# Patient Record
Sex: FEMALE | Race: BLACK OR AFRICAN AMERICAN | NOT HISPANIC OR LATINO | Employment: OTHER | URBAN - METROPOLITAN AREA
[De-identification: names, ages, dates, MRNs, and addresses within clinical notes are randomized per-mention and may not be internally consistent; named-entity substitution may affect disease eponyms.]

---

## 2023-10-02 ENCOUNTER — TELEPHONE (OUTPATIENT)
Dept: NEUROLOGY | Facility: CLINIC | Age: 78
End: 2023-10-02
Payer: COMMERCIAL

## 2023-10-02 NOTE — TELEPHONE ENCOUNTER
Called Back- No Answer, Left detailed message to return call the refill line with medication names, local pharmacy- her refill message was not clear.

## 2023-12-28 ENCOUNTER — APPOINTMENT (OUTPATIENT)
Dept: CARDIOLOGY | Facility: HOSPITAL | Age: 78
DRG: 300 | End: 2023-12-28
Payer: COMMERCIAL

## 2023-12-28 ENCOUNTER — HOSPITAL ENCOUNTER (INPATIENT)
Facility: HOSPITAL | Age: 78
LOS: 1 days | Discharge: HOME | DRG: 300 | End: 2023-12-29
Attending: EMERGENCY MEDICINE | Admitting: INTERNAL MEDICINE
Payer: COMMERCIAL

## 2023-12-28 ENCOUNTER — ANCILLARY PROCEDURE (OUTPATIENT)
Dept: EMERGENCY MEDICINE | Facility: HOSPITAL | Age: 78
DRG: 300 | End: 2023-12-28
Payer: COMMERCIAL

## 2023-12-28 ENCOUNTER — APPOINTMENT (OUTPATIENT)
Dept: RADIOLOGY | Facility: HOSPITAL | Age: 78
DRG: 300 | End: 2023-12-28
Payer: COMMERCIAL

## 2023-12-28 DIAGNOSIS — R94.31 ACUTE ELECTROCARDIOGRAM CHANGES: ICD-10-CM

## 2023-12-28 DIAGNOSIS — I82.519 CHRONIC DEEP VEIN THROMBOSIS (DVT) OF FEMORAL VEIN, UNSPECIFIED LATERALITY (MULTI): Primary | ICD-10-CM

## 2023-12-28 DIAGNOSIS — R06.02 SOB (SHORTNESS OF BREATH): ICD-10-CM

## 2023-12-28 LAB
ALBUMIN SERPL BCP-MCNC: 3.8 G/DL (ref 3.4–5)
ALP SERPL-CCNC: 85 U/L (ref 33–136)
ALT SERPL W P-5'-P-CCNC: 4 U/L (ref 7–45)
ANION GAP BLDV CALCULATED.4IONS-SCNC: 8 MMOL/L (ref 10–25)
ANION GAP SERPL CALC-SCNC: 10 MMOL/L (ref 10–20)
APTT PPP: 45 SECONDS (ref 27–38)
AST SERPL W P-5'-P-CCNC: 10 U/L (ref 9–39)
ATRIAL RATE: 85 BPM
BASE EXCESS BLDV CALC-SCNC: -0.5 MMOL/L (ref -2–3)
BASOPHILS # BLD AUTO: 0.03 X10*3/UL (ref 0–0.1)
BASOPHILS NFR BLD AUTO: 0.6 %
BILIRUB SERPL-MCNC: 0.3 MG/DL (ref 0–1.2)
BNP SERPL-MCNC: 61 PG/ML (ref 0–99)
BODY TEMPERATURE: 37 DEGREES CELSIUS
BUN SERPL-MCNC: 21 MG/DL (ref 6–23)
CA-I BLDV-SCNC: 1.34 MMOL/L (ref 1.1–1.33)
CALCIUM SERPL-MCNC: 9.7 MG/DL (ref 8.6–10.6)
CARDIAC TROPONIN I PNL SERPL HS: 14 NG/L (ref 0–34)
CARDIAC TROPONIN I PNL SERPL HS: 19 NG/L (ref 0–34)
CHLORIDE BLDV-SCNC: 111 MMOL/L (ref 98–107)
CHLORIDE SERPL-SCNC: 112 MMOL/L (ref 98–107)
CO2 SERPL-SCNC: 23 MMOL/L (ref 21–32)
CREAT SERPL-MCNC: 1.79 MG/DL (ref 0.5–1.05)
EOSINOPHIL # BLD AUTO: 0.13 X10*3/UL (ref 0–0.4)
EOSINOPHIL NFR BLD AUTO: 2.6 %
ERYTHROCYTE [DISTWIDTH] IN BLOOD BY AUTOMATED COUNT: 14.3 % (ref 11.5–14.5)
FLUAV RNA RESP QL NAA+PROBE: NOT DETECTED
FLUBV RNA RESP QL NAA+PROBE: NOT DETECTED
GFR SERPL CREATININE-BSD FRML MDRD: 29 ML/MIN/1.73M*2
GLUCOSE BLDV-MCNC: 97 MG/DL (ref 74–99)
GLUCOSE SERPL-MCNC: 102 MG/DL (ref 74–99)
HCO3 BLDV-SCNC: 25.4 MMOL/L (ref 22–26)
HCT VFR BLD AUTO: 31.9 % (ref 36–46)
HCT VFR BLD EST: 33 % (ref 36–46)
HGB BLD-MCNC: 11.1 G/DL (ref 12–16)
HGB BLDV-MCNC: 11.1 G/DL (ref 12–16)
IMM GRANULOCYTES # BLD AUTO: 0.01 X10*3/UL (ref 0–0.5)
IMM GRANULOCYTES NFR BLD AUTO: 0.2 % (ref 0–0.9)
INR PPP: 2.7 (ref 0.9–1.1)
LACTATE BLDV-SCNC: 1 MMOL/L (ref 0.4–2)
LYMPHOCYTES # BLD AUTO: 1.29 X10*3/UL (ref 0.8–3)
LYMPHOCYTES NFR BLD AUTO: 26.2 %
MCH RBC QN AUTO: 31.5 PG (ref 26–34)
MCHC RBC AUTO-ENTMCNC: 34.8 G/DL (ref 32–36)
MCV RBC AUTO: 91 FL (ref 80–100)
MONOCYTES # BLD AUTO: 0.37 X10*3/UL (ref 0.05–0.8)
MONOCYTES NFR BLD AUTO: 7.5 %
NEUTROPHILS # BLD AUTO: 3.1 X10*3/UL (ref 1.6–5.5)
NEUTROPHILS NFR BLD AUTO: 62.9 %
NRBC BLD-RTO: 0 /100 WBCS (ref 0–0)
OXYHGB MFR BLDV: 75.7 % (ref 45–75)
P AXIS: 22 DEGREES
P OFFSET: 182 MS
P ONSET: 135 MS
PCO2 BLDV: 46 MM HG (ref 41–51)
PH BLDV: 7.35 PH (ref 7.33–7.43)
PLATELET # BLD AUTO: 190 X10*3/UL (ref 150–450)
PO2 BLDV: 49 MM HG (ref 35–45)
POTASSIUM BLDV-SCNC: 4.1 MMOL/L (ref 3.5–5.3)
POTASSIUM SERPL-SCNC: 4 MMOL/L (ref 3.5–5.3)
PR INTERVAL: 176 MS
PROT SERPL-MCNC: 7 G/DL (ref 6.4–8.2)
PROTHROMBIN TIME: 31.2 SECONDS (ref 9.8–12.8)
Q ONSET: 223 MS
QRS COUNT: 14 BEATS
QRS DURATION: 136 MS
QT INTERVAL: 414 MS
QTC CALCULATION(BAZETT): 492 MS
QTC FREDERICIA: 465 MS
R AXIS: -57 DEGREES
RBC # BLD AUTO: 3.52 X10*6/UL (ref 4–5.2)
SAO2 % BLDV: 81 % (ref 45–75)
SARS-COV-2 RNA RESP QL NAA+PROBE: NOT DETECTED
SODIUM BLDV-SCNC: 140 MMOL/L (ref 136–145)
SODIUM SERPL-SCNC: 141 MMOL/L (ref 136–145)
T AXIS: 15 DEGREES
T OFFSET: 430 MS
VENTRICULAR RATE: 85 BPM
WBC # BLD AUTO: 4.9 X10*3/UL (ref 4.4–11.3)

## 2023-12-28 PROCEDURE — 99222 1ST HOSP IP/OBS MODERATE 55: CPT

## 2023-12-28 PROCEDURE — 93005 ELECTROCARDIOGRAM TRACING: CPT

## 2023-12-28 PROCEDURE — 93971 EXTREMITY STUDY: CPT | Performed by: RADIOLOGY

## 2023-12-28 PROCEDURE — 94640 AIRWAY INHALATION TREATMENT: CPT

## 2023-12-28 PROCEDURE — 85610 PROTHROMBIN TIME: CPT

## 2023-12-28 PROCEDURE — 99285 EMERGENCY DEPT VISIT HI MDM: CPT | Performed by: EMERGENCY MEDICINE

## 2023-12-28 PROCEDURE — 2500000001 HC RX 250 WO HCPCS SELF ADMINISTERED DRUGS (ALT 637 FOR MEDICARE OP)

## 2023-12-28 PROCEDURE — 71046 X-RAY EXAM CHEST 2 VIEWS: CPT | Performed by: RADIOLOGY

## 2023-12-28 PROCEDURE — 2500000002 HC RX 250 W HCPCS SELF ADMINISTERED DRUGS (ALT 637 FOR MEDICARE OP, ALT 636 FOR OP/ED)

## 2023-12-28 PROCEDURE — 93970 EXTREMITY STUDY: CPT

## 2023-12-28 PROCEDURE — 84132 ASSAY OF SERUM POTASSIUM: CPT

## 2023-12-28 PROCEDURE — 2500000004 HC RX 250 GENERAL PHARMACY W/ HCPCS (ALT 636 FOR OP/ED)

## 2023-12-28 PROCEDURE — 85025 COMPLETE CBC W/AUTO DIFF WBC: CPT

## 2023-12-28 PROCEDURE — 84484 ASSAY OF TROPONIN QUANT: CPT

## 2023-12-28 PROCEDURE — 71046 X-RAY EXAM CHEST 2 VIEWS: CPT

## 2023-12-28 PROCEDURE — 36415 COLL VENOUS BLD VENIPUNCTURE: CPT

## 2023-12-28 PROCEDURE — 99222 1ST HOSP IP/OBS MODERATE 55: CPT | Performed by: INTERNAL MEDICINE

## 2023-12-28 PROCEDURE — 83880 ASSAY OF NATRIURETIC PEPTIDE: CPT

## 2023-12-28 PROCEDURE — 1100000001 HC PRIVATE ROOM DAILY

## 2023-12-28 PROCEDURE — 87636 SARSCOV2 & INF A&B AMP PRB: CPT

## 2023-12-28 PROCEDURE — 93010 ELECTROCARDIOGRAM REPORT: CPT | Performed by: EMERGENCY MEDICINE

## 2023-12-28 RX ORDER — LISINOPRIL 40 MG/1
40 TABLET ORAL DAILY
COMMUNITY
Start: 2023-11-10

## 2023-12-28 RX ORDER — FOLIC ACID 1 MG/1
1 TABLET ORAL DAILY
COMMUNITY
Start: 2016-03-30

## 2023-12-28 RX ORDER — CARVEDILOL 25 MG/1
25 TABLET ORAL 2 TIMES DAILY
Status: DISCONTINUED | OUTPATIENT
Start: 2023-12-28 | End: 2023-12-29 | Stop reason: HOSPADM

## 2023-12-28 RX ORDER — FOLIC ACID 1 MG/1
1 TABLET ORAL DAILY
Status: DISCONTINUED | OUTPATIENT
Start: 2023-12-28 | End: 2023-12-29 | Stop reason: HOSPADM

## 2023-12-28 RX ORDER — TERAZOSIN 2 MG/1
2 CAPSULE ORAL NIGHTLY
Status: DISCONTINUED | OUTPATIENT
Start: 2023-12-28 | End: 2023-12-29 | Stop reason: HOSPADM

## 2023-12-28 RX ORDER — ATORVASTATIN CALCIUM 40 MG/1
40 TABLET, FILM COATED ORAL NIGHTLY
Status: DISCONTINUED | OUTPATIENT
Start: 2023-12-28 | End: 2023-12-29 | Stop reason: HOSPADM

## 2023-12-28 RX ORDER — QUETIAPINE FUMARATE 300 MG/1
300 TABLET, FILM COATED ORAL NIGHTLY
Status: DISCONTINUED | OUTPATIENT
Start: 2023-12-28 | End: 2023-12-29 | Stop reason: HOSPADM

## 2023-12-28 RX ORDER — HYDROCHLOROTHIAZIDE 12.5 MG/1
12.5 TABLET ORAL DAILY
COMMUNITY

## 2023-12-28 RX ORDER — ACETAMINOPHEN 325 MG/1
650 TABLET ORAL EVERY 6 HOURS PRN
Status: DISCONTINUED | OUTPATIENT
Start: 2023-12-28 | End: 2023-12-29 | Stop reason: HOSPADM

## 2023-12-28 RX ORDER — CARBIDOPA AND LEVODOPA 25; 100 MG/1; MG/1
1 TABLET ORAL 3 TIMES DAILY
COMMUNITY
Start: 2022-11-16

## 2023-12-28 RX ORDER — CARVEDILOL 25 MG/1
25 TABLET ORAL
COMMUNITY

## 2023-12-28 RX ORDER — NAPROXEN SODIUM 220 MG/1
81 TABLET, FILM COATED ORAL DAILY
COMMUNITY
Start: 2022-11-16

## 2023-12-28 RX ORDER — HYDRALAZINE HYDROCHLORIDE 20 MG/ML
5 INJECTION INTRAMUSCULAR; INTRAVENOUS EVERY 4 HOURS PRN
Status: DISCONTINUED | OUTPATIENT
Start: 2023-12-28 | End: 2023-12-29

## 2023-12-28 RX ORDER — QUETIAPINE FUMARATE 300 MG/1
300 TABLET, FILM COATED ORAL NIGHTLY
COMMUNITY

## 2023-12-28 RX ORDER — IPRATROPIUM BROMIDE AND ALBUTEROL SULFATE 2.5; .5 MG/3ML; MG/3ML
3 SOLUTION RESPIRATORY (INHALATION) 3 TIMES DAILY
Status: COMPLETED | OUTPATIENT
Start: 2023-12-28 | End: 2023-12-28

## 2023-12-28 RX ORDER — LISINOPRIL 20 MG/1
40 TABLET ORAL DAILY
Status: DISCONTINUED | OUTPATIENT
Start: 2023-12-28 | End: 2023-12-29 | Stop reason: HOSPADM

## 2023-12-28 RX ORDER — TERAZOSIN 2 MG/1
2 CAPSULE ORAL NIGHTLY
COMMUNITY
Start: 2023-10-16 | End: 2024-04-05 | Stop reason: HOSPADM

## 2023-12-28 RX ORDER — WARFARIN 2 MG/1
4 TABLET ORAL DAILY
Status: DISCONTINUED | OUTPATIENT
Start: 2023-12-28 | End: 2023-12-29 | Stop reason: HOSPADM

## 2023-12-28 RX ORDER — ATORVASTATIN CALCIUM 40 MG/1
40 TABLET, FILM COATED ORAL DAILY
COMMUNITY
Start: 2022-11-16

## 2023-12-28 RX ORDER — HYDROCHLOROTHIAZIDE 25 MG/1
12.5 TABLET ORAL DAILY
Status: DISCONTINUED | OUTPATIENT
Start: 2023-12-28 | End: 2023-12-29 | Stop reason: HOSPADM

## 2023-12-28 RX ORDER — DULOXETIN HYDROCHLORIDE 30 MG/1
30 CAPSULE, DELAYED RELEASE ORAL DAILY
COMMUNITY
Start: 2023-06-01 | End: 2024-04-05 | Stop reason: HOSPADM

## 2023-12-28 RX ORDER — PREDNISONE 20 MG/1
40 TABLET ORAL ONCE
Status: COMPLETED | OUTPATIENT
Start: 2023-12-28 | End: 2023-12-28

## 2023-12-28 RX ORDER — DULOXETIN HYDROCHLORIDE 30 MG/1
90 CAPSULE, DELAYED RELEASE ORAL DAILY
Status: DISCONTINUED | OUTPATIENT
Start: 2023-12-28 | End: 2023-12-29 | Stop reason: HOSPADM

## 2023-12-28 RX ORDER — CARBIDOPA AND LEVODOPA 25; 100 MG/1; MG/1
1 TABLET ORAL 3 TIMES DAILY
Status: DISCONTINUED | OUTPATIENT
Start: 2023-12-28 | End: 2023-12-29 | Stop reason: HOSPADM

## 2023-12-28 RX ORDER — HYDRALAZINE HYDROCHLORIDE 25 MG/1
50 TABLET, FILM COATED ORAL 3 TIMES DAILY
Status: DISCONTINUED | OUTPATIENT
Start: 2023-12-28 | End: 2023-12-28

## 2023-12-28 RX ORDER — HYDRALAZINE HYDROCHLORIDE 100 MG/1
100 TABLET, FILM COATED ORAL 3 TIMES DAILY
COMMUNITY
Start: 2023-11-15 | End: 2023-12-29 | Stop reason: HOSPADM

## 2023-12-28 RX ADMIN — TERAZOSIN HYDROCHLORIDE 2 MG: 2 CAPSULE ORAL at 22:23

## 2023-12-28 RX ADMIN — DULOXETINE HYDROCHLORIDE 90 MG: 30 CAPSULE, DELAYED RELEASE ORAL at 11:07

## 2023-12-28 RX ADMIN — IPRATROPIUM BROMIDE AND ALBUTEROL SULFATE 3 ML: .5; 3 SOLUTION RESPIRATORY (INHALATION) at 15:09

## 2023-12-28 RX ADMIN — IPRATROPIUM BROMIDE AND ALBUTEROL SULFATE 3 ML: .5; 3 SOLUTION RESPIRATORY (INHALATION) at 01:17

## 2023-12-28 RX ADMIN — CARBIDOPA AND LEVODOPA 1 TABLET: 25; 100 TABLET ORAL at 11:07

## 2023-12-28 RX ADMIN — WARFARIN SODIUM 4 MG: 2 TABLET ORAL at 18:05

## 2023-12-28 RX ADMIN — QUETIAPINE FUMARATE 300 MG: 300 TABLET ORAL at 21:09

## 2023-12-28 RX ADMIN — LISINOPRIL 40 MG: 20 TABLET ORAL at 11:07

## 2023-12-28 RX ADMIN — CARVEDILOL 25 MG: 25 TABLET, FILM COATED ORAL at 16:09

## 2023-12-28 RX ADMIN — CARBIDOPA AND LEVODOPA 1 TABLET: 25; 100 TABLET ORAL at 21:10

## 2023-12-28 RX ADMIN — CARVEDILOL 25 MG: 25 TABLET, FILM COATED ORAL at 21:09

## 2023-12-28 RX ADMIN — PREDNISONE 40 MG: 20 TABLET ORAL at 01:17

## 2023-12-28 RX ADMIN — IPRATROPIUM BROMIDE AND ALBUTEROL SULFATE 3 ML: .5; 3 SOLUTION RESPIRATORY (INHALATION) at 09:00

## 2023-12-28 RX ADMIN — HYDROCHLOROTHIAZIDE 12.5 MG: 25 TABLET ORAL at 11:07

## 2023-12-28 RX ADMIN — CARBIDOPA AND LEVODOPA 1 TABLET: 25; 100 TABLET ORAL at 16:09

## 2023-12-28 RX ADMIN — FOLIC ACID 1 MG: 1 TABLET ORAL at 11:07

## 2023-12-28 RX ADMIN — ATORVASTATIN CALCIUM 40 MG: 40 TABLET, FILM COATED ORAL at 21:09

## 2023-12-28 RX ADMIN — HYDRALAZINE HYDROCHLORIDE 5 MG: 20 INJECTION INTRAMUSCULAR; INTRAVENOUS at 19:49

## 2023-12-28 RX ADMIN — ACETAMINOPHEN 650 MG: 325 TABLET ORAL at 21:08

## 2023-12-28 SDOH — SOCIAL STABILITY: SOCIAL INSECURITY: HAVE YOU HAD THOUGHTS OF HARMING ANYONE ELSE?: NO

## 2023-12-28 SDOH — SOCIAL STABILITY: SOCIAL INSECURITY: WERE YOU ABLE TO COMPLETE ALL THE BEHAVIORAL HEALTH SCREENINGS?: YES

## 2023-12-28 SDOH — SOCIAL STABILITY: SOCIAL INSECURITY: DO YOU FEEL ANYONE HAS EXPLOITED OR TAKEN ADVANTAGE OF YOU FINANCIALLY OR OF YOUR PERSONAL PROPERTY?: NO

## 2023-12-28 SDOH — SOCIAL STABILITY: SOCIAL INSECURITY: ABUSE: ADULT

## 2023-12-28 SDOH — SOCIAL STABILITY: SOCIAL INSECURITY: ARE THERE ANY APPARENT SIGNS OF INJURIES/BEHAVIORS THAT COULD BE RELATED TO ABUSE/NEGLECT?: NO

## 2023-12-28 SDOH — SOCIAL STABILITY: SOCIAL INSECURITY: ARE YOU OR HAVE YOU BEEN THREATENED OR ABUSED PHYSICALLY, EMOTIONALLY, OR SEXUALLY BY ANYONE?: NO

## 2023-12-28 SDOH — HEALTH STABILITY: MENTAL HEALTH: HAVE YOU EVER DONE ANYTHING, STARTED TO DO ANYTHING, OR PREPARED TO DO ANYTHING TO END YOUR LIFE?: NO

## 2023-12-28 SDOH — SOCIAL STABILITY: SOCIAL INSECURITY: DO YOU FEEL UNSAFE GOING BACK TO THE PLACE WHERE YOU ARE LIVING?: NO

## 2023-12-28 SDOH — HEALTH STABILITY: MENTAL HEALTH: SUICIDE ASSESSMENT: ADULT (C-SSRS)

## 2023-12-28 SDOH — HEALTH STABILITY: MENTAL HEALTH: HAVE YOU ACTUALLY HAD ANY THOUGHTS OF KILLING YOURSELF?: NO

## 2023-12-28 SDOH — SOCIAL STABILITY: SOCIAL INSECURITY: DOES ANYONE TRY TO KEEP YOU FROM HAVING/CONTACTING OTHER FRIENDS OR DOING THINGS OUTSIDE YOUR HOME?: NO

## 2023-12-28 SDOH — HEALTH STABILITY: MENTAL HEALTH: HAVE YOU WISHED YOU WERE DEAD OR WISHED YOU COULD GO TO SLEEP AND NOT WAKE UP?: NO

## 2023-12-28 SDOH — SOCIAL STABILITY: SOCIAL INSECURITY: HAS ANYONE EVER THREATENED TO HURT YOUR FAMILY OR YOUR PETS?: NO

## 2023-12-28 ASSESSMENT — ACTIVITIES OF DAILY LIVING (ADL)
WALKS IN HOME: INDEPENDENT
ADEQUATE_TO_COMPLETE_ADL: YES
GROOMING: INDEPENDENT
FEEDING YOURSELF: INDEPENDENT
ASSISTIVE_DEVICE: WALKER
JUDGMENT_ADEQUATE_SAFELY_COMPLETE_DAILY_ACTIVITIES: YES
DRESSING YOURSELF: INDEPENDENT
BATHING: INDEPENDENT
TOILETING: INDEPENDENT
HEARING - LEFT EAR: FUNCTIONAL
HEARING - RIGHT EAR: FUNCTIONAL
PATIENT'S MEMORY ADEQUATE TO SAFELY COMPLETE DAILY ACTIVITIES?: YES

## 2023-12-28 ASSESSMENT — LIFESTYLE VARIABLES
AUDIT-C TOTAL SCORE: 0
HAVE YOU EVER FELT YOU SHOULD CUT DOWN ON YOUR DRINKING: NO
HOW OFTEN DO YOU HAVE 6 OR MORE DRINKS ON ONE OCCASION: NEVER
HAVE PEOPLE ANNOYED YOU BY CRITICIZING YOUR DRINKING: NO
EVER FELT BAD OR GUILTY ABOUT YOUR DRINKING: NO
REASON UNABLE TO ASSESS: NO
HOW MANY STANDARD DRINKS CONTAINING ALCOHOL DO YOU HAVE ON A TYPICAL DAY: PATIENT DOES NOT DRINK
SKIP TO QUESTIONS 9-10: 1
AUDIT-C TOTAL SCORE: 0
EVER HAD A DRINK FIRST THING IN THE MORNING TO STEADY YOUR NERVES TO GET RID OF A HANGOVER: NO
HOW OFTEN DO YOU HAVE A DRINK CONTAINING ALCOHOL: NEVER

## 2023-12-28 ASSESSMENT — COGNITIVE AND FUNCTIONAL STATUS - GENERAL
MOBILITY SCORE: 23
DAILY ACTIVITIY SCORE: 24
DAILY ACTIVITIY SCORE: 24
PATIENT BASELINE BEDBOUND: NO
CLIMB 3 TO 5 STEPS WITH RAILING: A LITTLE
CLIMB 3 TO 5 STEPS WITH RAILING: A LITTLE
MOBILITY SCORE: 23

## 2023-12-28 ASSESSMENT — HEART SCORE
RISK FACTORS: >2 RISK FACTORS OR HX OF ATHEROSCLEROTIC DISEASE
ECG: NON-SPECIFIC REPOLARIZATION DISTURBANCE
TROPONIN: LESS THAN OR EQUAL TO NORMAL LIMIT
AGE: 65+
HEART SCORE: 5
HISTORY: SLIGHTLY SUSPICIOUS

## 2023-12-28 ASSESSMENT — PAIN SCALES - GENERAL
PAINLEVEL_OUTOF10: 0 - NO PAIN
PAINLEVEL_OUTOF10: 3
PAINLEVEL_OUTOF10: 0 - NO PAIN

## 2023-12-28 ASSESSMENT — PAIN - FUNCTIONAL ASSESSMENT
PAIN_FUNCTIONAL_ASSESSMENT: 0-10

## 2023-12-28 ASSESSMENT — COLUMBIA-SUICIDE SEVERITY RATING SCALE - C-SSRS
2. HAVE YOU ACTUALLY HAD ANY THOUGHTS OF KILLING YOURSELF?: NO
1. IN THE PAST MONTH, HAVE YOU WISHED YOU WERE DEAD OR WISHED YOU COULD GO TO SLEEP AND NOT WAKE UP?: NO
6. HAVE YOU EVER DONE ANYTHING, STARTED TO DO ANYTHING, OR PREPARED TO DO ANYTHING TO END YOUR LIFE?: NO

## 2023-12-28 ASSESSMENT — PAIN DESCRIPTION - LOCATION: LOCATION: HEAD

## 2023-12-28 ASSESSMENT — PATIENT HEALTH QUESTIONNAIRE - PHQ9
SUM OF ALL RESPONSES TO PHQ9 QUESTIONS 1 & 2: 0
2. FEELING DOWN, DEPRESSED OR HOPELESS: NOT AT ALL
1. LITTLE INTEREST OR PLEASURE IN DOING THINGS: NOT AT ALL

## 2023-12-28 NOTE — ED PROVIDER NOTES
HPI   No chief complaint on file.      HPI     Patient is a 78-year-old female presenting with a past medical history of COPD, DVT PE on Coumadin, CHF, CKD 4, hypertension presented to the ED due to shortness of breath.  Patient states she woke up in the [place] being short of breath.  Patient states associated dizziness, chest tightness.  Patient states she has had swelling in the legs mainly on the left.  Patient denies abdominal pain, increased inhaler use, productive cough with yellow sputum.  On arrival patient was on room air with saturation above 95%.  Patient was hemodynamically stable and states she came from home.  Patient states she gets her Coumadin checked every few weeks and states that they told her to take more.               No data recorded                Patient History   Past Medical History:   Diagnosis Date    Other pulmonary embolism without acute cor pulmonale (CMS/[facility]) 07/21/2013    Pulmonary embolism    Personal history of other mental and behavioral disorders     History of depression    Personal history of other venous thrombosis and embolism     History of deep venous thrombosis     Past Surgical History:   Procedure Laterality Date    KNEE SURGERY  04/06/2016    Knee Surgery    NECK SURGERY  04/06/2016    Neck Surgery     No family history on file.  Social History     Tobacco Use    Smoking status: Not on file    Smokeless tobacco: Not on file   Substance Use Topics    Alcohol use: Not on file    Drug use: Not on file       Physical Exam   ED Triage Vitals   Temp Pulse Resp BP   -- -- -- --      SpO2 Temp src Heart Rate Source Patient Position   -- -- -- --      BP Location FiO2 (%)     -- --       Physical Exam  Constitutional:       Appearance: Normal appearance.   HENT:      Head: Normocephalic and atraumatic.   Pulmonary:      Effort: Pulmonary effort is normal.      Breath sounds: Wheezing present.   Abdominal:      General: Abdomen is flat.      Palpations: Abdomen is soft.    Musculoskeletal:         General: Normal range of motion.      Cervical back: Normal range of motion.      Right lower leg: Edema present.      Left lower leg: Edema present.   Neurological:      General: No focal deficit present.      Mental Status: She is alert and oriented to person, place, and time.   Psychiatric:         Mood and Affect: Mood normal.         Behavior: Behavior normal.         ED Course & MDM   ED Course as of 12/28/23 0444   Thu Dec 28, 2023   0347 Troponin Series, (0, 1 HR) [TS]      ED Course User Index  [TS] Hanh Perez MD         Diagnoses as of 12/28/23 0444   SOB (shortness of breath)       Medical Decision Making    Patient is a 78-year-old female presenting to the ED due to shortness of breath.  Differential includes COPD exacerbation, heart failure exacerbation, DVT PE, pneumonia, COVID, flu.  Patient was started on DuoNeb inhalers x 3.  Patient also given 40 mg prednisone for potential COPD exacerbation.  Workup includes EKG and troponin which shows normal sinus rhythm, right bundle branch block, left anterior fascicular block, heart rate 85, QTc 492.  Patient had a troponin of 14.  Chest x-ray shows.  VBG pH 7.35, CO2 46, O2 49, bicarb 25.4.  Venous duplex ultrasound of bilateral lower extremities shows increased growth of clot in right femoral vein.  Patient had a BNP of 61 patient INR 2.7 COVID and flu negative.  Patient had point-of-care ultrasound which showed no signs of right heart strain, normal ejection fraction, mild B-lines.  Patient was admitted due to new right bundle branch block, left anterior fascicular block and worsening DVT clot.  Patient was admitted to cardiology for further workup    Hanh Perez MD  Emergency Medicine, PGY-1       Hanh Perez MD  Resident  12/28/23 8441

## 2023-12-28 NOTE — CARE PLAN
Problem: Pain  Goal: My pain/discomfort is manageable  Outcome: Progressing     Problem: Safety  Goal: I will remain free of falls  Outcome: Progressing     Problem: Daily Care  Goal: Daily care needs are met  Outcome: Progressing     Problem: Psychosocial Needs  Goal: Demonstrates ability to cope with hospitalization/illness  Outcome: Progressing  Goal: Collaborate with me, my family, and caregiver to identify my specific goals  Outcome: Progressing     Problem: Discharge Barriers  Goal: My discharge needs are met  Outcome: Progressing   The patient's goals for the shift include  results of testing.    The clinical goals for the shift include safety, ct chest r/o pe, no resp distress.    Over the shift, the patient did not make progress toward the following goals. Barriers to progression include n/a. Recommendations to address these barriers include n/a.

## 2023-12-28 NOTE — CONSULTS
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 currently on warfarin after failed Eliquis. Per PCP note follows with vascular. Expansion of DVT on Warfarin. Any changes to regimen?        History Of Present Illness:    Kelly aMrtinez is a 78 F with history of CKD stage IV, COPD, depression, DVT/PE (s/p IVC filter removed in 4/2016), HFpEF, HLD, HTN, obesity BMI 34.6, JOY, Vascular Parkinson's disease came to the emergency department with shortness of breath. Associated chest pain, dizziness, and swelling in legs present (mainly left). She voiced that the main reason she came to the hospital was due to shortness of breath. Vitals at admission saturating >95% on room air, hemodynamically stable blood pressure. Labs show multiple subtherapeutic INR results.  US LE Venous duplex 12/28/23 showed DVT in right common femoral bifurcation, right femoral (increased clot burden compared to previous ultrasound on 11/10/22). DVT in left common femoral vein (clot burden decreased compared to prior ultrasound from 11/10/22).     The patient was previously admitted from 11/10/22-11/16/22 for bilateral lower extremity pain. US LE Venous duplex on 11/10/22 showed DVT in left common femoral vein and left posterior tibial vein. She was previously taking Eliquis prior to new thrombotic event. She reported compliance to Eliquis. Reported as Eliquis failure. Bridged to Coumadin.    Admitted from 12/9/22-12/20/22 for LLE pain and weakness. The patient had subtherapeutic INRs which required bridging from heparin to Coumadin.     Last Recorded Vitals:  Vitals:    12/28/23 0707 12/28/23 0810 12/28/23 0952 12/28/23 1116   BP:       Pulse: 75 81 78 80   Resp: 14 23 16 15   Temp:       TempSrc:       SpO2: 95% 97% 94% 94%   Weight:       Height:           Last Labs:  CBC - 12/28/2023:  1:18  AM  4.9 11.1 190    31.9      CMP - 12/28/2023:  1:18 AM  9.7 7.0 10 --- 0.3   3.4 3.8 4 85      PTT - 12/28/2023:  1:18 AM  2.7   31.2 45     Troponin I, High Sensitivity   Date/Time Value Ref Range Status   12/28/2023 02:33 AM 19 0 - 34 ng/L Final   12/28/2023 01:18 AM 14 0 - 34 ng/L Final     Troponin I   Date/Time Value Ref Range Status   11/09/2022 07:26 PM 18 0 - 34 ng/L Final     Comment:     .  Less than 99th percentile of normal range cutoff-  Female and children under 18 years old <35 ng/L; Male <54 ng/L: Negative  Repeat testing should be performed if clinically indicated.   .  Female and children under 18 years old  ng/L; Male  ng/L:  Consistent with possible cardiac damage and possible increased clinical   risk. Serial measurements may help to assess extent of myocardial damage.   .  >120 ng/L: Consistent with cardiac damage, increased clinical risk and  myocardial infarction. Serial measurements may help assess extent of   myocardial damage.   .   NOTE: Children less than 1 year old may have higher baseline troponin   levels and results should be interpreted in conjunction with the overall   clinical context.  .  NOTE: Troponin I testing is performed using a different   testing methodology at [facility] than at other   [facility]. Direct result comparisons should only   be made within the same method.       BNP   Date/Time Value Ref Range Status   12/28/2023 01:18 AM 61 0 - 99 pg/mL Final   12/10/2022 01:08 AM 34 0 - 99 pg/mL Final     Comment:     .  <100 pg/mL - Heart failure unlikely  100-299 pg/mL - Intermediate probability of acute heart  .               failure exacerbation. Correlate with clinical  .               context and patient history.    >=300 pg/mL - Heart Failure likely. Correlate with clinical  .               context and patient history.   Biotin interference may cause falsely decreased results.   Patients taking a Biotin dose of up to 5 mg/day  should   refrain from taking Biotin for 24 hours before sample   collection. Providers may contact their local laboratory   for further information.     11/09/2022 07:26 PM 24 0 - 99 pg/mL Final     Comment:     .  <100 pg/mL - Heart failure unlikely  100-299 pg/mL - Intermediate probability of acute heart  .               failure exacerbation. Correlate with clinical  .               context and patient history.    >=300 pg/mL - Heart Failure likely. Correlate with clinical  .               context and patient history.   Biotin interference may cause falsely decreased results.   Patients taking a Biotin dose of up to 5 mg/day should   refrain from taking Biotin for 24 hours before sample   collection. Providers may contact their local laboratory   for further information.       Hemoglobin A1C   Date/Time Value Ref Range Status   02/17/2023 02:56 PM 5.3 % Final     Comment:          Diagnosis of Diabetes-Adults   Non-Diabetic: < or = 5.6%   Increased risk for developing diabetes: 5.7-6.4%   Diagnostic of diabetes: > or = 6.5%  .       Monitoring of Diabetes                Age (y)     Therapeutic Goal (%)   Adults:          >18           <7.0   Pediatrics:    13-18           <7.5                   7-12           <8.0                   0- 6            7.5-8.5   American Diabetes Association. Diabetes Care 33(S1), Jan 2010.       VLDL   Date/Time Value Ref Range Status   12/07/2022 02:20 PM 16 0 - 40 mg/dL Final      US LE Venous Duplex Left (Unilateral) 12/10/22:  IMPRESSION:  1. Partially occlusive deep vein thrombus within the left common  femoral, proximal/mid femoral, and popliteal veins. Clot burden  appears somewhat increased from 11/10/2022.  2. Partially occlusive deep vein thrombus within the imaged portion  of the right common femoral vein, new from 11/10/2022.    US Le Venous Duplex 11/10/22:  IMPRESSION:  1. Partially occlusive deep vein thrombosis within the left common  femoral vein.  2. Deep vein  thrombosis within the left posterior tibial vein.  3. No venous thrombus within the remainder of the visualized  bilateral lower extremity veins.    US LE Venous Duplex Left 08/2022  Chronic, nonocclusive, minimal echogenic thrombus involving the left CFV, FV and PV    USG DVT 07/2022:  Right: Chronic nonocclusive DVT of FV with spontaneous flow. Left: Chronic DVT of the CFV extending into the PV    US LE Venous Duplex 5/10/2016:  IMPRESSION:  1.  Interval partial recanalization of the left mid to distal femoral   and popliteal veins as above.  Redemonstration of chronic mural   echogenic nonocclusive clot within the bilateral common femoral veins   and proximal left femoral vein.   2. Otherwise no acute thrombosis in the remainder of the visualized   venous system.    US LE Venous Duplex 4/14/2016:  CONCLUSIONS:  Right Lower Venous: No Acute DVT. Chronic noted in the CFV, and FV.  Left Lower Venous: No Acute DVT. Chronic in the CFV,FV, and popliteal veins.    US LE Venous Duplex 4/28/2012:  IMPRESSION:     1. Extensive occlusive thrombus in the right lower extremity   extending from the right common femoral vein into the popliteal vein,   with some extension into the visualized deep femoral vein at the   femoral vein bifurcation. There is also incompletely occlusive   thrombus in the right great saphenous vein.     2. Incompletely occlusive thrombus in the right common femoral vein.      Past Medical History:  She has a past medical history of Other pulmonary embolism without acute cor pulmonale (CMS/HCC) (07/21/2013), Personal history of other mental and behavioral disorders, and Personal history of other venous thrombosis and embolism.    Past Surgical History:  She has a past surgical history that includes Knee surgery (04/06/2016) and Neck surgery (04/06/2016).      Social History:  She has no history on file for tobacco use, alcohol use, and drug use.    Family History:  No family history on file.      Allergies:  Meclizine, Naproxen, and Piperacillin-tazobactam-dextrs    Inpatient Medications:  Scheduled medications   Medication Dose Route Frequency    atorvastatin  40 mg oral Nightly    carbidopa-levodopa  1 tablet oral TID    DULoxetine  90 mg oral Daily    folic acid  1 mg oral Daily    hydroCHLOROthiazide  12.5 mg oral Daily    ipratropium-albuteroL  3 mL nebulization TID    lisinopril  40 mg oral Daily    QUEtiapine  300 mg oral Nightly    terazosin  2 mg oral Nightly     PRN medications   Medication     Continuous Medications   Medication Dose Last Rate     Outpatient Medications:  Current Outpatient Medications   Medication Instructions    apixaban (ELIQUIS) 5 mg, oral, 2 times daily    aspirin 81 mg, oral, Daily    atorvastatin (LIPITOR) 40 mg, oral, Daily    carbidopa-levodopa (Sinemet)  mg tablet 1 tablet, oral, 3 times daily    carvedilol (COREG) 25 mg, oral, 2 times daily with meals    DULoxetine (CYMBALTA) 90 mg, oral, Daily    folic acid (FOLVITE) 1 mg, oral, Daily    hydrALAZINE (APRESOLINE) 100 mg, oral, 3 times daily    hydroCHLOROthiazide (HYDRODIURIL) 12.5 mg, oral, Daily    lisinopril 40 mg, oral, Daily    QUEtiapine (SEROQUEL) 300 mg, oral, Nightly    terazosin (HYTRIN) 2 mg, oral, Nightly       Physical Exam:  Constitutional: No acute distress  Heart: Normal rate, regular rhythm  Lungs: decreased breath sounds at the lung bases  Abdomen: Soft, nontender  Extremities: DP 2+ PT 2+ bilateral lower extremity swelling  Neck: Supple nontender  Neurologic: AAOx3     Assessment/Plan   78 F with history of CKD stage IV, COPD, depression, DVT/PE (s/p IVC filter removed in 4/2016), HFpEF, HLD, HTN, obesity, JOY, Vascular Parkinson's disease came to the emergency department with shortness of breath. LE Venous Duplex showed DVT in right common femoral bifurcation, right femoral (increased clot burden compared to previous ultrasound on 11/10/22), and left common femoral vein. The patient has been  on Warfarin since 11/2022. On review of labs, she's had multiple subtherapeutic INR levels. Patient reported previous compliance to Eliquis in 11/2022 after which she was found to have DVT of left common femoral vein and left posterior tibial vein. This caused the transition from Eliquis to Warfarin (Eliquis failure).  Hb and platelets stable. No bleeding noted.    #Acute on Chronic DVT in right common femoral, right femoral, left common femoral vein  #recurrent unprovoked VTE  #Previous Eliquis failure in 11/2022  #Subtherapeutic INRs prior to admission     Recommendations:  - Recommend CT PE due to shortness of breath in setting of acute on chronic DVT  - Continue Coumadin treatment while in the hospital, daily INR/CBC  - Continue follow up with Coumadin clinic. Patient has difficulty with transportation to her current Coumadin Clinic.  - Nutrition consult for diet/coumadin education     Mehran Sage MD  Vascular Medicine    I saw and evaluated the patient. I personally obtained the key and critical portions of the history and physical exam or was physically present for key and critical portions performed by the resident/fellow. I reviewed the resident/fellow's documentation and discussed the patient with the resident/fellow. I agree with the resident/fellow's medical decision making as documented in the note.    Per chart review, the last time the patient was therapeutic on coumadin was in August and since then all her INR's have been subtherapeutic and checked only once a month  She had failed DOAC prior, given her kidney dysfunction she is not an ideal candidate for Lovenox or jania. Continue Coumadin  Please check with [facility] to ensure that the patient is followed closely, alternative could be arranging for home health care with INR checks followed by her PCP as patient has difficulty with transportation  Hard to tell if her shortness of breath is old or new, but given evidence of acute DVT and  complains of shortness of breath and lightheadedness, please proceed with CT PE    Linda Kee MD

## 2023-12-28 NOTE — ED TRIAGE NOTES
"Pt comes in for a cough from home. States that she felt very SOB and felt like she \"might pass out.\"  Pt alert and oriented. Speaking in full sentences. No tri-poding or pursed lip breathing. Pleasant.  "

## 2023-12-28 NOTE — H&P
History Of Present Illness  Kelly Martinez is a 78 y.o. female presenting with history significant for 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, HFpEF, HTN, PD on carbidopa/levadopa, Schizophrenia/depression (followed by  psychiatry), JOY on CPAP, COPD not on o2,  CKD stage IV, and chronic tobacco use who presented with SOB.  The patient reports that her shortness of breath worsened from her baseline suddenly yesterday when she was sitting in her chair watching TV.  She suddenly felt like she was not able to breathe she is unsure how long this episode lasted for.  Throughout this episode she did not have any chest pain.  She did not lose consciousness.  She never fell and never hit her head. At baseline she notes shortness of breath with exertion.  With regards to her sleep schedule she says that she takes many naps throughout the day and it is not uncommon for her to have poor sleep at night.  Interview was limited because the patient was quite sleepy and unable to participate consistently. Attempted to call daughter but was sent to voicemail.       Past Medical History  Past Medical History:   Diagnosis Date    Other pulmonary embolism without acute cor pulmonale (CMS/[facility]) 07/21/2013    Pulmonary embolism    Personal history of other mental and behavioral disorders     History of depression    Personal history of other venous thrombosis and embolism     History of deep venous thrombosis       Surgical History  Past Surgical History:   Procedure Laterality Date    KNEE SURGERY  04/06/2016    Knee Surgery    NECK SURGERY  04/06/2016    Neck Surgery        Social History  Reports smoking 1 ppd for over 60 years. Denies alcohol use. No recreational drug use.    Family History  No family history on file.     Allergies  Meclizine, Naproxen, and Piperacillin-tazobactam-dextrs    Medications  Medication Name Instruction   amLODIPine Besylate 5 MG Oral Tablet TAKE 1 TABLET DAILY FOR BLOOD PRESSURE.   Ammonium Lactate 12 % External Lotion    "  Arthritis Pain Relief 650 MG Oral Tablet Extended Release     Asmanex (30 Metered Doses) 220 MCG/ACT Inhalation Aerosol Powder Breath Activated     Aspirin 81 MG Oral Tablet Chewable     Aspirin 81 MG TABS Take 1 tablet daily   Atorvastatin Calcium 40 MG Oral Tablet     Carbidopa-Levodopa  MG Oral Tablet Take 1 tabs three times a day   Carvedilol 12.5 MG Oral Tablet     Carvedilol 25 MG Oral Tablet Take 1 tablet twice daily   Diclofenac Sodium 1 % External Gel     DULoxetine HCl CPEP TAKE 90 MG Daily   Eliquis 5 MG Oral Tablet     Folic Acid 1 MG Oral Tablet TAKE 1 TABLET DAILY.   hydrALAZINE HCl - 50 MG Oral Tablet TAKE 1 TABLET 3 times daily   hydroCHLOROthiazide 12.5 MG Oral Tablet TAKE 1 TABLET DAILY.   Lisinopril 40 MG Oral Tablet Take 1 tablet daily   Pulmicort Flexhaler 90 MCG/ACT Inhalation Aerosol Powder Breath Activated     QUEtiapine Fumarate 300 MG Oral Tablet TAKE 1 TABLET AT BEDTIME.   Siltussin  MG/5ML Oral Liquid     Terazosin HCl - 2 MG Oral Capsule TAKE 1 CAPSULE AT BEDTIME   traZODone HCl - 150 MG Oral Tablet     traZODone HCl - 50 MG Oral Tablet TAKE 1 TABLET AT BEDTIME AS NEEDED FOR SLEEP.   Warfarin Sodium 3 MG Oral Tablet Please take pill Saturday and Sunday   Warfarin Sodium 4 MG Oral Tablet Please take pill monday through Friday       Review of Systems  10 point review of systems negative other than as mentioned in HPI.    Physical Exam    General: sleepy and difficult to converse with; but awakened after sternal rub, A&O x 3, unclear speech  HEENT: NCAT  Pulm/Resp: non-labored respirations on room air, lungs clear to auscultation  CV: normal rate and regular rhythm  Abdomen: Soft, distended.  Extremities: WWP; legs slightly swollen  Skin: No rash or lesion     Last Recorded Vitals  Blood pressure (!) 137/100, pulse 75, temperature 36.7 °C (98.1 °F), temperature source Oral, resp. rate 14, height 1.702 m (5' 7\"), weight 102 kg (224 lb 13.9 oz), SpO2 95 %.       Assessment/Plan "   Principal Problem:    SOB (shortness of breath)    Kelly Martinez is a 78 y.o. female presenting with history significant for 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, HFpEF, HTN, PD on carbidopa/levadopa, Schizophrenia/depression (followed by  psychiatry), JOY on CPAP, COPD not on o2,  CKD stage IV, and chronic tobacco use who presented with SOB. Workup to date largely negative except for DVT scan which showed possible expansion of lower extremity DVT. She has also been hypertensive with systolics consistently > 200 since admission but she is asymptomatic. Plan to resume her home medications and consult vascular medicine to optimize anticoagulation therapy.    # HFpEF (EF 65% - Aug 2022)  Follows with cardiology  - limited TTE ordered    #Hypertension  Patient reports that her blood pressure is consistently in the 200s.   - Resume hydrochlorothiazide, carvedilol, lisinopril  - Hold amlodipine for now and and see how patient responds    #History of PE and DVT  On warfarin. Patient reports that she is compliant. Vascular medicine consulted and recommended continuing warfarin.  - vascular medicine consult  - continue warfarin per home dosing    #CKD stage IV  Was seen by Urology on 2/2023 (Dr. Nilesh Marino). Was referred to nephrology for April 2023 put this is visit is not documented.  - CTM    #History of dysuria  - noted    # HLD  - Atorvastatin    #JOY  Unclear if patient uses CPAP at home.  -Noted    #Vascular parkinsonism  Last seen by neurology on 8/2023  - Levodopa/Carbidopa    #Schizophrenia/depression  Followed by  Psychiatry (last Aug 2022)       F: prn   E: prn   N: regular   A: PIV  DVT Ppx: warfarin  Code status: Full Code (confirmed on admission)  NOK:   Noris (daughter) [phone]      Stanley Ohara MD PhD

## 2023-12-28 NOTE — NURSING NOTE
Pt admitted in stable condition. Assessment, skin check, vitals completed.  Pt oriented to unit, hourly rounding, call light.  Pt has no needs at this time.

## 2023-12-29 ENCOUNTER — PHARMACY VISIT (OUTPATIENT)
Dept: PHARMACY | Facility: CLINIC | Age: 78
End: 2023-12-29
Payer: COMMERCIAL

## 2023-12-29 ENCOUNTER — APPOINTMENT (OUTPATIENT)
Dept: CARDIOLOGY | Facility: HOSPITAL | Age: 78
DRG: 300 | End: 2023-12-29
Payer: COMMERCIAL

## 2023-12-29 ENCOUNTER — APPOINTMENT (OUTPATIENT)
Dept: RADIOLOGY | Facility: HOSPITAL | Age: 78
DRG: 300 | End: 2023-12-29
Payer: COMMERCIAL

## 2023-12-29 VITALS
TEMPERATURE: 97.9 F | SYSTOLIC BLOOD PRESSURE: 168 MMHG | OXYGEN SATURATION: 95 % | HEIGHT: 68 IN | WEIGHT: 207.45 LBS | BODY MASS INDEX: 31.44 KG/M2 | HEART RATE: 64 BPM | RESPIRATION RATE: 18 BRPM | DIASTOLIC BLOOD PRESSURE: 92 MMHG

## 2023-12-29 PROBLEM — R06.02 SOB (SHORTNESS OF BREATH): Status: RESOLVED | Noted: 2023-12-28 | Resolved: 2023-12-29

## 2023-12-29 LAB
ALBUMIN SERPL BCP-MCNC: 3.8 G/DL (ref 3.4–5)
ANION GAP SERPL CALC-SCNC: 13 MMOL/L (ref 10–20)
APTT PPP: 46 SECONDS (ref 27–38)
BUN SERPL-MCNC: 28 MG/DL (ref 6–23)
CALCIUM SERPL-MCNC: 9.3 MG/DL (ref 8.6–10.6)
CHLORIDE SERPL-SCNC: 109 MMOL/L (ref 98–107)
CO2 SERPL-SCNC: 22 MMOL/L (ref 21–32)
CREAT SERPL-MCNC: 1.95 MG/DL (ref 0.5–1.05)
EJECTION FRACTION APICAL 4 CHAMBER: 60.2
EJECTION FRACTION: 54
ERYTHROCYTE [DISTWIDTH] IN BLOOD BY AUTOMATED COUNT: 14.5 % (ref 11.5–14.5)
GFR SERPL CREATININE-BSD FRML MDRD: 26 ML/MIN/1.73M*2
GLUCOSE SERPL-MCNC: 85 MG/DL (ref 74–99)
HCT VFR BLD AUTO: 35.9 % (ref 36–46)
HGB BLD-MCNC: 11.6 G/DL (ref 12–16)
INR PPP: 3.2 (ref 0.9–1.1)
LEFT ATRIUM VOLUME AREA LENGTH INDEX BSA: 23.8
LEFT VENTRICLE INTERNAL DIMENSION DIASTOLE: 4.15 (ref 3.5–6)
LEFT VENTRICULAR OUTFLOW TRACT DIAMETER: 1.94
MAGNESIUM SERPL-MCNC: 1.82 MG/DL (ref 1.6–2.4)
MCH RBC QN AUTO: 30.8 PG (ref 26–34)
MCHC RBC AUTO-ENTMCNC: 32.3 G/DL (ref 32–36)
MCV RBC AUTO: 95 FL (ref 80–100)
MITRAL VALVE E/A RATIO: 0.98
MITRAL VALVE E/E' RATIO: 26.82
NRBC BLD-RTO: 0 /100 WBCS (ref 0–0)
PHOSPHATE SERPL-MCNC: 2.4 MG/DL (ref 2.5–4.9)
PLATELET # BLD AUTO: 212 X10*3/UL (ref 150–450)
POTASSIUM SERPL-SCNC: 3.9 MMOL/L (ref 3.5–5.3)
PROTHROMBIN TIME: 36.7 SECONDS (ref 9.8–12.8)
RBC # BLD AUTO: 3.77 X10*6/UL (ref 4–5.2)
RIGHT VENTRICLE FREE WALL PEAK S': 9
SODIUM SERPL-SCNC: 140 MMOL/L (ref 136–145)
TRICUSPID ANNULAR PLANE SYSTOLIC EXCURSION: 2
WBC # BLD AUTO: 6.2 X10*3/UL (ref 4.4–11.3)

## 2023-12-29 PROCEDURE — 2500000004 HC RX 250 GENERAL PHARMACY W/ HCPCS (ALT 636 FOR OP/ED)

## 2023-12-29 PROCEDURE — 71275 CT ANGIOGRAPHY CHEST: CPT

## 2023-12-29 PROCEDURE — 2500000001 HC RX 250 WO HCPCS SELF ADMINISTERED DRUGS (ALT 637 FOR MEDICARE OP)

## 2023-12-29 PROCEDURE — 99238 HOSP IP/OBS DSCHRG MGMT 30/<: CPT

## 2023-12-29 PROCEDURE — 85610 PROTHROMBIN TIME: CPT

## 2023-12-29 PROCEDURE — 93308 TTE F-UP OR LMTD: CPT | Performed by: INTERNAL MEDICINE

## 2023-12-29 PROCEDURE — 80069 RENAL FUNCTION PANEL: CPT

## 2023-12-29 PROCEDURE — 85027 COMPLETE CBC AUTOMATED: CPT

## 2023-12-29 PROCEDURE — 71275 CT ANGIOGRAPHY CHEST: CPT | Performed by: RADIOLOGY

## 2023-12-29 PROCEDURE — 36415 COLL VENOUS BLD VENIPUNCTURE: CPT

## 2023-12-29 PROCEDURE — 93325 DOPPLER ECHO COLOR FLOW MAPG: CPT | Performed by: INTERNAL MEDICINE

## 2023-12-29 PROCEDURE — 99232 SBSQ HOSP IP/OBS MODERATE 35: CPT | Performed by: NURSE PRACTITIONER

## 2023-12-29 PROCEDURE — 83735 ASSAY OF MAGNESIUM: CPT

## 2023-12-29 PROCEDURE — 93321 DOPPLER ECHO F-UP/LMTD STD: CPT | Performed by: INTERNAL MEDICINE

## 2023-12-29 PROCEDURE — 93325 DOPPLER ECHO COLOR FLOW MAPG: CPT

## 2023-12-29 PROCEDURE — RXMED WILLOW AMBULATORY MEDICATION CHARGE

## 2023-12-29 PROCEDURE — 2550000001 HC RX 255 CONTRASTS: Performed by: INTERNAL MEDICINE

## 2023-12-29 RX ORDER — WARFARIN 2 MG/1
TABLET ORAL
Qty: 60 TABLET | Refills: 0 | Status: SHIPPED | OUTPATIENT
Start: 2023-12-29 | End: 2024-03-31 | Stop reason: DRUGHIGH

## 2023-12-29 RX ORDER — WARFARIN 2 MG/1
2 TABLET ORAL ONCE
Status: COMPLETED | OUTPATIENT
Start: 2023-12-29 | End: 2023-12-29

## 2023-12-29 RX ORDER — HYDRALAZINE HYDROCHLORIDE 20 MG/ML
10 INJECTION INTRAMUSCULAR; INTRAVENOUS EVERY 4 HOURS PRN
Status: DISCONTINUED | OUTPATIENT
Start: 2023-12-29 | End: 2023-12-29 | Stop reason: HOSPADM

## 2023-12-29 RX ADMIN — PERFLUTREN 1.5 ML OF DILUTION: 6.52 INJECTION, SUSPENSION INTRAVENOUS at 10:35

## 2023-12-29 RX ADMIN — CARBIDOPA AND LEVODOPA 1 TABLET: 25; 100 TABLET ORAL at 09:00

## 2023-12-29 RX ADMIN — HYDROCHLOROTHIAZIDE 12.5 MG: 25 TABLET ORAL at 08:59

## 2023-12-29 RX ADMIN — WARFARIN SODIUM 2 MG: 2 TABLET ORAL at 17:47

## 2023-12-29 RX ADMIN — IOHEXOL 72 ML: 350 INJECTION, SOLUTION INTRAVENOUS at 06:59

## 2023-12-29 RX ADMIN — FOLIC ACID 1 MG: 1 TABLET ORAL at 08:59

## 2023-12-29 RX ADMIN — LISINOPRIL 40 MG: 20 TABLET ORAL at 08:59

## 2023-12-29 RX ADMIN — CARBIDOPA AND LEVODOPA 1 TABLET: 25; 100 TABLET ORAL at 15:15

## 2023-12-29 RX ADMIN — DULOXETINE HYDROCHLORIDE 90 MG: 30 CAPSULE, DELAYED RELEASE ORAL at 09:02

## 2023-12-29 RX ADMIN — CARVEDILOL 25 MG: 25 TABLET, FILM COATED ORAL at 08:59

## 2023-12-29 ASSESSMENT — COGNITIVE AND FUNCTIONAL STATUS - GENERAL
TOILETING: A LITTLE
WALKING IN HOSPITAL ROOM: A LITTLE
HELP NEEDED FOR BATHING: A LITTLE
MOBILITY SCORE: 19
STANDING UP FROM CHAIR USING ARMS: A LITTLE
CLIMB 3 TO 5 STEPS WITH RAILING: A LOT
MOVING TO AND FROM BED TO CHAIR: A LITTLE
DAILY ACTIVITIY SCORE: 22

## 2023-12-29 ASSESSMENT — PAIN SCALES - GENERAL: PAINLEVEL_OUTOF10: 0 - NO PAIN

## 2023-12-29 ASSESSMENT — PAIN - FUNCTIONAL ASSESSMENT: PAIN_FUNCTIONAL_ASSESSMENT: 0-10

## 2023-12-29 NOTE — CARE PLAN
Problem: Pain  Goal: My pain/discomfort is manageable  Outcome: Progressing     Problem: Safety  Goal: I will remain free of falls  Outcome: Progressing     Problem: Psychosocial Needs  Goal: Demonstrates ability to cope with hospitalization/illness  Outcome: Progressing   The patient's goals for the shift include  decreased SOB    The clinical goals for the shift include pts SBP will remain less than goal of 180    Pt remained HDS throughout this shift

## 2023-12-29 NOTE — CARE PLAN
The patient's goals for the shift include pt to remain free of fall and hds    The clinical goals for the shift include sbp <180    Over the shift, the patient remained stable today and sbp was <180; med optimization and pt is discharged today and to follow up with her pcp at [facility] and follow with their coumadin clinic on 1/2/2024; pt family did not wait to review discharge instructions or follow up appointments; pt left the floor via wheel chair with all her belongings, including her cell phone and ; prior to leaving, the piv was removed with tip intact and 2x2 applied.

## 2023-12-29 NOTE — SIGNIFICANT EVENT
Given patient presentation of SOB and hx of VTE, benefits of CT PE outweigh risk of NICOLÁS on CKD.

## 2023-12-29 NOTE — PROGRESS NOTES
"Kelly Martinez is a 78 y.o. female on day 1 of admission presenting with SOB (shortness of breath). Patient with long history of tobacco use - reports 1.5 ppd.    Imaging showed possible expansion of known BLE DVT (right femoral veins and common femoral bifurcation, and DVT, left CFV with resolution of thrombus within the left femoral and popliteal veins on prior imaging 12/10/23) likely due to sub-therapeutic INR.  Underwent CTA chest that was negative for PE.   Home Warfarin anticoagulation was resumed by Primary Service.  Vascular Medicine Service consulted for anticoagulation recommendations.     Subjective   Patient reports she smokes 1.5 packs of cigarettes a day.  Reports mild SOB that, per patient, is chronic.  Denies CP or bleeding.      Objective   Vascular Medicine Service consulted for anticoagulation recommendations.   Continues with home dose of Warfarin 4mg/day, with INR 3.2 today.     Physical Exam  Resting in bed in NAD  Respirations full and easy with fine rales in bilateral upper lobes, and diminished in bilateral bases; on RA  Normal heart sounds with regular rate and rhythm; vital signs significant for HTN today.  Abdomen soft and nontender to palpation  Extremities are warm to touch with palpable bilateral radial and DP pulses; mild swelling noted RLE from tops of foot up to calf; no edema noted left leg;  Patient is awake and alert, participates in conversation.    Last Recorded Vitals  Blood pressure (!) 168/92, pulse 64, temperature 36.6 °C (97.9 °F), resp. rate 18, height 1.727 m (5' 8\"), weight 94.1 kg (207 lb 7.3 oz), SpO2 95 %.  Intake/Output last 3 Shifts:  I/O last 3 completed shifts:  In: 240 (2.6 mL/kg) [P.O.:240]  Out: 500 (5.3 mL/kg) [Urine:500 (0.1 mL/kg/hr)]  Weight: 94.1 kg     Relevant Results  Scheduled medications  atorvastatin, 40 mg, oral, Nightly  carbidopa-levodopa, 1 tablet, oral, TID  carvedilol, 25 mg, oral, BID  DULoxetine, 90 mg, oral, Daily  folic acid, 1 mg, oral, " Daily  hydroCHLOROthiazide, 12.5 mg, oral, Daily  lisinopril, 40 mg, oral, Daily  QUEtiapine, 300 mg, oral, Nightly  terazosin, 2 mg, oral, Nightly  warfarin, 4 mg, oral, Daily      Results for orders placed or performed during the hospital encounter of 12/28/23 (from the past 24 hour(s))   Coagulation Screen   Result Value Ref Range    Protime 36.7 (H) 9.8 - 12.8 seconds    INR 3.2 (H) 0.9 - 1.1    aPTT 46 (H) 27 - 38 seconds   Renal function panel   Result Value Ref Range    Glucose 85 74 - 99 mg/dL    Sodium 140 136 - 145 mmol/L    Potassium 3.9 3.5 - 5.3 mmol/L    Chloride 109 (H) 98 - 107 mmol/L    Bicarbonate 22 21 - 32 mmol/L    Anion Gap 13 10 - 20 mmol/L    Urea Nitrogen 28 (H) 6 - 23 mg/dL    Creatinine 1.95 (H) 0.50 - 1.05 mg/dL    eGFR 26 (L) >60 mL/min/1.73m*2    Calcium 9.3 8.6 - 10.6 mg/dL    Phosphorus 2.4 (L) 2.5 - 4.9 mg/dL    Albumin 3.8 3.4 - 5.0 g/dL   CBC   Result Value Ref Range    WBC 6.2 4.4 - 11.3 x10*3/uL    nRBC 0.0 0.0 - 0.0 /100 WBCs    RBC 3.77 (L) 4.00 - 5.20 x10*6/uL    Hemoglobin 11.6 (L) 12.0 - 16.0 g/dL    Hematocrit 35.9 (L) 36.0 - 46.0 %    MCV 95 80 - 100 fL    MCH 30.8 26.0 - 34.0 pg    MCHC 32.3 32.0 - 36.0 g/dL    RDW 14.5 11.5 - 14.5 %    Platelets 212 150 - 450 x10*3/uL   Magnesium   Result Value Ref Range    Magnesium 1.82 1.60 - 2.40 mg/dL   Transthoracic Echo (TTE) Limited   Result Value Ref Range    LVOT diam 1.94     LV biplane EF 54     MV avg E/e' ratio 26.82     MV E/A ratio 0.98     LA vol index A/L 23.8     Tricuspid annular plane systolic excursion 2.0     RV free wall pk S' 9.00     LVIDd 4.15     LV A4C EF 60.2       VASC US LOWER EXTREMITY VENOUS DUPLEX BILATERAL;  12/28/2023 3:21 am       IMPRESSION:  1. Partially occlusive deep venous thrombus seen within the right  femoral veins as well as the common femoral bifurcation. Right-sided  clot burden of the femoral vein appears increased as compared to  ultrasound from 11/10/2022 concerning for acute  "thrombus.  2. Partially occlusive deep venous thrombus within the left common  femoral vein, with resolution of thrombus seen within the left  femoral and popliteal veins on prior imaging 12/10/2022. Left-sided  clot burden appears decreased as compared to prior ultrasound from  12/10/2022.  3. Nonvisualization of the left peroneal vein.      CT ANGIO CHEST FOR PULMONARY EMBOLISM;  12/29/2023 6:39 am       IMPRESSION:  1. No evidence of acute pulmonary embolism.  2. Mild enlargement of the pulmonary artery measuring up to 3.2 cm,  previously 3.4 cm. Recommend correlation with concern for pulmonary  arterial hypertension.  3. Moderate to severe coronary artery calcifications, recommend  correlation with cardiovascular risk factors.  4. There is diffuse peribronchial thickening with a prominent mosaic  perfusion pattern. Correlate with underlying small airway disease.  Correlate with small airway obstruction/bronchiolitis.      Assessment/Plan   Active Problems:  There are no active Hospital Problems.    78 year old female with medical history significant for CKD stage IV, COPD, depression, DVT/PE with use of Warfarin for anticoagulation (s/p IVC filter removed in 4/2016), HFpEF, HLD, HTN, obesity BMI 34.6, JOY, Vascular Parkinson's disease.    Presented to [facility] ED with complaints of acute onset of SOB, associated CP, dizziness and swelling of BLE.  Initial workup with US of BLE showed DVT in right femoral vein and at the common femoral bifurcation; from the US report: \"the clot burden of the femoral vein appears increased compared to US from 11/10/23\"; partially occlusive DVT in the left CFV with resolution of thrombus in the left femoral and popliteal veins compared to previous ultrasound on 11/10/22.   Continues with Warfarin home dose of 4mg Monday through Friday, and 3mg Saturday/Sunday.  Review of labs today shows stable hemoglobin 11.6 grams, stable platelets 212K and serum creatinine 1.95.    Patient given " Warfarin Med Alert band.     Date    INR     Warfarin Dose     Comments   11/28   2.7           4mg  11/29   3.2           4mg    Recommendations:  ~CONTINUE home regimen of Warfarin: Warfarin 4mg Monday through Friday, and Warfarin 3mg Saturday/Sunday  ~Goal INR 2-3.  ~Per Cardiology Service, patient has an established INR testing appointment at [facility] for Tuesday 1/2/24.    ~Tobacco cessation support.    Vascular Medicine Service will sign off; contact us on pager [phone] for questions.     Plan of care discussed with Dr. Kee  Plan of care discussed with the [facility] Cardiology Service    Kary MOJICA-CNP  Vascular Medicine Service   Pager [phone]

## 2023-12-29 NOTE — HOSPITAL COURSE
Kelly Martinez is a 78 y.o. female presenting with history significant for 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, HFpEF, HTN, PD on carbidopa/levadopa, Schizophrenia/depression (followed by  psychiatry), JOY on CPAP, COPD not on o2,  CKD stage IV, and chronic tobacco use who presented with SOB. Workup to date largely negative except for DVT scan which showed possible expansion of lower extremity DVT. She has also been hypertensive with systolics consistently > 200 since admission but she is asymptomatic. Resumed her home medications and consulted vascular medicine. Per vascular medicine, patient DVT expansion was due to subtherapeutic INR. Resumed home warfarin dosing. CTPE was obtained and showed no PE. Blood pressures improved after resuming home medications. Echo was performed and showed normal heart function. Will discharge the patient with close follow up with coumadin clinic to ensure her INR is at therapeutic levels.

## 2023-12-29 NOTE — PROGRESS NOTES
"Kelly Martinez is a 78 y.o. female on day 1 of admission presenting with SOB (shortness of breath).    Subjective   NAEO. Feeling a bit worse today because her thighs are sore. Breathing feels \"off\" but she denies being short of breath. Says thighs feel a bit swollen.       Objective     Physical Exam  General: awake and alert  HEENT: NCAT  Pulm/Resp: non-labored respirations on room air, lungs clear to auscultation  CV: normal rate and regular rhythm  Abdomen: Soft, distended.  Extremities: WWP; legs slightly swollen  Skin: No rash or lesion    Last Recorded Vitals  Blood pressure 159/87, pulse 74, temperature 36.3 °C (97.4 °F), temperature source Temporal, resp. rate 19, height 1.727 m (5' 8\"), weight 94.1 kg (207 lb 7.3 oz), SpO2 95 %.  Intake/Output last 3 Shifts:  I/O last 3 completed shifts:  In: 240 (2.6 mL/kg) [P.O.:240]  Out: 500 (5.3 mL/kg) [Urine:500 (0.1 mL/kg/hr)]  Weight: 94.1 kg                    Assessment/Plan   Principal Problem:    SOB (shortness of breath)    Kelly Martinez is a 78 y.o. female presenting with history significant for 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, HFpEF, HTN, PD on carbidopa/levadopa, Schizophrenia/depression (followed by  psychiatry), JOY on CPAP, COPD not on o2,  CKD stage IV, and chronic tobacco use who presented with SOB. Workup to date largely negative except for DVT scan which showed possible expansion of lower extremity DVT. She has also been hypertensive with systolics consistently > 200 since admission but she is asymptomatic. Resumed her home medications and consulted vascular medicine. Per vascular medicine, patient DVT expansion was due to subtherapeutic INR. Resumed home warfarin dosing. CTPE was obtained and showed no PE. Will plan to discharge patient after echo is done.     Changes Today:  - continued warfarin per vascular medicine  - CT-PE no PE  - echo pending  - discharge patient if echo is normal     # HFpEF (EF 65% - Aug 2022)  Follows with cardiology  - limited TTE " ordered     #Hypertension  Patient reports that her blood pressure is consistently in the 200s.   - Resume hydrochlorothiazide, carvedilol, lisinopril  - Hold amlodipine for now and and see how patient responds     #History of PE and DVT  On warfarin. Patient reports that she is compliant. Vascular medicine consulted and recommended continuing warfarin.  - vascular medicine consult  - continue warfarin per home dosing     #CKD stage IV  Was seen by Urology on 2/2023 (Dr. Nilesh Marino). Was referred to nephrology for April 2023 put this is visit is not documented.  - CTM     #History of dysuria  - noted     # HLD  - Atorvastatin     #JOY  Unclear if patient uses CPAP at home.  -Noted     #Vascular parkinsonism  Last seen by neurology on 8/2023  - Levodopa/Carbidopa     #Schizophrenia/depression  Followed by  Psychiatry (last Aug 2022)           F: prn   E: prn   N: regular   A: PIV  DVT Ppx: warfarin  Code status: Full Code (confirmed on admission)  NOK:   Noris (daughter) [phone]           Stanley Ohara MD PhD

## 2023-12-29 NOTE — DISCHARGE INSTRUCTIONS
Dear Mrs. Martinez,    You were seen at [facility] for shortness of breath. We performed imaging which showed that you did not have a cloth in your lungs. Vascular medicine also saw you and recommended that you continue on your home coumadin dosing. Ultrasound of your legs did show expansion of previously seen clot. This is likely because your blood is not thin enough from the blood thinner medication. They want you to continue your home coumadin regimen. And follow up with coumadin clinic where they can help you adjust your coumadin so that your clots will resolve. We also obtained an ultrasound of your heart. This showed that your heart function was normal. We restarted all your blood pressure medications while you were admitted because your blood pressure was very  high but did not restart the hydralazine yet. Please see your PCP to restart this medication when appropriate.     For your coumadin, please take 4mg daily Monday-Friday and 3mg on Saturday and Sunday. You can go anytime on Tuesday 1/2 to [facility] coumadin clinic to have your INR checked.     Thank you for entrusting us with your care.    Sincerely,  Your  Cardiology Team

## 2023-12-29 NOTE — DISCHARGE SUMMARY
Discharge Diagnosis  SOB (shortness of breath)    Issues Requiring Follow-Up  DVT    Test Results Pending At Discharge  Pending Labs       Order Current Status    BLOOD GAS VENOUS FULL PANEL In process            Hospital Course  Kelly Martinez is a 78 y.o. female presenting with history significant for 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, HFpEF, HTN, PD on carbidopa/levadopa, Schizophrenia/depression (followed by  psychiatry), JOY on CPAP, COPD not on o2,  CKD stage IV, and chronic tobacco use who presented with SOB. Workup to date largely negative except for DVT scan which showed possible expansion of lower extremity DVT. She has also been hypertensive with systolics consistently > 200 since admission but she is asymptomatic. Resumed her home medications and consulted vascular medicine. Per vascular medicine, patient DVT expansion was due to subtherapeutic INR. Resumed home warfarin dosing. CTPE was obtained and showed no PE. Blood pressures improved after resuming home medications. Echo was performed and showed normal heart function. Will discharge the patient with close follow up with coumadin clinic to ensure her INR is at therapeutic levels.    Pertinent Physical Exam At Time of Discharge  Physical Exam  General:  awake and alert  HEENT: NCAT  Pulm/Resp: non-labored respirations on room air, lungs clear to auscultation  CV: normal rate and regular rhythm  Abdomen: Soft, distended.  Extremities: WWP; legs slightly swollen  Skin: No rash or lesion    Vitals  Vitals:    12/29/23 0858   BP: (!) 180/129   Pulse: 70   Resp: 16   Temp: 36.6 °C (97.9 °F)   SpO2: 94%         Home Medications     Medication List      CONTINUE taking these medications     aspirin 81 mg chewable tablet   atorvastatin 40 mg tablet; Commonly known as: Lipitor   carbidopa-levodopa  mg tablet; Commonly known as: Sinemet   carvedilol 25 mg tablet; Commonly known as: Coreg   DULoxetine 30 mg DR capsule; Commonly known as: Cymbalta   folic acid 1 mg tablet;  Commonly known as: Folvite   hydroCHLOROthiazide 12.5 mg tablet; Commonly known as: HYDRODiuril   lisinopril 40 mg tablet   QUEtiapine 300 mg tablet; Commonly known as: SEROquel   terazosin 2 mg capsule; Commonly known as: Hytrin     STOP taking these medications     apixaban 5 mg tablet; Commonly known as: Eliquis   hydrALAZINE 100 mg tablet; Commonly known as: Apresoline       Outpatient Follow-Up  Coumadin clinic    Stanley Ohara MD PhD

## 2023-12-29 NOTE — CONSULTS
"Nutrition Diet Education:   Nutrition Assessment    Reason for Assessment: Provider consult order (Coumadin education)    Patient is a 78 y.o. female presenting with SOB. PMH 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, HFpEF, HTN, PD on carbidopa/levadopa, Schizophrenia/depression (followed by  psychiatry), JOY on CPAP, COPD not on o2,  CKD stage IV, and chronic tobacco use.       Nutrition History:  Food and Nutrient History: Met with Pt at bedside for nutrition education. Pt report to have been educated on warfarin in your diet before and was bela to recall important information about the education. RDN reviewed handout related to vitamin K intake and warfarin. No other nutrition related questions or concerns at this time         Anthropometrics:  Height: 172.7 cm (5' 8\")   Weight: 94.1 kg (207 lb 7.3 oz)   BMI (Calculated): 31.55             I/O:   Last BM Date: 12/27/23;          Dietary Orders (From admission, onward)       Start     Ordered    12/28/23 1456  Adult diet Regular  Diet effective now        Question:  Diet type  Answer:  Regular    12/28/23 1455                      Nutrition Education:   Provided Warfarin and your diet education handout- AND    Education Documentation  Nutrition Care Manual  Learner: Patient  Readiness: Acceptance  Method: Explanation  Response: Verbalizes Understanding  Comment: Review Vit K in coumadin guidelines        Time Spent/Follow-up Reminder:   Time Spent (min): 45 minutes  Last Date of Nutrition Visit: 12/29/23  Nutrition Follow-Up Needed?: Other (Comment) (Education provided)  "

## 2024-03-30 ENCOUNTER — APPOINTMENT (OUTPATIENT)
Dept: RADIOLOGY | Facility: HOSPITAL | Age: 79
DRG: 057 | End: 2024-03-30
Payer: COMMERCIAL

## 2024-03-30 ENCOUNTER — HOSPITAL ENCOUNTER (INPATIENT)
Facility: HOSPITAL | Age: 79
LOS: 5 days | Discharge: HOME | DRG: 057 | End: 2024-04-05
Attending: EMERGENCY MEDICINE | Admitting: STUDENT IN AN ORGANIZED HEALTH CARE EDUCATION/TRAINING PROGRAM
Payer: COMMERCIAL

## 2024-03-30 DIAGNOSIS — I73.9 PERIPHERAL VASCULAR DISEASE, UNSPECIFIED (CMS-HCC): ICD-10-CM
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
## 2024-03-30 DIAGNOSIS — I82.533: ICD-10-CM

## 2024-03-30 DIAGNOSIS — Z79.01 ANTICOAGULATED: ICD-10-CM

## 2024-03-30 DIAGNOSIS — I82.403 DEEP VEIN THROMBOSIS (DVT) OF BOTH LOWER EXTREMITIES, UNSPECIFIED CHRONICITY, UNSPECIFIED VEIN (MULTI): ICD-10-CM

## 2024-03-30 DIAGNOSIS — F51.01 PRIMARY INSOMNIA: ICD-10-CM

## 2024-03-30 DIAGNOSIS — R26.2 INABILITY TO WALK: ICD-10-CM

## 2024-03-30 LAB
ALBUMIN SERPL BCP-MCNC: 3.6 G/DL (ref 3.4–5)
ALP SERPL-CCNC: 95 U/L (ref 33–136)
ALT SERPL W P-5'-P-CCNC: 7 U/L (ref 7–45)
ANION GAP SERPL CALC-SCNC: 14 MMOL/L (ref 10–20)
AST SERPL W P-5'-P-CCNC: 10 U/L (ref 9–39)
BASOPHILS # BLD AUTO: 0.03 X10*3/UL (ref 0–0.1)
BASOPHILS NFR BLD AUTO: 0.8 %
BILIRUB SERPL-MCNC: 0.5 MG/DL (ref 0–1.2)
BUN SERPL-MCNC: 31 MG/DL (ref 6–23)
CALCIUM SERPL-MCNC: 9.4 MG/DL (ref 8.6–10.6)
CARDIAC TROPONIN I PNL SERPL HS: 18 NG/L (ref 0–34)
CARDIAC TROPONIN I PNL SERPL HS: 19 NG/L (ref 0–34)
CHLORIDE SERPL-SCNC: 108 MMOL/L (ref 98–107)
CO2 SERPL-SCNC: 23 MMOL/L (ref 21–32)
CREAT SERPL-MCNC: 2.33 MG/DL (ref 0.5–1.05)
EGFRCR SERPLBLD CKD-EPI 2021: 21 ML/MIN/1.73M*2
EOSINOPHIL # BLD AUTO: 0.14 X10*3/UL (ref 0–0.4)
EOSINOPHIL NFR BLD AUTO: 3.7 %
ERYTHROCYTE [DISTWIDTH] IN BLOOD BY AUTOMATED COUNT: 14.6 % (ref 11.5–14.5)
GLUCOSE SERPL-MCNC: 80 MG/DL (ref 74–99)
HCT VFR BLD AUTO: 30.9 % (ref 36–46)
HGB BLD-MCNC: 10.1 G/DL (ref 12–16)
IMM GRANULOCYTES # BLD AUTO: 0.01 X10*3/UL (ref 0–0.5)
IMM GRANULOCYTES NFR BLD AUTO: 0.3 % (ref 0–0.9)
INR PPP: 2 (ref 0.9–1.1)
LYMPHOCYTES # BLD AUTO: 1.27 X10*3/UL (ref 0.8–3)
LYMPHOCYTES NFR BLD AUTO: 33.2 %
MAGNESIUM SERPL-MCNC: 1.79 MG/DL (ref 1.6–2.4)
MCH RBC QN AUTO: 30.1 PG (ref 26–34)
MCHC RBC AUTO-ENTMCNC: 32.7 G/DL (ref 32–36)
MCV RBC AUTO: 92 FL (ref 80–100)
MONOCYTES # BLD AUTO: 0.33 X10*3/UL (ref 0.05–0.8)
MONOCYTES NFR BLD AUTO: 8.6 %
NEUTROPHILS # BLD AUTO: 2.04 X10*3/UL (ref 1.6–5.5)
NEUTROPHILS NFR BLD AUTO: 53.4 %
NRBC BLD-RTO: 0 /100 WBCS (ref 0–0)
PLATELET # BLD AUTO: 205 X10*3/UL (ref 150–450)
POTASSIUM SERPL-SCNC: 4.5 MMOL/L (ref 3.5–5.3)
PROT SERPL-MCNC: 6.6 G/DL (ref 6.4–8.2)
PROTHROMBIN TIME: 22.5 SECONDS (ref 9.8–12.8)
RBC # BLD AUTO: 3.35 X10*6/UL (ref 4–5.2)
SODIUM SERPL-SCNC: 140 MMOL/L (ref 136–145)
WBC # BLD AUTO: 3.8 X10*3/UL (ref 4.4–11.3)

## 2024-03-30 PROCEDURE — 93971 EXTREMITY STUDY: CPT | Performed by: RADIOLOGY

## 2024-03-30 PROCEDURE — 84484 ASSAY OF TROPONIN QUANT: CPT | Performed by: STUDENT IN AN ORGANIZED HEALTH CARE EDUCATION/TRAINING PROGRAM

## 2024-03-30 PROCEDURE — 72125 CT NECK SPINE W/O DYE: CPT

## 2024-03-30 PROCEDURE — 71270 CT THORAX DX C-/C+: CPT

## 2024-03-30 PROCEDURE — 72131 CT LUMBAR SPINE W/O DYE: CPT

## 2024-03-30 PROCEDURE — 71045 X-RAY EXAM CHEST 1 VIEW: CPT

## 2024-03-30 PROCEDURE — 2500000004 HC RX 250 GENERAL PHARMACY W/ HCPCS (ALT 636 FOR OP/ED): Performed by: EMERGENCY MEDICINE

## 2024-03-30 PROCEDURE — 72125 CT NECK SPINE W/O DYE: CPT | Performed by: RADIOLOGY

## 2024-03-30 PROCEDURE — 74177 CT ABD & PELVIS W/CONTRAST: CPT | Mod: FOREIGN READ | Performed by: RADIOLOGY

## 2024-03-30 PROCEDURE — 99285 EMERGENCY DEPT VISIT HI MDM: CPT | Performed by: EMERGENCY MEDICINE

## 2024-03-30 PROCEDURE — 71260 CT THORAX DX C+: CPT | Mod: FOREIGN READ | Performed by: RADIOLOGY

## 2024-03-30 PROCEDURE — 83735 ASSAY OF MAGNESIUM: CPT | Performed by: STUDENT IN AN ORGANIZED HEALTH CARE EDUCATION/TRAINING PROGRAM

## 2024-03-30 PROCEDURE — 96376 TX/PRO/DX INJ SAME DRUG ADON: CPT

## 2024-03-30 PROCEDURE — 2500000001 HC RX 250 WO HCPCS SELF ADMINISTERED DRUGS (ALT 637 FOR MEDICARE OP): Performed by: EMERGENCY MEDICINE

## 2024-03-30 PROCEDURE — 72128 CT CHEST SPINE W/O DYE: CPT | Mod: FOREIGN READ | Performed by: RADIOLOGY

## 2024-03-30 PROCEDURE — 99285 EMERGENCY DEPT VISIT HI MDM: CPT | Mod: 25

## 2024-03-30 PROCEDURE — 70450 CT HEAD/BRAIN W/O DYE: CPT | Performed by: RADIOLOGY

## 2024-03-30 PROCEDURE — 70450 CT HEAD/BRAIN W/O DYE: CPT

## 2024-03-30 PROCEDURE — 2500000001 HC RX 250 WO HCPCS SELF ADMINISTERED DRUGS (ALT 637 FOR MEDICARE OP): Performed by: STUDENT IN AN ORGANIZED HEALTH CARE EDUCATION/TRAINING PROGRAM

## 2024-03-30 PROCEDURE — 85610 PROTHROMBIN TIME: CPT | Performed by: STUDENT IN AN ORGANIZED HEALTH CARE EDUCATION/TRAINING PROGRAM

## 2024-03-30 PROCEDURE — 71045 X-RAY EXAM CHEST 1 VIEW: CPT | Mod: FOREIGN READ | Performed by: RADIOLOGY

## 2024-03-30 PROCEDURE — 85025 COMPLETE CBC W/AUTO DIFF WBC: CPT | Performed by: STUDENT IN AN ORGANIZED HEALTH CARE EDUCATION/TRAINING PROGRAM

## 2024-03-30 PROCEDURE — 72128 CT CHEST SPINE W/O DYE: CPT

## 2024-03-30 PROCEDURE — 96374 THER/PROPH/DIAG INJ IV PUSH: CPT

## 2024-03-30 PROCEDURE — 2500000004 HC RX 250 GENERAL PHARMACY W/ HCPCS (ALT 636 FOR OP/ED): Performed by: STUDENT IN AN ORGANIZED HEALTH CARE EDUCATION/TRAINING PROGRAM

## 2024-03-30 PROCEDURE — 80053 COMPREHEN METABOLIC PANEL: CPT | Performed by: STUDENT IN AN ORGANIZED HEALTH CARE EDUCATION/TRAINING PROGRAM

## 2024-03-30 PROCEDURE — 93970 EXTREMITY STUDY: CPT

## 2024-03-30 PROCEDURE — 93010 ELECTROCARDIOGRAM REPORT: CPT | Performed by: PHYSICIAN ASSISTANT

## 2024-03-30 PROCEDURE — 72131 CT LUMBAR SPINE W/O DYE: CPT | Mod: FOREIGN READ | Performed by: RADIOLOGY

## 2024-03-30 PROCEDURE — 36415 COLL VENOUS BLD VENIPUNCTURE: CPT | Performed by: STUDENT IN AN ORGANIZED HEALTH CARE EDUCATION/TRAINING PROGRAM

## 2024-03-30 PROCEDURE — 2550000001 HC RX 255 CONTRASTS: Performed by: EMERGENCY MEDICINE

## 2024-03-30 RX ORDER — LABETALOL HYDROCHLORIDE 5 MG/ML
20 INJECTION, SOLUTION INTRAVENOUS ONCE
Status: COMPLETED | OUTPATIENT
Start: 2024-03-30 | End: 2024-03-30

## 2024-03-30 RX ORDER — HYDRALAZINE HYDROCHLORIDE 25 MG/1
50 TABLET, FILM COATED ORAL ONCE
Status: COMPLETED | OUTPATIENT
Start: 2024-03-30 | End: 2024-03-30

## 2024-03-30 RX ORDER — LABETALOL 100 MG/1
200 TABLET, FILM COATED ORAL ONCE
Status: COMPLETED | OUTPATIENT
Start: 2024-03-30 | End: 2024-03-31

## 2024-03-30 RX ORDER — CARBIDOPA AND LEVODOPA 25; 100 MG/1; MG/1
1 TABLET ORAL 3 TIMES DAILY
Status: DISCONTINUED | OUTPATIENT
Start: 2024-03-30 | End: 2024-03-31

## 2024-03-30 RX ORDER — CARVEDILOL 12.5 MG/1
25 TABLET ORAL ONCE
Status: COMPLETED | OUTPATIENT
Start: 2024-03-30 | End: 2024-03-30

## 2024-03-30 RX ADMIN — HYDRALAZINE HYDROCHLORIDE 50 MG: 25 TABLET, FILM COATED ORAL at 15:12

## 2024-03-30 RX ADMIN — LABETALOL HYDROCHLORIDE 20 MG: 5 INJECTION, SOLUTION INTRAVENOUS at 19:26

## 2024-03-30 RX ADMIN — LABETALOL HYDROCHLORIDE 20 MG: 5 INJECTION, SOLUTION INTRAVENOUS at 23:10

## 2024-03-30 RX ADMIN — IOHEXOL 80 ML: 350 INJECTION, SOLUTION INTRAVENOUS at 18:30

## 2024-03-30 RX ADMIN — CARBIDOPA AND LEVODOPA 1 TABLET: 25; 100 TABLET ORAL at 15:54

## 2024-03-30 RX ADMIN — CARBIDOPA AND LEVODOPA 1 TABLET: 25; 100 TABLET ORAL at 20:43

## 2024-03-30 RX ADMIN — CARVEDILOL 25 MG: 12.5 TABLET, FILM COATED ORAL at 15:12

## 2024-03-30 ASSESSMENT — COLUMBIA-SUICIDE SEVERITY RATING SCALE - C-SSRS
6. HAVE YOU EVER DONE ANYTHING, STARTED TO DO ANYTHING, OR PREPARED TO DO ANYTHING TO END YOUR LIFE?: NO
1. IN THE PAST MONTH, HAVE YOU WISHED YOU WERE DEAD OR WISHED YOU COULD GO TO SLEEP AND NOT WAKE UP?: NO
2. HAVE YOU ACTUALLY HAD ANY THOUGHTS OF KILLING YOURSELF?: NO

## 2024-03-30 ASSESSMENT — LIFESTYLE VARIABLES
EVER FELT BAD OR GUILTY ABOUT YOUR DRINKING: NO
EVER HAD A DRINK FIRST THING IN THE MORNING TO STEADY YOUR NERVES TO GET RID OF A HANGOVER: NO
TOTAL SCORE: 0
HAVE YOU EVER FELT YOU SHOULD CUT DOWN ON YOUR DRINKING: NO
HAVE PEOPLE ANNOYED YOU BY CRITICIZING YOUR DRINKING: NO

## 2024-03-30 NOTE — ED TRIAGE NOTES
Pt brought in by [facility] for fall at home.Pt states she was walking with her walker and tripped over air. Pt is on coumadin but states she did not hit her head and denies LOC. Pt is having some left leg pain that also has some blisters that she noticed 2 days ago and has gotten worse today. Pt was also having some chest pain that she says has gone away. Pt has a PMH of MI, HTN, and Parkinson's. Pt is A&)x4

## 2024-03-30 NOTE — ED PROVIDER NOTES
HPI   Chief Complaint   Patient presents with    Fall       Patient is a 79-year-old female history of blood clots on Coumadin, Parkinson disease who presents the emergency department today due to concern for fall.  Patient states that elevated at home she has been lightheadedness as well as some reported chest discomfort.  She reports she fell.  He is complains of hip pain as well as back pain.  She is unsure if she hit her head.  She denies any chest pain currently or any abdominal pain.  Denies any nausea or vomiting or shortness of breath.  She also does report some back pain as well.      History provided by:  Patient   used: No                        No data recorded                   Patient History   Past Medical History:   Diagnosis Date    Other pulmonary embolism without acute cor pulmonale (CMS/[facility]) 07/21/2013    Pulmonary embolism    Personal history of other mental and behavioral disorders     History of depression    Personal history of other venous thrombosis and embolism     History of deep venous thrombosis     Past Surgical History:   Procedure Laterality Date    KNEE SURGERY  04/06/2016    Knee Surgery    NECK SURGERY  04/06/2016    Neck Surgery     No family history on file.  Social History     Tobacco Use    Smoking status: Not on file    Smokeless tobacco: Not on file   Substance Use Topics    Alcohol use: Not on file    Drug use: Not on file       Physical Exam   ED Triage Vitals [03/30/24 1359]   Temp Heart Rate Respirations BP   -- 76 18 (!) 201/110      Pulse Ox Temp src Heart Rate Source Patient Position   (!) 93 % -- -- Lying      BP Location FiO2 (%)     Left arm --       Physical Exam  Vitals and nursing note reviewed.   Constitutional:       General: She is not in acute distress.     Appearance: She is well-developed.   HENT:      Head: Normocephalic and atraumatic.      Right Ear: External ear normal.      Left Ear: External ear normal.   Eyes:       Conjunctiva/sclera: Conjunctivae normal.   Cardiovascular:      Rate and Rhythm: Normal rate and regular rhythm.      Heart sounds: No murmur heard.  Pulmonary:      Effort: Pulmonary effort is normal. No respiratory distress.      Breath sounds: Normal breath sounds.   Abdominal:      Palpations: Abdomen is soft.      Tenderness: There is no abdominal tenderness.   Musculoskeletal:         General: Tenderness (T and L-spine) present. No swelling.      Cervical back: Neck supple.      Right lower leg: Edema present.      Left lower leg: Edema present.   Skin:     General: Skin is warm and dry.      Capillary Refill: Capillary refill takes less than 2 seconds.      Findings: Bruising (Left lateral thigh) present.   Neurological:      Mental Status: She is alert.   Psychiatric:         Mood and Affect: Mood normal.         ED Course & MDM   ED Course as of 03/31/24 1141   Sat Mar 30, 2024   1623 Patient 79-year-old female on Coumadin who presents the ER after a fall.  On arrival, patient was hypertensive.  Other vitals were stable.  Did give patient her home antihypertension medications.  Also give patient on carbidopa levodopa.  Given the patient is on Coumadin, did get CT imaging of chest abdomen pelvis as well as head and C-spine, T-spine, and L-spine.  Did also get x-ray imaging of bilateral hips.  Cardiac workup was also initiated given her reported lightheadedness [MJ]   1624 . [MJ]   1624 CT head wo IV contrast  IMPRESSION:  No acute infarct, hemorrhage or mass is noted.      The white matter hypodensities are consistent with chronic  microvascular ischemic changes and are similar to the prior exam.   [MJ]   1624 CT cervical spine wo IV contrast  IMPRESSION:  No acute fracture or subluxation of the cervical spine is noted.   [MJ]   1900 Patient troponin x 2 are stable.  Magnesium was unremarkable.  CMP and was unremarkable.  Patient had mild leukopenia at 3.8 as well as mild anemia on CBC but this is otherwise  unremarkable.  Patient was signed out to oncoming resident pending remaining CT imaging results. [MJ]      ED Course User Index  [MJ] Maxi Schmidt DO         Diagnoses as of 03/31/24 1141   Fall, initial encounter       Medical Decision Making      Procedure  Procedures     Maxi Schmidt DO  Resident  03/31/24 0014       Maxi Schmidt DO  Resident  03/31/24 1142

## 2024-03-30 NOTE — PROGRESS NOTES
Patient has been identified as having an emergent need for administration of iodinated contrast for CT scan prior to result of laboratory studies or despite known elevated GFR due to possibility of life and/or limb threatening pathology.    I acknowledge the risks and benefits of emergently proceeding with contrast administration including that, at present, it is the position of the [employer] that contrast induced nephropathy (ROCKY) is a rare but possible consequence. At this time the benefits of proceeding with contrast administration outweigh the risks.    Attempts will be made to mitigate possible ROCKY risk with IV fluid hydration if able.    [unfilled]

## 2024-03-31 ENCOUNTER — CLINICAL SUPPORT (OUTPATIENT)
Dept: EMERGENCY MEDICINE | Facility: HOSPITAL | Age: 79
DRG: 057 | End: 2024-03-31
Payer: COMMERCIAL
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
## 2024-03-31 LAB
APPEARANCE UR: CLEAR
APTT PPP: 36 SECONDS (ref 27–38)
BILIRUB UR STRIP.AUTO-MCNC: NEGATIVE MG/DL
COLOR UR: COLORLESS
GLUCOSE UR STRIP.AUTO-MCNC: NORMAL MG/DL
KETONES UR STRIP.AUTO-MCNC: NEGATIVE MG/DL
LEUKOCYTE ESTERASE UR QL STRIP.AUTO: NEGATIVE
NITRITE UR QL STRIP.AUTO: NEGATIVE
PH UR STRIP.AUTO: 7 [PH]
PROT UR STRIP.AUTO-MCNC: NEGATIVE MG/DL
RBC # UR STRIP.AUTO: NEGATIVE /UL
SP GR UR STRIP.AUTO: 1.01
UFH PPP CHRO-ACNC: 0.8 IU/ML
UFH PPP CHRO-ACNC: 1.4 IU/ML
UFH PPP CHRO-ACNC: 1.7 IU/ML
UROBILINOGEN UR STRIP.AUTO-MCNC: NORMAL MG/DL

## 2024-03-31 PROCEDURE — 2500000004 HC RX 250 GENERAL PHARMACY W/ HCPCS (ALT 636 FOR OP/ED): Performed by: INTERNAL MEDICINE

## 2024-03-31 PROCEDURE — 2500000005 HC RX 250 GENERAL PHARMACY W/O HCPCS: Performed by: STUDENT IN AN ORGANIZED HEALTH CARE EDUCATION/TRAINING PROGRAM

## 2024-03-31 PROCEDURE — 2500000004 HC RX 250 GENERAL PHARMACY W/ HCPCS (ALT 636 FOR OP/ED): Performed by: STUDENT IN AN ORGANIZED HEALTH CARE EDUCATION/TRAINING PROGRAM

## 2024-03-31 PROCEDURE — 85520 HEPARIN ASSAY: CPT | Performed by: INTERNAL MEDICINE

## 2024-03-31 PROCEDURE — 2500000004 HC RX 250 GENERAL PHARMACY W/ HCPCS (ALT 636 FOR OP/ED)

## 2024-03-31 PROCEDURE — 93005 ELECTROCARDIOGRAM TRACING: CPT

## 2024-03-31 PROCEDURE — 99223 1ST HOSP IP/OBS HIGH 75: CPT | Performed by: INTERNAL MEDICINE

## 2024-03-31 PROCEDURE — 1210000001 HC SEMI-PRIVATE ROOM DAILY

## 2024-03-31 PROCEDURE — 2500000002 HC RX 250 W HCPCS SELF ADMINISTERED DRUGS (ALT 637 FOR MEDICARE OP, ALT 636 FOR OP/ED): Performed by: STUDENT IN AN ORGANIZED HEALTH CARE EDUCATION/TRAINING PROGRAM

## 2024-03-31 PROCEDURE — 2500000001 HC RX 250 WO HCPCS SELF ADMINISTERED DRUGS (ALT 637 FOR MEDICARE OP): Performed by: STUDENT IN AN ORGANIZED HEALTH CARE EDUCATION/TRAINING PROGRAM

## 2024-03-31 PROCEDURE — 96375 TX/PRO/DX INJ NEW DRUG ADDON: CPT

## 2024-03-31 PROCEDURE — 2500000001 HC RX 250 WO HCPCS SELF ADMINISTERED DRUGS (ALT 637 FOR MEDICARE OP): Performed by: INTERNAL MEDICINE

## 2024-03-31 PROCEDURE — 85520 HEPARIN ASSAY: CPT | Performed by: STUDENT IN AN ORGANIZED HEALTH CARE EDUCATION/TRAINING PROGRAM

## 2024-03-31 PROCEDURE — 36415 COLL VENOUS BLD VENIPUNCTURE: CPT | Performed by: EMERGENCY MEDICINE

## 2024-03-31 PROCEDURE — 85730 THROMBOPLASTIN TIME PARTIAL: CPT | Performed by: EMERGENCY MEDICINE

## 2024-03-31 PROCEDURE — 36415 COLL VENOUS BLD VENIPUNCTURE: CPT | Performed by: STUDENT IN AN ORGANIZED HEALTH CARE EDUCATION/TRAINING PROGRAM

## 2024-03-31 PROCEDURE — 87086 URINE CULTURE/COLONY COUNT: CPT | Performed by: STUDENT IN AN ORGANIZED HEALTH CARE EDUCATION/TRAINING PROGRAM

## 2024-03-31 PROCEDURE — 81003 URINALYSIS AUTO W/O SCOPE: CPT | Performed by: STUDENT IN AN ORGANIZED HEALTH CARE EDUCATION/TRAINING PROGRAM

## 2024-03-31 RX ORDER — POLYETHYLENE GLYCOL 3350 17 G/17G
17 POWDER, FOR SOLUTION ORAL DAILY
Status: DISCONTINUED | OUTPATIENT
Start: 2024-03-31 | End: 2024-04-05 | Stop reason: HOSPADM

## 2024-03-31 RX ORDER — HEPARIN SODIUM 10000 [USP'U]/100ML
0-4000 INJECTION, SOLUTION INTRAVENOUS CONTINUOUS
Status: DISCONTINUED | OUTPATIENT
Start: 2024-03-31 | End: 2024-03-31

## 2024-03-31 RX ORDER — HYDRALAZINE HYDROCHLORIDE 25 MG/1
50 TABLET, FILM COATED ORAL 3 TIMES DAILY
Status: DISCONTINUED | OUTPATIENT
Start: 2024-03-31 | End: 2024-03-31

## 2024-03-31 RX ORDER — WARFARIN 3 MG/1
TABLET ORAL
COMMUNITY

## 2024-03-31 RX ORDER — METOPROLOL TARTRATE 1 MG/ML
5 INJECTION, SOLUTION INTRAVENOUS ONCE
Status: COMPLETED | OUTPATIENT
Start: 2024-03-31 | End: 2024-03-31

## 2024-03-31 RX ORDER — HEPARIN SODIUM 10000 [USP'U]/100ML
0-4500 INJECTION, SOLUTION INTRAVENOUS CONTINUOUS
Status: DISCONTINUED | OUTPATIENT
Start: 2024-03-31 | End: 2024-03-31

## 2024-03-31 RX ORDER — ATORVASTATIN CALCIUM 40 MG/1
40 TABLET, FILM COATED ORAL DAILY
Status: DISCONTINUED | OUTPATIENT
Start: 2024-03-31 | End: 2024-04-05 | Stop reason: HOSPADM

## 2024-03-31 RX ORDER — TRAZODONE HYDROCHLORIDE 150 MG/1
1 TABLET ORAL NIGHTLY
Status: ON HOLD | COMMUNITY
Start: 2022-11-16 | End: 2024-04-04 | Stop reason: SDUPTHER

## 2024-03-31 RX ORDER — QUETIAPINE FUMARATE 300 MG/1
300 TABLET, FILM COATED ORAL NIGHTLY
Status: DISCONTINUED | OUTPATIENT
Start: 2024-03-31 | End: 2024-04-05 | Stop reason: HOSPADM

## 2024-03-31 RX ORDER — BUPROPION HYDROCHLORIDE 150 MG/1
150 TABLET ORAL EVERY MORNING
COMMUNITY
End: 2024-04-05 | Stop reason: HOSPADM

## 2024-03-31 RX ORDER — HEPARIN SODIUM 5000 [USP'U]/ML
80 INJECTION, SOLUTION INTRAVENOUS; SUBCUTANEOUS ONCE
Status: COMPLETED | OUTPATIENT
Start: 2024-03-31 | End: 2024-03-31

## 2024-03-31 RX ORDER — CARVEDILOL 25 MG/1
25 TABLET ORAL
Status: DISCONTINUED | OUTPATIENT
Start: 2024-03-31 | End: 2024-04-05 | Stop reason: HOSPADM

## 2024-03-31 RX ORDER — FOLIC ACID 1 MG/1
1 TABLET ORAL DAILY
Status: DISCONTINUED | OUTPATIENT
Start: 2024-03-31 | End: 2024-04-05 | Stop reason: HOSPADM

## 2024-03-31 RX ORDER — NAPROXEN SODIUM 220 MG/1
81 TABLET, FILM COATED ORAL DAILY
Status: DISCONTINUED | OUTPATIENT
Start: 2024-03-31 | End: 2024-04-05 | Stop reason: HOSPADM

## 2024-03-31 RX ORDER — HYDROMORPHONE HYDROCHLORIDE 1 MG/ML
INJECTION, SOLUTION INTRAMUSCULAR; INTRAVENOUS; SUBCUTANEOUS
Status: COMPLETED
Start: 2024-03-31 | End: 2024-03-31

## 2024-03-31 RX ORDER — CARBIDOPA AND LEVODOPA 25; 100 MG/1; MG/1
1 TABLET ORAL 3 TIMES DAILY
Status: DISCONTINUED | OUTPATIENT
Start: 2024-03-31 | End: 2024-04-05 | Stop reason: HOSPADM

## 2024-03-31 RX ORDER — HYDROCHLOROTHIAZIDE 25 MG/1
50 TABLET ORAL DAILY
Status: DISCONTINUED | OUTPATIENT
Start: 2024-03-31 | End: 2024-04-05 | Stop reason: HOSPADM

## 2024-03-31 RX ORDER — HYDRALAZINE HYDROCHLORIDE 50 MG/1
TABLET, FILM COATED ORAL
Status: DISPENSED
Start: 2024-03-31 | End: 2024-04-01

## 2024-03-31 RX ORDER — HEPARIN SODIUM 5000 [USP'U]/ML
3000-6000 INJECTION, SOLUTION INTRAVENOUS; SUBCUTANEOUS EVERY 4 HOURS PRN
Status: DISCONTINUED | OUTPATIENT
Start: 2024-03-31 | End: 2024-03-31

## 2024-03-31 RX ORDER — HYDROMORPHONE HYDROCHLORIDE 1 MG/ML
0.5 INJECTION, SOLUTION INTRAMUSCULAR; INTRAVENOUS; SUBCUTANEOUS ONCE
Status: COMPLETED | OUTPATIENT
Start: 2024-03-31 | End: 2024-03-31

## 2024-03-31 RX ORDER — HYDRALAZINE HYDROCHLORIDE 20 MG/ML
10 INJECTION INTRAMUSCULAR; INTRAVENOUS EVERY 6 HOURS PRN
Status: DISCONTINUED | OUTPATIENT
Start: 2024-03-31 | End: 2024-04-05 | Stop reason: HOSPADM

## 2024-03-31 RX ORDER — BUDESONIDE 90 UG/1
2 AEROSOL, POWDER RESPIRATORY (INHALATION)
COMMUNITY
Start: 2022-05-23

## 2024-03-31 RX ORDER — HYDRALAZINE HYDROCHLORIDE 20 MG/ML
10 INJECTION INTRAMUSCULAR; INTRAVENOUS ONCE
Status: COMPLETED | OUTPATIENT
Start: 2024-03-31 | End: 2024-03-31

## 2024-03-31 RX ORDER — ISOSORBIDE MONONITRATE 30 MG/1
30 TABLET, EXTENDED RELEASE ORAL DAILY
COMMUNITY

## 2024-03-31 RX ORDER — TERAZOSIN 2 MG/1
2 CAPSULE ORAL ONCE
Status: DISCONTINUED | OUTPATIENT
Start: 2024-03-31 | End: 2024-03-31

## 2024-03-31 RX ORDER — HYDROMORPHONE HYDROCHLORIDE 1 MG/ML
0.4 INJECTION, SOLUTION INTRAMUSCULAR; INTRAVENOUS; SUBCUTANEOUS EVERY 4 HOURS PRN
Status: DISCONTINUED | OUTPATIENT
Start: 2024-03-31 | End: 2024-04-05 | Stop reason: HOSPADM

## 2024-03-31 RX ORDER — TERAZOSIN 2 MG/1
2 CAPSULE ORAL NIGHTLY
Status: DISCONTINUED | OUTPATIENT
Start: 2024-03-31 | End: 2024-04-05 | Stop reason: HOSPADM

## 2024-03-31 RX ORDER — ACETAMINOPHEN 325 MG/1
975 TABLET ORAL EVERY 8 HOURS
Status: DISCONTINUED | OUTPATIENT
Start: 2024-03-31 | End: 2024-04-05 | Stop reason: HOSPADM

## 2024-03-31 RX ADMIN — APIXABAN 5 MG: 5 TABLET, FILM COATED ORAL at 20:59

## 2024-03-31 RX ADMIN — HYDROMORPHONE HYDROCHLORIDE 0.5 MG: 1 INJECTION, SOLUTION INTRAMUSCULAR; INTRAVENOUS; SUBCUTANEOUS at 01:21

## 2024-03-31 RX ADMIN — POLYETHYLENE GLYCOL 3350 17 G: 17 POWDER, FOR SOLUTION ORAL at 08:25

## 2024-03-31 RX ADMIN — HYDRALAZINE HYDROCHLORIDE 10 MG: 20 INJECTION INTRAMUSCULAR; INTRAVENOUS at 10:41

## 2024-03-31 RX ADMIN — LABETALOL HYDROCHLORIDE 200 MG: 100 TABLET, FILM COATED ORAL at 00:15

## 2024-03-31 RX ADMIN — ASPIRIN 81 MG CHEWABLE TABLET 81 MG: 81 TABLET CHEWABLE at 08:25

## 2024-03-31 RX ADMIN — TERAZOSIN HYDROCHLORIDE 2 MG: 2 CAPSULE ORAL at 21:58

## 2024-03-31 RX ADMIN — HYDRALAZINE HYDROCHLORIDE 50 MG: 25 TABLET ORAL at 08:25

## 2024-03-31 RX ADMIN — CARBIDOPA AND LEVODOPA 1 TABLET: 25; 100 TABLET ORAL at 08:26

## 2024-03-31 RX ADMIN — HYDROMORPHONE HYDROCHLORIDE 0.4 MG: 1 INJECTION, SOLUTION INTRAMUSCULAR; INTRAVENOUS; SUBCUTANEOUS at 09:55

## 2024-03-31 RX ADMIN — CARVEDILOL 25 MG: 25 TABLET, FILM COATED ORAL at 16:58

## 2024-03-31 RX ADMIN — QUETIAPINE FUMARATE 300 MG: 300 TABLET ORAL at 20:59

## 2024-03-31 RX ADMIN — ACETAMINOPHEN 975 MG: 325 TABLET ORAL at 03:45

## 2024-03-31 RX ADMIN — CARBIDOPA AND LEVODOPA 1 TABLET: 25; 100 TABLET ORAL at 14:00

## 2024-03-31 RX ADMIN — ATORVASTATIN CALCIUM 40 MG: 40 TABLET, FILM COATED ORAL at 20:59

## 2024-03-31 RX ADMIN — FOLIC ACID 1 MG: 1 TABLET ORAL at 08:25

## 2024-03-31 RX ADMIN — CARBIDOPA AND LEVODOPA 1 TABLET: 25; 100 TABLET ORAL at 20:59

## 2024-03-31 RX ADMIN — ACETAMINOPHEN 975 MG: 325 TABLET ORAL at 17:47

## 2024-03-31 RX ADMIN — HYDRALAZINE HYDROCHLORIDE 10 MG: 20 INJECTION INTRAMUSCULAR; INTRAVENOUS at 01:21

## 2024-03-31 RX ADMIN — APIXABAN 5 MG: 5 TABLET, FILM COATED ORAL at 14:00

## 2024-03-31 RX ADMIN — HYDROCHLOROTHIAZIDE 50 MG: 25 TABLET ORAL at 08:26

## 2024-03-31 RX ADMIN — HYDROMORPHONE HYDROCHLORIDE 0.4 MG: 1 INJECTION, SOLUTION INTRAMUSCULAR; INTRAVENOUS; SUBCUTANEOUS at 06:05

## 2024-03-31 RX ADMIN — METOPROLOL TARTRATE 5 MG: 1 INJECTION, SOLUTION INTRAVENOUS at 22:38

## 2024-03-31 RX ADMIN — HYDRALAZINE HYDROCHLORIDE 10 MG: 20 INJECTION INTRAMUSCULAR; INTRAVENOUS at 16:58

## 2024-03-31 RX ADMIN — ACETAMINOPHEN 975 MG: 325 TABLET ORAL at 10:41

## 2024-03-31 RX ADMIN — HEPARIN SODIUM 7500 UNITS: 5000 INJECTION INTRAVENOUS; SUBCUTANEOUS at 03:48

## 2024-03-31 RX ADMIN — CARVEDILOL 25 MG: 25 TABLET, FILM COATED ORAL at 08:26

## 2024-03-31 RX ADMIN — HEPARIN SODIUM 1700 UNITS/HR: 10000 INJECTION, SOLUTION INTRAVENOUS at 03:46

## 2024-03-31 ASSESSMENT — PAIN DESCRIPTION - LOCATION
LOCATION: BACK
LOCATION: LEG

## 2024-03-31 ASSESSMENT — PAIN SCALES - GENERAL
PAINLEVEL_OUTOF10: 3
PAINLEVEL_OUTOF10: 7
PAINLEVEL_OUTOF10: 8
PAINLEVEL_OUTOF10: 3
PAINLEVEL_OUTOF10: 3
PAINLEVEL_OUTOF10: 8
PAINLEVEL_OUTOF10: 7

## 2024-03-31 ASSESSMENT — PAIN DESCRIPTION - PROGRESSION: CLINICAL_PROGRESSION: GRADUALLY IMPROVING

## 2024-03-31 ASSESSMENT — PAIN - FUNCTIONAL ASSESSMENT
PAIN_FUNCTIONAL_ASSESSMENT: 0-10
PAIN_FUNCTIONAL_ASSESSMENT: 0-10

## 2024-03-31 ASSESSMENT — PAIN DESCRIPTION - PAIN TYPE: TYPE: CHRONIC PAIN

## 2024-03-31 ASSESSMENT — PAIN DESCRIPTION - ORIENTATION
ORIENTATION: LEFT;RIGHT
ORIENTATION: RIGHT;LEFT

## 2024-03-31 NOTE — PROGRESS NOTES
Pharmacy Medication History Review    Kelly Martinez is a 79 y.o. female admitted for Fall, initial encounter. Pharmacy reviewed the patient's [id number] medications and allergies for accuracy.    The list below reflects the updated PTA list. Comments regarding how patient may be taking medications differently can be found in the Admit Orders Activity.  Prior to Admission Medications   Prescriptions Last Dose Informant Patient Reported?   DULoxetine (Cymbalta) 30 mg DR capsule Unknown Self, Other Yes   Sig: Take 1 capsule (30 mg) by mouth once daily.   QUEtiapine (SEROquel) 300 mg tablet Unknown Self, Other Yes   Sig: Take 1 tablet (300 mg) by mouth once daily at bedtime.   aspirin 81 mg chewable tablet Unknown Self, Other Yes   Sig: Chew 1 tablet (81 mg) once daily.   atorvastatin (Lipitor) 40 mg tablet Unknown Self, Other Yes   Sig: Take 1 tablet (40 mg) by mouth once daily.   buPROPion XL (Wellbutrin XL) 150 mg 24 hr tablet Unknown Self, Other Yes   Sig: Take 1 tablet (150 mg) by mouth once daily in the morning.   budesonide (Pulmicort Flexhaler) 90 mcg/actuation inhaler Unknown Self, Other Yes   Sig: Inhale 2 puffs 2 times a day.   carbidopa-levodopa (Sinemet)  mg tablet Unknown Self, Other Yes   Sig: Take 1 tablet by mouth 3 times a day.   carvedilol (Coreg) 25 mg tablet Unknown Self, Other Yes   Sig: Take 1 tablet (25 mg) by mouth 2 times a day with meals.   folic acid (Folvite) 1 mg tablet Unknown Self, Other Yes   Sig: Take 1 tablet (1 mg) by mouth once daily.   hydroCHLOROthiazide (HYDRODiuril) 12.5 mg tablet Unknown Self, Other Yes   Sig: Take 1 tablet (12.5 mg) by mouth once daily.   isosorbide mononitrate ER (Imdur) 30 mg 24 hr tablet Unknown Self, Other Yes   Sig: Take 1 tablet (30 mg) by mouth once daily.   lisinopril 40 mg tablet Unknown Self, Other Yes   Sig: Take 1 tablet (40 mg) by mouth once daily.   terazosin (Hytrin) 2 mg capsule Unknown Self, Other Yes   Sig: Take 1 capsule (2 mg)  by mouth once daily at bedtime.   traZODone (Desyrel) 150 mg tablet Unknown Self, Other Yes   Sig: Take 1 tablet (150 mg) by mouth once daily at bedtime.   warfarin (Coumadin) 3 mg tablet Unknown Self, Other Yes   Sig: Take by mouth. Take as directed per Anticoagulation Clinic ([facility]).  Per [facility] clinic 01/17/24: 3 mg every Sun, Tue, Thu; 4.5 mg all other days. Patient taking differently: taking 3 mg PO daily.      Facility-Administered Medications: None        The list below reflects the updated allergy list. Please review each documented allergy for additional clarification and justification.  Allergies  Reviewed by Jaye Lester PharmD on 3/31/2024        Severity Reactions Comments    Meclizine Not Specified Dizziness, Unknown     Naproxen Not Specified Itching     Piperacillin-tazobactam-dextrs Not Specified Other               Sources used to complete the med history include:  Out patient fill history - dispense history, unable to obtain full hx as [facility] outpatient pharmacy closed at time of intervention  OARRS: checked 03/31/24  Concerns: No  Chart Review  Patient interview: conducted at bedside with patient  Historian is unable to recall details    Medication Reconciliation  Added:  Bupropion  Isosorbide mononitrate  Trazodone  Budesonide inhaler    Changed:  Warfarin dose - updated to 3 mg tabs taken PO as directed by anticoagulation clinic at [facility] (Most recent dose: Per [facility] 01/17/24: 3 mg every Sun, Tue, Thu; 4.5 mg all other days. Patient taking differently: taking 3 mg PO daily.)  Removed:  Warfarin 2 mg - dose adjustment    Below are additional concerns with the patient's PTA list.  Adherence: pt expected to non-adherent due to memory impairment.   Fill history: fill history expected to be incomplete due to patient filling some medications at [facility]. Some [facility] medications do not present on Dispense Hx Report.       Patient was unable to be assessed for M2B at  discharge. Outpatient pharmacist: [facility] [place] or [facility].       BELINDA REED, PharmD  PGY-1 Resident Pharmacist  Please reach out via Secure Chat for questions, or if no response call [employer] or [facility]

## 2024-03-31 NOTE — H&P
History Of Present Illness  Kelly Martinez is a 79 y.o. female with history significant for 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, HFpEF, HTN, PD on carbidopa/levadopa, Schizophrenia/depression (followed by  psychiatry), JOY on CPAP, COPD not on o2,  CKD stage IV, and chronic tobacco use who presented with fall at home.     Per my interview with the pt, she could not recall the circumstances around her fall today. She denied LOC or hitting her head. She reported falls yesterday and the day prior but again, could not describe the fall or where it took place. She is inconsistent throughout my interview, for example, stating she does not live alone but then stating she lives alone. Other times, she has difficulty recalling what she was going to say and appears confused. She reports her current symptoms are back pain (present for years), abd pain (starting yesterday), and headache (starting yesterday). She denies vision changes, confusion, chest pain, sob. She has chronic cough.     Per ED note, daughter reported pt's dementia has been worsening lately and that her mother relies on home health aid and her own care for safety at home.     Pt states she takes her medications daily but can only recall percocet and warfarin.  She tells me she is independent in bathing, dressing, and toileting.      At the ED. CT imaging was unremarkable for any acute traumatic injury however the DVT ultrasound non occlusive DVTs in R and L leg, noting that she has had prior DVT in both legs as recently as December 2023.      Patient was admitted by the ED team for PT/OT evaluation given concerns by her daughter with regards to safety going back home alone.      Of note patient was admitted in 12/23 with SOB at OSH; work up showed possible expansion of L lower extremity DVT along with R femoral DVT with cloth burden increased compared from prior study 11/2022. [facility] medicine considered that pt was not appropriately anticoagulated with warfarin at that point  "(subtherapeutic INR).     ED interventions  Coreg 25  Hydral IV 10   Hydral PO 50  Labetalol 200 PO  Labetalol IV 20 x 2  IV dilaudid 0.5    Past Medical History  Past Medical History:   Diagnosis Date    Other pulmonary embolism without acute cor pulmonale (CMS/HCC) 07/21/2013    Pulmonary embolism    Personal history of other mental and behavioral disorders     History of depression    Personal history of other venous thrombosis and embolism     History of deep venous thrombosis       Surgical History  Past Surgical History:   Procedure Laterality Date    KNEE SURGERY  04/06/2016    Knee Surgery    NECK SURGERY  04/06/2016    Neck Surgery        Social History  She has no history on file for tobacco use, alcohol use, and drug use.  Lives alone. Uses walker  Denies alcohol, tobacco    Family History  No family history on file.     Allergies  Meclizine, Naproxen, and Piperacillin-tazobactam-dextrs    ROS: no f/c, chest pain, sob  +abd pain  +n/v but cannot tell me any more about how often she is throwing up or when this started   +chronic cough  +dysuria and hematuria      Physical Exam:  General: elderly female, alert, conversant, in no apparent distress  HEENT: normocephalic, atraumatic, EOMI, no scleral icterus  CV: RRR, no murmurs  Pulm: CTAB, no wheezes or crackles, no increased work of breathing  Abd: soft, non tender, non distended  : no louie   MSK: no focal spinal tenderness or spinal step offs   Ext: warm, no lower extremity edema. Tenderness with palpation of bilateral calves   Skin: no rashes  Neuro: moving all extremities. Strength 5/5 in bilateral UE. Strength 4/5 in bilateral LE. CN II-XII intact.  Intact speech repetition. No dysarthria. Sensation grossly equal bilaterally      Last Recorded Vitals  Blood pressure (!) 161/92, pulse 84, temperature 36.3 °C (97.3 °F), temperature source Oral, resp. rate 20, height 1.702 m (5' 7\"), weight 93.4 kg (206 lb), SpO2 97 %.    Relevant Results  Lab Results "   Component Value Date    WBC 3.8 (L) 03/30/2024    HGB 10.1 (L) 03/30/2024    HCT 30.9 (L) 03/30/2024    MCV 92 03/30/2024     03/30/2024      Lab Results   Component Value Date    GLUCOSE 80 03/30/2024    CALCIUM 9.4 03/30/2024     03/30/2024    K 4.5 03/30/2024    CO2 23 03/30/2024     (H) 03/30/2024    BUN 31 (H) 03/30/2024    CREATININE 2.33 (H) 03/30/2024     Lab Results   Component Value Date    ALT 7 03/30/2024    AST 10 03/30/2024    ALKPHOS 95 03/30/2024    BILITOT 0.5 03/30/2024          Ultrasound 12/28/23  IMPRESSION:  1. Partially occlusive deep venous thrombus seen within the right  femoral veins as well as the common femoral bifurcation. Right-sided  clot burden of the femoral vein appears increased as compared to  ultrasound from 11/10/2022 concerning for acute thrombus.  2. Partially occlusive deep venous thrombus within the left common  femoral vein, with resolution of thrombus seen within the left  femoral and popliteal veins on prior imaging 12/10/2022. Left-sided  clot burden appears decreased as compared to prior ultrasound from  12/10/2022.  3. Nonvisualization of the left peroneal vein.    Ultrasound 3/30/24    IMPRESSION:  1. Nonocclusive deep venous thrombosis of the right common femora  vein, right proximal to distal femoral vein, and right popliteal vein.  2. Nonocclusive deep venous thrombosis of the left popliteal vein.  3. Incompletely visualized left external iliac vein with echogenic  intraluminal material and partial compressibility compatible with  nonocclusive thrombosis.    Assessment/Plan   Principal Problem:    Fall, initial encounter      Kelly Martinez is a 79 y.o. female with history significant for 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, HFpEF, HTN, PD on carbidopa/levadopa, Schizophrenia/depression (followed by  psychiatry), JOY on CPAP, COPD not on o2,  CKD stage IV, and chronic tobacco use who presented with fall at home. Trauma work up with pan CT scans negative for  acute injury. She does exhibit some confusion, per daughter, has been worsening. Given reported urinary symptoms, will rule out UTI as cause for metabolic encephalopathy. Initial vitals with uncontrolled HTN with SBP > 200 however low concern for stroke on physical exam. INR values at [facility] show subtherapeutic values and may explain potentially progressive LE DVTs compared to prior study in December although today's DVT ultrasound read does not comment on a direct comparison to December's imaging.     #Multiple falls  -trauma work up negative  -obtain orthostatic vitals  -pt/ot eval   -EKG with NSR without acute st changes   -troponin neg x 2    #Confusion  #Parkinsons dementia  -obtain urinalysis to eval for UTI   -possible contribution from pain medications vs delirium    -continue home carbidopa/levodopa     #Recurrent DVTs   -INR 2 on admit. 1.3 on 3/9/24, other sub therapeutic values at [facility]  -consider vasc med consult in the AM to re-consider choice of AC   -discuss with radiology to compare this admission's ultrasound to Dec imaging to understand if this is acute thrombus in the setting of subtherapeutic INR   -start heparin gtt     #HTN  -unclear pt's adherence to home meds. Reportedly bP also high at home  -Initial /119 up to 221/129. Initial goal reduction 25% for SBP ~160  -continue home BP meds: coreg 25.  -holding lisnopril in setting of advanced CKD   -increase hydrochlorothiazide to 25  -PRN hydralazine 50 for SBP > 180    #CKD IV   -appears at baseline     PT/OT ordered   Diet: regular       Giselle Shah MD  IM PGY-3

## 2024-03-31 NOTE — PROGRESS NOTES
Patient was signed out to me pending final CT imaging as well as DVT imaging of the bilateral lower extremities.  CT imaging was unremarkable for any acute traumatic injury however the DVT ultrasound shows a new blood clot in the right upper extremity.  Patient previously had a lower extremity ultrasound that revealed DVT in the left lower extremity, this is not visualized on today's imaging however the patient reports compliance with warfarin.  Patient does have Parkinson's, per report from daughter has been having worsening dementia lately.  Although the patient reports that she wishes to go home her report of safety at home is inconsistent with that of the daughter.  The daughter states that the patient relies heavily on home health aides as well as her own care for patient's safety at home.  As a result I believe that the patient does not have decision-making capacity and at the recommendation of the daughter who is the next of kin for this patient I would like to admit this patient for vascular recommendations regarding failure of warfarin, PT/OT in the setting of multiple falls at home.  Patient case was discussed with the admissions coordinator the patient was accepted for admission and the patient is admitted to the hospital for further care.    Patient seen and discussed with attending physician.    Kyrie Dcikey M.D.  PGY-3 EM

## 2024-04-01 LAB
APPEARANCE UR: CLEAR
BACTERIA #/AREA URNS AUTO: ABNORMAL /HPF
BACTERIA UR CULT: ABNORMAL
BASOPHILS # BLD AUTO: 0.02 X10*3/UL (ref 0–0.1)
BASOPHILS NFR BLD AUTO: 0.4 %
BILIRUB UR STRIP.AUTO-MCNC: NEGATIVE MG/DL
COLOR UR: ABNORMAL
EOSINOPHIL # BLD AUTO: 0.13 X10*3/UL (ref 0–0.4)
EOSINOPHIL NFR BLD AUTO: 2.5 %
ERYTHROCYTE [DISTWIDTH] IN BLOOD BY AUTOMATED COUNT: 14.6 % (ref 11.5–14.5)
GLUCOSE UR STRIP.AUTO-MCNC: NORMAL MG/DL
HCT VFR BLD AUTO: 34.5 % (ref 36–46)
HGB BLD-MCNC: 11.5 G/DL (ref 12–16)
IMM GRANULOCYTES # BLD AUTO: 0.01 X10*3/UL (ref 0–0.5)
IMM GRANULOCYTES NFR BLD AUTO: 0.2 % (ref 0–0.9)
KETONES UR STRIP.AUTO-MCNC: NEGATIVE MG/DL
LEUKOCYTE ESTERASE UR QL STRIP.AUTO: ABNORMAL
LYMPHOCYTES # BLD AUTO: 1.14 X10*3/UL (ref 0.8–3)
LYMPHOCYTES NFR BLD AUTO: 21.7 %
MCH RBC QN AUTO: 30.2 PG (ref 26–34)
MCHC RBC AUTO-ENTMCNC: 33.3 G/DL (ref 32–36)
MCV RBC AUTO: 91 FL (ref 80–100)
MONOCYTES # BLD AUTO: 0.43 X10*3/UL (ref 0.05–0.8)
MONOCYTES NFR BLD AUTO: 8.2 %
NEUTROPHILS # BLD AUTO: 3.52 X10*3/UL (ref 1.6–5.5)
NEUTROPHILS NFR BLD AUTO: 67 %
NITRITE UR QL STRIP.AUTO: NEGATIVE
NRBC BLD-RTO: 0 /100 WBCS (ref 0–0)
PH UR STRIP.AUTO: 6 [PH]
PLATELET # BLD AUTO: 181 X10*3/UL (ref 150–450)
PROT UR STRIP.AUTO-MCNC: ABNORMAL MG/DL
RBC # BLD AUTO: 3.81 X10*6/UL (ref 4–5.2)
RBC # UR STRIP.AUTO: NEGATIVE /UL
RBC #/AREA URNS AUTO: ABNORMAL /HPF
RENAL EPI CELLS #/AREA UR COMP ASSIST: ABNORMAL /HPF
SP GR UR STRIP.AUTO: 1.02
SQUAMOUS #/AREA URNS AUTO: ABNORMAL /HPF
UROBILINOGEN UR STRIP.AUTO-MCNC: NORMAL MG/DL
WBC # BLD AUTO: 5.3 X10*3/UL (ref 4.4–11.3)
WBC #/AREA URNS AUTO: ABNORMAL /HPF

## 2024-04-01 PROCEDURE — 2500000001 HC RX 250 WO HCPCS SELF ADMINISTERED DRUGS (ALT 637 FOR MEDICARE OP): Performed by: INTERNAL MEDICINE

## 2024-04-01 PROCEDURE — 97165 OT EVAL LOW COMPLEX 30 MIN: CPT | Mod: GO

## 2024-04-01 PROCEDURE — 81001 URINALYSIS AUTO W/SCOPE: CPT | Performed by: STUDENT IN AN ORGANIZED HEALTH CARE EDUCATION/TRAINING PROGRAM

## 2024-04-01 PROCEDURE — 2500000004 HC RX 250 GENERAL PHARMACY W/ HCPCS (ALT 636 FOR OP/ED): Performed by: STUDENT IN AN ORGANIZED HEALTH CARE EDUCATION/TRAINING PROGRAM

## 2024-04-01 PROCEDURE — 1100000001 HC PRIVATE ROOM DAILY

## 2024-04-01 PROCEDURE — 2500000001 HC RX 250 WO HCPCS SELF ADMINISTERED DRUGS (ALT 637 FOR MEDICARE OP): Performed by: STUDENT IN AN ORGANIZED HEALTH CARE EDUCATION/TRAINING PROGRAM

## 2024-04-01 PROCEDURE — 87086 URINE CULTURE/COLONY COUNT: CPT | Performed by: STUDENT IN AN ORGANIZED HEALTH CARE EDUCATION/TRAINING PROGRAM

## 2024-04-01 PROCEDURE — 97535 SELF CARE MNGMENT TRAINING: CPT | Mod: GO

## 2024-04-01 PROCEDURE — 97161 PT EVAL LOW COMPLEX 20 MIN: CPT | Mod: GP

## 2024-04-01 PROCEDURE — 99232 SBSQ HOSP IP/OBS MODERATE 35: CPT | Performed by: STUDENT IN AN ORGANIZED HEALTH CARE EDUCATION/TRAINING PROGRAM

## 2024-04-01 PROCEDURE — 2500000002 HC RX 250 W HCPCS SELF ADMINISTERED DRUGS (ALT 637 FOR MEDICARE OP, ALT 636 FOR OP/ED): Performed by: STUDENT IN AN ORGANIZED HEALTH CARE EDUCATION/TRAINING PROGRAM

## 2024-04-01 PROCEDURE — 36415 COLL VENOUS BLD VENIPUNCTURE: CPT | Performed by: STUDENT IN AN ORGANIZED HEALTH CARE EDUCATION/TRAINING PROGRAM

## 2024-04-01 PROCEDURE — 85025 COMPLETE CBC W/AUTO DIFF WBC: CPT | Performed by: STUDENT IN AN ORGANIZED HEALTH CARE EDUCATION/TRAINING PROGRAM

## 2024-04-01 PROCEDURE — 2500000004 HC RX 250 GENERAL PHARMACY W/ HCPCS (ALT 636 FOR OP/ED): Performed by: INTERNAL MEDICINE

## 2024-04-01 RX ADMIN — CARVEDILOL 25 MG: 25 TABLET, FILM COATED ORAL at 08:00

## 2024-04-01 RX ADMIN — APIXABAN 5 MG: 5 TABLET, FILM COATED ORAL at 21:03

## 2024-04-01 RX ADMIN — HYDROCHLOROTHIAZIDE 50 MG: 25 TABLET ORAL at 08:13

## 2024-04-01 RX ADMIN — HYDROMORPHONE HYDROCHLORIDE 0.4 MG: 1 INJECTION, SOLUTION INTRAMUSCULAR; INTRAVENOUS; SUBCUTANEOUS at 08:20

## 2024-04-01 RX ADMIN — POLYETHYLENE GLYCOL 3350 17 G: 17 POWDER, FOR SOLUTION ORAL at 09:00

## 2024-04-01 RX ADMIN — CARVEDILOL 25 MG: 25 TABLET, FILM COATED ORAL at 17:00

## 2024-04-01 RX ADMIN — QUETIAPINE FUMARATE 300 MG: 300 TABLET ORAL at 21:03

## 2024-04-01 RX ADMIN — CARBIDOPA AND LEVODOPA 1 TABLET: 25; 100 TABLET ORAL at 19:45

## 2024-04-01 RX ADMIN — CARBIDOPA AND LEVODOPA 1 TABLET: 25; 100 TABLET ORAL at 13:00

## 2024-04-01 RX ADMIN — HYDRALAZINE HYDROCHLORIDE 10 MG: 20 INJECTION INTRAMUSCULAR; INTRAVENOUS at 00:17

## 2024-04-01 RX ADMIN — TERAZOSIN HYDROCHLORIDE 2 MG: 2 CAPSULE ORAL at 21:03

## 2024-04-01 RX ADMIN — HYDRALAZINE HYDROCHLORIDE 10 MG: 20 INJECTION INTRAMUSCULAR; INTRAVENOUS at 09:14

## 2024-04-01 RX ADMIN — ASPIRIN 81 MG CHEWABLE TABLET 81 MG: 81 TABLET CHEWABLE at 08:14

## 2024-04-01 RX ADMIN — ATORVASTATIN CALCIUM 40 MG: 40 TABLET, FILM COATED ORAL at 08:13

## 2024-04-01 RX ADMIN — APIXABAN 5 MG: 5 TABLET, FILM COATED ORAL at 08:13

## 2024-04-01 RX ADMIN — ACETAMINOPHEN 975 MG: 325 TABLET ORAL at 11:25

## 2024-04-01 RX ADMIN — ACETAMINOPHEN 975 MG: 325 TABLET ORAL at 19:45

## 2024-04-01 RX ADMIN — FOLIC ACID 1 MG: 1 TABLET ORAL at 08:13

## 2024-04-01 SDOH — HEALTH STABILITY: MENTAL HEALTH: HOW OFTEN DO YOU HAVE 6 OR MORE DRINKS ON ONE OCCASION?: NEVER

## 2024-04-01 SDOH — SOCIAL STABILITY: SOCIAL INSECURITY: ARE THERE ANY APPARENT SIGNS OF INJURIES/BEHAVIORS THAT COULD BE RELATED TO ABUSE/NEGLECT?: NO

## 2024-04-01 SDOH — SOCIAL STABILITY: SOCIAL INSECURITY: ARE YOU OR HAVE YOU BEEN THREATENED OR ABUSED PHYSICALLY, EMOTIONALLY, OR SEXUALLY BY ANYONE?: NO

## 2024-04-01 SDOH — SOCIAL STABILITY: SOCIAL INSECURITY: HAVE YOU HAD THOUGHTS OF HARMING ANYONE ELSE?: NO

## 2024-04-01 SDOH — SOCIAL STABILITY: SOCIAL INSECURITY: DO YOU FEEL UNSAFE GOING BACK TO THE PLACE WHERE YOU ARE LIVING?: NO

## 2024-04-01 SDOH — SOCIAL STABILITY: SOCIAL INSECURITY: DO YOU FEEL ANYONE HAS EXPLOITED OR TAKEN ADVANTAGE OF YOU FINANCIALLY OR OF YOUR PERSONAL PROPERTY?: NO

## 2024-04-01 SDOH — HEALTH STABILITY: MENTAL HEALTH: HOW OFTEN DO YOU HAVE A DRINK CONTAINING ALCOHOL?: NEVER

## 2024-04-01 SDOH — SOCIAL STABILITY: SOCIAL INSECURITY: HAS ANYONE EVER THREATENED TO HURT YOUR FAMILY OR YOUR PETS?: NO

## 2024-04-01 SDOH — HEALTH STABILITY: MENTAL HEALTH: HOW MANY STANDARD DRINKS CONTAINING ALCOHOL DO YOU HAVE ON A TYPICAL DAY?: PATIENT DOES NOT DRINK

## 2024-04-01 SDOH — SOCIAL STABILITY: SOCIAL INSECURITY: ABUSE: ADULT

## 2024-04-01 SDOH — SOCIAL STABILITY: SOCIAL INSECURITY: DOES ANYONE TRY TO KEEP YOU FROM HAVING/CONTACTING OTHER FRIENDS OR DOING THINGS OUTSIDE YOUR HOME?: NO

## 2024-04-01 SDOH — SOCIAL STABILITY: SOCIAL INSECURITY: WERE YOU ABLE TO COMPLETE ALL THE BEHAVIORAL HEALTH SCREENINGS?: YES

## 2024-04-01 ASSESSMENT — ACTIVITIES OF DAILY LIVING (ADL)
GROOMING: INDEPENDENT
HEARING - LEFT EAR: FUNCTIONAL
HOME_MANAGEMENT_TIME_ENTRY: 12
BATHING_ASSISTANCE: MODERATE
ADEQUATE_TO_COMPLETE_ADL: YES
BATHING: NEEDS ASSISTANCE
ASSISTIVE_DEVICE: WALKER
WALKS IN HOME: NEEDS ASSISTANCE
PATIENT'S MEMORY ADEQUATE TO SAFELY COMPLETE DAILY ACTIVITIES?: NO
LACK_OF_TRANSPORTATION: NO
DRESSING YOURSELF: NEEDS ASSISTANCE
JUDGMENT_ADEQUATE_SAFELY_COMPLETE_DAILY_ACTIVITIES: YES
FEEDING YOURSELF: INDEPENDENT
HEARING - RIGHT EAR: FUNCTIONAL
ADL_ASSISTANCE: INDEPENDENT
TOILETING: NEEDS ASSISTANCE

## 2024-04-01 ASSESSMENT — LIFESTYLE VARIABLES
SUBSTANCE_ABUSE_PAST_12_MONTHS: NO
HOW OFTEN DO YOU HAVE A DRINK CONTAINING ALCOHOL: NEVER
HOW MANY STANDARD DRINKS CONTAINING ALCOHOL DO YOU HAVE ON A TYPICAL DAY: PATIENT DOES NOT DRINK
SKIP TO QUESTIONS 9-10: 1
AUDIT-C TOTAL SCORE: 0
HOW OFTEN DO YOU HAVE 6 OR MORE DRINKS ON ONE OCCASION: NEVER
HOW OFTEN DO YOU HAVE A DRINK CONTAINING ALCOHOL: NEVER
PRESCIPTION_ABUSE_PAST_12_MONTHS: NO
AUDIT-C TOTAL SCORE: 0
HOW MANY STANDARD DRINKS CONTAINING ALCOHOL DO YOU HAVE ON A TYPICAL DAY: PATIENT DOES NOT DRINK
SKIP TO QUESTIONS 9-10: 1
HOW OFTEN DO YOU HAVE 6 OR MORE DRINKS ON ONE OCCASION: NEVER
SKIP TO QUESTIONS 9-10: 1
AUDIT-C TOTAL SCORE: 0

## 2024-04-01 ASSESSMENT — COGNITIVE AND FUNCTIONAL STATUS - GENERAL
TOILETING: A LITTLE
MOVING FROM LYING ON BACK TO SITTING ON SIDE OF FLAT BED WITH BEDRAILS: A LITTLE
STANDING UP FROM CHAIR USING ARMS: A LITTLE
DRESSING REGULAR UPPER BODY CLOTHING: A LITTLE
DRESSING REGULAR LOWER BODY CLOTHING: A LOT
PERSONAL GROOMING: A LITTLE
DAILY ACTIVITIY SCORE: 18
PERSONAL GROOMING: A LITTLE
MOBILITY SCORE: 16
DRESSING REGULAR LOWER BODY CLOTHING: A LITTLE
MOVING TO AND FROM BED TO CHAIR: A LITTLE
CLIMB 3 TO 5 STEPS WITH RAILING: TOTAL
DAILY ACTIVITIY SCORE: 16
MOBILITY SCORE: 18
TURNING FROM BACK TO SIDE WHILE IN FLAT BAD: A LITTLE
STANDING UP FROM CHAIR USING ARMS: A LITTLE
HELP NEEDED FOR BATHING: A LOT
WALKING IN HOSPITAL ROOM: A LITTLE
HELP NEEDED FOR BATHING: A LITTLE
CLIMB 3 TO 5 STEPS WITH RAILING: A LITTLE
PATIENT BASELINE BEDBOUND: NO
EATING MEALS: A LITTLE
WALKING IN HOSPITAL ROOM: A LITTLE
MOVING FROM LYING ON BACK TO SITTING ON SIDE OF FLAT BED WITH BEDRAILS: A LITTLE
EATING MEALS: A LITTLE
MOVING TO AND FROM BED TO CHAIR: A LITTLE
TOILETING: A LITTLE
DRESSING REGULAR UPPER BODY CLOTHING: A LITTLE
TURNING FROM BACK TO SIDE WHILE IN FLAT BAD: A LITTLE

## 2024-04-01 ASSESSMENT — PAIN SCALES - GENERAL
PAINLEVEL_OUTOF10: 0 - NO PAIN
PAINLEVEL_OUTOF10: 8
PAINLEVEL_OUTOF10: 3
PAINLEVEL_OUTOF10: 3
PAINLEVEL_OUTOF10: 8
PAINLEVEL_OUTOF10: 3
PAINLEVEL_OUTOF10: 0 - NO PAIN

## 2024-04-01 ASSESSMENT — PATIENT HEALTH QUESTIONNAIRE - PHQ9
SUM OF ALL RESPONSES TO PHQ9 QUESTIONS 1 & 2: 0
1. LITTLE INTEREST OR PLEASURE IN DOING THINGS: NOT AT ALL
1. LITTLE INTEREST OR PLEASURE IN DOING THINGS: NOT AT ALL
2. FEELING DOWN, DEPRESSED OR HOPELESS: NOT AT ALL

## 2024-04-01 ASSESSMENT — PAIN - FUNCTIONAL ASSESSMENT
PAIN_FUNCTIONAL_ASSESSMENT: 0-10

## 2024-04-01 NOTE — PROGRESS NOTES
Occupational Therapy    Evaluation and Treatment    Patient Name: Kelly Martinez  MRN: [id number]  Today's Date: 4/1/2024  Time Calculation  Start Time: 0849  Stop Time: 0916  Time Calculation (min): 27 min    Assessment  IP OT Assessment  OT Assessment: Pt's ability to complete ADLs currently limited by deficits in cognition, activity tolerance, functional strength and dynamic balance. She will benefit from skilled OT while admitted to increase IND in ADLs/IADLs prior to d/c.  Prognosis: Fair  Barriers to Discharge: Decreased caregiver support  Evaluation/Treatment Tolerance: Patient limited by fatigue  Medical Staff Made Aware: Yes  End of Session Communication: Bedside nurse  End of Session Patient Position: Bed, 3 rail up, Alarm on (RN present)  Plan:  Treatment Interventions: ADL retraining, Functional transfer training, UE strengthening/ROM, Endurance training, Cognitive reorientation, Patient/family training, Equipment evaluation/education, Compensatory technique education  OT Frequency: 3 times per week  OT Discharge Recommendations: Moderate intensity level of continued care  OT Recommended Transfer Status: Minimal assist, Assist of 1  OT - OK to Discharge: Yes    Subjective   Current Problem:  1. Fall, initial encounter        2. Inability to walk        3. 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 Pleasantly confused throughout and poor accuracy with PLOF and home setup questions.   Precautions:  Hearing/Visual Limitations: Mildly [place]  Medical  Precautions: Fall precautions  Vital Signs:  Heart Rate: 64  Heart Rate Source: Monitor  SpO2: 97 %  BP: (!) 191/111  BP Location: Left arm  BP Method: Automatic  Patient Position: Lying  Pain:  Pain Assessment  Pain Assessment: 0-10  Pain Score: 0 - No pain  Lines/Tubes/Drains:  External Urinary Catheter Female (Active)   Number of days: 0     Objective   Cognition:  Overall Cognitive Status: Impaired at baseline  Orientation Level: Disoriented to time, Disoriented to place, Disoriented to situation  Following Commands: Follows one step commands with repetition  Safety Judgment: Decreased awareness of need for assistance  Cognition Comments: Confusion throughout, repetition of cues and commands for follow through  Problem Solving: Exceptions to WFL  Safety/Judgement: Exceptions to WFL  Novel Situations: Minimal  Routine Tasks: Minimal  Unable to Self-Monitor and Self-Correct Consistently: Minimal  Insight: Mild  Impulsive: Mildly  Processing Speed: No response           Home Living:  Type of Home: Apartment (2nd floor)  Lives With: Alone  Home Adaptive Equipment: Walker rolling or standard  Home Layout: One level  Home Access: Elevator  Bathroom Toilet: Standard  Home Living Comments: Home setup and PLOF unreliable due to cognitive deficits   Prior Function:  Level of [place]: Independent with ADLs and functional transfers, Independent with homemaking with ambulation  ADL Assistance: Independent  Homemaking Assistance: Independent  Ambulatory Assistance: Independent  Vocational: Retired  Leisure: None reported  Prior Function Comments: Home setup and PLOF unreliable due to cognitive deficits  IADL History:  Homemaking Responsibilities: Yes  Meal Prep Responsibility: Primary  Laundry Responsibility: Primary  Cleaning Responsibility: Primary  Bill Paying/Finance Responsibility: Primary  Shopping Responsibility: Primary  Current License: No  Occupation: Retired  Leisure and Hobbies: None reported  ADL:  Eating  Assistance: Modified independent (Device) (Anticipated)  Eating Deficit: Setup  Grooming Assistance: Minimal  Grooming Deficit: Steadying  Bathing Assistance: Moderate (Anticipated)  UE Dressing Assistance: Minimal (Anticipated)  LE Dressing Assistance: Moderate (Anticipated)  Toileting Assistance with Device: Moderate (Simulated)  Activity Tolerance:  Endurance: Decreased tolerance for upright activites  Activity Tolerance Comments: Reports feeling nauseous after extended standing, requires seated rest break for recovery  Balance:  Dynamic Standing Balance  Dynamic Standing-Balance Support: Bilateral upper extremity supported  Dynamic Standing-Comments: FWW - CGA/Min A  Static Sitting Balance  Static Sitting-Balance Support: No upper extremity supported, Feet supported  Static Sitting-Level of Assistance: Distant supervision  Static Sitting-Comment/Number of Minutes: EOB  Static Standing Balance  Static Standing-Balance Support: Bilateral upper extremity supported  Static Standing-Level of Assistance: Close supervision  Static Standing-Comment/Number of Minutes: FWW  Bed Mobility/Transfers: Bed Mobility/Transfers: Bed Mobility  Bed Mobility: Yes  Bed Mobility 1  Bed Mobility 1: Supine to sitting, Sitting to supine  Level of Assistance 1: Minimum assistance  Bed Mobility Comments 1: Min A for LE management and trunk support   and Transfers  Transfer: Yes  Transfer 1  Transfer From 1: Sit to, Stand to  Transfer to 1: Stand, Sit  Technique 1: Sit to stand, Stand to sit  Transfer Device 1: Walker  Transfer Level of Assistance 1: Minimum assistance  Trials/Comments 1: Bed height elevated, Min A for lifting into stand  Transfers 2  Transfer From 2: Bed to  Transfer to 2: Chair with arms  Technique 2:  (Ambulating)  Transfer Device 2: Walker  Transfer Level of Assistance 2: Minimum assistance  Trials/Comments 2: Min A for balance  IADL's:   Homemaking Responsibilities: Yes  Meal Prep Responsibility: Primary  Laundry  Responsibility: Primary  Cleaning Responsibility: Primary  Bill Paying/Finance Responsibility: Primary  Shopping Responsibility: Primary  Current License: No  Occupation: Retired  Leisure and Hobbies: None reported  Vision: Vision - Basic Assessment  Current Vision: Does not wear glasses   and    Sensation:  Light Touch: No apparent deficits  Strength:  Strength Comments: B UEs approx 4/5  Perception:  Inattention/Neglect: Appears intact  Coordination:  Movements are Fluid and Coordinated: No  Upper Body Coordination: Mild FMC and GMC deficits   Hand Function:  Hand Function  Gross Grasp: Functional  Coordination: Functional  Extremities:   RUE   RUE : Within Functional Limits, LUE   LUE: Within Functional Limits,  , and        Outcome Measures: [facility] Daily Activity  Putting on and taking off regular lower body clothing: A lot  Bathing (including washing, rinsing, drying): A lot  Putting on and taking off regular upper body clothing: A little  Toileting, which includes using toilet, bedpan or urinal: A little  Taking care of personal grooming such as brushing teeth: A little  Eating Meals: A little  Daily Activity - Total Score: 16         ,     OT Adult Other Outcome Measures  4AT: 4 AT +    Education Documentation  Body Mechanics, taught by Ian Mehta OT at 4/1/2024 10:59 AM.  Learner: Patient  Readiness: Acceptance  Method: Explanation, Demonstration  Response: Verbalizes Understanding, Needs Reinforcement    Precautions, taught by Ian Mehta OT at 4/1/2024 10:59 AM.  Learner: Patient  Readiness: Acceptance  Method: Explanation, Demonstration  Response: Verbalizes Understanding, Needs Reinforcement    ADL Training, taught by Ian Mehta OT at 4/1/2024 10:59 AM.  Learner: Patient  Readiness: Acceptance  Method: Explanation, Demonstration  Response: Verbalizes Understanding, Needs Reinforcement    Education Comments  No comments found.    Goals:   Encounter Problems       Encounter Problems (Active)        ADLs       Patient with complete lower body dressing with supervision level of assistance donning and doffing all LE clothes  with PRN adaptive equipment while edge of bed  and standing (Progressing)       Start:  04/01/24    Expected End:  04/22/24            Patient will complete daily grooming tasks brushing teeth with supervision level of assistance and PRN adaptive equipment while standing. (Progressing)       Start:  04/01/24    Expected End:  04/22/24            Patient will complete toileting including hygiene clothing management/hygiene with supervision level of assistance and raised toilet seat and grab bars. (Progressing)       Start:  04/01/24    Expected End:  04/22/24               BALANCE       Pt will maintain dynamic standing balance during ADL task with supervision level of assistance in order to demonstrate decreased risk of falling and improved postural control. (Progressing)       Start:  04/01/24    Expected End:  04/22/24               COGNITION/SAFETY       Patient will demonstrated orientation x 4 with Min verbal cues. (Progressing)       Start:  04/01/24    Expected End:  04/22/24       ORIENTATION            MOBILITY       Patient will perform Functional mobility Household distances/Community Distances with supervision level of assistance and least restrictive device in order to improve safety and functional mobility. (Progressing)       Start:  04/01/24    Expected End:  04/22/24                   Treatment Completed on Evaluation    Activities of Daily Living:    Grooming  Grooming Level of Assistance: Contact guard  Grooming Where Assessed: Standing sinkside  Grooming Comments: CGA while pt stood at sink to initiate grooming tasks, she washed hands with cueing for increased positioning and safety following toileting. Due to nausea, pt unable to remain standing to complete oral hygiene.      Toileting  Toileting Level of Assistance: Minimum assistance  Where Assessed: Toilet  Toileting  Comments: Min A for balance and safety while standing to manage clothing, cueing for improved positioning and IND    04/01/24 at 11:00 AM   CHARITY LIEBERMAN, OT   Rehab Office: [phone]

## 2024-04-01 NOTE — CARE PLAN
The patient's goals for the shift include  fEEL BETTER    The clinical goals for the shift include Pt will remain safe and free of injury

## 2024-04-01 NOTE — PROGRESS NOTES
Physical Therapy    Physical Therapy Evaluation    Patient Name: Kelly Martinez  MRN: [id number]  Today's Date: 4/1/2024   Time Calculation  Start Time: 1203  Stop Time: 1219  Time Calculation (min): 16 min    Assessment/Plan   PT Assessment  PT Assessment Results: Decreased strength, Decreased endurance, Decreased cognition, Decreased mobility  Rehab Prognosis: Fair  End of Session Communication: Bedside nurse  Assessment Comment: Pt supine in bed, A&Ox1, perseverates on birth date with inconsistent command following. Pt unable to provide acurate history and PLOF, per EMR pt has HHA, pt reports amb with FWW occasionally. Pt is at inc risk of falls, needs assist for all mobility, would benefit from moderate intensity thearpy upon d/c.  End of Session Patient Position: Bed, 3 rail up, Alarm on  IP OR SWING BED PT PLAN  Inpatient or Swing Bed: Inpatient  PT Plan  Treatment/Interventions: Bed mobility, Transfer training, Gait training, Balance training, Strengthening, Therapeutic exercise, Home exercise program, Therapeutic activity  PT Plan: Skilled PT  PT Frequency: 3 times per week  PT Discharge Recommendations: Moderate intensity level of continued care  PT - OK to Discharge: Yes (eval complete, d/c recommendation made)      Subjective   General Visit Information:  Reason for Referral: Fall at home  Past Medical History Relevant to Rehab: 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, HFpEF, HTN, PD, Schizophrenia/depression, JOY on CPAP, COPD,  CKD stage IV, and chronic tobacco use  Prior to Session Communication: Bedside nurse  Patient Position Received: Bed, 3 rail up, Alarm on  Family/Caregiver Present: No  General Comment: Pt supine in bed, confused throughout, initially perseverates on birth date for all answers but improves with inc time and mobility   Home Living:  Home Living  Type of Home: Apartment  Lives With: Alone (pt reports alone but per EMR pt has HHA (unsure of hours))  Home Adaptive Equipment: Walker rolling or standard  Home  Layout: One level  Home Access: Elevator  Home Living Comments: pt is overall a questionable historian  Prior Level of Function:  Prior Function Per Pt/Caregiver Report  ADL Assistance:  (pt is questionable historian, per EMR has HHA but unclear levels of assist)  Ambulatory Assistance:  (pt reports being indep using FWW most of the time, questionable historian)  Precautions:  Precautions  Medical Precautions: Fall precautions         Objective     Pain:  Pain Assessment  Pain Assessment: 0-10  Pain Score: 0 - No pain  Cognition:  Cognition  Overall Cognitive Status: Impaired at baseline  Orientation Level: Disoriented to time, Disoriented to situation, Disoriented to place  Following Commands: Follows one step commands with repetition  Cognition Comments: confused      Extremity/Trunk Assessments:  Strength:           RLE   RLE : Within Functional Limits  LLE   LLE : Within Functional Limits    General Assessments:  Strength  Strength Comments: B UEs approx 4/5               Static Sitting Balance  Static Sitting-Balance Support: Bilateral upper extremity supported, Feet supported  Static Sitting-Level of Assistance: Contact guard  Static Standing Balance  Static Standing-Balance Support: Bilateral upper extremity supported  Static Standing-Level of Assistance: Contact guard    Functional Assessments:  Bed Mobility  Bed Mobility: Yes  Bed Mobility 1  Bed Mobility 1: Supine to sitting, Sitting to supine  Level of Assistance 1: Minimum assistance  Bed Mobility Comments 1: cues for sequencing  Transfers  Transfer: Yes  Transfer 1  Technique 1: Sit to stand, Stand to sit  Transfer Device 1: Walker  Transfer Level of Assistance 1: Minimum assistance, Minimal verbal cues  Trials/Comments 1: cues for UE placement  Ambulation/Gait Training  Ambulation/Gait Training Performed: Yes  Ambulation/Gait Training 1  Surface 1: Level tile  Device 1: Rolling walker  Assistance 1: Contact guard  Quality of Gait 1:  (dec step length  and foot clearance bilaterally)  Comments/Distance (ft) 1: 5ft          Outcome Measures:  [facility] Basic Mobility  Turning from your back to your side while in a flat bed without using bedrails: A little  Moving from lying on your back to sitting on the side of a flat bed without using bedrails: A little  Moving to and from bed to chair (including a wheelchair): A little  Standing up from a chair using your arms (e.g. wheelchair or bedside chair): A little  To walk in hospital room: A little  Climbing 3-5 steps with railing: Total  Basic Mobility - Total Score: 16                               Encounter Problems       Encounter Problems (Active)       Balance       Pt will complete dynamic reaching outside RUBI, in standing with unilateral UE support, without LOB (Progressing)       Start:  04/01/24    Expected End:  04/15/24               Mobility       Pt will amb 50ft with LRAD and supervision (Progressing)       Start:  04/01/24    Expected End:  04/15/24               PT Transfers       Pt will perform sit<>stand with LRAD and supervision (Progressing)       Start:  04/01/24    Expected End:  04/15/24            Pt will perform supine<>sit with supervision (Progressing)       Start:  04/01/24    Expected End:  04/15/24                   Education Documentation  Mobility Training, taught by Kath Roman, PT at 4/1/2024  1:22 PM.  Learner: Patient  Readiness: Acceptance  Method: Explanation  Response: Needs Reinforcement  Comment: goals of mobility, progression of activity, need for assistance    Education Comments  No comments found.            04/01/24 at 1:23 PM   Kath Roman, PT   Rehab Office: [phone]

## 2024-04-01 NOTE — PROGRESS NOTES
"Kelly Martinez is a 79 y.o. female on Hospital Day: 3 of admission presenting with Fall, initial encounter.    Subjective   Patient seen at bedside.  Denies any pain at this time.  Further denies any dysuria, frequency, or hematuria       Objective     Physical Exam  Vitals and nursing note reviewed.   Constitutional:       General: She is not in acute distress.  Eyes:      General: No scleral icterus.     Extraocular Movements: Extraocular movements intact.   Cardiovascular:      Rate and Rhythm: Normal rate and regular rhythm.      Heart sounds: No murmur heard.  Pulmonary:      Effort: Pulmonary effort is normal. No respiratory distress.      Breath sounds: Normal breath sounds.   Abdominal:      General: Abdomen is flat. There is no distension.      Palpations: Abdomen is soft.      Tenderness: There is no abdominal tenderness.   Musculoskeletal:         General: No swelling. Normal range of motion.   Skin:     General: Skin is warm and dry.   Neurological:      Mental Status: She is alert.      Comments: Oriented x2          Last Recorded Vitals  Blood pressure 160/90, pulse 64, temperature 35.9 °C (96.6 °F), resp. rate 16, height 1.702 m (5' 7\"), weight 93.4 kg (206 lb), SpO2 93 %.  Intake/Output last 3 Shifts:  I/O last 3 completed shifts:  In: - (0 mL/kg)   Out: 1700 (18.2 mL/kg) [Urine:1700 (0.5 mL/kg/hr)]  Weight: 93.4 kg     Relevant Results  Results reviewed    Scheduled Medications:  acetaminophen, 975 mg, oral, q8h  apixaban, 5 mg, oral, BID  aspirin, 81 mg, oral, Daily  atorvastatin, 40 mg, oral, Daily  carbidopa-levodopa, 1 tablet, oral, TID  carvedilol, 25 mg, oral, BID with meals  folic acid, 1 mg, oral, Daily  hydroCHLOROthiazide, 50 mg, oral, Daily  polyethylene glycol, 17 g, oral, Daily  QUEtiapine, 300 mg, oral, Nightly  terazosin, 2 mg, oral, Nightly      Continuous Medications:     PRN Medications:  PRN medications: hydrALAZINE, HYDROmorphone       Assessment/Plan   Kelly Martinez is a 79 " y.o. female on Hospital Day: 3 of admission presenting with Fall, initial encounter at home.  Trauma workup negative. Today we will consult vascular medicine for further recommendations anticoagulation.    #Multiple falls  -Trauma work up negative  -PT/OT recommending mod intensity therapy  -EKG with NSR without acute st changes   -troponin neg x 2     #Confusion  #Parkinsons dementia  -UA unremarkable  -possible contribution from pain medications vs delirium    -continue home carbidopa/levodopa      #Recurrent DVTs   -INR 2 on admit. 1.3 on 3/9/24, other sub therapeutic values at [facility]  -Transitioned to [facility] Medicine consult     #HTN  -unclear pt's adherence to home meds. Reportedly BP also high at home  -Initial /119 up to 221/129. Initial goal reduction 25% for SBP ~160  -continue home BP meds: Coreg 25.  -Continue HCTZ 25 mg daily  -holding lisnopril in setting of advanced CKD   -PRN hydralazine 50 for SBP > 180     #CKD IV   -stable    Disposition: Therapies recommending mod intensity therapy at discharge, awaiting further recommendation from vascular medicine     I spent 40 minutes in the professional and overall care of this patient.             Daivd Molina MD

## 2024-04-01 NOTE — PROGRESS NOTES
I attempted to speak with Kelly at the bedside regarding discharge planning and home going needs. Patient asked me to call her daughter [phone] which I did to no availability, I did leave a voice mail. I will call again at a later time or date.

## 2024-04-01 NOTE — PROGRESS NOTES
T[facility] requested SW to leave SNF list at bedside for daughter to review. SW met with patient at bedside and left SNF list. SW will continue to follow.    TARIQ Blackwell

## 2024-04-01 NOTE — CARE PLAN
The patient's goals for the shift include      The clinical goals for the shift include Pt will remain safe and free of injury    Pt admitted to LT6 after fall. Pt said she hit her back and butt to refrigerator but does not want to take any pain med right now. A & O *3 but forgetful and has slurred speech. Used walker at home. She lives alone and need placement. Call light in reach and bed alarm on. Resting quietly at this time.    Brynn Tompkins RN

## 2024-04-02 ENCOUNTER — APPOINTMENT (OUTPATIENT)
Dept: VASCULAR MEDICINE | Facility: HOSPITAL | Age: 79
DRG: 057 | End: 2024-04-02
Payer: COMMERCIAL

## 2024-04-02 LAB
ALBUMIN SERPL BCP-MCNC: 3.4 G/DL (ref 3.4–5)
ANION GAP SERPL CALC-SCNC: 12 MMOL/L (ref 10–20)
BUN SERPL-MCNC: 27 MG/DL (ref 6–23)
CALCIUM SERPL-MCNC: 9.8 MG/DL (ref 8.6–10.6)
CHLORIDE SERPL-SCNC: 107 MMOL/L (ref 98–107)
CO2 SERPL-SCNC: 24 MMOL/L (ref 21–32)
CREAT SERPL-MCNC: 2.21 MG/DL (ref 0.5–1.05)
EGFRCR SERPLBLD CKD-EPI 2021: 22 ML/MIN/1.73M*2
ERYTHROCYTE [DISTWIDTH] IN BLOOD BY AUTOMATED COUNT: 14.6 % (ref 11.5–14.5)
GLUCOSE SERPL-MCNC: 81 MG/DL (ref 74–99)
HCT VFR BLD AUTO: 34.8 % (ref 36–46)
HGB BLD-MCNC: 11.2 G/DL (ref 12–16)
HOLD SPECIMEN: NORMAL
MCH RBC QN AUTO: 30.3 PG (ref 26–34)
MCHC RBC AUTO-ENTMCNC: 32.2 G/DL (ref 32–36)
MCV RBC AUTO: 94 FL (ref 80–100)
NRBC BLD-RTO: 0 /100 WBCS (ref 0–0)
PHOSPHATE SERPL-MCNC: 2.8 MG/DL (ref 2.5–4.9)
PLATELET # BLD AUTO: 181 X10*3/UL (ref 150–450)
POTASSIUM SERPL-SCNC: 4.2 MMOL/L (ref 3.5–5.3)
RBC # BLD AUTO: 3.7 X10*6/UL (ref 4–5.2)
SODIUM SERPL-SCNC: 139 MMOL/L (ref 136–145)
WBC # BLD AUTO: 4.4 X10*3/UL (ref 4.4–11.3)

## 2024-04-02 PROCEDURE — 2500000004 HC RX 250 GENERAL PHARMACY W/ HCPCS (ALT 636 FOR OP/ED): Performed by: STUDENT IN AN ORGANIZED HEALTH CARE EDUCATION/TRAINING PROGRAM

## 2024-04-02 PROCEDURE — 97116 GAIT TRAINING THERAPY: CPT | Mod: GP | Performed by: PHYSICAL THERAPIST

## 2024-04-02 PROCEDURE — 93970 EXTREMITY STUDY: CPT

## 2024-04-02 PROCEDURE — 99232 SBSQ HOSP IP/OBS MODERATE 35: CPT | Performed by: STUDENT IN AN ORGANIZED HEALTH CARE EDUCATION/TRAINING PROGRAM

## 2024-04-02 PROCEDURE — 80069 RENAL FUNCTION PANEL: CPT | Performed by: STUDENT IN AN ORGANIZED HEALTH CARE EDUCATION/TRAINING PROGRAM

## 2024-04-02 PROCEDURE — 2500000001 HC RX 250 WO HCPCS SELF ADMINISTERED DRUGS (ALT 637 FOR MEDICARE OP): Performed by: STUDENT IN AN ORGANIZED HEALTH CARE EDUCATION/TRAINING PROGRAM

## 2024-04-02 PROCEDURE — 99222 1ST HOSP IP/OBS MODERATE 55: CPT | Performed by: INTERNAL MEDICINE

## 2024-04-02 PROCEDURE — 2500000002 HC RX 250 W HCPCS SELF ADMINISTERED DRUGS (ALT 637 FOR MEDICARE OP, ALT 636 FOR OP/ED): Performed by: STUDENT IN AN ORGANIZED HEALTH CARE EDUCATION/TRAINING PROGRAM

## 2024-04-02 PROCEDURE — 2500000001 HC RX 250 WO HCPCS SELF ADMINISTERED DRUGS (ALT 637 FOR MEDICARE OP): Performed by: INTERNAL MEDICINE

## 2024-04-02 PROCEDURE — 36415 COLL VENOUS BLD VENIPUNCTURE: CPT | Performed by: INTERNAL MEDICINE

## 2024-04-02 PROCEDURE — 1100000001 HC PRIVATE ROOM DAILY

## 2024-04-02 PROCEDURE — 93970 EXTREMITY STUDY: CPT | Performed by: SURGERY

## 2024-04-02 PROCEDURE — 85027 COMPLETE CBC AUTOMATED: CPT | Performed by: INTERNAL MEDICINE

## 2024-04-02 RX ORDER — OLANZAPINE 10 MG/2ML
5 INJECTION, POWDER, FOR SOLUTION INTRAMUSCULAR ONCE
Status: COMPLETED | OUTPATIENT
Start: 2024-04-02 | End: 2024-04-02

## 2024-04-02 RX ADMIN — ASPIRIN 81 MG CHEWABLE TABLET 81 MG: 81 TABLET CHEWABLE at 09:00

## 2024-04-02 RX ADMIN — CARVEDILOL 25 MG: 25 TABLET, FILM COATED ORAL at 17:00

## 2024-04-02 RX ADMIN — QUETIAPINE FUMARATE 300 MG: 300 TABLET ORAL at 20:11

## 2024-04-02 RX ADMIN — ACETAMINOPHEN 975 MG: 325 TABLET ORAL at 11:25

## 2024-04-02 RX ADMIN — FOLIC ACID 1 MG: 1 TABLET ORAL at 09:00

## 2024-04-02 RX ADMIN — APIXABAN 5 MG: 5 TABLET, FILM COATED ORAL at 20:11

## 2024-04-02 RX ADMIN — ACETAMINOPHEN 975 MG: 325 TABLET ORAL at 20:11

## 2024-04-02 RX ADMIN — OLANZAPINE 5 MG: 10 INJECTION, POWDER, FOR SOLUTION INTRAMUSCULAR at 22:17

## 2024-04-02 RX ADMIN — TERAZOSIN HYDROCHLORIDE 2 MG: 2 CAPSULE ORAL at 20:11

## 2024-04-02 RX ADMIN — ATORVASTATIN CALCIUM 40 MG: 40 TABLET, FILM COATED ORAL at 09:00

## 2024-04-02 RX ADMIN — HYDROCHLOROTHIAZIDE 50 MG: 25 TABLET ORAL at 09:00

## 2024-04-02 RX ADMIN — CARBIDOPA AND LEVODOPA 1 TABLET: 25; 100 TABLET ORAL at 06:58

## 2024-04-02 RX ADMIN — APIXABAN 5 MG: 5 TABLET, FILM COATED ORAL at 09:00

## 2024-04-02 RX ADMIN — POLYETHYLENE GLYCOL 3350 17 G: 17 POWDER, FOR SOLUTION ORAL at 09:00

## 2024-04-02 RX ADMIN — CARVEDILOL 25 MG: 25 TABLET, FILM COATED ORAL at 08:00

## 2024-04-02 ASSESSMENT — COGNITIVE AND FUNCTIONAL STATUS - GENERAL
HELP NEEDED FOR BATHING: A LITTLE
WALKING IN HOSPITAL ROOM: A LITTLE
DRESSING REGULAR LOWER BODY CLOTHING: A LITTLE
TURNING FROM BACK TO SIDE WHILE IN FLAT BAD: A LITTLE
MOVING TO AND FROM BED TO CHAIR: A LITTLE
TURNING FROM BACK TO SIDE WHILE IN FLAT BAD: A LITTLE
WALKING IN HOSPITAL ROOM: A LITTLE
CLIMB 3 TO 5 STEPS WITH RAILING: TOTAL
MOBILITY SCORE: 16
TOILETING: A LITTLE
MOVING FROM LYING ON BACK TO SITTING ON SIDE OF FLAT BED WITH BEDRAILS: A LITTLE
EATING MEALS: A LITTLE
STANDING UP FROM CHAIR USING ARMS: A LITTLE
STANDING UP FROM CHAIR USING ARMS: A LITTLE
MOVING TO AND FROM BED TO CHAIR: A LITTLE
DRESSING REGULAR UPPER BODY CLOTHING: A LITTLE
PERSONAL GROOMING: A LITTLE
MOVING FROM LYING ON BACK TO SITTING ON SIDE OF FLAT BED WITH BEDRAILS: A LITTLE
DAILY ACTIVITIY SCORE: 18
MOBILITY SCORE: 16
CLIMB 3 TO 5 STEPS WITH RAILING: TOTAL

## 2024-04-02 ASSESSMENT — PAIN SCALES - GENERAL: PAINLEVEL_OUTOF10: 0 - NO PAIN

## 2024-04-02 ASSESSMENT — PAIN - FUNCTIONAL ASSESSMENT: PAIN_FUNCTIONAL_ASSESSMENT: 0-10

## 2024-04-02 NOTE — PROGRESS NOTES
"Kelly Martinez is a 79 y.o. female on Hospital Day: 4 of admission presenting with Fall, initial encounter.    Subjective   No acute vents overnight.  Patient seen at bedside working with physical therapy.  And that she was able to ambulate to the door.  Denies any other issues at this time       Objective     Physical Exam  Vitals and nursing note reviewed.   Constitutional:       General: She is not in acute distress.  Eyes:      General: No scleral icterus.     Extraocular Movements: Extraocular movements intact.   Cardiovascular:      Rate and Rhythm: Normal rate and regular rhythm.      Heart sounds: No murmur heard.  Pulmonary:      Effort: Pulmonary effort is normal. No respiratory distress.      Breath sounds: Normal breath sounds.   Abdominal:      General: Abdomen is flat. There is no distension.      Palpations: Abdomen is soft.      Tenderness: There is no abdominal tenderness.   Musculoskeletal:         General: No swelling. Normal range of motion.   Skin:     General: Skin is warm and dry.   Neurological:      Mental Status: She is alert.      Comments: Oriented x2        Last Recorded Vitals  Blood pressure (!) 152/91, pulse 67, temperature 36 °C (96.8 °F), temperature source Temporal, resp. rate 16, height 1.702 m (5' 7\"), weight 93.4 kg (206 lb), SpO2 94 %.  Intake/Output last 3 Shifts:  I/O last 3 completed shifts:  In: - (0 mL/kg)   Out: 1150 (12.3 mL/kg) [Urine:1150 (0.3 mL/kg/hr)]  Weight: 93.4 kg     Relevant Results  Results reviewed    Scheduled Medications:  acetaminophen, 975 mg, oral, q8h  apixaban, 5 mg, oral, BID  aspirin, 81 mg, oral, Daily  atorvastatin, 40 mg, oral, Daily  carbidopa-levodopa, 1 tablet, oral, TID  carvedilol, 25 mg, oral, BID with meals  folic acid, 1 mg, oral, Daily  hydroCHLOROthiazide, 50 mg, oral, Daily  polyethylene glycol, 17 g, oral, Daily  QUEtiapine, 300 mg, oral, Nightly  terazosin, 2 mg, oral, Nightly      PRN Medications:  PRN medications: hydrALAZINE, " HYDROmorphone       Assessment/Plan   Kelly Martinez is a 79 y.o. female on Hospital Day: 4 of admission presenting with Fall, initial encounter. Trauma workup negative.  Therapies recommending mod intensity therapy at discharge.    #Multiple falls  -Trauma work up negative  -PT/OT recommending mod intensity therapy  -EKG with NSR without acute st changes   -troponin neg x 2     #Confusion  #Parkinsons dementia  -UA unremarkable  -possible contribution from pain medications vs delirium    -continue home carbidopa/levodopa      #Recurrent DVTs   -INR 2 on admit. 1.3 on 3/9/24, other sub therapeutic values at [facility] consulted for recommendations on anticoagulation, currently on Eliquis  -Daily INR and CBC.     #HTN  -unclear pt's adherence to home meds. Reportedly BP also high at home  -Initial /119 up to 221/129. Initial goal reduction 25% for SBP ~160  -continue home BP meds: Coreg 25.  -Continue HCTZ 25 mg daily  -holding lisnopril in setting of advanced CKD   -PRN hydralazine 50 for SBP > 180     #CKD IV   -stable    Disposition: Therapies recommending mod intensity therapy at discharge, vascular medicine recommendations on anticoagulation     I spent 40 minutes in the professional and overall care of this patient.             David Molina MD

## 2024-04-02 NOTE — PROGRESS NOTES
ZACH received update that patient and daughters SNF [facility] is [facility]. Referral submitted for review. Will continue to follow.      [facility] unable to accept. ZACH updated daughter Paola. Daughter states patient prefers to discharge home. SW offered home care. Daughter states patient is active with an agency but is unable to recall the agency's name. ZACH updated [facility]. Will continue to follow.     TARIQ Blackwell

## 2024-04-02 NOTE — PROGRESS NOTES
Physical Therapy    Physical Therapy Treatment    Patient Name: Kelly Martinez  MRN: [id number]  Today's Date: 4/2/2024  Time Calculation  Start Time: 0954  Stop Time: 1009  Time Calculation (min): 15 min       Assessment/Plan   PT Assessment  PT Assessment Results: Decreased strength, Decreased endurance, Decreased cognition, Decreased mobility, Impaired balance, Pain, Impaired hearing, Decreased safety awareness, Impaired judgement  Rehab Prognosis: Good  Evaluation/Treatment Tolerance: Patient tolerated treatment well  Medical Staff Made Aware: Yes  Strengths: Ability to acquire knowledge, Attitude of self, Coping skills  Barriers to Participation:  (none)  End of Session Communication: Bedside nurse  Assessment Comment: 78 yo female admitted after fall at home presents with confusion, decreased activity tolerance, weakness and safety/balance deficits; pt is high fall risk. Recommend moderate intensity therapy as pt needs PT & OT (possibly SLT), is making gains and willing to participate, and is not at baseline.  End of Session Patient Position: Up in chair, Alarm on  PT Plan  Inpatient/Swing Bed or Outpatient: Inpatient  PT Plan  Treatment/Interventions: Bed mobility, Transfer training, Gait training, Balance training, Neuromuscular re-education, Endurance training, Strengthening, Therapeutic exercise, Range of motion, Therapeutic activity, Home exercise program, Positioning, Postural re-education  PT Plan: Skilled PT  PT Frequency: 3 times per week  PT Discharge Recommendations: Moderate intensity level of continued care  Equipment Recommended upon Discharge:  (TBD at SNF)  PT Recommended Transfer Status: Assist x1, Assistive device (min A +1 with WW)  PT - OK to Discharge: Yes (PT eval completed and DC recs made)      General Visit Information:   PT  Visit  PT Received On: 04/02/24  General  Reason for Referral: Fall at home  Past Medical History Relevant to Rehab: 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 Pt has mild pain Right mid thorax below breast.    Subjective   Precautions:  Precautions  Hearing/Visual Limitations: Mildly Jamul  Medical Precautions: Fall precautions (confusion (has sitter today))  Vital Signs:  Vital Signs  Heart Rate:  (sitting post gait: /92  HR 71 O2 95%, mild dsypnea post gait)  BP Location: Left arm  BP Method: Automatic  Patient Position: Sitting    Objective   Pain:  Pain Assessment  Pain Assessment: 0-10  Pain Score:  (4/10 Right thorax below breast \"in my kidney\")  Pain Interventions: Repositioned, Ambulation/increased activity, Rest, Therapeutic presence  Response to Interventions: 4/10 in chair as noted  Cognition:  Cognition  Overall Cognitive Status: Impaired  Arousal/Alertness: Delayed responses to stimuli  Orientation Level:  (oriented to self, [facility], month, date (not day, not entirely to situation)  Following Commands:  (follows 100% of simple 1 steps commands with intermittent repetition and cues)  Safety Judgment: Decreased awareness of need for assistance  Problem Solving: Assistance required to identify errors made  Cognition Comments: alert, engaged, fatigued at times  Safety/Judgement: Exceptions to WFL  Routine Tasks: Minimal  Insight: Mild  Processing Speed: Delayed  Postural Control:  Postural Control  Postural Control: Impaired  Trunk Control: descreased in static & dynamic standing as noted  Posture Comment: rounded shoulders and forward head in sitting; flexed head/upper trunk in standing (does well with repetitive cues about erect " posture)  Static Sitting Balance  Static Sitting-Balance Support: Feet supported, Bilateral upper extremity supported  Static Sitting-Level of Assistance: Contact guard (CG/min A, cues to scoot out to put feet on floor)  Static Standing Balance  Static Standing-Balance Support: Bilateral upper extremity supported (on WW)  Static Standing-Level of Assistance: Contact guard (CG/min A)  Static Standing-Comment/Number of Minutes: cues for erect posture  Dynamic Standing Balance  Dynamic Standing-Balance Support: Bilateral upper extremity supported (on WW)  Dynamic Standing-Balance:  (gait including turns)  Dynamic Standing-Comments: CG to min A, cue fo posture and sequencing    Activity Tolerance:  Activity Tolerance  Endurance: Tolerates 10 - 20 min exercise with multiple rests (mild dyspnea on exertion)  Treatments:  Therapeutic Exercise  Therapeutic Exercise Performed: Yes  Therapeutic Exercise Activity 1: sitting bilateral LAQs, ankle pumps    Therapeutic Activity  Therapeutic Activity Performed: No    Bed Mobility  Bed Mobility: Yes  Bed Mobility 1  Bed Mobility 1: Supine to sitting  Level of Assistance 1: Minimum assistance, Minimal verbal cues, Minimal tactile cues  Bed Mobility Comments 1: cues for sequencing/hand placement (use of rail)  Bed Mobility 2  Bed Mobility  2: Scooting (in sitting)  Level of Assistance 2: Contact guard, Moderate verbal cues, Minimal tactile cues  Bed Mobility Comments 2: use of rail    Ambulation/Gait Training  Ambulation/Gait Training Performed: Yes  Ambulation/Gait Training 1  Surface 1: Level tile  Device 1: Rolling walker  Assistance 1: Minimum assistance, Minimal verbal cues, Minimal tactile cues  Quality of Gait 1:  (flexed head/upper trunk at times, slow, assist with walker, small steps, walker too far forward at times, decreased safety at times (when going to sit))  Comments/Distance (ft) 1: 10  Transfers  Transfer: Yes  Transfer 1  Transfer From 1: Sit to, Stand to  Transfer  to 1: Sit, Stand  Technique 1: Sit to stand, Stand to sit  Transfer Device 1: Walker  Transfer Level of Assistance 1: Minimum assistance, Minimal verbal cues  Transfers 2  Transfer From 2: Stand to  Transfer to 2: Chair with arms  Technique 2: Stand to sit, Stand pivot  Transfer Device 2: Walker  Transfer Level of Assistance 2: Minimum assistance, Minimal verbal cues, Minimal tactile cues    Stairs  Stairs: No    Outcome Measures:  [facility] Basic Mobility  Turning from your back to your side while in a flat bed without using bedrails: A little  Moving from lying on your back to sitting on the side of a flat bed without using bedrails: A little  Moving to and from bed to chair (including a wheelchair): A little  Standing up from a chair using your arms (e.g. wheelchair or bedside chair): A little  To walk in hospital room: A little  Climbing 3-5 steps with railing: Total  Basic Mobility - Total Score: 16    Education Documentation  Precautions, taught by Andressa Mccormick PT at 4/2/2024 10:11 AM.  Learner: Patient  Readiness: Acceptance  Method: Explanation, Demonstration, Teach-back  Response: Demonstrated Understanding, Verbalizes Understanding  Comment: reoriented, PT purpose/POC, safe bed mobility/tranfers/gait, progress, vitals    Body Mechanics, taught by Andressa Mccormick PT at 4/2/2024 10:11 AM.  Learner: Patient  Readiness: Acceptance  Method: Explanation, Demonstration, Teach-back  Response: Demonstrated Understanding, Verbalizes Understanding  Comment: reoriented, PT purpose/POC, safe bed mobility/tranfers/gait, progress, vitals    Mobility Training, taught by Andressa Mccormick PT at 4/2/2024 10:11 AM.  Learner: Patient  Readiness: Acceptance  Method: Explanation, Demonstration, Teach-back  Response: Demonstrated Understanding, Verbalizes Understanding  Comment: reoriented, PT purpose/POC, safe bed mobility/tranfers/gait, progress, vitals    Education Comments  No comments found.             Encounter  Problems       Encounter Problems (Active)       Balance       Pt will complete dynamic reaching outside RUBI, in standing with unilateral UE support, without LOB (Progressing)       Start:  04/01/24    Expected End:  04/15/24               Mobility       Pt will amb 50ft with LRAD and supervision (Progressing)       Start:  04/01/24    Expected End:  04/15/24               PT Transfers       Pt will perform sit<>stand with LRAD and supervision (Progressing)       Start:  04/01/24    Expected End:  04/15/24            Pt will perform supine<>sit with supervision (Progressing)       Start:  04/01/24    Expected End:  04/15/24

## 2024-04-02 NOTE — NURSING NOTE
Patient refused 1300 carbidopa-levodopa dose. Patient educated on need to continue medication with out abruptly discontinuing. I explained the risk of discontinuation of therapy may result in neuroleptic malignant-like syndrome. Patient continues to refuse medication at this time.

## 2024-04-02 NOTE — CARE PLAN
The patient's goals for the shift include      The clinical goals for the shift include Pt will remain safe and free of injury during night.    Pt remained safe and free of injury during night. She became agitated and tried to get out of bed. She sat up chair for 1 hour and calmed down. Pain not complained. No other distress noted. Call light in reach. Resting quietly at this time.     Brynn Tompkins RN

## 2024-04-02 NOTE — CONSULTS
Inpatient consult to Vascular Medicine  Consult performed by: Sarah Sutton MD  Consult ordered by: David Molina MD  Reason for consult: ?AC RECS.        History Of Present Illness:    Kelly Martinez is a 79 y.o. female with hx of CKD stage IV, COPD, depression, DVT/PE (s/p IVC filter removed in 4/2016), HFpEF, HLD, HTN, obesity BMI 34.6, JOY, Vascular Parkinson's disease, Schizophrenia, JOY, COPD, and chronic tobacco use came to the ED after a fall at home. The patient wasn't able to remember the circumstances behind her fall. She's had similar falls for the past couple of months. She denies LOC or hitting her head. Her family is unaware of the circumstances behind her falls. The patient says she lives at home, but has a home health aid who takes care of her. The patient follows up at [facility] for Coumadin Clinic. The patient has been prescribed to take Coumadin 3 mg every Sun, Tue, Thu; 4.5 mg all other days. The patient reports compliance to taking the medications.    Trauma imaging showed chronic microvascular changes with no evidence of injury or fracture. Radiology LE Venous duplex showed Rt CFV-FV-Pop and Lt EIV and Lt Pop DVT.    Currently, the patient is complaining of chest pain and cough with sputum. The patient has had cough for 1 year now. She notes worsening swelling in her bilateral lower extremities. ALSO REPORTS LE TENDERNESS.    US LE Venous duplex 12/28/23 showed DVT in right common femoral bifurcation, right femoral (increased clot burden compared to previous ultrasound on 11/10/22). DVT in left common femoral vein (clot burden decreased compared to prior ultrasound from 11/10/22). The patient was noted to have subtherapeutic INR and Coumadin was continued.     The patient was previously admitted from 11/10/22-11/16/22 for bilateral lower extremity pain. US LE Venous duplex on 11/10/22 showed DVT in left common femoral vein and left posterior tibial vein. She was previously taking  Eliquis prior to new thrombotic event. She reported compliance to Eliquis. Reported as Eliquis failure. Bridged to Coumadin. SEEN BY  CONSULTS AT THAT TIME FOR DECISION MAKING.     PMH:  CKD stage IV  COPD  Depression  DVT/PE (s/p IVC filter removed in 4/2016)  HFpEF  HLD  HTN  obesity BMI 32  JOY  Vascular Parkinson's disease  Schizophrenia  JOY  COPD  chronic tobacco use    PSH:   Knee surgery (04/06/2016)  Neck surgery (04/06/2016)     FH: no hx of blood clots, no hx of clotting disorders    SH: smokes <1 pack a day x 40-50 years  SHE LIVES ALONE    Review of systems:  No fevers, chills, weight is stable AGREE  + shortness of breath, + cough AGREE  +chest pain, No Abdominal pain, diarrhea, nausea, vomiting,  No headaches, seizures, syncopal events AGREE  No hematuria, hematochezia, or melena  + swelling in the legs  No paresthesias     Last Recorded Vitals:  Vitals:    04/01/24 0915 04/01/24 1200 04/01/24 1550 04/01/24 2229   BP: 160/90 140/72 (!) 160/94 (!) 152/91   BP Location: Left arm Left arm  Left arm   Patient Position: Sitting Sitting  Lying   Pulse:  90 69 67   Resp:  16 18 16   Temp:  36.2 °C (97.2 °F) 36 °C (96.8 °F) 36 °C (96.8 °F)   TempSrc:  Temporal Tympanic Temporal   SpO2:  95% 94% 94%   Weight:       Height:           Last Labs:  CBC - 4/1/2024:  3:07 AM  5.3 11.5 181    34.5      CMP - 3/30/2024:  2:09 PM  9.4 6.6 10 --- 0.5   2.4 3.6 7 95      PTT - 3/31/2024:  3:38 AM  2.0   22.5 36     Troponin I, High Sensitivity   Date/Time Value Ref Range Status   03/30/2024 03:19 PM 19 0 - 34 ng/L Final   03/30/2024 02:09 PM 18 0 - 34 ng/L Final   12/28/2023 02:33 AM 19 0 - 34 ng/L Final     BNP   Date/Time Value Ref Range Status   12/28/2023 01:18 AM 61 0 - 99 pg/mL Final   12/10/2022 01:08 AM 34 0 - 99 pg/mL Final     Comment:     .  <100 pg/mL - Heart failure unlikely  100-299 pg/mL - Intermediate probability of acute heart  .               failure exacerbation. Correlate with clinical  .                context and patient history.    >=300 pg/mL - Heart Failure likely. Correlate with clinical  .               context and patient history.   Biotin interference may cause falsely decreased results.   Patients taking a Biotin dose of up to 5 mg/day should   refrain from taking Biotin for 24 hours before sample   collection. Providers may contact their local laboratory   for further information.     11/09/2022 07:26 PM 24 0 - 99 pg/mL Final     Comment:     .  <100 pg/mL - Heart failure unlikely  100-299 pg/mL - Intermediate probability of acute heart  .               failure exacerbation. Correlate with clinical  .               context and patient history.    >=300 pg/mL - Heart Failure likely. Correlate with clinical  .               context and patient history.   Biotin interference may cause falsely decreased results.   Patients taking a Biotin dose of up to 5 mg/day should   refrain from taking Biotin for 24 hours before sample   collection. Providers may contact their local laboratory   for further information.       Hemoglobin A1C   Date/Time Value Ref Range Status   02/17/2023 02:56 PM 5.3 % Final     Comment:          Diagnosis of Diabetes-Adults   Non-Diabetic: < or = 5.6%   Increased risk for developing diabetes: 5.7-6.4%   Diagnostic of diabetes: > or = 6.5%  .       Monitoring of Diabetes                Age (y)     Therapeutic Goal (%)   Adults:          >18           <7.0   Pediatrics:    13-18           <7.5                   7-12           <8.0                   0- 6            7.5-8.5   American Diabetes Association. Diabetes Care 33(S1), Jan 2010.       VLDL   Date/Time Value Ref Range Status   12/07/2022 02:20 PM 16 0 - 40 mg/dL Final      Last I/O:  I/O last 3 completed shifts:  In: - (0 mL/kg)   Out: 800 (8.6 mL/kg) [Urine:800 (0.2 mL/kg/hr)]  Weight: 93.4 kg     Imaging:  Radiology LE Venous Duplex 3/30/24  IMPRESSION:  1. Nonocclusive deep venous thrombosis of the right common femora  vein,  right proximal to distal femoral vein, and right popliteal vein.  2. Nonocclusive deep venous thrombosis of the left popliteal vein.  3. Incompletely visualized left external iliac vein with echogenic  intraluminal material and partial compressibility compatible with  nonocclusive thrombosis.      US LE Venous Duplex Left (Unilateral) 12/10/22:  IMPRESSION:  1. Partially occlusive deep vein thrombus within the left common  femoral, proximal/mid femoral, and popliteal veins. Clot burden  appears somewhat increased from 11/10/2022.  2. Partially occlusive deep vein thrombus within the imaged portion  of the right common femoral vein, new from 11/10/2022.     US Le Venous Duplex 11/10/22:  IMPRESSION:  1. Partially occlusive deep vein thrombosis within the left common  femoral vein.  2. Deep vein thrombosis within the left posterior tibial vein.  3. No venous thrombus within the remainder of the visualized  bilateral lower extremity veins.     US LE Venous Duplex Left 08/2022  Chronic, nonocclusive, minimal echogenic thrombus involving the left CFV, FV and PV     USG DVT 07/2022:  Right: Chronic nonocclusive DVT of FV with spontaneous flow. Left: Chronic DVT of the CFV extending into the PV     US LE Venous Duplex 5/10/2016:  IMPRESSION:  1.  Interval partial recanalization of the left mid to distal femoral   and popliteal veins as above.  Redemonstration of chronic mural   echogenic nonocclusive clot within the bilateral common femoral veins   and proximal left femoral vein.   2. Otherwise no acute thrombosis in the remainder of the visualized   venous system.     US LE Venous Duplex 4/14/2016:  CONCLUSIONS:  Right Lower Venous: No Acute DVT. Chronic noted in the CFV, and FV.  Left Lower Venous: No Acute DVT. Chronic in the CFV,FV, and popliteal veins.     US LE Venous Duplex 4/28/2012:  IMPRESSION:     1. Extensive occlusive thrombus in the right lower extremity   extending from the right common femoral vein into the  popliteal vein,   with some extension into the visualized deep femoral vein at the   femoral vein bifurcation. There is also incompletely occlusive   thrombus in the right great saphenous vein.     2. Incompletely occlusive thrombus in the right common femoral vein.     CONCLUSIONS: 4/2/2024  Right Lower Venous Insufficiency: There is reflux noted in the popliteal vein.  Left Lower Venous Insufficiency: There is reflux noted in the popliteal vein.  Right Lower Venous: There are chronic changes visualized in the common femoral, proximal femoral, mid femoral and distal Femoral veins. The remainder of the right leg is negative for deep vein thrombosis.  Left Lower Venous: There are chronic changes visualized in the common femoral, proximal femoral, mid femoral, distal Femoral and popliteal veins. The remainder of the left leg is negative for deep vein thrombosis.    Past Medical History:  She has a past medical history of Other pulmonary embolism without acute cor pulmonale (CMS/HCC) (07/21/2013), Personal history of other mental and behavioral disorders, and Personal history of other venous thrombosis and embolism.    Past Surgical History:  She has a past surgical history that includes Knee surgery (04/06/2016) and Neck surgery (04/06/2016).      Social History:  She reports that she has been smoking cigarettes. She has been smoking an average of .5 packs per day. She has been exposed to tobacco smoke. She has never used smokeless tobacco. She reports that she does not currently use alcohol. No history on file for drug use.    Family History:  No family history on file.     Allergies:  Meclizine, Naproxen, and Piperacillin-tazobactam-dextrs    Inpatient Medications:  Scheduled medications   Medication Dose Route Frequency    acetaminophen  975 mg oral q8h    apixaban  5 mg oral BID    aspirin  81 mg oral Daily    atorvastatin  40 mg oral Daily    carbidopa-levodopa  1 tablet oral TID    carvedilol  25 mg oral BID  with meals    folic acid  1 mg oral Daily    hydroCHLOROthiazide  50 mg oral Daily    polyethylene glycol  17 g oral Daily    QUEtiapine  300 mg oral Nightly    terazosin  2 mg oral Nightly     PRN medications   Medication    hydrALAZINE    HYDROmorphone       Outpatient Medications:  Current Outpatient Medications   Medication Instructions    aspirin 81 mg, oral, Daily    atorvastatin (LIPITOR) 40 mg, oral, Daily    budesonide (Pulmicort Flexhaler) 90 mcg/actuation inhaler 2 puffs, inhalation, 2 times daily RT    buPROPion XL (WELLBUTRIN XL) 150 mg, oral, Every morning    carbidopa-levodopa (Sinemet)  mg tablet 1 tablet, oral, 3 times daily    carvedilol (COREG) 25 mg, oral, 2 times daily with meals    DULoxetine (CYMBALTA) 30 mg, oral, Daily    folic acid (FOLVITE) 1 mg, oral, Daily    hydroCHLOROthiazide (MICROZIDE) 12.5 mg, oral, Daily    isosorbide mononitrate ER (IMDUR) 30 mg, oral, Daily    lisinopril 40 mg, oral, Daily    QUEtiapine (SEROQUEL) 300 mg, oral, Nightly    terazosin (HYTRIN) 2 mg, oral, Nightly    traZODone (Desyrel) 150 mg tablet 1 tablet, oral, Nightly    warfarin (Coumadin) 3 mg tablet oral, Take as directed per Anticoagulation Clinic ([facility]).        Physical Exam:  Constitutional: No acute distress AGREE  Heart: Normal rate, regular rhythm AGREE  Lungs: decreased breath sounds at the lung bases AGREE  Abdomen: Soft, nontender AGREE  Extremities: DP 2+ PT 2+ bilateral lower extremity swelling; bruising noted at the lateral border of the posterior left knee MINIMAL SWELLING TO MY EXAM  Neck: Supple nontender AGREE  Neurologic: AAOx3 AGREE     Assessment/Plan   78 F with hx of CKD stage IV, COPD, depression, DVT/PE (s/p IVC filter removed in 4/2016), HFpEF, HLD, HTN, obesity BMI 34.6, JOY, Vascular Parkinson's disease, Schizophrenia, JOY, COPD, and chronic tobacco use  came to the emergency department after a fall at home. Trauma work up was negative for injury or fracture except for  chronic microvascular changes seen on CT head. Radiology LE Venous duplex showed Rt CFV-FV-Pop and Lt EIV and Lt Pop DVT. The patient was being treated with Coumadin.  The patient has been on Warfarin since 11/2022. The primary team started Eliquis in setting of warfarin failure based on LE Venous duplex findings. Repeat VASC Venous duplex showed chronic changes in the Rt CFV-FV and Lt CFV-FV-Pop.  Given chronic changes and no new thrombosis, can continue Coumadin given the patient has been without any additional VTE event since being on the medication. AGREE    On prior admission, patient reported previous compliance to Eliquis in 11/2022 after which she was found to have DVT of left CFV and PTV. This caused the transition from Eliquis to Warfarin (Eliquis failure). AGREE    The patient was taking Warfarin 3 mg every Sun, Tue, Thu; 4.5 mg all other days. Follows up at [facility] for Coumadin Clinic. The patient does live alone at home and has a home health aid who takes care of her.     Recommendations:  - Discontinue Eliquis AGREE  - Continue home Coumadin treatment 3 mg every Sun, Tue, Thu; 4.5 mg all other days.  AGREE  - Continue on lovenox prophylaxis while bridging to Coumadin AGREE  - Will need INR >2 on 2 consecutive readings - INR>2 IS SUFFICIENT  - Order daily INRs. AGREE  - Continue follow up with [facility] Coumadin clinic.  AGREE     Mehran Sage MD  Vascular Medicine Fellow    HX AS NOTED  IMAGING C/W CHRONIC CHANGES AS NOTED   AGREE WITH RETURNING TO WARFARIN WITH HER USUAL MONITORING  FRANKLY RETURNING TO [facility] AFTER A DOCUMENTED ELIQUIS FAILURE IN 2022 WOULD MAKE LITTLE CLINICAL SENSE.    AGREE - START LOVENOX 40 MG DAUILY  RETURN TO HER USUAL HOME WARFARIN DOSING  TARGET INR 2-3.  LOVENOX MAY BE STOPPED WHEN THE INR IS > 2   CONTINUE HER USUAL OP MONITORING AT THE TIME OF DC.    NO VM FOLLOW UP REQUIRED.    VM WILL S/O PLEASE RECONSULT FOR ANY ADDITIONAL CONCERNS.    MY ADDITIONS ARE  "NOTED IN \"CAPS\".    Sarah Sutton MD      "

## 2024-04-03 LAB
APTT PPP: 44 SECONDS (ref 27–38)
ATRIAL RATE: 73 BPM
BACTERIA UR CULT: NORMAL
INR PPP: 2.6 (ref 0.9–1.1)
P AXIS: 58 DEGREES
P OFFSET: 190 MS
P ONSET: 126 MS
PR INTERVAL: 202 MS
PROTHROMBIN TIME: 29.6 SECONDS (ref 9.8–12.8)
Q ONSET: 227 MS
QRS COUNT: 12 BEATS
QRS DURATION: 146 MS
QT INTERVAL: 420 MS
QTC CALCULATION(BAZETT): 462 MS
QTC FREDERICIA: 448 MS
R AXIS: -15 DEGREES
T AXIS: -17 DEGREES
T OFFSET: 437 MS
VENTRICULAR RATE: 73 BPM

## 2024-04-03 PROCEDURE — 36415 COLL VENOUS BLD VENIPUNCTURE: CPT | Performed by: STUDENT IN AN ORGANIZED HEALTH CARE EDUCATION/TRAINING PROGRAM

## 2024-04-03 PROCEDURE — 2500000001 HC RX 250 WO HCPCS SELF ADMINISTERED DRUGS (ALT 637 FOR MEDICARE OP): Performed by: STUDENT IN AN ORGANIZED HEALTH CARE EDUCATION/TRAINING PROGRAM

## 2024-04-03 PROCEDURE — 2500000002 HC RX 250 W HCPCS SELF ADMINISTERED DRUGS (ALT 637 FOR MEDICARE OP, ALT 636 FOR OP/ED): Performed by: STUDENT IN AN ORGANIZED HEALTH CARE EDUCATION/TRAINING PROGRAM

## 2024-04-03 PROCEDURE — 1100000001 HC PRIVATE ROOM DAILY

## 2024-04-03 PROCEDURE — 99232 SBSQ HOSP IP/OBS MODERATE 35: CPT | Performed by: STUDENT IN AN ORGANIZED HEALTH CARE EDUCATION/TRAINING PROGRAM

## 2024-04-03 PROCEDURE — 2500000001 HC RX 250 WO HCPCS SELF ADMINISTERED DRUGS (ALT 637 FOR MEDICARE OP): Performed by: INTERNAL MEDICINE

## 2024-04-03 PROCEDURE — 85610 PROTHROMBIN TIME: CPT | Performed by: STUDENT IN AN ORGANIZED HEALTH CARE EDUCATION/TRAINING PROGRAM

## 2024-04-03 RX ORDER — WARFARIN 3 MG/1
3 TABLET ORAL
Status: DISCONTINUED | OUTPATIENT
Start: 2024-04-04 | End: 2024-04-05 | Stop reason: HOSPADM

## 2024-04-03 RX ORDER — WARFARIN 3 MG/1
3 TABLET ORAL DAILY
Status: DISCONTINUED | OUTPATIENT
Start: 2024-04-03 | End: 2024-04-03

## 2024-04-03 RX ORDER — WARFARIN 3 MG/1
1.5 TABLET ORAL ONCE
Status: COMPLETED | OUTPATIENT
Start: 2024-04-03 | End: 2024-04-03

## 2024-04-03 RX ORDER — ENOXAPARIN SODIUM 100 MG/ML
1 INJECTION SUBCUTANEOUS
Status: DISCONTINUED | OUTPATIENT
Start: 2024-04-04 | End: 2024-04-05 | Stop reason: HOSPADM

## 2024-04-03 RX ORDER — ENOXAPARIN SODIUM 100 MG/ML
30 INJECTION SUBCUTANEOUS
Status: DISCONTINUED | OUTPATIENT
Start: 2024-04-03 | End: 2024-04-03

## 2024-04-03 RX ADMIN — CARBIDOPA AND LEVODOPA 1 TABLET: 25; 100 TABLET ORAL at 19:24

## 2024-04-03 RX ADMIN — WARFARIN SODIUM 3 MG: 3 TABLET ORAL at 17:21

## 2024-04-03 RX ADMIN — QUETIAPINE FUMARATE 300 MG: 300 TABLET ORAL at 22:25

## 2024-04-03 RX ADMIN — ASPIRIN 81 MG CHEWABLE TABLET 81 MG: 81 TABLET CHEWABLE at 10:29

## 2024-04-03 RX ADMIN — APIXABAN 5 MG: 5 TABLET, FILM COATED ORAL at 10:30

## 2024-04-03 RX ADMIN — ACETAMINOPHEN 975 MG: 325 TABLET ORAL at 19:24

## 2024-04-03 RX ADMIN — ACETAMINOPHEN 975 MG: 325 TABLET ORAL at 10:30

## 2024-04-03 RX ADMIN — FOLIC ACID 1 MG: 1 TABLET ORAL at 10:30

## 2024-04-03 RX ADMIN — CARVEDILOL 25 MG: 25 TABLET, FILM COATED ORAL at 17:21

## 2024-04-03 RX ADMIN — HYDROCHLOROTHIAZIDE 50 MG: 25 TABLET ORAL at 10:29

## 2024-04-03 RX ADMIN — TERAZOSIN HYDROCHLORIDE 2 MG: 2 CAPSULE ORAL at 21:16

## 2024-04-03 RX ADMIN — CARVEDILOL 25 MG: 25 TABLET, FILM COATED ORAL at 10:29

## 2024-04-03 RX ADMIN — WARFARIN SODIUM 1.5 MG: 3 TABLET ORAL at 19:24

## 2024-04-03 RX ADMIN — CARBIDOPA AND LEVODOPA 1 TABLET: 25; 100 TABLET ORAL at 06:07

## 2024-04-03 RX ADMIN — ATORVASTATIN CALCIUM 40 MG: 40 TABLET, FILM COATED ORAL at 10:29

## 2024-04-03 RX ADMIN — CARBIDOPA AND LEVODOPA 1 TABLET: 25; 100 TABLET ORAL at 10:29

## 2024-04-03 RX ADMIN — CARBIDOPA AND LEVODOPA 1 TABLET: 25; 100 TABLET ORAL at 14:23

## 2024-04-03 ASSESSMENT — PAIN SCALES - GENERAL
PAINLEVEL_OUTOF10: 0 - NO PAIN
PAINLEVEL_OUTOF10: 0 - NO PAIN

## 2024-04-03 ASSESSMENT — COGNITIVE AND FUNCTIONAL STATUS - GENERAL
TURNING FROM BACK TO SIDE WHILE IN FLAT BAD: A LITTLE
MOBILITY SCORE: 16
EATING MEALS: A LITTLE
CLIMB 3 TO 5 STEPS WITH RAILING: TOTAL
PERSONAL GROOMING: A LITTLE
MOVING FROM LYING ON BACK TO SITTING ON SIDE OF FLAT BED WITH BEDRAILS: A LITTLE
DAILY ACTIVITIY SCORE: 18
STANDING UP FROM CHAIR USING ARMS: A LITTLE
WALKING IN HOSPITAL ROOM: A LITTLE
MOVING TO AND FROM BED TO CHAIR: A LITTLE
TOILETING: A LITTLE
DRESSING REGULAR UPPER BODY CLOTHING: A LITTLE
DRESSING REGULAR LOWER BODY CLOTHING: A LITTLE
HELP NEEDED FOR BATHING: A LITTLE

## 2024-04-03 ASSESSMENT — PAIN - FUNCTIONAL ASSESSMENT: PAIN_FUNCTIONAL_ASSESSMENT: 0-10

## 2024-04-03 NOTE — CARE PLAN
The patient's goals for the shift include      The clinical goals for the shift include remain safe      Problem: Pain  Goal: My pain/discomfort is manageable  Outcome: Progressing     Problem: Safety  Goal: Patient will be injury free during hospitalization  Outcome: Progressing  Goal: I will remain free of falls  Outcome: Progressing     Problem: Daily Care  Goal: Daily care needs are met  Outcome: Progressing     Problem: Psychosocial Needs  Goal: Demonstrates ability to cope with hospitalization/illness  Outcome: Progressing  Goal: Collaborate with me, my family, and caregiver to identify my specific goals  Outcome: Progressing     Problem: Discharge Barriers  Goal: My discharge needs are met  Outcome: Progressing

## 2024-04-03 NOTE — PROGRESS NOTES
"Kelly Martinez is a 79 y.o. female on Hospital Day: 5 of admission presenting with Fall, initial encounter.    Subjective   No acute events overnight.  Patient seen at bedside.  States that she is just waking up and wondering when she can discharge.  Does endorse taking Coumadin at home of 3 mg.       Objective     Physical Exam  Vitals and nursing note reviewed.   Constitutional:       General: She is not in acute distress.  Eyes:      General: No scleral icterus.     Extraocular Movements: Extraocular movements intact.   Cardiovascular:      Rate and Rhythm: Normal rate and regular rhythm.      Heart sounds: No murmur heard.  Pulmonary:      Effort: Pulmonary effort is normal. No respiratory distress.      Breath sounds: Normal breath sounds.   Abdominal:      General: Abdomen is flat. There is no distension.      Palpations: Abdomen is soft.      Tenderness: There is no abdominal tenderness.   Musculoskeletal:         General: No swelling. Normal range of motion.   Skin:     General: Skin is warm and dry.   Neurological:      Mental Status: She is alert.      Comments: Oriented x2        Last Recorded Vitals  Blood pressure 156/86, pulse 56, temperature 36.3 °C (97.3 °F), temperature source Temporal, resp. rate 16, height 1.702 m (5' 7\"), weight 93.4 kg (206 lb), SpO2 96 %.  Intake/Output last 3 Shifts:  I/O last 3 completed shifts:  In: - (0 mL/kg)   Out: 650 (7 mL/kg) [Urine:650 (0.2 mL/kg/hr)]  Weight: 93.4 kg     Relevant Results  Results reviewed    Scheduled Medications:  acetaminophen, 975 mg, oral, q8h  apixaban, 5 mg, oral, BID  aspirin, 81 mg, oral, Daily  atorvastatin, 40 mg, oral, Daily  carbidopa-levodopa, 1 tablet, oral, TID  carvedilol, 25 mg, oral, BID with meals  folic acid, 1 mg, oral, Daily  hydroCHLOROthiazide, 50 mg, oral, Daily  polyethylene glycol, 17 g, oral, Daily  QUEtiapine, 300 mg, oral, Nightly  terazosin, 2 mg, oral, Nightly      Continuous Medications:     PRN Medications:  PRN " medications: hydrALAZINE, HYDROmorphone       Assessment/Plan   Kelly Martinez is a 79 y.o. female on Hospital Day: 5 of admission presenting with Fall, initial encounter. Trauma workup negative.  Therapies recommending mod intensity therapy at discharge.    #Confusion  #Parkinsons dementia  -UA unremarkable  -possible contribution from pain medications vs delirium    -continue home carbidopa/levodopa      #Recurrent DVTs   -INR 2 on admit. 1.3 on 3/9/24, other sub therapeutic values at [facility]  -Discontinue Eliquis, restart Coumadin at 3 mg  -[facility] Medicine consulted, appreciate recs  -Daily INR and CBC.    #Multiple falls  -Trauma work up negative  -PT/OT recommending mod intensity therapy  -EKG with NSR without acute st changes   -troponin neg x 2     #HTN  -unclear pt's adherence to home meds. Reportedly BP also high at home  -Initial /119 up to 221/129. Initial goal reduction 25% for SBP ~160  -continue home BP meds: Coreg 25.  -Continue HCTZ 25 mg daily  -holding lisnopril in setting of advanced CKD   -PRN hydralazine 50 for SBP > 180     #CKD IV   -stable    Disposition: Therapies recommending mod intensity therapy at discharge,Restarting Warfarin for anticoagulation     I spent 40 minutes in the professional and overall care of this patient.             David Molina MD

## 2024-04-04 ENCOUNTER — DOCUMENTATION (OUTPATIENT)
Dept: HOME HEALTH SERVICES | Facility: HOME HEALTH | Age: 79
End: 2024-04-04
Payer: COMMERCIAL

## 2024-04-04 ENCOUNTER — PHARMACY VISIT (OUTPATIENT)
Dept: PHARMACY | Facility: CLINIC | Age: 79
End: 2024-04-04
Payer: COMMERCIAL

## 2024-04-04 ENCOUNTER — HOME HEALTH ADMISSION (OUTPATIENT)
Dept: HOME HEALTH SERVICES | Facility: HOME HEALTH | Age: 79
End: 2024-04-04
Payer: COMMERCIAL

## 2024-04-04 LAB
APTT PPP: 42 SECONDS (ref 27–38)
ERYTHROCYTE [DISTWIDTH] IN BLOOD BY AUTOMATED COUNT: 14.9 % (ref 11.5–14.5)
GLUCOSE BLD MANUAL STRIP-MCNC: 92 MG/DL (ref 74–99)
HCT VFR BLD AUTO: 32.3 % (ref 36–46)
HGB BLD-MCNC: 10.2 G/DL (ref 12–16)
INR PPP: 2.4 (ref 0.9–1.1)
MCH RBC QN AUTO: 30.3 PG (ref 26–34)
MCHC RBC AUTO-ENTMCNC: 31.6 G/DL (ref 32–36)
MCV RBC AUTO: 96 FL (ref 80–100)
NRBC BLD-RTO: 0 /100 WBCS (ref 0–0)
PLATELET # BLD AUTO: 180 X10*3/UL (ref 150–450)
PROTHROMBIN TIME: 27.7 SECONDS (ref 9.8–12.8)
RBC # BLD AUTO: 3.37 X10*6/UL (ref 4–5.2)
WBC # BLD AUTO: 4.4 X10*3/UL (ref 4.4–11.3)

## 2024-04-04 PROCEDURE — 85027 COMPLETE CBC AUTOMATED: CPT | Performed by: INTERNAL MEDICINE

## 2024-04-04 PROCEDURE — 2500000004 HC RX 250 GENERAL PHARMACY W/ HCPCS (ALT 636 FOR OP/ED): Performed by: STUDENT IN AN ORGANIZED HEALTH CARE EDUCATION/TRAINING PROGRAM

## 2024-04-04 PROCEDURE — 99231 SBSQ HOSP IP/OBS SF/LOW 25: CPT | Performed by: STUDENT IN AN ORGANIZED HEALTH CARE EDUCATION/TRAINING PROGRAM

## 2024-04-04 PROCEDURE — 36415 COLL VENOUS BLD VENIPUNCTURE: CPT | Performed by: STUDENT IN AN ORGANIZED HEALTH CARE EDUCATION/TRAINING PROGRAM

## 2024-04-04 PROCEDURE — 85610 PROTHROMBIN TIME: CPT | Performed by: STUDENT IN AN ORGANIZED HEALTH CARE EDUCATION/TRAINING PROGRAM

## 2024-04-04 PROCEDURE — RXMED WILLOW AMBULATORY MEDICATION CHARGE

## 2024-04-04 PROCEDURE — 2500000001 HC RX 250 WO HCPCS SELF ADMINISTERED DRUGS (ALT 637 FOR MEDICARE OP): Performed by: STUDENT IN AN ORGANIZED HEALTH CARE EDUCATION/TRAINING PROGRAM

## 2024-04-04 PROCEDURE — 2500000002 HC RX 250 W HCPCS SELF ADMINISTERED DRUGS (ALT 637 FOR MEDICARE OP, ALT 636 FOR OP/ED): Performed by: STUDENT IN AN ORGANIZED HEALTH CARE EDUCATION/TRAINING PROGRAM

## 2024-04-04 PROCEDURE — 82947 ASSAY GLUCOSE BLOOD QUANT: CPT

## 2024-04-04 PROCEDURE — 1100000001 HC PRIVATE ROOM DAILY

## 2024-04-04 RX ORDER — WARFARIN 3 MG/1
TABLET ORAL
Qty: 30 TABLET | Refills: 1 | Status: SHIPPED | OUTPATIENT
Start: 2024-04-04 | End: 2025-04-04

## 2024-04-04 RX ORDER — WARFARIN 1 MG/1
TABLET ORAL
Qty: 30 TABLET | Refills: 1 | Status: SHIPPED | OUTPATIENT
Start: 2024-04-05 | End: 2025-04-04

## 2024-04-04 RX ORDER — TRAZODONE HYDROCHLORIDE 150 MG/1
150 TABLET ORAL NIGHTLY PRN
Qty: 30 TABLET | Refills: 0 | Status: SHIPPED | OUTPATIENT
Start: 2024-04-04

## 2024-04-04 RX ADMIN — CARVEDILOL 25 MG: 25 TABLET, FILM COATED ORAL at 07:50

## 2024-04-04 RX ADMIN — CARBIDOPA AND LEVODOPA 1 TABLET: 25; 100 TABLET ORAL at 07:50

## 2024-04-04 RX ADMIN — CARBIDOPA AND LEVODOPA 1 TABLET: 25; 100 TABLET ORAL at 18:26

## 2024-04-04 RX ADMIN — WARFARIN SODIUM 3 MG: 3 TABLET ORAL at 18:26

## 2024-04-04 RX ADMIN — HYDROCHLOROTHIAZIDE 50 MG: 25 TABLET ORAL at 08:00

## 2024-04-04 RX ADMIN — ACETAMINOPHEN 975 MG: 325 TABLET ORAL at 03:28

## 2024-04-04 RX ADMIN — CARVEDILOL 25 MG: 25 TABLET, FILM COATED ORAL at 17:28

## 2024-04-04 RX ADMIN — CARBIDOPA AND LEVODOPA 1 TABLET: 25; 100 TABLET ORAL at 12:22

## 2024-04-04 RX ADMIN — TERAZOSIN HYDROCHLORIDE 2 MG: 2 CAPSULE ORAL at 20:18

## 2024-04-04 RX ADMIN — FOLIC ACID 1 MG: 1 TABLET ORAL at 08:00

## 2024-04-04 RX ADMIN — ACETAMINOPHEN 975 MG: 325 TABLET ORAL at 11:15

## 2024-04-04 RX ADMIN — QUETIAPINE FUMARATE 300 MG: 300 TABLET ORAL at 20:18

## 2024-04-04 RX ADMIN — ASPIRIN 81 MG CHEWABLE TABLET 81 MG: 81 TABLET CHEWABLE at 08:00

## 2024-04-04 RX ADMIN — POLYETHYLENE GLYCOL 3350 17 G: 17 POWDER, FOR SOLUTION ORAL at 08:00

## 2024-04-04 RX ADMIN — ENOXAPARIN SODIUM 90 MG: 100 INJECTION SUBCUTANEOUS at 08:25

## 2024-04-04 RX ADMIN — ATORVASTATIN CALCIUM 40 MG: 40 TABLET, FILM COATED ORAL at 08:00

## 2024-04-04 RX ADMIN — ACETAMINOPHEN 975 MG: 325 TABLET ORAL at 18:25

## 2024-04-04 ASSESSMENT — PAIN - FUNCTIONAL ASSESSMENT
PAIN_FUNCTIONAL_ASSESSMENT: 0-10
PAIN_FUNCTIONAL_ASSESSMENT: 0-10

## 2024-04-04 ASSESSMENT — COGNITIVE AND FUNCTIONAL STATUS - GENERAL
DRESSING REGULAR LOWER BODY CLOTHING: A LITTLE
MOVING TO AND FROM BED TO CHAIR: A LITTLE
TOILETING: A LITTLE
EATING MEALS: A LITTLE
DAILY ACTIVITIY SCORE: 18
PERSONAL GROOMING: A LITTLE
DAILY ACTIVITIY SCORE: 18
TURNING FROM BACK TO SIDE WHILE IN FLAT BAD: A LITTLE
MOBILITY SCORE: 16
MOVING TO AND FROM BED TO CHAIR: A LITTLE
MOVING FROM LYING ON BACK TO SITTING ON SIDE OF FLAT BED WITH BEDRAILS: A LITTLE
DRESSING REGULAR LOWER BODY CLOTHING: A LITTLE
CLIMB 3 TO 5 STEPS WITH RAILING: TOTAL
MOVING FROM LYING ON BACK TO SITTING ON SIDE OF FLAT BED WITH BEDRAILS: A LITTLE
HELP NEEDED FOR BATHING: A LITTLE
PERSONAL GROOMING: A LITTLE
TURNING FROM BACK TO SIDE WHILE IN FLAT BAD: A LITTLE
CLIMB 3 TO 5 STEPS WITH RAILING: TOTAL
HELP NEEDED FOR BATHING: A LITTLE
DRESSING REGULAR UPPER BODY CLOTHING: A LITTLE
EATING MEALS: A LITTLE
MOBILITY SCORE: 16
WALKING IN HOSPITAL ROOM: A LITTLE
TOILETING: A LITTLE
WALKING IN HOSPITAL ROOM: A LITTLE
DRESSING REGULAR UPPER BODY CLOTHING: A LITTLE
STANDING UP FROM CHAIR USING ARMS: A LITTLE
STANDING UP FROM CHAIR USING ARMS: A LITTLE

## 2024-04-04 ASSESSMENT — PAIN SCALES - GENERAL
PAINLEVEL_OUTOF10: 0 - NO PAIN
PAINLEVEL_OUTOF10: 0 - NO PAIN

## 2024-04-04 NOTE — PROGRESS NOTES
Subjective Data:  None     Overnight Events:    naeo     Objective Data:  Last Recorded Vitals:  Vitals:    04/04/24 0007 04/04/24 0606 04/04/24 0807 04/04/24 1530   BP: 92/60 109/66 129/80 116/78   BP Location:  Left arm     Patient Position:  Lying     Pulse: 73 62 60 75   Resp: 16 16 15 16   Temp: 36 °C (96.8 °F) 36.1 °C (97 °F) 36 °C (96.8 °F) 36.6 °C (97.9 °F)   TempSrc:  Temporal Temporal Temporal   SpO2: 93% 97% 96% 97%   Weight:       Height:           Last Labs:  CBC - 4/4/2024:  6:40 AM  4.4 10.2 180    32.3      CMP - 4/2/2024:  6:32 AM  9.8 6.6 10 --- 0.5   2.8 3.4 7 95      PTT - 4/4/2024:  6:40 AM  2.4   27.7 42     TROPHS   Date/Time Value Ref Range Status   03/30/2024 03:19 PM 19 0 - 34 ng/L Final   03/30/2024 02:09 PM 18 0 - 34 ng/L Final   12/28/2023 02:33 AM 19 0 - 34 ng/L Final     BNP   Date/Time Value Ref Range Status   12/28/2023 01:18 AM 61 0 - 99 pg/mL Final   12/10/2022 01:08 AM 34 0 - 99 pg/mL Final     Comment:     .  <100 pg/mL - Heart failure unlikely  100-299 pg/mL - Intermediate probability of acute heart  .               failure exacerbation. Correlate with clinical  .               context and patient history.    >=300 pg/mL - Heart Failure likely. Correlate with clinical  .               context and patient history.   Biotin interference may cause falsely decreased results.   Patients taking a Biotin dose of up to 5 mg/day should   refrain from taking Biotin for 24 hours before sample   collection. Providers may contact their local laboratory   for further information.     11/09/2022 07:26 PM 24 0 - 99 pg/mL Final     Comment:     .  <100 pg/mL - Heart failure unlikely  100-299 pg/mL - Intermediate probability of acute heart  .               failure exacerbation. Correlate with clinical  .               context and patient history.    >=300 pg/mL - Heart Failure likely. Correlate with clinical  .               context and patient history.   Biotin interference may cause falsely  decreased results.   Patients taking a Biotin dose of up to 5 mg/day should   refrain from taking Biotin for 24 hours before sample   collection. Providers may contact their local laboratory   for further information.       HGBA1C   Date/Time Value Ref Range Status   02/17/2023 02:56 PM 5.3 % Final     Comment:          Diagnosis of Diabetes-Adults   Non-Diabetic: < or = 5.6%   Increased risk for developing diabetes: 5.7-6.4%   Diagnostic of diabetes: > or = 6.5%  .       Monitoring of Diabetes                Age (y)     Therapeutic Goal (%)   Adults:          >18           <7.0   Pediatrics:    13-18           <7.5                   7-12           <8.0                   0- 6            7.5-8.5   American Diabetes Association. Diabetes Care 33(S1), Jan 2010.       VLDL   Date/Time Value Ref Range Status   12/07/2022 02:20 PM 16 0 - 40 mg/dL Final      Last I/O:  No intake/output data recorded.    Past Cardiology Tests (Last 3 Years):  EKG:  ECG 12 lead 03/31/2024      ECG 12 Lead 12/28/2023    Echo:  Transthoracic Echo (TTE) Limited 12/29/2023    Ejection Fractions:  EF   Date/Time Value Ref Range Status   12/29/2023 10:48 AM 54       Cath:  No results found for this or any previous visit from the past 1095 days.    Stress Test:  No results found for this or any previous visit from the past 1095 days.    Cardiac Imaging:  No results found for this or any previous visit from the past 1095 days.      Inpatient Medications:  Scheduled medications   Medication Dose Route Frequency    acetaminophen  975 mg oral q8h    aspirin  81 mg oral Daily    atorvastatin  40 mg oral Daily    carbidopa-levodopa  1 tablet oral TID    carvedilol  25 mg oral BID with meals    enoxaparin  1 mg/kg subcutaneous q24h [facility]    folic acid  1 mg oral Daily    hydroCHLOROthiazide  50 mg oral Daily    polyethylene glycol  17 g oral Daily    QUEtiapine  300 mg oral Nightly    terazosin  2 mg oral Nightly    warfarin  3 mg oral Once per day on Sun  Ibeth Montero    [START ON 4/5/2024] warfarin  4.5 mg oral Once per day on Mon Wed Fri Sat     PRN medications   Medication    hydrALAZINE    HYDROmorphone     Continuous Medications   Medication Dose Last Rate     Physical Exam  AO x3, in no distress  No JVD, supple neck  CTAB, no crackles/rales/wheezing  S1/S2 wnl, no mrg  Soft, nd, nt, +ve bs, no organomegaly  Warm and Dry extremities, +5 strength of all extremities       Assessment/Plan   79 y.o. female on Hospital Day: 5 of admission presenting with Fall, initial encounter. Trauma workup negative.  Therapies recommending mod intensity therapy at discharge.     #Confusion  #Parkinsons dementia  -UA unremarkable  -possible contribution from pain medications vs delirium    -continue home carbidopa/levodopa      #Recurrent DVTs   -INR 2 on admit. 1.3 on 3/9/24, other sub therapeutic values at [facility]  -Discontinue Eliquis, restart Coumadin at 3 mg  -Vasc Medicine consulted, appreciate recs  -Daily INR and CBC.     #Multiple falls  -Trauma work up negative  -PT/OT recommending mod intensity therapy  -EKG with NSR without acute st changes   -troponin neg x 2     #HTN  -unclear pt's adherence to home meds. Reportedly BP also high at home  -Initial /119 up to 221/129. Initial goal reduction 25% for SBP ~160  -continue home BP meds: Coreg 25.  -Continue HCTZ 25 mg daily  -holding lisnopril in setting of advanced CKD   -PRN hydralazine 50 for SBP > 180     #CKD IV   -stable     Disposition: Therapies recommending mod intensity therapy at discharge,Restarting Warfarin for anticoagulation  Peripheral IV 03/30/24 20 G Right Antecubital (Active)   Site Assessment Clean;Dry;Intact 04/04/24 0710   Dressing Status Dry;Clean 04/04/24 0710   Number of days: 5       Code Status:  Full Code    I spent 35 minutes in the professional and overall care of this patient.        Xavier Schultz MD

## 2024-04-04 NOTE — HH CARE COORDINATION
Home Care received a referral for Physical Therapy, Occupational Therapy, and Home Health Aide. Unfortunately, we are unable to accept and process the referral at this time.    Patients, please reach out to the referring provider or your PCP to assist in obtaining an alternative home care agency and/or guidance to meet your needs.    Providers, please reach out to  Home Care with any questions regarding the declined referral.

## 2024-04-04 NOTE — NURSING NOTE
Geriatric Nursing rounds summary  Ms Martinez is a 79 year old was at home with family  Admitted after fall  Pmh parkinsons dvt (inr low)  Age friendly  What matters full code home with family and home care inr therapeutic on coumadin  Mentation cooperative cam+  Meds bp coumadin sinemett   Mobility up with assist able to take 75% meal with out issues

## 2024-04-04 NOTE — DISCHARGE INSTRUCTIONS
Please follow up with your PCP within the next 2 weeks    I modified some of your sleeping medications which may be the cause of your weakness and falls    Please adhere to your warfarin regimen and follow up with your coumadin clinic as soon as possible after you get discharged. PLEASE CALL THEM TOMORROW MORNING TO ARRANGE YOUR NEXT INR CHECK.

## 2024-04-04 NOTE — CONSULTS
"Nutrition Initial Assessment:   Nutrition Assessment    Reason for Assessment: Admission nursing screening    Patient is a 79 y.o. female presenting s/p multiple falls.     PMHx 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, HFpEF, HTN, PD on carbidopa/levadopa, Schizophrenia/depression (followed by  psychiatry), JOY on CPAP, COPD not on o2,  CKD stage IV, and chronic tobacco     Nutrition History:  Food and Nutrient History: Pt eating breakfast this morning with sitter at bedside. Pt notes her appetite is fine -- has been eating more here at the hospital than she normally does at home. Edentulous and notes usually does not wear her dentures. Denies swallowing issues. Some bowel issues for which she states she takes a laxative for. Unaware of any recent weight changes.  Food Allergies/Intolerances:  None  GI Symptoms: Constipation and Diarrhea  Oral Problems: Chewing difficulty       Anthropometrics:  Height: 170.2 cm (5' 7\")   Weight: 93.4 kg (206 lb)   BMI (Calculated): 32.26  IBW/kg (Dietitian Calculated): 61.4 kg  Percent of IBW: 152 %  Adjusted Body Weight (kg): 69 kg    Weight History:   Wt Readings from Last 10 Encounters:   03/30/24 93.4 kg (206 lb)   12/29/23 94.1 kg (207 lb 7.3 oz)   01/26/23 102 kg (223 lb 14.4 oz)   12/07/22 102 kg (225 lb)   09/29/21 104 kg (230 lb)   07/15/20 104 kg (230 lb)   01/16/20 104 kg (230 lb)      Weight Change %:  Weight History / % Weight Change: 7.7% loss x 11 months  Significant Weight Loss: No    Nutrition Focused Physical Exam Findings:  defer: pt eating breakfast -- visual exam only  Subcutaneous Fat Loss:   Orbital Fat Pads: Mild-Moderate (slight dark circles and slight hollowing)  Buccal Fat Pads: Well nourished (full, rounded cheeks)  Triceps: Well nourished (ample fat tissue)  Ribs: Well nourished (chest is full, ribs do not show, slight to no protrusion of the iliac crest)  Muscle Wasting:     Edema:  Edema: +1 trace  Edema Location: BLE  Physical Findings:  Mouth: Positive " "(edentulous)  Skin: Negative    Nutrition Significant Labs:  CBC Trend:   Results from last 7 days   Lab Units 04/04/24  0640 04/02/24  0632 04/01/24  0307 03/30/24  1409   WBC AUTO x10*3/uL 4.4 4.4 5.3 3.8*   RBC AUTO x10*6/uL 3.37* 3.70* 3.81* 3.35*   HEMOGLOBIN g/dL 10.2* 11.2* 11.5* 10.1*   HEMATOCRIT % 32.3* 34.8* 34.5* 30.9*   MCV fL 96 94 91 92   PLATELETS AUTO x10*3/uL 180 181 181 205    , BMP Trend:   Results from last 7 days   Lab Units 04/02/24  0632 03/30/24  1409   GLUCOSE mg/dL 81 80   CALCIUM mg/dL 9.8 9.4   SODIUM mmol/L 139 140   POTASSIUM mmol/L 4.2 4.5   CO2 mmol/L 24 23   CHLORIDE mmol/L 107 108*   BUN mg/dL 27* 31*   CREATININE mg/dL 2.21* 2.33*    , A1C:  Lab Results   Component Value Date    HGBA1C 5.3 02/17/2023   , BG POCT trend:   Results from last 7 days   Lab Units 04/04/24  0614   POCT GLUCOSE mg/dL 92    , Liver Function Trend:   Results from last 7 days   Lab Units 03/30/24  1409   ALK PHOS U/L 95   AST U/L 10   ALT U/L 7   BILIRUBIN TOTAL mg/dL 0.5    , Renal Lab Trend:   Results from last 7 days   Lab Units 04/02/24  0632 03/30/24  1409   POTASSIUM mmol/L 4.2 4.5   PHOSPHORUS mg/dL 2.8  --    SODIUM mmol/L 139 140   MAGNESIUM mg/dL  --  1.79   EGFR mL/min/1.73m*2 22* 21*   BUN mg/dL 27* 31*   CREATININE mg/dL 2.21* 2.33*    , Lipid Panel:   Lab Results   Component Value Date    CHOL 116 12/07/2022    HDL 41.3 12/07/2022    CHHDL 2.8 12/07/2022    LDLF 59 12/07/2022    VLDL 16 12/07/2022    TRIG 78 12/07/2022    , Vit D: No results found for: \"VITD25\"     Nutrition Specific Medications:  Scheduled medications  acetaminophen, 975 mg, oral, q8h  aspirin, 81 mg, oral, Daily  atorvastatin, 40 mg, oral, Daily  carbidopa-levodopa, 1 tablet, oral, TID  carvedilol, 25 mg, oral, BID with meals  enoxaparin, 1 mg/kg, subcutaneous, q24h JAJA  folic acid, 1 mg, oral, Daily  hydroCHLOROthiazide, 50 mg, oral, Daily  polyethylene glycol, 17 g, oral, Daily  QUEtiapine, 300 mg, oral, Nightly  terazosin, " 2 mg, oral, Nightly  warfarin, 3 mg, oral, Once per day on Sun Tue Thu  [START ON 4/5/2024] warfarin, 4.5 mg, oral, Once per day on Mon Wed Fri Sat        I/O:   Last BM Date: 03/31/24;      Dietary Orders (From admission, onward)       Start     Ordered    03/31/24 0321  Adult diet Regular  Diet effective now        Question:  Diet type  Answer:  Regular    03/31/24 0320                     Estimated Needs:   Total Energy Estimated Needs (kCal): 1700 kCal  Method for Estimating Needs: 25 kcals/kg of adjusted BW  Total Protein Estimated Needs (g):  (70-85)  Method for Estimating Needs: ~1.0-1.2 gm/kg of adjusted BW  Total Fluid Estimated Needs (mL): 2100 mL (or per MD)  Method for Estimating Needs: 30 mls/kg of adjusted BW        Nutrition Diagnosis   Malnutrition Diagnosis  Patient has Malnutrition Diagnosis: No    Nutrition Diagnosis  Patient has Nutrition Diagnosis: Yes  Diagnosis Status (1): New  Nutrition Diagnosis 1: Biting/chewing (masticatory) difficility  Related to (1): advanced age  As Evidenced by (1): pt edentulous and reports does not usually wear dentures while eating  Additional Assessment Information (1): Suspect that routine meals in a facility setting providing pt with overall improved nutreient density based on subjective info obtained from pt interview. No need for ONS at present but will monitor if pt with prolonged hospical LOS.       Nutrition Interventions/Recommendations         Nutrition Prescription:  Individualized Nutrition Prescription Provided for : 1) continue regular diet as tolerated -- encourage soft foods & continue to assist with tray set up/cutting food given edentulousness  2) consider checking vitamin D level & replete if low        Nutrition Interventions:   Interventions: Meals and snacks  Meals and Snacks: General healthful diet  Goal: as above         Nutrition Monitoring and Evaluation   Food/Nutrient Related History Monitoring  Monitoring and Evaluation Plan: Energy  intake  Energy Intake: Estimated energy intake  Criteria: >75% est nutrient needs met via PO diet  Additional Plans: monitor need for ONS                      Time Spent/Follow-up Reminder:   Time Spent (min): 30 minutes  Last Date of Nutrition Visit: 04/04/24  Nutrition Follow-Up Needed?: Dietitian to reassess per policy

## 2024-04-05 VITALS
TEMPERATURE: 97.7 F | HEART RATE: 74 BPM | BODY MASS INDEX: 32.33 KG/M2 | HEIGHT: 67 IN | DIASTOLIC BLOOD PRESSURE: 87 MMHG | OXYGEN SATURATION: 97 % | WEIGHT: 206 LBS | RESPIRATION RATE: 17 BRPM | SYSTOLIC BLOOD PRESSURE: 140 MMHG

## 2024-04-05 LAB
APTT PPP: 47 SECONDS (ref 27–38)
INR PPP: 2.4 (ref 0.9–1.1)
PROTHROMBIN TIME: 27.2 SECONDS (ref 9.8–12.8)

## 2024-04-05 PROCEDURE — 2500000002 HC RX 250 W HCPCS SELF ADMINISTERED DRUGS (ALT 637 FOR MEDICARE OP, ALT 636 FOR OP/ED): Performed by: STUDENT IN AN ORGANIZED HEALTH CARE EDUCATION/TRAINING PROGRAM

## 2024-04-05 PROCEDURE — 85610 PROTHROMBIN TIME: CPT | Performed by: STUDENT IN AN ORGANIZED HEALTH CARE EDUCATION/TRAINING PROGRAM

## 2024-04-05 PROCEDURE — 36415 COLL VENOUS BLD VENIPUNCTURE: CPT | Performed by: STUDENT IN AN ORGANIZED HEALTH CARE EDUCATION/TRAINING PROGRAM

## 2024-04-05 PROCEDURE — 2500000004 HC RX 250 GENERAL PHARMACY W/ HCPCS (ALT 636 FOR OP/ED): Performed by: STUDENT IN AN ORGANIZED HEALTH CARE EDUCATION/TRAINING PROGRAM

## 2024-04-05 PROCEDURE — 2500000001 HC RX 250 WO HCPCS SELF ADMINISTERED DRUGS (ALT 637 FOR MEDICARE OP): Performed by: STUDENT IN AN ORGANIZED HEALTH CARE EDUCATION/TRAINING PROGRAM

## 2024-04-05 PROCEDURE — 97535 SELF CARE MNGMENT TRAINING: CPT | Mod: GO

## 2024-04-05 PROCEDURE — 99238 HOSP IP/OBS DSCHRG MGMT 30/<: CPT | Performed by: STUDENT IN AN ORGANIZED HEALTH CARE EDUCATION/TRAINING PROGRAM

## 2024-04-05 RX ADMIN — CARVEDILOL 25 MG: 25 TABLET, FILM COATED ORAL at 16:43

## 2024-04-05 RX ADMIN — ACETAMINOPHEN 975 MG: 325 TABLET ORAL at 11:04

## 2024-04-05 RX ADMIN — CARVEDILOL 25 MG: 25 TABLET, FILM COATED ORAL at 08:05

## 2024-04-05 RX ADMIN — CARBIDOPA AND LEVODOPA 1 TABLET: 25; 100 TABLET ORAL at 08:05

## 2024-04-05 RX ADMIN — ENOXAPARIN SODIUM 90 MG: 100 INJECTION SUBCUTANEOUS at 08:05

## 2024-04-05 RX ADMIN — HYDROCHLOROTHIAZIDE 50 MG: 25 TABLET ORAL at 08:05

## 2024-04-05 RX ADMIN — FOLIC ACID 1 MG: 1 TABLET ORAL at 08:05

## 2024-04-05 RX ADMIN — ATORVASTATIN CALCIUM 40 MG: 40 TABLET, FILM COATED ORAL at 08:05

## 2024-04-05 RX ADMIN — POLYETHYLENE GLYCOL 3350 17 G: 17 POWDER, FOR SOLUTION ORAL at 08:05

## 2024-04-05 RX ADMIN — WARFARIN SODIUM 4.5 MG: 3 TABLET ORAL at 17:16

## 2024-04-05 RX ADMIN — CARBIDOPA AND LEVODOPA 1 TABLET: 25; 100 TABLET ORAL at 12:17

## 2024-04-05 RX ADMIN — ASPIRIN 81 MG CHEWABLE TABLET 81 MG: 81 TABLET CHEWABLE at 08:05

## 2024-04-05 ASSESSMENT — PAIN SCALES - GENERAL
PAINLEVEL_OUTOF10: 0 - NO PAIN

## 2024-04-05 ASSESSMENT — ACTIVITIES OF DAILY LIVING (ADL): HOME_MANAGEMENT_TIME_ENTRY: 26

## 2024-04-05 ASSESSMENT — COGNITIVE AND FUNCTIONAL STATUS - GENERAL
CLIMB 3 TO 5 STEPS WITH RAILING: TOTAL
MOVING TO AND FROM BED TO CHAIR: A LITTLE
HELP NEEDED FOR BATHING: A LITTLE
TURNING FROM BACK TO SIDE WHILE IN FLAT BAD: A LITTLE
PERSONAL GROOMING: A LITTLE
TOILETING: A LITTLE
MOVING FROM LYING ON BACK TO SITTING ON SIDE OF FLAT BED WITH BEDRAILS: A LITTLE
HELP NEEDED FOR BATHING: A LITTLE
MOBILITY SCORE: 16
DRESSING REGULAR UPPER BODY CLOTHING: A LITTLE
DRESSING REGULAR UPPER BODY CLOTHING: A LITTLE
WALKING IN HOSPITAL ROOM: A LITTLE
DAILY ACTIVITIY SCORE: 18
DAILY ACTIVITIY SCORE: 19
PERSONAL GROOMING: A LITTLE
TOILETING: A LITTLE
DRESSING REGULAR LOWER BODY CLOTHING: A LITTLE
STANDING UP FROM CHAIR USING ARMS: A LITTLE
EATING MEALS: A LITTLE
DRESSING REGULAR LOWER BODY CLOTHING: A LITTLE

## 2024-04-05 ASSESSMENT — PAIN - FUNCTIONAL ASSESSMENT
PAIN_FUNCTIONAL_ASSESSMENT: 0-10

## 2024-04-05 NOTE — DISCHARGE SUMMARY
Discharge Diagnosis  Fall, initial encounter    Issues Requiring Follow-Up  [ ] PT/OT at home   [ ] follow up with PCP and coumadin clinic    Test Results Pending At Discharge  Pending Labs       No current pending labs.            Hospital Course   79 y.o. female  presenting with  a mechanical Fall, initial encounter. Trauma workup negative. US dopplelr  of LE with new DVT in the setting of low INR; vasc.medicine continued to recommend warfarin. Pt/ot recommending SNF but patient elected to go home with home care. Orthostatics negative. Parkinson's well regulated.    Pertinent Physical Exam At Time of Discharge  AO x3, in no distress  No JVD, supple neck  CTAB, no crackles/rales/wheezing  S1/S2 wnl, no mrg  Soft, nd, nt, +ve bs, no organomegaly  Warm and Dry extremities, +5 strength of all extremities    Home Medications     Medication List      CHANGE how you take these medications     traZODone 150 mg tablet; Commonly known as: Desyrel; Take 1 tablet (150   mg) by mouth as needed at bedtime for sleep.; What changed: when to take   this, reasons to take this   * warfarin 3 mg tablet; Commonly known as: Coumadin; What changed:   Another medication with the same name was added. Make sure you understand   how and when to take each.   * warfarin 3 mg tablet; Commonly known as: Coumadin; Take 1 tablet by   mouth daily on Tuesday and Thursday or take as directed per After Visit   Summary.; What changed: You were already taking a medication with the same   name, and this prescription was added. Make sure you understand how and   when to take each.   * warfarin 1 mg tablet; Commonly known as: Coumadin; Take 4 and half   (4.5 mg) tablets on Monday, Wednesday, Friday and Saturday or take as   directed per After Visit Summary. Do not start before April 5, 2024.; What   changed: You were already taking a medication with the same name, and this   prescription was added. Make sure you understand how and when to take   each.  *  This list has 3 medication(s) that are the same as other medications   prescribed for you. Read the directions carefully, and ask your doctor or   other care provider to review them with you.     CONTINUE taking these medications     aspirin 81 mg chewable tablet   atorvastatin 40 mg tablet; Commonly known as: Lipitor   carbidopa-levodopa  mg tablet; Commonly known as: Sinemet   carvedilol 25 mg tablet; Commonly known as: Coreg   folic acid 1 mg tablet; Commonly known as: Folvite   hydroCHLOROthiazide 12.5 mg tablet; Commonly known as: Microzide   isosorbide mononitrate ER 30 mg 24 hr tablet; Commonly known as: Imdur   lisinopril 40 mg tablet   Pulmicort Flexhaler 90 mcg/actuation inhaler; Generic drug: budesonide   QUEtiapine 300 mg tablet; Commonly known as: SEROquel     STOP taking these medications     buPROPion  mg 24 hr tablet; Commonly known as: Wellbutrin XL   DULoxetine 30 mg DR capsule; Commonly known as: Cymbalta   terazosin 2 mg capsule; Commonly known as: Hytrin       Outpatient Follow-Up  No future appointments.    Xavier Schultz MD

## 2024-04-05 NOTE — PROGRESS NOTES
Patient is medically cleared  for discharge refusing her recommendation of moderate intensity therapy at discharge but she is refusing wanting to be discharged home with home health care. [phone] the [place] office states that they can accept they need confirmation from patient's PCP Siena HAMILTON CNP that she will follow the home healthcare. I will continue to follow patient until discharge.

## 2024-04-05 NOTE — PROGRESS NOTES
Physical Therapy                 Therapy Communication Note    Patient Name: Kelly Martinez  MRN: [id number]  Today's Date: 4/5/2024     Discipline: Physical Therapy    Missed Visit Reason: Missed Visit Reason: Patient refused (MD present at entry and discussing pt DC today. Pt declining therapy due to DC.)    Missed Time: Attempt    Comment:

## 2024-04-05 NOTE — CARE PLAN
The patient's goals for the shift include      The clinical goals for the shift include patient to remain stable free of falls      Problem: Pain  Goal: My pain/discomfort is manageable  Outcome: Progressing     Problem: Safety  Goal: Patient will be injury free during hospitalization  Outcome: Progressing  Goal: I will remain free of falls  Outcome: Progressing     Problem: Daily Care  Goal: Daily care needs are met  Outcome: Progressing     Problem: Psychosocial Needs  Goal: Demonstrates ability to cope with hospitalization/illness  Outcome: Progressing  Goal: Collaborate with me, my family, and caregiver to identify my specific goals  Outcome: Progressing     Problem: Discharge Barriers  Goal: My discharge needs are met  Outcome: Progressing

## 2024-04-05 NOTE — NURSING NOTE
Patient discharged home meds to bed given,discharge instructions and follow up appointment discussed, given warfarin before discharged, will go over discharge with daughter as well.  Alicia Chinchilla RN

## 2024-04-05 NOTE — PROGRESS NOTES
Occupational Therapy    Occupational Therapy Treatment    Name: Kelly Martinez  MRN: [id number]  : 1945  Date: 24  Time Calculation  Start Time: 827  Stop Time: 853  Time Calculation (min): 26 min    Assessment:  Barriers to Discharge: Decreased caregiver support  Evaluation/Treatment Tolerance: Patient tolerated treatment well  Medical Staff Made Aware: Yes  End of Session Communication: Bedside nurse  End of Session Patient Position: Bed, 3 rail up, Alarm on  Plan:  Treatment Interventions: ADL retraining, Functional transfer training, UE strengthening/ROM, Endurance training, Cognitive reorientation, Patient/family training, Equipment evaluation/education, Compensatory technique education  OT Frequency: 3 times per week  OT Discharge Recommendations: Moderate intensity level of continued care (Pt continues to improve functional independence, if she continues to improve, she may progress to low intensity therapy at home with  SUP.)  Equipment Recommended upon Discharge:  (TBD at SNF)  OT Recommended Transfer Status: Minimal assist, Assist of 1  OT - OK to Discharge: Yes    Subjective   General:  OT Last Visit  OT Received On: 24  Prior to Session Communication: Bedside nurse  Patient Position Received: Bed, 3 rail up, Alarm on  Family/Caregiver Present: No  General Comment: Pt pleasant and agreeable to OT evaluation   Precautions:  Hearing/Visual Limitations: [place]  Medical Precautions: Fall precautions  Vitals:  Vital Signs  BP: 123/81  BP Location: Left arm  BP Method: Automatic  Patient Position: Lying  Lines/Tubes/Drains:       Cognition:  Orientation Level: Disoriented to time  Cognition Comments: Improved cognitive functioning compared to previous session, pt with improved ability to follow commands and reports improved orientation. Pt continues to benefit from cueing for safety    Pain Assessment:  Pain Assessment  Pain Assessment: 0-10  Pain Score: 0 - No pain     Objective    Activities of Daily Living:        Grooming  Grooming Level of Assistance: Contact guard, Close supervision  Grooming Where Assessed: Standing sinkside  Grooming Comments: Standing at sink to brush teeth and wash face, cueing for improved safety and use of FWW and sink for balance. Increased time reqiured and cue to locate tooth paste as pt attempts to use soap on toothbrush. SBA for safety    UE Bathing  UE Bathing Level of Assistance: Contact guard  UE Bathing Where Assessed: Standing sinkside  UE Bathing Comments: Standing at sink to wash UEs and trunk using bath wipes, CGA and cueing for improved safety    LE Bathing  LE Bathing Comments: Pt performed periarea hygiene while standing at sink using bath wipes, CGA provided for safety    UE Dressing  UE Dressing Level of Assistance: Minimum assistance  UE Dressing Where Assessed: Edge of bed  UE Dressing Comments: Min A to manage ties in back of gown, pt able to doff and don gown    LE Dressing  LE Dressing: Yes  Pants Level of Assistance: Contact guard  LE Dressing Where Assessed: Toilet, Edge of bed  LE Dressing Comments: Sitting on toilet, pt threaded B LEs into mesh underwear prior to standing to FWW with cueing and CGA to pull up over hips, she threaded B LEs into pants sitting EOB with increased time prior to standing to pull up.    Toileting  Toileting Level of Assistance: Contact guard  Where Assessed: Toilet  Toileting Comments: CGA while standing to manage clothing, SUP during weight shift to complete hygiene.  Bed Mobility/Transfers:     Bed Mobility  Bed Mobility: Yes  Bed Mobility 1  Bed Mobility 1: Supine to sitting, Sitting to supine  Level of Assistance 1: Close supervision  Bed Mobility Comments 1: HOB elevated, bed rails, increased time, cueing for technique.    Transfer 1  Transfer From 1: Sit to, Stand to  Transfer to 1: Stand, Sit  Technique 1: Sit to stand, Stand to sit  Transfer Device 1: Walker  Transfer Level of Assistance 1: Contact  guard  Trials/Comments 1: CGA and cueing for safety, elevated bed height    Toilet Transfers  Toilet Transfer From: Bed  Toilet Transfer Type: To and from  Toilet Transfer to: Standard toilet  Toilet Transfer Technique: Ambulating  Toilet Transfers: Contact guard  Toilet Transfers Comments: Cueing to use grab bars, increased time during ambulation, no LOB noted   Balance:  Dynamic Standing Balance  Dynamic Standing-Balance Support: Bilateral upper extremity supported  Dynamic Standing-Comments: FWW - CGA/Min A  Static Sitting Balance  Static Sitting-Balance Support: No upper extremity supported, Feet supported  Static Sitting-Level of Assistance: Distant supervision  Static Sitting-Comment/Number of Minutes: EOB  Static Standing Balance  Static Standing-Balance Support: Bilateral upper extremity supported  Static Standing-Level of Assistance: Close supervision  Static Standing-Comment/Number of Minutes: FWW  Outcome Measures:  [facility] Daily Activity  Putting on and taking off regular lower body clothing: A little  Bathing (including washing, rinsing, drying): A little  Putting on and taking off regular upper body clothing: A little  Toileting, which includes using toilet, bedpan or urinal: A little  Taking care of personal grooming such as brushing teeth: A little  Eating Meals: None  Daily Activity - Total Score: 19     Education Documentation  Body Mechanics, taught by Ian Mehta OT at 4/5/2024 10:45 AM.  Learner: Patient  Readiness: Acceptance  Method: Explanation, Demonstration  Response: Verbalizes Understanding, Needs Reinforcement    Precautions, taught by Ian Mehta OT at 4/5/2024 10:45 AM.  Learner: Patient  Readiness: Acceptance  Method: Explanation, Demonstration  Response: Verbalizes Understanding, Needs Reinforcement    ADL Training, taught by Ian Mehta OT at 4/5/2024 10:45 AM.  Learner: Patient  Readiness: Acceptance  Method: Explanation, Demonstration  Response: Verbalizes Understanding, Needs  Reinforcement    Education Comments  No comments found.    Goals:  Encounter Problems       Encounter Problems (Active)       ADLs       Patient with complete lower body dressing with supervision level of assistance donning and doffing all LE clothes  with PRN adaptive equipment while edge of bed  and standing (Progressing)       Start:  04/01/24    Expected End:  04/22/24            Patient will complete daily grooming tasks brushing teeth with supervision level of assistance and PRN adaptive equipment while standing. (Progressing)       Start:  04/01/24    Expected End:  04/22/24            Patient will complete toileting including hygiene clothing management/hygiene with supervision level of assistance and raised toilet seat and grab bars. (Progressing)       Start:  04/01/24    Expected End:  04/22/24               BALANCE       Pt will maintain dynamic standing balance during ADL task with supervision level of assistance in order to demonstrate decreased risk of falling and improved postural control. (Progressing)       Start:  04/01/24    Expected End:  04/22/24               COGNITION/SAFETY       Patient will demonstrated orientation x 4 with Min verbal cues. (Progressing)       Start:  04/01/24    Expected End:  04/22/24       ORIENTATION            MOBILITY       Patient will perform Functional mobility Household distances/Community Distances with supervision level of assistance and least restrictive device in order to improve safety and functional mobility. (Progressing)       Start:  04/01/24    Expected End:  04/22/24 04/05/24 at 10:47 AM   CHARITY LIEBERMAN, OT   [phone]

## 2024-04-05 NOTE — PROGRESS NOTES
I attempted to speak with Kelly at the bedside again regarding discharge planning and home going needs, patient again I was asked to call the patient's daughter [phone]. I did call Paola again and this time she did answer all information received from her. She states that patient lives home alone and that she generally gets around in her apartment independently she does have a walker, cane and shower bench at home. Patient also receives a home health aide from [place] her name [phone] she does not know how often the home health aide comes to her mother's home. She states that her mother does have a PCP at [facility] but she does not know the doctor's name she said her mother had an appointment last month. I will continue to follow this patient with a safe discharge plan.

## 2024-04-08 ENCOUNTER — PATIENT OUTREACH (OUTPATIENT)
Dept: CARE COORDINATION | Facility: CLINIC | Age: 79
End: 2024-04-08
Payer: COMMERCIAL

## 2024-04-08 NOTE — PROGRESS NOTES
Discharge facility: [facility]  Discharge diagnosis: Fall, initial encounter  Admission date: 4/1/24  Discharge date: 4/5/24  PCP Appointment Date: 4/16/24    Outreach call to patient to support a smooth transition of care from recent admission.  Left voicemail message for patient with my contact information.     Joie Minor RN

## 2024-05-08 ENCOUNTER — PATIENT OUTREACH (OUTPATIENT)
Dept: CARE COORDINATION | Facility: CLINIC | Age: 79
End: 2024-05-08
Payer: COMMERCIAL

## 2024-05-08 NOTE — PROGRESS NOTES
Attempts made to reach patient for [facility] outreach and no contact made after LVM w/ my name and contact information.   Joie Minor RN

## 2024-09-20 ENCOUNTER — CLINICAL SUPPORT (OUTPATIENT)
Dept: EMERGENCY MEDICINE | Facility: HOSPITAL | Age: 79
End: 2024-09-20
Payer: COMMERCIAL

## 2024-09-20 PROCEDURE — 99291 CRITICAL CARE FIRST HOUR: CPT | Performed by: EMERGENCY MEDICINE

## 2024-09-20 PROCEDURE — 93005 ELECTROCARDIOGRAM TRACING: CPT

## 2024-09-20 ASSESSMENT — COLUMBIA-SUICIDE SEVERITY RATING SCALE - C-SSRS
1. IN THE PAST MONTH, HAVE YOU WISHED YOU WERE DEAD OR WISHED YOU COULD GO TO SLEEP AND NOT WAKE UP?: NO
6. HAVE YOU EVER DONE ANYTHING, STARTED TO DO ANYTHING, OR PREPARED TO DO ANYTHING TO END YOUR LIFE?: NO
2. HAVE YOU ACTUALLY HAD ANY THOUGHTS OF KILLING YOURSELF?: NO

## 2024-09-21 ENCOUNTER — APPOINTMENT (OUTPATIENT)
Dept: RADIOLOGY | Facility: HOSPITAL | Age: 79
End: 2024-09-21
Payer: COMMERCIAL

## 2024-09-21 ENCOUNTER — HOSPITAL ENCOUNTER (INPATIENT)
Facility: HOSPITAL | Age: 79
End: 2024-09-21
Attending: EMERGENCY MEDICINE | Admitting: NEUROLOGICAL SURGERY
Payer: COMMERCIAL

## 2024-09-21 ENCOUNTER — APPOINTMENT (OUTPATIENT)
Dept: CARDIOLOGY | Facility: HOSPITAL | Age: 79
End: 2024-09-21
Payer: COMMERCIAL

## 2024-09-21 DIAGNOSIS — M79.606 PAIN AND SWELLING OF LOWER EXTREMITY: ICD-10-CM

## 2024-09-21 DIAGNOSIS — M79.604 PAIN IN RIGHT LEG: ICD-10-CM

## 2024-09-21 DIAGNOSIS — I63.9 CEREBROVASCULAR ACCIDENT (CVA), UNSPECIFIED MECHANISM (MULTI): ICD-10-CM

## 2024-09-21 DIAGNOSIS — Q27.30 AVM (ARTERIOVENOUS MALFORMATION) (HHS-HCC): ICD-10-CM

## 2024-09-21 DIAGNOSIS — R78.81 BACTEREMIA: ICD-10-CM

## 2024-09-21 DIAGNOSIS — I82.5Y9 CHRONIC DEEP VEIN THROMBOSIS (DVT) OF PROXIMAL VEIN OF LOWER EXTREMITY, UNSPECIFIED LATERALITY (MULTI): ICD-10-CM

## 2024-09-21 DIAGNOSIS — I61.4 CEREBELLAR HEMORRHAGE (MULTI): Primary | ICD-10-CM

## 2024-09-21 DIAGNOSIS — Z79.01 ANTICOAGULATED: ICD-10-CM

## 2024-09-21 DIAGNOSIS — M79.605 PAIN IN LEFT LEG: ICD-10-CM

## 2024-09-21 DIAGNOSIS — M79.89 PAIN AND SWELLING OF LOWER EXTREMITY: ICD-10-CM

## 2024-09-21 DIAGNOSIS — R60.0 LOCALIZED EDEMA: ICD-10-CM

## 2024-09-21 LAB
ALBUMIN SERPL BCP-MCNC: 3.6 G/DL (ref 3.4–5)
ALBUMIN SERPL BCP-MCNC: 4.1 G/DL (ref 3.4–5)
ALP SERPL-CCNC: 99 U/L (ref 33–136)
ALT SERPL W P-5'-P-CCNC: 9 U/L (ref 7–45)
ANION GAP SERPL CALC-SCNC: 11 MMOL/L (ref 10–20)
ANION GAP SERPL CALC-SCNC: 12 MMOL/L (ref 10–20)
AORTIC VALVE PEAK VELOCITY: 1.41 M/S
APPEARANCE UR: CLEAR
APTT PPP: 31 SECONDS (ref 27–38)
APTT PPP: 37 SECONDS (ref 27–38)
APTT PPP: 46 SECONDS (ref 27–38)
APTT PPP: 49 SECONDS (ref 27–38)
AST SERPL W P-5'-P-CCNC: 14 U/L (ref 9–39)
ATRIAL RATE: 83 BPM
AV PEAK GRADIENT: 7.9 MMHG
AVA (PEAK VEL): 2.72 CM2
BASOPHILS # BLD AUTO: 0.03 X10*3/UL (ref 0–0.1)
BASOPHILS # BLD AUTO: 0.03 X10*3/UL (ref 0–0.1)
BASOPHILS NFR BLD AUTO: 0.5 %
BASOPHILS NFR BLD AUTO: 0.5 %
BILIRUB SERPL-MCNC: 0.7 MG/DL (ref 0–1.2)
BILIRUB UR STRIP.AUTO-MCNC: NEGATIVE MG/DL
BNP SERPL-MCNC: 151 PG/ML (ref 0–99)
BNP SERPL-MCNC: 170 PG/ML (ref 0–99)
BUN SERPL-MCNC: 26 MG/DL (ref 6–23)
BUN SERPL-MCNC: 26 MG/DL (ref 6–23)
CA-I BLD-SCNC: 1.13 MMOL/L (ref 1.1–1.33)
CALCIUM SERPL-MCNC: 9.2 MG/DL (ref 8.6–10.6)
CALCIUM SERPL-MCNC: 9.8 MG/DL (ref 8.6–10.6)
CARDIAC TROPONIN I PNL SERPL HS: 27 NG/L (ref 0–34)
CARDIAC TROPONIN I PNL SERPL HS: 35 NG/L (ref 0–34)
CHLORIDE SERPL-SCNC: 110 MMOL/L (ref 98–107)
CHLORIDE SERPL-SCNC: 111 MMOL/L (ref 98–107)
CHOLEST SERPL-MCNC: 133 MG/DL (ref 0–199)
CHOLESTEROL/HDL RATIO: 2.7
CO2 SERPL-SCNC: 21 MMOL/L (ref 21–32)
CO2 SERPL-SCNC: 24 MMOL/L (ref 21–32)
COLOR UR: COLORLESS
CREAT SERPL-MCNC: 2.06 MG/DL (ref 0.5–1.05)
CREAT SERPL-MCNC: 2.2 MG/DL (ref 0.5–1.05)
EGFRCR SERPLBLD CKD-EPI 2021: 23 ML/MIN/1.73M*2
EGFRCR SERPLBLD CKD-EPI 2021: 24 ML/MIN/1.73M*2
EJECTION FRACTION APICAL 4 CHAMBER: 71.2
EJECTION FRACTION: 74 %
EOSINOPHIL # BLD AUTO: 0.06 X10*3/UL (ref 0–0.4)
EOSINOPHIL # BLD AUTO: 0.2 X10*3/UL (ref 0–0.4)
EOSINOPHIL NFR BLD AUTO: 1 %
EOSINOPHIL NFR BLD AUTO: 3.4 %
ERYTHROCYTE [DISTWIDTH] IN BLOOD BY AUTOMATED COUNT: 14.7 % (ref 11.5–14.5)
ERYTHROCYTE [DISTWIDTH] IN BLOOD BY AUTOMATED COUNT: 14.9 % (ref 11.5–14.5)
EST. AVERAGE GLUCOSE BLD GHB EST-MCNC: 123 MG/DL
GLUCOSE BLD MANUAL STRIP-MCNC: 102 MG/DL (ref 74–99)
GLUCOSE BLD MANUAL STRIP-MCNC: 122 MG/DL (ref 74–99)
GLUCOSE BLD MANUAL STRIP-MCNC: 129 MG/DL (ref 74–99)
GLUCOSE BLD MANUAL STRIP-MCNC: 137 MG/DL (ref 74–99)
GLUCOSE BLD MANUAL STRIP-MCNC: 139 MG/DL (ref 74–99)
GLUCOSE BLD MANUAL STRIP-MCNC: 141 MG/DL (ref 74–99)
GLUCOSE BLD MANUAL STRIP-MCNC: 96 MG/DL (ref 74–99)
GLUCOSE SERPL-MCNC: 134 MG/DL (ref 74–99)
GLUCOSE SERPL-MCNC: 98 MG/DL (ref 74–99)
GLUCOSE UR STRIP.AUTO-MCNC: NORMAL MG/DL
HBA1C MFR BLD: 5.9 %
HCT VFR BLD AUTO: 31.1 % (ref 36–46)
HCT VFR BLD AUTO: 35.5 % (ref 36–46)
HDLC SERPL-MCNC: 49.2 MG/DL
HGB BLD-MCNC: 10.3 G/DL (ref 12–16)
HGB BLD-MCNC: 11.3 G/DL (ref 12–16)
HOLD SPECIMEN: NORMAL
HOLD SPECIMEN: NORMAL
IMM GRANULOCYTES # BLD AUTO: 0.02 X10*3/UL (ref 0–0.5)
IMM GRANULOCYTES # BLD AUTO: 0.02 X10*3/UL (ref 0–0.5)
IMM GRANULOCYTES NFR BLD AUTO: 0.3 % (ref 0–0.9)
IMM GRANULOCYTES NFR BLD AUTO: 0.3 % (ref 0–0.9)
INR PPP: 1.2 (ref 0.9–1.1)
INR PPP: 1.4 (ref 0.9–1.1)
INR PPP: 2.7 (ref 0.9–1.1)
INR PPP: 2.8 (ref 0.9–1.1)
KETONES UR STRIP.AUTO-MCNC: NEGATIVE MG/DL
LEFT ATRIUM VOLUME AREA LENGTH INDEX BSA: 36.9 ML/M2
LEFT VENTRICLE INTERNAL DIMENSION DIASTOLE: 3.99 CM (ref 3.5–6)
LEFT VENTRICULAR OUTFLOW TRACT DIAMETER: 2 CM
LEUKOCYTE ESTERASE UR QL STRIP.AUTO: NEGATIVE
LYMPHOCYTES # BLD AUTO: 0.94 X10*3/UL (ref 0.8–3)
LYMPHOCYTES # BLD AUTO: 1.59 X10*3/UL (ref 0.8–3)
LYMPHOCYTES NFR BLD AUTO: 15.8 %
LYMPHOCYTES NFR BLD AUTO: 26.9 %
MAGNESIUM SERPL-MCNC: 1.65 MG/DL (ref 1.6–2.4)
MAGNESIUM SERPL-MCNC: 1.73 MG/DL (ref 1.6–2.4)
MAGNESIUM SERPL-MCNC: 1.8 MG/DL (ref 1.6–2.4)
MCH RBC QN AUTO: 29 PG (ref 26–34)
MCH RBC QN AUTO: 30.2 PG (ref 26–34)
MCHC RBC AUTO-ENTMCNC: 31.8 G/DL (ref 32–36)
MCHC RBC AUTO-ENTMCNC: 33.1 G/DL (ref 32–36)
MCV RBC AUTO: 91 FL (ref 80–100)
MCV RBC AUTO: 91 FL (ref 80–100)
MITRAL VALVE E/A RATIO: 0.94
MONOCYTES # BLD AUTO: 0.25 X10*3/UL (ref 0.05–0.8)
MONOCYTES # BLD AUTO: 0.38 X10*3/UL (ref 0.05–0.8)
MONOCYTES NFR BLD AUTO: 4.2 %
MONOCYTES NFR BLD AUTO: 6.4 %
NEUTROPHILS # BLD AUTO: 3.7 X10*3/UL (ref 1.6–5.5)
NEUTROPHILS # BLD AUTO: 4.65 X10*3/UL (ref 1.6–5.5)
NEUTROPHILS NFR BLD AUTO: 62.5 %
NEUTROPHILS NFR BLD AUTO: 78.2 %
NITRITE UR QL STRIP.AUTO: NEGATIVE
NON-HDL CHOLESTEROL: 84 MG/DL (ref 0–149)
NRBC BLD-RTO: 0 /100 WBCS (ref 0–0)
NRBC BLD-RTO: 0 /100 WBCS (ref 0–0)
P AXIS: 54 DEGREES
P OFFSET: 184 MS
P ONSET: 132 MS
PH UR STRIP.AUTO: 6.5 [PH]
PHOSPHATE SERPL-MCNC: 2.3 MG/DL (ref 2.5–4.9)
PLATELET # BLD AUTO: 150 X10*3/UL (ref 150–450)
PLATELET # BLD AUTO: 189 X10*3/UL (ref 150–450)
POTASSIUM SERPL-SCNC: 3.8 MMOL/L (ref 3.5–5.3)
POTASSIUM SERPL-SCNC: 5.4 MMOL/L (ref 3.5–5.3)
PR INTERVAL: 198 MS
PROT SERPL-MCNC: 7.9 G/DL (ref 6.4–8.2)
PROT UR STRIP.AUTO-MCNC: ABNORMAL MG/DL
PROTHROMBIN TIME: 13.9 SECONDS (ref 9.8–12.8)
PROTHROMBIN TIME: 15.9 SECONDS (ref 9.8–12.8)
PROTHROMBIN TIME: 30.5 SECONDS (ref 9.8–12.8)
PROTHROMBIN TIME: 31.4 SECONDS (ref 9.8–12.8)
Q ONSET: 231 MS
QRS COUNT: 14 BEATS
QRS DURATION: 128 MS
QT INTERVAL: 382 MS
QTC CALCULATION(BAZETT): 448 MS
QTC FREDERICIA: 425 MS
R AXIS: -19 DEGREES
RBC # BLD AUTO: 3.41 X10*6/UL (ref 4–5.2)
RBC # BLD AUTO: 3.9 X10*6/UL (ref 4–5.2)
RBC # UR STRIP.AUTO: NEGATIVE /UL
RBC #/AREA URNS AUTO: NORMAL /HPF
RIGHT VENTRICLE FREE WALL PEAK S': 13 CM/S
RIGHT VENTRICLE PEAK SYSTOLIC PRESSURE: 34.1 MMHG
SARS-COV-2 RNA RESP QL NAA+PROBE: NOT DETECTED
SODIUM SERPL-SCNC: 140 MMOL/L (ref 136–145)
SODIUM SERPL-SCNC: 140 MMOL/L (ref 136–145)
SP GR UR STRIP.AUTO: 1
SQUAMOUS #/AREA URNS AUTO: NORMAL /HPF
T AXIS: 13 DEGREES
T OFFSET: 422 MS
TRICUSPID ANNULAR PLANE SYSTOLIC EXCURSION: 2.9 CM
UROBILINOGEN UR STRIP.AUTO-MCNC: NORMAL MG/DL
VENTRICULAR RATE: 83 BPM
WBC # BLD AUTO: 5.9 X10*3/UL (ref 4.4–11.3)
WBC # BLD AUTO: 6 X10*3/UL (ref 4.4–11.3)
WBC #/AREA URNS AUTO: NORMAL /HPF

## 2024-09-21 PROCEDURE — 80053 COMPREHEN METABOLIC PANEL: CPT

## 2024-09-21 PROCEDURE — 6360000002 HC RX 636 FACTOR: Mod: JZ

## 2024-09-21 PROCEDURE — 85025 COMPLETE CBC W/AUTO DIFF WBC: CPT

## 2024-09-21 PROCEDURE — 36620 INSERTION CATHETER ARTERY: CPT

## 2024-09-21 PROCEDURE — 2500000005 HC RX 250 GENERAL PHARMACY W/O HCPCS

## 2024-09-21 PROCEDURE — 71046 X-RAY EXAM CHEST 2 VIEWS: CPT

## 2024-09-21 PROCEDURE — 99222 1ST HOSP IP/OBS MODERATE 55: CPT

## 2024-09-21 PROCEDURE — 82330 ASSAY OF CALCIUM: CPT

## 2024-09-21 PROCEDURE — 96375 TX/PRO/DX INJ NEW DRUG ADDON: CPT | Mod: 59

## 2024-09-21 PROCEDURE — 76882 US LMTD JT/FCL EVL NVASC XTR: CPT

## 2024-09-21 PROCEDURE — 83735 ASSAY OF MAGNESIUM: CPT

## 2024-09-21 PROCEDURE — 85610 PROTHROMBIN TIME: CPT

## 2024-09-21 PROCEDURE — 99291 CRITICAL CARE FIRST HOUR: CPT | Mod: 25 | Performed by: EMERGENCY MEDICINE

## 2024-09-21 PROCEDURE — 93306 TTE W/DOPPLER COMPLETE: CPT

## 2024-09-21 PROCEDURE — 87635 SARS-COV-2 COVID-19 AMP PRB: CPT

## 2024-09-21 PROCEDURE — 2500000002 HC RX 250 W HCPCS SELF ADMINISTERED DRUGS (ALT 637 FOR MEDICARE OP, ALT 636 FOR OP/ED)

## 2024-09-21 PROCEDURE — 71275 CT ANGIOGRAPHY CHEST: CPT

## 2024-09-21 PROCEDURE — 72125 CT NECK SPINE W/O DYE: CPT | Performed by: STUDENT IN AN ORGANIZED HEALTH CARE EDUCATION/TRAINING PROGRAM

## 2024-09-21 PROCEDURE — 70450 CT HEAD/BRAIN W/O DYE: CPT | Performed by: STUDENT IN AN ORGANIZED HEALTH CARE EDUCATION/TRAINING PROGRAM

## 2024-09-21 PROCEDURE — 36415 COLL VENOUS BLD VENIPUNCTURE: CPT

## 2024-09-21 PROCEDURE — 2500000004 HC RX 250 GENERAL PHARMACY W/ HCPCS (ALT 636 FOR OP/ED)

## 2024-09-21 PROCEDURE — 84484 ASSAY OF TROPONIN QUANT: CPT

## 2024-09-21 PROCEDURE — 94640 AIRWAY INHALATION TREATMENT: CPT

## 2024-09-21 PROCEDURE — 85730 THROMBOPLASTIN TIME PARTIAL: CPT

## 2024-09-21 PROCEDURE — 93306 TTE W/DOPPLER COMPLETE: CPT | Performed by: INTERNAL MEDICINE

## 2024-09-21 PROCEDURE — 70450 CT HEAD/BRAIN W/O DYE: CPT

## 2024-09-21 PROCEDURE — 72125 CT NECK SPINE W/O DYE: CPT

## 2024-09-21 PROCEDURE — 83036 HEMOGLOBIN GLYCOSYLATED A1C: CPT

## 2024-09-21 PROCEDURE — 99291 CRITICAL CARE FIRST HOUR: CPT | Performed by: PSYCHIATRY & NEUROLOGY

## 2024-09-21 PROCEDURE — 81001 URINALYSIS AUTO W/SCOPE: CPT

## 2024-09-21 PROCEDURE — 37799 UNLISTED PX VASCULAR SURGERY: CPT

## 2024-09-21 PROCEDURE — 70553 MRI BRAIN STEM W/O & W/DYE: CPT

## 2024-09-21 PROCEDURE — 82947 ASSAY GLUCOSE BLOOD QUANT: CPT

## 2024-09-21 PROCEDURE — 2550000001 HC RX 255 CONTRASTS: Performed by: EMERGENCY MEDICINE

## 2024-09-21 PROCEDURE — 96374 THER/PROPH/DIAG INJ IV PUSH: CPT | Mod: 59

## 2024-09-21 PROCEDURE — 71275 CT ANGIOGRAPHY CHEST: CPT | Performed by: STUDENT IN AN ORGANIZED HEALTH CARE EDUCATION/TRAINING PROGRAM

## 2024-09-21 PROCEDURE — 83880 ASSAY OF NATRIURETIC PEPTIDE: CPT

## 2024-09-21 PROCEDURE — 70498 CT ANGIOGRAPHY NECK: CPT

## 2024-09-21 PROCEDURE — 99222 1ST HOSP IP/OBS MODERATE 55: CPT | Performed by: NEUROLOGICAL SURGERY

## 2024-09-21 PROCEDURE — 80069 RENAL FUNCTION PANEL: CPT | Mod: CCI

## 2024-09-21 PROCEDURE — 2500000001 HC RX 250 WO HCPCS SELF ADMINISTERED DRUGS (ALT 637 FOR MEDICARE OP)

## 2024-09-21 PROCEDURE — 70546 MR ANGIOGRAPH HEAD W/O&W/DYE: CPT

## 2024-09-21 PROCEDURE — 2020000001 HC ICU ROOM DAILY

## 2024-09-21 PROCEDURE — 99291 CRITICAL CARE FIRST HOUR: CPT

## 2024-09-21 PROCEDURE — 83718 ASSAY OF LIPOPROTEIN: CPT

## 2024-09-21 RX ORDER — HYDROCHLOROTHIAZIDE 25 MG/1
12.5 TABLET ORAL DAILY
Status: DISCONTINUED | OUTPATIENT
Start: 2024-09-21 | End: 2024-09-23

## 2024-09-21 RX ORDER — POLYETHYLENE GLYCOL 3350 17 G/17G
17 POWDER, FOR SOLUTION ORAL DAILY
Status: DISCONTINUED | OUTPATIENT
Start: 2024-09-21 | End: 2024-09-27

## 2024-09-21 RX ORDER — ACETAMINOPHEN 160 MG/5ML
650 SOLUTION ORAL EVERY 4 HOURS PRN
Status: DISCONTINUED | OUTPATIENT
Start: 2024-09-21 | End: 2024-09-26

## 2024-09-21 RX ORDER — NICARDIPINE HYDROCHLORIDE 0.2 MG/ML
1-15 INJECTION INTRAVENOUS CONTINUOUS PRN
Status: DISCONTINUED | OUTPATIENT
Start: 2024-09-21 | End: 2024-09-25

## 2024-09-21 RX ORDER — NICARDIPINE HYDROCHLORIDE 0.2 MG/ML
2.5-15 INJECTION INTRAVENOUS CONTINUOUS
Status: DISCONTINUED | OUTPATIENT
Start: 2024-09-21 | End: 2024-09-21

## 2024-09-21 RX ORDER — HYDRALAZINE HYDROCHLORIDE 20 MG/ML
10 INJECTION INTRAMUSCULAR; INTRAVENOUS ONCE
Status: COMPLETED | OUTPATIENT
Start: 2024-09-21 | End: 2024-09-21

## 2024-09-21 RX ORDER — TRAZODONE HYDROCHLORIDE 50 MG/1
150 TABLET ORAL NIGHTLY PRN
Status: DISCONTINUED | OUTPATIENT
Start: 2024-09-21 | End: 2024-09-27

## 2024-09-21 RX ORDER — HYDRALAZINE HYDROCHLORIDE 20 MG/ML
10 INJECTION INTRAMUSCULAR; INTRAVENOUS
Status: DISCONTINUED | OUTPATIENT
Start: 2024-09-21 | End: 2024-09-22

## 2024-09-21 RX ORDER — LABETALOL HYDROCHLORIDE 5 MG/ML
10 INJECTION, SOLUTION INTRAVENOUS EVERY 10 MIN PRN
Status: DISCONTINUED | OUTPATIENT
Start: 2024-09-21 | End: 2024-09-25

## 2024-09-21 RX ORDER — CARBIDOPA AND LEVODOPA 25; 100 MG/1; MG/1
1 TABLET ORAL 3 TIMES DAILY
Status: DISCONTINUED | OUTPATIENT
Start: 2024-09-21 | End: 2024-09-23

## 2024-09-21 RX ORDER — LISINOPRIL 20 MG/1
40 TABLET ORAL DAILY
Status: DISCONTINUED | OUTPATIENT
Start: 2024-09-21 | End: 2024-09-28

## 2024-09-21 RX ORDER — ACETAMINOPHEN 650 MG/1
650 SUPPOSITORY RECTAL EVERY 4 HOURS PRN
Status: DISCONTINUED | OUTPATIENT
Start: 2024-09-21 | End: 2024-09-26

## 2024-09-21 RX ORDER — ATORVASTATIN CALCIUM 40 MG/1
40 TABLET, FILM COATED ORAL DAILY
Status: DISCONTINUED | OUTPATIENT
Start: 2024-09-21 | End: 2024-09-27

## 2024-09-21 RX ORDER — MORPHINE SULFATE 4 MG/ML
4 INJECTION INTRAVENOUS ONCE
Status: COMPLETED | OUTPATIENT
Start: 2024-09-21 | End: 2024-09-21

## 2024-09-21 RX ORDER — CARVEDILOL 25 MG/1
25 TABLET ORAL
Status: DISCONTINUED | OUTPATIENT
Start: 2024-09-21 | End: 2024-09-27

## 2024-09-21 RX ORDER — ACETAMINOPHEN 325 MG/1
650 TABLET ORAL EVERY 4 HOURS PRN
Status: DISCONTINUED | OUTPATIENT
Start: 2024-09-21 | End: 2024-09-26

## 2024-09-21 RX ORDER — LORAZEPAM 2 MG/ML
2 INJECTION INTRAMUSCULAR ONCE
Status: COMPLETED | OUTPATIENT
Start: 2024-09-21 | End: 2024-09-22

## 2024-09-21 RX ORDER — LABETALOL HYDROCHLORIDE 5 MG/ML
20 INJECTION, SOLUTION INTRAVENOUS ONCE
Status: DISCONTINUED | OUTPATIENT
Start: 2024-09-21 | End: 2024-09-21

## 2024-09-21 RX ORDER — SODIUM CHLORIDE 9 MG/ML
75 INJECTION, SOLUTION INTRAVENOUS CONTINUOUS
Status: DISCONTINUED | OUTPATIENT
Start: 2024-09-21 | End: 2024-09-22

## 2024-09-21 RX ORDER — NICARDIPINE HYDROCHLORIDE 0.2 MG/ML
INJECTION INTRAVENOUS
Status: COMPLETED
Start: 2024-09-21 | End: 2024-09-21

## 2024-09-21 RX ORDER — QUETIAPINE FUMARATE 300 MG/1
300 TABLET, FILM COATED ORAL NIGHTLY
Status: DISCONTINUED | OUTPATIENT
Start: 2024-09-21 | End: 2024-09-27

## 2024-09-21 RX ORDER — ISOSORBIDE MONONITRATE 30 MG/1
30 TABLET, EXTENDED RELEASE ORAL DAILY
Status: DISCONTINUED | OUTPATIENT
Start: 2024-09-21 | End: 2024-09-27

## 2024-09-21 RX ORDER — FOLIC ACID 1 MG/1
1 TABLET ORAL DAILY
Status: DISCONTINUED | OUTPATIENT
Start: 2024-09-21 | End: 2024-09-27

## 2024-09-21 ASSESSMENT — LIFESTYLE VARIABLES
EVER HAD A DRINK FIRST THING IN THE MORNING TO STEADY YOUR NERVES TO GET RID OF A HANGOVER: NO
HAVE YOU EVER FELT YOU SHOULD CUT DOWN ON YOUR DRINKING: NO
HAVE PEOPLE ANNOYED YOU BY CRITICIZING YOUR DRINKING: NO
TOTAL SCORE: 0
EVER FELT BAD OR GUILTY ABOUT YOUR DRINKING: NO

## 2024-09-21 ASSESSMENT — PAIN - FUNCTIONAL ASSESSMENT
PAIN_FUNCTIONAL_ASSESSMENT: 0-10

## 2024-09-21 ASSESSMENT — PAIN DESCRIPTION - DESCRIPTORS
DESCRIPTORS: ACHING
DESCRIPTORS: ACHING

## 2024-09-21 ASSESSMENT — PAIN SCALES - GENERAL
PAINLEVEL_OUTOF10: 9
PAINLEVEL_OUTOF10: 0 - NO PAIN
PAINLEVEL_OUTOF10: 9
PAINLEVEL_OUTOF10: 8
PAINLEVEL_OUTOF10: 8
PAINLEVEL_OUTOF10: 4
PAINLEVEL_OUTOF10: 3
PAINLEVEL_OUTOF10: 0 - NO PAIN

## 2024-09-21 ASSESSMENT — PAIN DESCRIPTION - PROGRESSION: CLINICAL_PROGRESSION: NOT CHANGED

## 2024-09-21 ASSESSMENT — PAIN DESCRIPTION - PAIN TYPE: TYPE: ACUTE PAIN

## 2024-09-21 ASSESSMENT — PAIN DESCRIPTION - LOCATION: LOCATION: HEAD

## 2024-09-21 NOTE — PROGRESS NOTES
Physical Therapy                 Therapy Communication Note    Patient Name: Kelly Martinez  MRN: [id number]  Department: [facility]  Room: 06/06-A  Today's Date: 9/21/2024     Discipline: Physical Therapy     Missed Visit Reason:  (0804: Pt undergoing procedure at bedside for a-line placement. Will re-attempt PT eval.)    Missed Time: Attempt

## 2024-09-21 NOTE — CONSULTS
"    Subjective     HPI  Kelly Martinez is a 79 y.o. female with PMH of PE and recurrent DVT s/p IVCF which was removed in 2016 was on eliquis switched to coumadin 2022, HFpEF, HLD, HTN, JOY, vascular parkinson, schizophrenia, COPD, CKD IV who presented to [facility] on 9/21/2024 with cerebellar ICH with surrounding SAH, neurology consulted for co-management.    Pt presents with fall 2 days ago and confusion per ED note and neurosurgery report. On interview, the patient did report a posterior headache around 5/10 and dizziness and nausea with an episode of vomiting today. When asked about medication compliance she mentioned that she is independent in taking them but might forget them sometimes. However she is somnolent and confused and unable to provide overall reliable history, intermittently talks nonsensically with some perseveration (repeated \"mister\" inappropriately multiple times). Daughter's and pt's phone are unavailable.     /121, HR 82, RR 18, T 36.2, SpO2 95% on RA  INR 2.6-2.8, platelets 189  BUN 26, Cr 2.2  UA wo infection  CTH revealed vermian cerebellar ICH with R cerebellar hemisphere ICH and/or SAH, no hydrocephalus or IVH  CTA H&N wo aneurysm or vascular malformation  CTA chest and CT C-spine unremarkable    Kcentra ordered and cardene drip initiated.     ROS:  A comprehensive review-of-systems was obtained and negative unless noted above in the HPI.    Past Medical History  Past Medical History:   Diagnosis Date    Other pulmonary embolism without acute cor pulmonale (Multi) 07/21/2013    Pulmonary embolism    Personal history of other mental and behavioral disorders     History of depression    Personal history of other venous thrombosis and embolism     History of deep venous thrombosis     Surgical History  Past Surgical History:   Procedure Laterality Date    KNEE SURGERY  04/06/2016    Knee Surgery    NECK SURGERY  04/06/2016    Neck Surgery     Social History  Social History     Tobacco Use " "   Smoking status: Every Day     Current packs/day: 0.50     Types: Cigarettes     Passive exposure: Current    Smokeless tobacco: Never   Substance Use Topics    Alcohol use: Not Currently     Allergies  Meclizine, Naproxen, and Piperacillin-tazobactam-dextrs    Home Medications  Current Outpatient Medications   Medication Instructions    aspirin 81 mg, oral, Daily    atorvastatin (LIPITOR) 40 mg, oral, Daily    budesonide (Pulmicort Flexhaler) 90 mcg/actuation inhaler 2 puffs, inhalation, 2 times daily RT    carbidopa-levodopa (Sinemet)  mg tablet 1 tablet, oral, 3 times daily    carvedilol (COREG) 25 mg, oral, 2 times daily (morning and late afternoon)    folic acid (FOLVITE) 1 mg, oral, Daily    hydroCHLOROthiazide (MICROZIDE) 12.5 mg, oral, Daily    isosorbide mononitrate ER (IMDUR) 30 mg, oral, Daily    lisinopril 40 mg, oral, Daily    QUEtiapine (SEROQUEL) 300 mg, oral, Nightly    traZODone (DESYREL) 150 mg, oral, Nightly PRN    warfarin (Coumadin) 1 mg tablet Take 4 and half (4.5 mg) tablets on Monday, Wednesday, Friday and Saturday or take as directed per After Visit Summary. Do not start before April 5, 2024.    warfarin (Coumadin) 3 mg tablet oral, Take as directed per Anticoagulation Clinic ([facility]).     warfarin (Coumadin) 3 mg tablet Take 1 tablet by mouth daily on Tuesday and Thursday or take as directed per After Visit Summary.           Objective   24h Vitals  Heart Rate:  [81-97]   Temp:  [36.2 °C (97.2 °F)-36.6 °C (97.8 °F)]   Resp:  [15-35]   BP: (137-241)/()   Height:  [170.2 cm (5' 7\")]   Weight:  [99.8 kg (220 lb)-101 kg (222 lb 10.6 oz)]   SpO2:  [94 %-99 %]      Physical Exam  Neurological Exam  Physical Exam  GENERAL: laying in bed comfortably; well-appearing and in NAD.  NEUROLOGICAL:   Cognitive Status Exam:  Orientation: somnolent, oriented to person, place but not time  Language: expression, naming, repetition, and comprehension intact  Moderate dysarthria    Cranial " Nerves:  II: pupils-PERRL (3 -> 2) bilaterally      VF-full   III, IV, VI: eyes aligned in primary position w/o fixation instability, EOM full-range with smooth pursuits (slow) and no gaze-evoked nystagmus  V: intact to LT  VII: intact facial strength and symmetry; nasolabial folds symmetric; no facial droop  VIII: intact hearing to conversation  IX and X: moderate dysarthria; symmetric palatal rise  XI: SCM and trapezius have 5/5 strength  XII: leftward tongue deviation    Motor:   Bulk: Normal    Tone: Normal  Tremor: Absent  Pronator Drift: Absent     Strength: R L  Deltoid  NT NT * limited cooperation  Biceps  5 5  Triceps  5 5    5 5  Iliopsoas  5 5  Quadriceps 5 5  Hamstrings 5 5  TA  5 5  Gastroc 5 5    Reflexes:    R L  Biceps  2+ 2+  Triceps  2+ 2+  Brachioradialis 2+ 2+  Patellar  0 0  Achilles 0 0                                 Toes: mute to plantar stimulation  Guy's: absent  Ankle Clonus: absent    Sensory:   intact and symmetric to light touch    Coordination:   Finger-to-Nose: no ataxia; no intention or action tremor; normokinetic; no dysmetria or overshoot    When trying to assess truncal ataxia patient felt excessively nauseous trying to sit up so no further assessment was done.    NIHSS    1a  Level of consciousness: 1=not alert but arousable by minor stimulation to obey, answer or respond   1b. LOC questions:  2=Performs neither task correctly   1c. LOC commands: 0=Performs both tasks correctly   2.  Best Gaze: 0=normal   3. Visual: 0=No visual loss   4. Facial Palsy: 0=Normal symmetric movement   5a. Motor Left Arm: 0=No drift, limb holds 90 (or 45) degrees for full 10 seconds   5b.  Motor Right Arm: 0=No drift, limb holds 90 (or 45) degrees for full 10 seconds   6a. Motor Left Le=No drift, limb holds 90 (or 45) degrees for full 10 seconds   6b  Motor Right Le=No drift, limb holds 90 (or 45) degrees for full 10 seconds   7. Limb Ataxia: 0=Absent   8.  Sensory: 0=Normal; no  sensory loss   9. Best Language:  0=No aphasia, normal   10. Dysarthria: 1=Mild to moderate, patient slurs at least some words and at worst, can be understood with some difficulty   11. Extinction and Inattention: 0=No abnormality          Total:   4     ICH score: 1 (infratentorial)    Recent Labs  Results from last 72 hours   Lab Units 09/21/24  0429 09/21/24  0130   SODIUM mmol/L  --  140   POTASSIUM mmol/L  --  5.4*   BUN mg/dL  --  26*   CREATININE mg/dL  --  2.20*   CALCIUM mg/dL  --  9.8   MAGNESIUM mg/dL 1.73 1.80      Results from last 72 hours   Lab Units 09/21/24  0130   WBC AUTO x10*3/uL 5.9   HEMOGLOBIN g/dL 11.3*   HEMATOCRIT % 35.5*   PLATELETS AUTO x10*3/uL 189      Results from last 72 hours   Lab Units 09/21/24  0429 09/21/24  0147   INR  2.7* 2.8*       Lab Results   Component Value Date    HGBA1C 5.9 (H) 09/21/2024    HGBA1C 5.3 02/17/2023    LDLF 59 12/07/2022          Results from last 7 days   Lab Units 09/21/24 0429   HEMOGLOBIN A1C % 5.9*     BNP   Date/Time Value Ref Range Status   09/21/2024 04:29  (H) 0 - 99 pg/mL Final       Imaging  MRI head results: No MRI head results found for the past 12 months  CT head results: CT head wo IV contrast    Result Date: 9/21/2024  CT HEAD: 1. Focal areas of hyperdensity within the right cerebellar hemisphere and of the cerebellar vermis suggestive of intraparenchymal hematoma. No significant mass effect or midline shift. The ventricles and basal cisterns are patent. Recommend follow-up CT in 48 hours to ensure stability. 2. Serpiginous hyperdensity over the sulci of the right cerebellar hemisphere representing subarachnoid hemorrhage.   CT CERVICAL SPINE: 1. No acute fracture or traumatic malalignment of the cervical spine. 2. Surgical changes and multilevel spondylotic changes of the cervical spine as detailed above.     I personally reviewed the image(s)/study and resident interpretation. I agree with the findings as stated by resident Ozzie  Srinivasan. Data analyzed and images interpreted at [facility], [place], [place].   MACRO: Ozzie Mattson discussed the significance and urgency of this critical finding by telephone with  Federico Patel on 9/21/2024 at 4:01 am.  (**-RCF-**) Findings:  See findings.   Signed by: Henry Taveras 9/21/2024 4:31 AM Dictation workstation:   [id number]    CT head wo IV contrast    Result Date: 3/30/2024  No acute infarct, hemorrhage or mass is noted.   The white matter hypodensities are consistent with chronic microvascular ischemic changes and are similar to the prior exam.   MACRO: None   Signed by: Jose J Aragon 3/30/2024 2:53 PM Dictation workstation:   [id number]   CT brain angio imaging results: No CT Brain Attack Angio Head and Neck Results found for the past 14 days            Stroke Alert CT/MRI review: Actual date and time by time of consult      IV Thrombolysis IV Thrombolysis Checklist    IV Thrombolysis Given: No; Thrombolysis contraindication reason: Working diagnosis is NOT a suspected ischemic stroke    Assessment/Plan   Kelly Martinez is a 79 y.o. female with PMH of PE and recurrent DVT s/p IVCF which was removed in 2016 was on eliquis switched to coumadin 2022, HFpEF, HLD, HTN, JOY, vascular parkinson, schizophrenia, COPD, CKD IV who presented to [facility] on 9/21/2024 with cerebellar ICH with surrounding SAH, neurology consulted for co-management. Pt presented with fall and confusion, found to have vermian cerebellar ICH with R cerebellar hemisphere ICH and/or SAH, no IVH or hydrocephalus. BP on presentation 220/121, INR 2.8, receiving Kcentra, currently on cardene. Clinically, the patient is drowsy, confused and dysarthric, NIHSS 4. Mechanism likely is hypertensive, exacerbated by the use of anticoagulation.     Type: ICH   Time of Neurosurgery contact: 0516   BP goal: 130-150   IVH: No   ICH volume: ~5cc  Subtype: NA  Vessels Involved: PICA/VA and branches  Neurologic  Manifestations: drowsiness, confusion, dysarthria  Deficits: NIHSS 4   Initial treatment: Anti-hypertensives  Anti-platelets or Anti-coagulation management: Not indicated  Vascular Risk Factors: HTN, HLD, and Hypercoagulability (coumadin use)  BP goal: 130-150  Disposition: Pend PT/OT eval     Recommendations:  Agree with sBP goal 130-150  NPO until nurse bedside swallow assessment   Consider focused stroke order set     Vascular Risk Factor modification goals:  Blood pressure goals: avoid hypotension SBP <100 that could worsen cerebral perfusion, Inpatient Intracerebral hemorrhage- -150 mmHg (if presenting SBP was 150-220 mmHg)   Lipid Goals: education on healthy diet and statin therapy not indicated or contraindicated  Glucose Goals: early treatment of hyperglycemia to goal glucose 140-180 mg/dl with long-term goal A1c < 7%   Smoking Cessation and Education  Assessment for Rehabilitation needs including PT/OT and if indicated swallow evaluation and speech therapy   Patient and family education on signs and symptoms of stroke, calling 911, risk factors, and healthy strategies for stroke prevention.      Abdirizak Steiner MD  PGY-3 Neurology  Stroke [phone]    Recommendations are not final until formally staffed with the attending physician, Dr. Gavin.

## 2024-09-21 NOTE — CARE PLAN
The patient's goals for the shift include  updates, rest, water    The clinical goals for the shift include  SBP between 130-150, A-line placement, q1h neuro assessments stable    Over the shift, the patient made progress towards:      Problem: Fall/Injury  Goal: Not fall by end of shift  Outcome: Progressing  Goal: Be free from injury by end of the shift  Outcome: Progressing  Goal: Verbalize understanding of personal risk factors for fall in the hospital  Outcome: Progressing  Goal: Verbalize understanding of risk factor reduction measures to prevent injury from fall in the home  Outcome: Progressing  Goal: Use assistive devices by end of the shift  Outcome: Progressing  Goal: Pace activities to prevent fatigue by end of the shift  Outcome: Progressing     Problem: Pain - Adult  Goal: Verbalizes/displays adequate comfort level or baseline comfort level  Outcome: Progressing     Problem: Safety - Adult  Goal: Free from fall injury  Outcome: Progressing     Problem: Chronic Conditions and Co-morbidities  Goal: Patient's chronic conditions and co-morbidity symptoms are monitored and maintained or improved  Outcome: Progressing     Problem: Pain  Goal: Takes deep breaths with improved pain control throughout the shift  Outcome: Progressing  Goal: Turns in bed with improved pain control throughout the shift  Outcome: Progressing  Goal: Performs ADL's with improved pain control throughout shift  Outcome: Progressing  Goal: Participates in PT with improved pain control throughout the shift  Outcome: Progressing  Goal: Free from opioid side effects throughout the shift  Outcome: Progressing  Goal: Free from acute confusion related to pain meds throughout the shift  Outcome: Progressing     Problem: General Stroke  Goal: Establish a mutual long term goal with patient by discharge  Outcome: Progressing  Goal: Demonstrate improvement in neurological exam throughout the shift  Outcome: Progressing  Goal: Maintain BP within  ordered limits throughout shift  Outcome: Progressing  Goal: Participate in treatment (ie., meds, therapy) throughout shift  Outcome: Progressing  Goal: No symptoms of aspiration throughout shift  Outcome: Progressing  Goal: No symptoms of hemorrhage throughout shift  Outcome: Progressing  Goal: Decreased nausea/vomiting throughout shift  Outcome: Progressing  Goal: Controlled blood glucose throughout shift  Outcome: Progressing     Problem: ICU Stroke  Goal: Maintain patent airway throughout shift  Outcome: Progressing  Goal: Achieve/maintain targeted sodium level throughout shift  Outcome: Progressing

## 2024-09-21 NOTE — PROGRESS NOTES
[facility]  NEUROSCIENCE INTENSIVE CARE UNIT  DAILY PROGRESS NOTE       Patient Name: Kelly Martinez   MRN: [id number]     Admit Date: 2024     : 1945 AGE: 79 y.o. GENDER: female      Subjective    79 year-old female with past medical history of PE/recurrent DVT s/p IVCF, HFpEF, hypertension, hyperlipidemia, JOY, schizophrenia, COPD, and CKD IV who presented to [facility] with cerebellar ICH with surrounding SAH. Admitted 2024 with cerebellar hemorrhage (Multi) after presenting s/p fall and confusion. CTH demonstrated right vermian cerebellar ICH with right cerebellar hemisphere ICH and/or SAH with 4th ventricular effacement. S/p Kcentra. CTA head/neck without aneurysm or vascular malformation. Admitted to NSU for neurological monitoring and hypertension management on Cardene infusion. Neurology consulted for co-management.     Interval Events: Admitted to NSU.    Objective   VITALS:  Temp:  [36.2 °C (97.2 °F)-36.6 °C (97.8 °F)] 36.6 °C (97.8 °F)  Heart Rate:  [81-97] 91  Resp:  [15-35] 20  BP: (137-241)/() 137/68  INTAKE/OUTPUT:No intake or output data in the 24 hours ending 24 0642      PHYSICAL EXAM:  NEURO:  - Drowsy, oriented x2, follows commands  - EOV, pupils 3mm/reactive bilaterally, very mild dysarthria  - WATKINS 5/5 strength, SILT  CV:  - RRR on telemetry, NSR  - Right radial arterial line in place  RESP:  - Regular, unlabored  - Oxygen: RA  :  - Voids  GI:  - Abdomen NT/ND, soft  SKIN:  - Intact    NIHSS    1a  Level of consciousness: 1=not alert but arousable by minor stimulation to obey, answer or respond   1b. LOC questions:  1=Performs one task correctly   1c. LOC commands: 0=Performs both tasks correctly   2.  Best Gaze: 0=normal   3. Visual: 0=No visual loss   4. Facial Palsy: 0=Normal symmetric movement   5a. Motor Left Arm: 0=No drift, limb holds 90 (or 45) degrees for full 10 seconds   5b.  Motor Right Arm: 0=No drift, limb holds 90 (or 45) degrees for full  10 seconds   6a. Motor Left Le=No drift, limb holds 90 (or 45) degrees for full 10 seconds   6b  Motor Right Le=No drift, limb holds 90 (or 45) degrees for full 10 seconds   7. Limb Ataxia: 0=Absent   8.  Sensory: 0=Normal; no sensory loss   9. Best Language:  0=No aphasia, normal   10. Dysarthria: 1=Mild to moderate, patient slurs at least some words and at worst, can be understood with some difficulty   11. Extinction and Inattention: 0=No abnormality          Total:   3      MEDICATIONS:  Scheduled: PRN: Continuous:   atorvastatin, 40 mg, oral, Daily  carbidopa-levodopa, 1 tablet, oral, TID  carvedilol, 25 mg, oral, BID  folic acid, 1 mg, oral, Daily  hydroCHLOROthiazide, 12.5 mg, oral, Daily  isosorbide mononitrate ER, 30 mg, oral, Daily  lisinopril, 40 mg, oral, Daily  mometasone, 2 puff, inhalation, BID  perflutren lipid microspheres, 0.5-10 mL of dilution, intravenous, Once in imaging  perflutren protein A microsphere, 0.5 mL, intravenous, Once in imaging  QUEtiapine, 300 mg, oral, Nightly  sulfur hexafluoride microsphr, 2 mL, intravenous, Once in imaging     PRN medications: hydrALAZINE, labetaloL, niCARdipine, oxygen, traZODone niCARdipine, 1-15 mg/hr, Last Rate: 10 mg/hr (24 0455)  niCARdipine, 2.5-15 mg/hr  sodium chloride 0.9%, 75 mL/hr         LAB RESULTS:  Results from last 72 hours   Lab Units 24  0429 24  0130   GLUCOSE mg/dL  --  98   SODIUM mmol/L  --  140   POTASSIUM mmol/L  --  5.4*   CHLORIDE mmol/L  --  110*   CO2 mmol/L  --  24   ANION GAP mmol/L  --  11   BUN mg/dL  --  26*   CREATININE mg/dL  --  2.20*   EGFR mL/min/1.73m*2  --  23*   CALCIUM mg/dL  --  9.8   ALBUMIN g/dL  --  4.1   MAGNESIUM mg/dL 1.73 1.80      Results from last 72 hours   Lab Units 24  0130   WBC AUTO x10*3/uL 5.9   NRBC AUTO /100 WBCs 0.0   RBC AUTO x10*6/uL 3.90*   HEMOGLOBIN g/dL 11.3*   HEMATOCRIT % 35.5*   MCV fL 91   MCH pg 29.0   MCHC g/dL 31.8*   RDW % 14.9*   PLATELETS AUTO  x10*3/uL 189      Results from last 72 hours   Lab Units 09/21/24  0130   WBC AUTO x10*3/uL 5.9   NRBC AUTO /100 WBCs 0.0   RBC AUTO x10*6/uL 3.90*   HEMOGLOBIN g/dL 11.3*   HEMATOCRIT % 35.5*   MCV fL 91   MCH pg 29.0   MCHC g/dL 31.8*   RDW % 14.9*   PLATELETS AUTO x10*3/uL 189   NEUTROS PCT AUTO % 62.5   IG PCT AUTO % 0.3   LYMPHS PCT AUTO % 26.9   MONOS PCT AUTO % 6.4   EOS PCT AUTO % 3.4   BASOS PCT AUTO % 0.5   NEUTROS ABS x10*3/uL 3.70   IG AUTO x10*3/uL 0.02   LYMPHS ABS AUTO x10*3/uL 1.59   MONOS ABS AUTO x10*3/uL 0.38   EOS ABS AUTO x10*3/uL 0.20   BASOS ABS AUTO x10*3/uL 0.03      Results from last 72 hours   Lab Units 09/21/24  0706 09/21/24  0429   PROTIME seconds 13.9* 30.5*   INR  1.2* 2.7*   APTT seconds 31 46*      Results from last 72 hours   Lab Units 09/21/24  0130   ALK PHOS U/L 99   BILIRUBIN TOTAL mg/dL 0.7   PROTEIN TOTAL g/dL 7.9   ALT U/L 9   AST U/L 14   ALBUMIN g/dL 4.1     IMAGING RESULTS:  CT angio head and neck w and wo IV contrast         US extremity nonvascular real time w image documentation limited anatomic specific   Final Result   Very limited exam as color Doppler images were not obtained and the   exam was prematurely terminated as per request of the emergency team.   Within these limitations, the right femoral vein is only partially   compressible with echogenic material along the lumen raising   suspicion for partially occlusive thrombus, age-indeterminate.        The right calf veins and left lower extremity deep venous system were   not evaluated due to early termination of the examination.        I personally reviewed the images/study and I agree with the findings   as stated by Dr. Arnulfo Gresham M.D. This study was interpreted at   [facility], [place], [place].        MACRO:   None.        Signed by: Henry Tvaeras 9/21/2024 4:52 AM   Dictation workstation:   [id number]      CT angio chest for pulmonary embolism   Final Result   1. No  evidence of acute pulmonary embolism.   2. Dilated main pulmonary artery measuring 3.6 cm, nonspecific but   can be seen with pulmonary hypertension.   3. Scattered ground-glass opacities with areas of interlobular septal   thickening, suspicious for pulmonary edema. Atypical infectious or   inflammatory process are not excluded.        I personally reviewed the image(s)/study and resident interpretation.   I agree with the findings as stated by resident Ozzie Mattson.   Data analyzed and images interpreted at [facility], [place], [place].        MACRO:   None        Signed by: Henry Taveras 9/21/2024 4:59 AM   Dictation workstation:   [id number]      CT head wo IV contrast   Final Result   CT HEAD:   1. Focal areas of hyperdensity within the right cerebellar hemisphere   and of the cerebellar vermis suggestive of intraparenchymal hematoma.   No significant mass effect or midline shift. The ventricles and basal   cisterns are patent. Recommend follow-up CT in 48 hours to ensure   stability.   2. Serpiginous hyperdensity over the sulci of the right cerebellar   hemisphere representing subarachnoid hemorrhage.        CT CERVICAL SPINE:   1. No acute fracture or traumatic malalignment of the cervical spine.   2. Surgical changes and multilevel spondylotic changes of the   cervical spine as detailed above.             I personally reviewed the image(s)/study and resident interpretation.   I agree with the findings as stated by resident Ozzie Mattson.   Data analyzed and images interpreted at [facility], [place], [place].        MACRO:   Ozzie Mattson discussed the significance and urgency of this   critical finding by telephone with  Federico Patel on 9/21/2024 at   4:01 am.  (**-RCF-**) Findings:  See findings.        Signed by: Henry Taveras 9/21/2024 4:31 AM   Dictation workstation:   [id number]      CT cervical spine wo IV contrast    Final Result   CT HEAD:   1. Focal areas of hyperdensity within the right cerebellar hemisphere   and of the cerebellar vermis suggestive of intraparenchymal hematoma.   No significant mass effect or midline shift. The ventricles and basal   cisterns are patent. Recommend follow-up CT in 48 hours to ensure   stability.   2. Serpiginous hyperdensity over the sulci of the right cerebellar   hemisphere representing subarachnoid hemorrhage.        CT CERVICAL SPINE:   1. No acute fracture or traumatic malalignment of the cervical spine.   2. Surgical changes and multilevel spondylotic changes of the   cervical spine as detailed above.             I personally reviewed the image(s)/study and resident interpretation.   I agree with the findings as stated by resident Ozzie Mattson.   Data analyzed and images interpreted at [facility], [place], [place].        MACRO:   Ozzie Mattson discussed the significance and urgency of this   critical finding by telephone with  Federico Patel on 9/21/2024 at   4:01 am.  (**-RCF-**) Findings:  See findings.        Signed by: Henry Taveras 9/21/2024 4:31 AM   Dictation workstation:   [id number]      XR chest 2 views   Final Result   Cardiomegaly with mild pulmonary edema.        I personally reviewed the image(s)/study and resident interpretation.   I agree with the findings as stated by resident Ozzie Mattson.   Data analyzed and images interpreted at [place], [place].        MACRO:   None        Signed by: Henry Taveras 9/21/2024 4:22 AM   Dictation workstation:   [id number]      Point of Care Ultrasound    (Results Pending)   Transthoracic Echo (TTE) Complete    (Results Pending)   CT head wo IV contrast    (Results Pending)     Assessment/Plan    79 year-old female with past medical history of PE/recurrent DVT s/p IVCF, HFpEF, hypertension, hyperlipidemia, JOY, schizophrenia, vascular  parkinson, COPD, and CKD IV who presented to [facility] with cerebellar ICH with surrounding SAH. Admitted 9/21/2024 with cerebellar hemorrhage (Multi) after presenting s/p fall and confusion. CTH demonstrated right vermian cerebellar ICH with right cerebellar hemisphere ICH and/or SAH with 4th ventricular effacement. S/p Kcentra. CTA head/neck without aneurysm or vascular malformation. Admitted to NSU for neurological monitoring and hypertension management on Cardene infusion. Neurology consulted for co-management.     NEURO:  #Right vermian cerebellar ICH with right cerebellar hemisphere ICH with 4th ventricular effacement  #Vascular parkinson   Assessment:  - Neurologically: see above  - S/p Kcentra  Plan:  - NSU  - Neuro Checks: Q1H  - Repeat CTH for stability in 6 hours  - MRI brain  - Neurology stroke recommendations  - Warfarin/ASA re-start plan per neurosurgery  - Carbidopa-levodopa 1 tablet TID, trazodone 150mg nightly PRN  - PT/OT    CARDIOVASCULAR:  #HTN, HLD  Assessment:  - Hypertensive on arrival to NSU requiring Cardene infusion  - ECHO: pending  Plan:  - Continue to monitor on telemetry  - Atorvastatin 40mg daily, carvedilol 25mg BID, hydrochlorothiazide 12.5mg daily, isosorbide mononitrate 30mg daily, lisinopril 40mg daily  - Obtain TTE  - BP goal: -150 mmHg (presenting SBP was 150-220 mmHg)    --> PRN: Labetalol, Hydralazine, and Nicardipine     RESPIRATORY:  #No active issues  Assessment:  - RA  Plan:  - Continuous pulse oximetry   - O2 PRN to maintain SpO2 > 94%, wean as tolerated  - Incentive spirometry while awake    RENAL/:  #CKD IV  Assessment:  Results from last 72 hours   Lab Units 09/21/24  0130   BUN mg/dL 26*   CREATININE mg/dL 2.20*   Plan:  - Monitor with daily RFP    FEN/GI:  #No active issues  Assessment:  - Last BM: PTA  Plan:  - Monitor and replace electrolytes per protocol  - IVF: NS @ 75 mL/hr  - Diet: NPO, pending nursing bedside swallow evaluation  - Bowel regimen:  Miralax    ENDOCRINE:  #No active issues  Assessment:  Results from last 7 days   Lab Units 24  0557 24  0510 24  0130   POCT GLUCOSE mg/dL 102* 96  --    GLUCOSE mg/dL  --   --  98   Plan:  - Accuchecks PRN    HEMATOLOGY:  #Anemia (stable)  Assessment:  Results from last 7 days   Lab Units 24  0130   HEMOGLOBIN g/dL 11.3*   HEMATOCRIT % 35.5*   PLATELETS AUTO x10*3/uL 189   Plan:  - Continue to monitor with daily CBC and Coag panel    INFECTIOUS DISEASE:  #No active issues  Assessment:  Results from last 7 days   Lab Units 24  0130   WBC AUTO x10*3/uL 5.9   - Temp (24hrs), Av.4 °C (97.5 °F), Min:36.2 °C (97.2 °F), Max:36.6 °C (97.8 °F)  Plan:  - Continue to monitor for s/sx of infection  - Pan culture for temperature > 38.4 C    MUSCULOSKELETAL:  - No acute issues    SKIN:  - No acute issues  - Turns and skin care per NSU protocol    ACCESS:  - PIVs  - Right radial arterial line    PROPHYLAXIS:  - DVT Ppx: SCDs  - GI Ppx: not indicated    RESTRAINTS:  Not indicated/Patient does not meet criteria for restraints    YUNIOR Ramos-CNP  Neuroscience Intensive Care    Total critical care time of 60 minutes, with > 50% of time spent in direct contact with patient/family for education, counseling and coordination of care.

## 2024-09-21 NOTE — H&P
"History Of Present Illness  Kelly Martinez is a 79 y.o. female presenting with cerebellar hemorrhage.    History limited by patient's neurologic state. Reported some headache, denied any new weakness, numbness, tingling.     Past Medical History  She has a past medical history of Other pulmonary embolism without acute cor pulmonale (Multi) (07/21/2013), Personal history of other mental and behavioral disorders, and Personal history of other venous thrombosis and embolism.    Surgical History  She has a past surgical history that includes Knee surgery (04/06/2016) and Neck surgery (04/06/2016).     Social History  She reports that she has been smoking cigarettes. She has been exposed to tobacco smoke. She has never used smokeless tobacco. She reports that she does not currently use alcohol. No history on file for drug use.     Allergies  Meclizine, Naproxen, and Piperacillin-tazobactam-dextrs    Medications  (Not in a hospital admission)      Review of Systems     Physical Exam  A&Ox2  Face symmetric  RUE D3 o/w 5/5  LUE 5/5  RLE 5/5  LLE 5/5  Sensation intact to light touch throughout all extremities      Last Recorded Vitals  Blood pressure (!) 225/115, pulse 92, temperature 36.6 °C (97.8 °F), temperature source Temporal, resp. rate 15, height 1.702 m (5' 7\"), weight 99.8 kg (220 lb), SpO2 95%.    Relevant Results     SCORES    GCS:   - Motor: 6 - Follows simple motor commands  - Verbal: 4 - Seems confused, disoriented  - Eyes: 4 - Opens eyes on own  - TOTAL: 15  ICH:  - GCS: +0 (GCS 13-15)  - Age: +0 (Age < 80)  - Volume: +0 (ICH volume < 30 mL)  - IVH: +0 (IVH absent)  - Infratentorial: +1 (Infratentorial origin)  - TOTAL: 1      Assessment/Plan   Assessment & Plan      79yF h/o parkinson's, HTN, HLD, HFpEF, DVT/PEs on warfarin (s/p IVC filter removed 4/2016), JOY (CPAP at night), COPD, CKD stage 4, chronic tobacco use, schizophrenia, depression, p/w AMS, recent fall 2days ago, CTH R cerebellar and cerebellar " vermis hemorrhage with 4th ventricular effacement, stable vents, CTA neg, s/p kcentra    PLAN  NSU  Repeat CTH for stability at 6 hour interval  -150, cardene PRN  Stroke recs  Warfarin/ASA restart plan  PTOT    Julia Gallegos MD  Note authored by resident on neurosurgery team, with all questions or to contact team please page at [phone]

## 2024-09-21 NOTE — Clinical Note
Cerebral angiogram performed. Access via right groin. Closure with StarClose. 2x2 and Tegaderm placed at site. Dressing clean, dry, and intact. No hematoma. Pt received 2 mg versed and 25 mcg fentanyl IVP. Pt to keep R leg straight for 3 hours and head of bed is not to go above 30 degrees. Leg brace applied to RLE. VSS. Pt transferred to NSU with NSU transport RN. Report was called to Leola ROGERS in NSU.

## 2024-09-21 NOTE — PROCEDURES
Arterial Line Insertion    Date/Time: 9/21/2024 8:03 AM    Performed by: JESSU Ramos  Authorized by: JESUS Ramos    Consent:     Consent obtained:  Verbal and written    Consent given by:  Patient    Risks, benefits, and alternatives were discussed: yes      Risks discussed:  Bleeding, infection, pain and repeat procedure  Universal protocol:     Procedure explained and questions answered to patient or proxy's satisfaction: yes      Site/side marked: yes      Immediately prior to procedure, a time out was called: yes      Patient identity confirmed:  Verbally with patient and arm band  Indications:     Indications: hemodynamic monitoring    Pre-procedure details:     Skin preparation:  Chlorhexidine and alcohol    Preparation: Patient was prepped and draped in sterile fashion    Sedation:     Sedation type:  None  Anesthesia:     Anesthesia method:  Local infiltration    Local anesthetic:  Lidocaine 1% w/o epi  Procedure details:     Location:  R radial    Needle gauge:  20 G    Placement technique:  Ultrasound guided    Number of attempts:  1    Transducer: waveform confirmed    Post-procedure details:     Post-procedure:  Sterile dressing applied and sutured    CMS:  Normal and unchanged    Procedure completion:  Tolerated well, no immediate complications

## 2024-09-21 NOTE — ED TRIAGE NOTES
YESSY العلي is a 79y old F with a PMHx significant for 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, HFpEF, HTN, PD on carbidopa/levadopa, Schizophrenia/depression (followed by  psychiatry), JOY on CPAP, COPD not on o2,  CKD stage IV, and chronic tobacco  is presenting to [facility] ED with multiple medical complaints. Per patient granddaughter, pt fell approx 2 days ago. Was found lying on the floor when she arrived home from work. Pt denies hitting head or LOC. Pt endorses generalized weakness with LE swelling bilaterally. Py is currently on both anticoagulant Coumadin and ASA 81 mg chewable. Pt denies any current/active dizziness, chest pain, abdominal pain or change to bowel or bladder patterns. No recent sick contacts or COVID symptoms. Allergies listed in EMR     Prior to Admission Medications      Last Dose Start Date End Date Provider    aspirin 81 mg chewable tablet  --  11/16/22  --  Historical Provider, MD    atorvastatin (Lipitor) 40 mg tablet  --  11/16/22  --  Historical Provider, MD    budesonide (Pulmicort Flexhaler) 90 mcg/actuation inhaler  --  05/23/22  --  Historical Provider, MD    carbidopa-levodopa (Sinemet)  mg tablet  --  11/16/22  --  Historical Provider, MD    carvedilol (Coreg) 25 mg tablet  --  --  --  Historical Provider, MD    folic acid (Folvite) 1 mg tablet  --  03/30/16  --  Historical Provider, MD    hydroCHLOROthiazide (HYDRODiuril) 12.5 mg tablet  --  --  --  Historical Provider, MD    isosorbide mononitrate ER (Imdur) 30 mg 24 hr tablet  --  --  --  Historical Provider, MD    lisinopril 40 mg tablet  --  11/10/23  --  Historical Provider, MD    QUEtiapine (SEROquel) 300 mg tablet  --  --  --  Historical Provider, MD    traZODone (Desyrel) 150 mg tablet  --  04/04/24  --  Xavier Schultz MD    Take 1 tablet (150 mg) by mouth as needed at bedtime for sleep.    warfarin (Coumadin) 1 mg tablet  --  04/05/24 04/04/25  Xavier Schultz MD    Take 4 and half (4.5 mg) tablets on Monday, Wednesday,  Friday and Saturday or take as directed per After Visit Summary. Do not start before April 5, 2024.    warfarin (Coumadin) 3 mg tablet  --  --  --  Historical Provider, MD    warfarin (Coumadin) 3 mg tablet

## 2024-09-21 NOTE — PROGRESS NOTES
Occupational Therapy                 Therapy Communication Note    Patient Name: Kelly Martinez  MRN: [id number]  Department: [facility]  Room: 06/06-A  Today's Date: 9/21/2024     Discipline: Occupational Therapy    Missed Visit Reason: Missed Visit Reason: Patient in a medical procedure (Pt getting a-line placed at bedside. Will reattempt OT as available/appropriate.)    Missed Time: Attempt

## 2024-09-21 NOTE — ED PROVIDER NOTES
History of Present Illness     History provided by: Patient  Limitations to History: Altered Mental Status  External Records Reviewed with Brief Summary: ED note    HPI:  Kelly Martinez is a 79 y.o. female with significant past medical history of 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, HFpEF, hypertension, Parkinson's on carbo levodopa, schizophrenia, depression, JOY on CPAP, COPD not on any O2, CKD stage IV, chronic tobacco use that presents emergency room for multiple medical issues.  Pt is with her granddaughter and states that about 2 days ago she fell and was found lying on the ground when she arrived home from work.  She lives at home by herself.  Patient denies hitting her head or loss of consciousness however patient does report some C-spine tenderness.  Granddaughter also states that she has been slightly altered however is AxO 2 on exam.  Patient is also complaining of 2 weeks of lateral lower extremity swelling.  Patient is currently on both anticoagulant Coumadin and aspirin 81 mg chewable.  She is not complaining of any current dizziness, chest pain, abdominal pain, diarrhea, constipation, urinary frequency.  Denies any recent sick contacts or productive cough, congestion.    Physical Exam   Triage vitals:  T 36.2 °C (97.2 °F)  HR 82  BP (!) 220/121  RR 18  O2 95 % None (Room air)    General: Awake, alert, in no acute distress  Eyes: Gaze conjugate.  No scleral icterus or injection  HENT: Normo-cephalic, atraumatic. No stridor. Mild JVD seen  CV: Regular rate, regular rhythm. Radial pulses 2+ bilaterally  Resp: Breathing non-labored, speaking in full sentences.  Clear to auscultation bilaterally  GI: Soft, non-distended, non-tender. No rebound or guarding.  MSK/Extremities: No gross bony deformities. Moving all extremities, no lower extremity swelling, no pitting edema, site tender to palpation of the right calf, no erythema, no wounds.  Skin: Warm. Appropriate color  Neuro: Alert. Oriented x 2. Face symmetric. Speech is  fluent.  Gross strength and sensation intact in b/l UE and LEs  Psych: Appropriate mood and affect    Medical Decision Making & ED Course   Medical Decision Makin y.o. female with significant past medical history of 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, HFpEF, hypertension, Parkinson's on carbo levodopa, schizophrenia, depression, JOY on CPAP, COPD not on any O2, CKD stage IV, chronic tobacco use that presents emergency room for fall 2 days ago, altered mental status, bilateral lower extremity swelling.  Patient is known to have orthopnea.  Diagnosis includes DVT, PE, congestive heart failure, pneumonia, pneumothorax, intracranial hemorrhage, cervical spinal fracture, sepsis secondary to unknown cause, urinary tract infection.  CBC shows a hemoglobin of 11.3 indicating a normocytic anemia.  No leukocytosis or leftward shift.  CMP shows a hyperchloremia of 110, creatinine of 2.2 however similar to baseline.  COVID is negative. Troponin is negative.  BNP of 156 is elevated from her baseline.  Magnesium is normal.  Urinalysis shows no evidence of urinary tract infection.  INR was elevated at 2.8 which was therapeutic.  Was initially given 4 mg of morphine.    CT head showed hyperdensity of the right cerebral hemisphere and of the cerebral vermis indicating intraparenchymal hematoma.  Additional hyperdensity seen in the sulci of the right cerebellar hemisphere representing a subarachnoid hemorrhage.  CT C-spine shows no acute fractures or malalignment.  Chest x-ray shows cardiomegaly with pulmonary edema.  CT PE shows no evidence of pulmonary embolism, dilated main pulmonary artery measuring 3.6 cm possible pulmonary pretension.  Scattered groundglass opacities suspicious for pulmonary edema.  Neurosurgery was consulted.  Given the patient's intracranial hemorrhage, Kcentra was given.  Patient also continues to be hypertensive at around 220s.  Prior to endings on CT scan, patient was given 10 mg of hydralazine.  When patient was found  to have a intracranial hemorrhage, patient was placed on a Cardene drip to try to titrate for a blood pressure of less than 140 systolic.  Was given 10 mg of Decadron.  And was placed on strict airway precaution.  Satting well and not complaining of any respiratory conditions which is why we held off on Lasix given the patient's hypertension control on a Cardene drip.  Patient has been admitted to the NICU for intracranial hemorrhage.  ----  Scoring Tools Utilized: None       Social Determinants of Health which Significantly Impact Care: None identified The following actions were taken to address these social determinants: None    EKG Independent Interpretation: EKG interpreted by myself. Please see ED Course for full interpretation.    Independent Result Review and Interpretation: Relevant laboratory and radiographic results were reviewed and independently interpreted by myself.  As necessary, they are commented on in the ED Course.    Chronic conditions affecting the patient's care: As documented above in MDM    The patient was discussed with the following consultants/services:  NSGY    Care Considerations: As documented above in [facility]    ED Course:  ED Course as of 09/21/24 0443   Sat Sep 21, 2024   0411 We canceled ultrasound.  We spoke to nurse to get the patient back here to the emergency room. [YG]      ED Course User Index  [YG] Arianna Hernandez MD         Diagnoses as of 09/21/24 0443   Cerebellar hemorrhage (Multi)     Disposition   As a result of their workup, the patient will require admission to the hospital.  The patient was informed of her diagnosis.  The patient was given the opportunity to ask questions and I answered them. The patient agreed to be admitted to the hospital.    Procedures   Procedures    Patient seen and discussed with ED attending physician.    Arianna Hernandez MD  Emergency Medicine        ATTENDING ATTESTATION  79-year-old female with multiple medical comorbidities most notable for  venous thromboembolism on Coumadin, heart failure with preserved ejection fraction, Parkinson's presenting to the emergency department with multiple complaints, granddaughter confirmed patient was slightly altered, had a recent fall without head injury and denying loss of consciousness, edema appreciated symmetric to the lower extremities multi pillow orthopnea and mild JVD concerning for possible concomitant CHF exacerbation.  This patient was evaluated slightly confused no sign of any external traumatic injury was ultimately sent for a CT of the head which we received a critical radiology call on with cerebellar bleed 2 cm with local edema, the patient was given dexamethasone, placed on strict airway watch and neurosurgery was consulted.  INR was elevated 2.8 and therapeutic, given the cerebellar bleed she was reversed with Kcentra.  Patient was hypertensive, started on Cardene drip with goal systolic below 140.  Patient has no respiratory symptoms consistent with CHF exacerbation does have a clinical appearance that looks like she has some mild fluid overload, we will hold on Lasix at this point as we are carefully titrating the patient's blood pressure she will be admitted to the NICU in critical condition.    Critical Care:  This critically ill patient continues to be at-risk for deterioration / failure due to the above  mentioned dysfunctional unstable organ systems.  I have personally identified and managed all critical care issues.  Assessment, impressions and plans are reflected in the note above as well as the orders.  Critical care time is spent at bedside includes review of diagnostic tests, labs, and radiographs, serial assessments and management of hemodynamics, respiratory status, ventilation and coordination of care   Time spent in critical care is 33 minutes    EKG reviewed independently by me and agree with interpretation above.    Federico Patel, [facility]  [facility]    The patient was seen by the resident/fellow.  I have personally performed a substantive portion of the encounter.  I have seen and examined the patient; agree with the workup, evaluation, MDM, management and diagnosis.    I have reviewed all the nurses' notes and have confirmed their findings, and have incorporated those findings into this medical record.   The care plan has been discussed with the resident/fellow; I have reviewed the resident/fellow’s note and agree with the documented findings with the exception/addition of information listed above.  On my own examination I agree and incorporated in this document my own history, examination findings and clinical decision making.  All notation in this Addendum supersedes information presented by the resident or ALMA ROSA as listed above.        Arianna Hernandez MD  Resident  09/21/24 [id number]

## 2024-09-21 NOTE — Clinical Note
Pt tolerated procedure well, Mynx deployed in the RFA, gauze and tegaderm dsg applied, dsg dry and intact, report to MARINA Potter RN

## 2024-09-21 NOTE — PROGRESS NOTES
Patient has been identified as having an emergent need for administration of iodinated contrast for CT scan prior to result of laboratory studies OR despite known elevated GFR due to possibility of life and/or limb threatening pathology.    I acknowledge the risks and benefits of emergently proceeding with contrast administration including that, at present, it is the position of the [employer] that contrast induced nephropathy (ROCKY) is a rare but possible consequence. At this time the benefits of proceeding with contrast administration outweigh the risks.    Attempts will be made to mitigate possible ROCKY risk with IV fluid hydration if able.    Arianna Hernandez MD

## 2024-09-21 NOTE — ED PROCEDURE NOTE
Procedure  Critical Care    Performed by: Federico Patel DO  Authorized by: Federico Patel DO    Critical care provider statement:     Critical care time (minutes):  33    Critical care time was exclusive of:  Separately billable procedures and treating other patients and teaching time    Critical care was necessary to treat or prevent imminent or life-threatening deterioration of the following conditions:  CNS failure or compromise    Critical care was time spent personally by me on the following activities:  Blood draw for specimens, development of treatment plan with patient or surrogate, discussions with consultants, evaluation of patient's response to treatment, examination of patient, ordering and performing treatments and interventions, ordering and review of laboratory studies, ordering and review of radiographic studies, re-evaluation of patient's condition and review of old charts               Federico Patel DO  09/21/24 9426

## 2024-09-21 NOTE — PROGRESS NOTES
Kelly Martinez is a 79 y.o. female on day 0 of admission presenting with Cerebellar hemorrhage (Multi).      78 yo woman with small cerebellar ICH.     Neuro - Drowsy, moves all llmbs. NIHSS score 3. Mri pending,     Cardiac - -150 mm Hg, on Cardene drip. Continue home antihypertensive meds.     Pulm - No issues, RA    Renal - CKD, Cr 2.2 (baseline)    GI - Passed swallow, advance diet, bowel regimen    Heme - Hgb 11.3 g/dL    ID - Afebrile    Vasc- SCDs    I spent 30 minutes in the professional and critical care of this patient.      Chilango Scott MD

## 2024-09-22 LAB
ALBUMIN SERPL BCP-MCNC: 3.3 G/DL (ref 3.4–5)
ANION GAP SERPL CALC-SCNC: 14 MMOL/L (ref 10–20)
BASOPHILS # BLD AUTO: 0.01 X10*3/UL (ref 0–0.1)
BASOPHILS NFR BLD AUTO: 0.2 %
BUN SERPL-MCNC: 30 MG/DL (ref 6–23)
CA-I BLD-SCNC: 1.17 MMOL/L (ref 1.1–1.33)
CALCIUM SERPL-MCNC: 8.8 MG/DL (ref 8.6–10.6)
CHLORIDE SERPL-SCNC: 114 MMOL/L (ref 98–107)
CO2 SERPL-SCNC: 18 MMOL/L (ref 21–32)
CREAT SERPL-MCNC: 2.24 MG/DL (ref 0.5–1.05)
EGFRCR SERPLBLD CKD-EPI 2021: 22 ML/MIN/1.73M*2
EOSINOPHIL # BLD AUTO: 0 X10*3/UL (ref 0–0.4)
EOSINOPHIL NFR BLD AUTO: 0 %
ERYTHROCYTE [DISTWIDTH] IN BLOOD BY AUTOMATED COUNT: 15.2 % (ref 11.5–14.5)
GLUCOSE BLD MANUAL STRIP-MCNC: 111 MG/DL (ref 74–99)
GLUCOSE BLD MANUAL STRIP-MCNC: 129 MG/DL (ref 74–99)
GLUCOSE BLD MANUAL STRIP-MCNC: 142 MG/DL (ref 74–99)
GLUCOSE BLD MANUAL STRIP-MCNC: 143 MG/DL (ref 74–99)
GLUCOSE BLD MANUAL STRIP-MCNC: 145 MG/DL (ref 74–99)
GLUCOSE BLD MANUAL STRIP-MCNC: 157 MG/DL (ref 74–99)
GLUCOSE SERPL-MCNC: 142 MG/DL (ref 74–99)
HCT VFR BLD AUTO: 31.4 % (ref 36–46)
HGB BLD-MCNC: 10.2 G/DL (ref 12–16)
IMM GRANULOCYTES # BLD AUTO: 0.03 X10*3/UL (ref 0–0.5)
IMM GRANULOCYTES NFR BLD AUTO: 0.5 % (ref 0–0.9)
LYMPHOCYTES # BLD AUTO: 0.58 X10*3/UL (ref 0.8–3)
LYMPHOCYTES NFR BLD AUTO: 9.1 %
MAGNESIUM SERPL-MCNC: 1.62 MG/DL (ref 1.6–2.4)
MCH RBC QN AUTO: 29.5 PG (ref 26–34)
MCHC RBC AUTO-ENTMCNC: 32.5 G/DL (ref 32–36)
MCV RBC AUTO: 91 FL (ref 80–100)
MONOCYTES # BLD AUTO: 0.05 X10*3/UL (ref 0.05–0.8)
MONOCYTES NFR BLD AUTO: 0.8 %
NEUTROPHILS # BLD AUTO: 5.72 X10*3/UL (ref 1.6–5.5)
NEUTROPHILS NFR BLD AUTO: 89.4 %
NRBC BLD-RTO: 0 /100 WBCS (ref 0–0)
PHOSPHATE SERPL-MCNC: 2.9 MG/DL (ref 2.5–4.9)
PLATELET # BLD AUTO: 178 X10*3/UL (ref 150–450)
POTASSIUM SERPL-SCNC: 3.8 MMOL/L (ref 3.5–5.3)
RBC # BLD AUTO: 3.46 X10*6/UL (ref 4–5.2)
SODIUM SERPL-SCNC: 142 MMOL/L (ref 136–145)
WBC # BLD AUTO: 6.4 X10*3/UL (ref 4.4–11.3)

## 2024-09-22 PROCEDURE — 2500000004 HC RX 250 GENERAL PHARMACY W/ HCPCS (ALT 636 FOR OP/ED)

## 2024-09-22 PROCEDURE — 85025 COMPLETE CBC W/AUTO DIFF WBC: CPT

## 2024-09-22 PROCEDURE — 2500000002 HC RX 250 W HCPCS SELF ADMINISTERED DRUGS (ALT 637 FOR MEDICARE OP, ALT 636 FOR OP/ED)

## 2024-09-22 PROCEDURE — 2500000001 HC RX 250 WO HCPCS SELF ADMINISTERED DRUGS (ALT 637 FOR MEDICARE OP)

## 2024-09-22 PROCEDURE — 99291 CRITICAL CARE FIRST HOUR: CPT

## 2024-09-22 PROCEDURE — 83735 ASSAY OF MAGNESIUM: CPT

## 2024-09-22 PROCEDURE — 82947 ASSAY GLUCOSE BLOOD QUANT: CPT

## 2024-09-22 PROCEDURE — 82330 ASSAY OF CALCIUM: CPT

## 2024-09-22 PROCEDURE — 37799 UNLISTED PX VASCULAR SURGERY: CPT

## 2024-09-22 PROCEDURE — 92610 EVALUATE SWALLOWING FUNCTION: CPT | Mod: GN | Performed by: SPEECH-LANGUAGE PATHOLOGIST

## 2024-09-22 PROCEDURE — 97162 PT EVAL MOD COMPLEX 30 MIN: CPT | Mod: GP

## 2024-09-22 PROCEDURE — 2020000001 HC ICU ROOM DAILY

## 2024-09-22 PROCEDURE — A9575 INJ GADOTERATE MEGLUMI 0.1ML: HCPCS

## 2024-09-22 PROCEDURE — 97166 OT EVAL MOD COMPLEX 45 MIN: CPT | Mod: GO

## 2024-09-22 PROCEDURE — 80069 RENAL FUNCTION PANEL: CPT

## 2024-09-22 PROCEDURE — 2550000001 HC RX 255 CONTRASTS

## 2024-09-22 PROCEDURE — 99291 CRITICAL CARE FIRST HOUR: CPT | Performed by: PSYCHIATRY & NEUROLOGY

## 2024-09-22 RX ORDER — SODIUM CHLORIDE 9 MG/ML
80 INJECTION, SOLUTION INTRAVENOUS CONTINUOUS
Status: DISCONTINUED | OUTPATIENT
Start: 2024-09-22 | End: 2024-09-24

## 2024-09-22 RX ORDER — DEXTROSE 50 % IN WATER (D50W) INTRAVENOUS SYRINGE
12.5
Status: DISCONTINUED | OUTPATIENT
Start: 2024-09-22 | End: 2024-10-04 | Stop reason: HOSPADM

## 2024-09-22 RX ORDER — HYDRALAZINE HYDROCHLORIDE 50 MG/1
25 TABLET, FILM COATED ORAL 3 TIMES DAILY
Status: DISCONTINUED | OUTPATIENT
Start: 2024-09-22 | End: 2024-09-23

## 2024-09-22 RX ORDER — DEXTROSE 50 % IN WATER (D50W) INTRAVENOUS SYRINGE
25
Status: DISCONTINUED | OUTPATIENT
Start: 2024-09-22 | End: 2024-10-04 | Stop reason: HOSPADM

## 2024-09-22 RX ORDER — INSULIN LISPRO 100 [IU]/ML
0-5 INJECTION, SOLUTION INTRAVENOUS; SUBCUTANEOUS EVERY 4 HOURS
Status: DISCONTINUED | OUTPATIENT
Start: 2024-09-22 | End: 2024-10-04 | Stop reason: HOSPADM

## 2024-09-22 RX ORDER — GADOTERATE MEGLUMINE 376.9 MG/ML
20 INJECTION INTRAVENOUS
Status: COMPLETED | OUTPATIENT
Start: 2024-09-22 | End: 2024-09-22

## 2024-09-22 SDOH — ECONOMIC STABILITY: INCOME INSECURITY
HOW HARD IS IT FOR YOU TO PAY FOR THE VERY BASICS LIKE FOOD, HOUSING, MEDICAL CARE, AND HEATING?: PATIENT UNABLE TO ANSWER

## 2024-09-22 SDOH — SOCIAL STABILITY: SOCIAL INSECURITY: DOES ANYONE TRY TO KEEP YOU FROM HAVING/CONTACTING OTHER FRIENDS OR DOING THINGS OUTSIDE YOUR HOME?: UNABLE TO ASSESS

## 2024-09-22 SDOH — ECONOMIC STABILITY: TRANSPORTATION INSECURITY
IN THE PAST 12 MONTHS, HAS THE LACK OF TRANSPORTATION KEPT YOU FROM MEDICAL APPOINTMENTS OR FROM GETTING MEDICATIONS?: PATIENT UNABLE TO ANSWER

## 2024-09-22 SDOH — SOCIAL STABILITY: SOCIAL INSECURITY: HAVE YOU HAD ANY THOUGHTS OF HARMING ANYONE ELSE?: UNABLE TO ASSESS

## 2024-09-22 SDOH — SOCIAL STABILITY: SOCIAL INSECURITY: ABUSE: ADULT

## 2024-09-22 SDOH — ECONOMIC STABILITY: INCOME INSECURITY
IN THE LAST 12 MONTHS, WAS THERE A TIME WHEN YOU WERE NOT ABLE TO PAY THE MORTGAGE OR RENT ON TIME?: PATIENT UNABLE TO ANSWER

## 2024-09-22 SDOH — SOCIAL STABILITY: SOCIAL INSECURITY: WERE YOU ABLE TO COMPLETE ALL THE BEHAVIORAL HEALTH SCREENINGS?: NO

## 2024-09-22 SDOH — SOCIAL STABILITY: SOCIAL INSECURITY: DO YOU FEEL UNSAFE GOING BACK TO THE PLACE WHERE YOU ARE LIVING?: UNABLE TO ASSESS

## 2024-09-22 SDOH — HEALTH STABILITY: PHYSICAL HEALTH
ON AVERAGE, HOW MANY DAYS PER WEEK DO YOU ENGAGE IN MODERATE TO STRENUOUS EXERCISE (LIKE A BRISK WALK)?: PATIENT UNABLE TO ANSWER

## 2024-09-22 SDOH — SOCIAL STABILITY: SOCIAL INSECURITY: DO YOU FEEL ANYONE HAS EXPLOITED OR TAKEN ADVANTAGE OF YOU FINANCIALLY OR OF YOUR PERSONAL PROPERTY?: UNABLE TO ASSESS

## 2024-09-22 SDOH — HEALTH STABILITY: PHYSICAL HEALTH: ON AVERAGE, HOW MANY MINUTES DO YOU ENGAGE IN EXERCISE AT THIS LEVEL?: PATIENT UNABLE TO ANSWER

## 2024-09-22 SDOH — ECONOMIC STABILITY: TRANSPORTATION INSECURITY
IN THE PAST 12 MONTHS, HAS LACK OF TRANSPORTATION KEPT YOU FROM MEETINGS, WORK, OR FROM GETTING THINGS NEEDED FOR DAILY LIVING?: PATIENT UNABLE TO ANSWER

## 2024-09-22 SDOH — ECONOMIC STABILITY: HOUSING INSECURITY: AT ANY TIME IN THE PAST 12 MONTHS, WERE YOU HOMELESS OR LIVING IN A SHELTER (INCLUDING NOW)?: PATIENT UNABLE TO ANSWER

## 2024-09-22 SDOH — ECONOMIC STABILITY: HOUSING INSECURITY: IN THE PAST 12 MONTHS, HOW MANY TIMES HAVE YOU MOVED WHERE YOU WERE LIVING?: 1

## 2024-09-22 SDOH — SOCIAL STABILITY: SOCIAL INSECURITY: ARE YOU OR HAVE YOU BEEN THREATENED OR ABUSED PHYSICALLY, EMOTIONALLY, OR SEXUALLY BY ANYONE?: UNABLE TO ASSESS

## 2024-09-22 SDOH — SOCIAL STABILITY: SOCIAL INSECURITY: HAS ANYONE EVER THREATENED TO HURT YOUR FAMILY OR YOUR PETS?: UNABLE TO ASSESS

## 2024-09-22 SDOH — SOCIAL STABILITY: SOCIAL INSECURITY: HAVE YOU HAD THOUGHTS OF HARMING ANYONE ELSE?: UNABLE TO ASSESS

## 2024-09-22 SDOH — SOCIAL STABILITY: SOCIAL INSECURITY: ARE THERE ANY APPARENT SIGNS OF INJURIES/BEHAVIORS THAT COULD BE RELATED TO ABUSE/NEGLECT?: UNABLE TO ASSESS

## 2024-09-22 ASSESSMENT — LIFESTYLE VARIABLES
SKIP TO QUESTIONS 9-10: 0
AUDIT-C TOTAL SCORE: -1
AUDIT-C TOTAL SCORE: -1
HOW OFTEN DO YOU HAVE A DRINK CONTAINING ALCOHOL: PATIENT UNABLE TO ANSWER
HOW OFTEN DO YOU HAVE 6 OR MORE DRINKS ON ONE OCCASION: PATIENT UNABLE TO ANSWER
HOW MANY STANDARD DRINKS CONTAINING ALCOHOL DO YOU HAVE ON A TYPICAL DAY: PATIENT UNABLE TO ANSWER

## 2024-09-22 ASSESSMENT — ACTIVITIES OF DAILY LIVING (ADL)
DRESSING YOURSELF: NEEDS ASSISTANCE
GROOMING: NEEDS ASSISTANCE
FEEDING YOURSELF: UNABLE TO ASSESS
HEARING - RIGHT EAR: FUNCTIONAL
TOILETING: UNABLE TO ASSESS
ADL_ASSISTANCE: INDEPENDENT
JUDGMENT_ADEQUATE_SAFELY_COMPLETE_DAILY_ACTIVITIES: NO
BATHING: UNABLE TO ASSESS
ADL_ASSISTANCE: INDEPENDENT
BATHING_ASSISTANCE: MODERATE
ADEQUATE_TO_COMPLETE_ADL: YES
HEARING - LEFT EAR: FUNCTIONAL
PATIENT'S MEMORY ADEQUATE TO SAFELY COMPLETE DAILY ACTIVITIES?: NO
WALKS IN HOME: UNABLE TO ASSESS

## 2024-09-22 ASSESSMENT — COGNITIVE AND FUNCTIONAL STATUS - GENERAL
DAILY ACTIVITIY SCORE: 15
PERSONAL GROOMING: A LITTLE
DRESSING REGULAR UPPER BODY CLOTHING: A LOT
MOBILITY SCORE: 7
TURNING FROM BACK TO SIDE WHILE IN FLAT BAD: A LITTLE
PATIENT BASELINE BEDBOUND: NO
MOVING FROM LYING ON BACK TO SITTING ON SIDE OF FLAT BED WITH BEDRAILS: A LOT
STANDING UP FROM CHAIR USING ARMS: TOTAL
WALKING IN HOSPITAL ROOM: TOTAL
CLIMB 3 TO 5 STEPS WITH RAILING: A LOT
HELP NEEDED FOR BATHING: A LOT
TURNING FROM BACK TO SIDE WHILE IN FLAT BAD: TOTAL
MOBILITY SCORE: 14
DRESSING REGULAR LOWER BODY CLOTHING: A LOT
CLIMB 3 TO 5 STEPS WITH RAILING: TOTAL
MOVING TO AND FROM BED TO CHAIR: A LOT
MOVING FROM LYING ON BACK TO SITTING ON SIDE OF FLAT BED WITH BEDRAILS: A LITTLE
TOILETING: A LOT
PERSONAL GROOMING: A LITTLE
WALKING IN HOSPITAL ROOM: A LOT
MOVING TO AND FROM BED TO CHAIR: TOTAL
TOILETING: A LITTLE
DAILY ACTIVITIY SCORE: 13
EATING MEALS: A LITTLE
EATING MEALS: A LITTLE
DRESSING REGULAR UPPER BODY CLOTHING: A LOT
DRESSING REGULAR LOWER BODY CLOTHING: TOTAL
STANDING UP FROM CHAIR USING ARMS: A LOT
HELP NEEDED FOR BATHING: A LOT

## 2024-09-22 ASSESSMENT — PATIENT HEALTH QUESTIONNAIRE - PHQ9
1. LITTLE INTEREST OR PLEASURE IN DOING THINGS: NOT AT ALL
2. FEELING DOWN, DEPRESSED OR HOPELESS: NOT AT ALL
SUM OF ALL RESPONSES TO PHQ9 QUESTIONS 1 & 2: 0

## 2024-09-22 ASSESSMENT — PAIN SCALES - GENERAL
PAINLEVEL_OUTOF10: 0 - NO PAIN

## 2024-09-22 ASSESSMENT — PAIN - FUNCTIONAL ASSESSMENT
PAIN_FUNCTIONAL_ASSESSMENT: 0-10
PAIN_FUNCTIONAL_ASSESSMENT: CPOT (CRITICAL CARE PAIN OBSERVATION TOOL)
PAIN_FUNCTIONAL_ASSESSMENT: 0-10

## 2024-09-22 NOTE — PROGRESS NOTES
[facility]  NEUROSCIENCE INTENSIVE CARE UNIT  DAILY PROGRESS NOTE       Patient Name: Kelly Martinez   MRN: [id number]     Admit Date: 2024     : 1945 AGE: 79 y.o. GENDER: female      Subjective    79 year-old female with past medical history of PE/recurrent DVT s/p IVCF, HFpEF, hypertension, hyperlipidemia, JOY, schizophrenia, COPD, and CKD IV who presented to [facility] with cerebellar ICH with surrounding SAH. Admitted 2024 with cerebellar hemorrhage (Multi) after presenting s/p fall and confusion. CTH demonstrated right vermian cerebellar ICH with right cerebellar hemisphere ICH and/or SAH with 4th ventricular effacement. S/p Kcentra. CTA head/neck without aneurysm or vascular malformation. Admitted to NSU for neurological monitoring and hypertension management on Cardene infusion. Neurology consulted for co-management.     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    Objective   VITALS:  Temp:  [36 °C (96.8 °F)-36.6 °C (97.9 °F)] 36 °C (96.8 °F)  Heart Rate:  [61-92] 68  Resp:  [13-29] 16  BP: (110-175)/(56-92) 116/60  Arterial Line BP 1: (107-197)/(51-93) 141/55  INTAKE/OUTPUT:  Intake/Output Summary (Last 24 hours) at 2024 0730  Last data filed at 2024 0600  Gross per 24 hour   Intake 2440.21 ml   Output 200 ml   Net 2240.21 ml         PHYSICAL EXAM:  NEURO:  - Awake, oriented x1-2, confused, follows commands  - EOS, pupils 3mm/reactive bilaterally, very mild dysarthria  - WATKINS 5/5 strength, SILT  CV:  - RRR on telemetry, NSR  - Right radial arterial line in place  RESP:  - Regular, unlabored  - Oxygen: RA  :  - Voids  GI:  - Abdomen NT/ND, soft  SKIN:  - Intact    MEDICATIONS:  Scheduled: PRN: Continuous:   atorvastatin, 40 mg, oral, Daily  carbidopa-levodopa, 1 tablet, oral, TID  carvedilol, 25 mg, oral, BID  folic acid, 1 mg, oral,  Daily  hydroCHLOROthiazide, 12.5 mg, oral, Daily  isosorbide mononitrate ER, 30 mg, oral, Daily  lisinopril, 40 mg, oral, Daily  mometasone, 2 puff, inhalation, BID  perflutren protein A microsphere, 0.5 mL, intravenous, Once in imaging  polyethylene glycol, 17 g, oral, Daily  QUEtiapine, 300 mg, oral, Nightly  sulfur hexafluoride microsphr, 2 mL, intravenous, Once in imaging     PRN medications: acetaminophen **OR** acetaminophen **OR** acetaminophen, hydrALAZINE, labetaloL, niCARdipine, oxygen, traZODone niCARdipine, 1-15 mg/hr, Last Rate: 12.5 mg/hr (09/22/24 0612)  sodium chloride 0.9%, 75 mL/hr, Last Rate: 75 mL/hr (09/21/24 1808)         LAB RESULTS:  Results from last 72 hours   Lab Units 09/22/24 0428 09/21/24  0909   GLUCOSE mg/dL 142* 134*   SODIUM mmol/L 142 140   POTASSIUM mmol/L 3.8 3.8   CHLORIDE mmol/L 114* 111*   CO2 mmol/L 18* 21   ANION GAP mmol/L 14 12   BUN mg/dL 30* 26*   CREATININE mg/dL 2.24* 2.06*   EGFR mL/min/1.73m*2 22* 24*   CALCIUM mg/dL 8.8 9.2   PHOSPHORUS mg/dL 2.9 2.3*   ALBUMIN g/dL 3.3* 3.6   MAGNESIUM mg/dL 1.62 1.65   POCT CALCIUM IONIZED (MMOL/L) IN BLOOD mmol/L 1.17 1.13      Results from last 72 hours   Lab Units 09/22/24 0428 09/21/24  0909   WBC AUTO x10*3/uL 6.4 6.0   NRBC AUTO /100 WBCs 0.0 0.0   RBC AUTO x10*6/uL 3.46* 3.41*   HEMOGLOBIN g/dL 10.2* 10.3*   HEMATOCRIT % 31.4* 31.1*   MCV fL 91 91   MCH pg 29.5 30.2   MCHC g/dL 32.5 33.1   RDW % 15.2* 14.7*   PLATELETS AUTO x10*3/uL 178 150      Results from last 72 hours   Lab Units 09/22/24 0428 09/21/24  0909   WBC AUTO x10*3/uL 6.4 6.0   NRBC AUTO /100 WBCs 0.0 0.0   RBC AUTO x10*6/uL 3.46* 3.41*   HEMOGLOBIN g/dL 10.2* 10.3*   HEMATOCRIT % 31.4* 31.1*   MCV fL 91 91   MCH pg 29.5 30.2   MCHC g/dL 32.5 33.1   RDW % 15.2* 14.7*   PLATELETS AUTO x10*3/uL 178 150   NEUTROS PCT AUTO % 89.4 78.2   IG PCT AUTO % 0.5 0.3   LYMPHS PCT AUTO % 9.1 15.8   MONOS PCT AUTO % 0.8 4.2   EOS PCT AUTO % 0.0 1.0   BASOS PCT AUTO % 0.2  0.5   NEUTROS ABS x10*3/uL 5.72* 4.65   IG AUTO x10*3/uL 0.03 0.02   LYMPHS ABS AUTO x10*3/uL 0.58* 0.94   MONOS ABS AUTO x10*3/uL 0.05 0.25   EOS ABS AUTO x10*3/uL 0.00 0.06   BASOS ABS AUTO x10*3/uL 0.01 0.03      Results from last 72 hours   Lab Units 09/21/24  0909 09/21/24  0706   PROTIME seconds 15.9* 13.9*   INR  1.4* 1.2*   APTT seconds 37 31      Results from last 72 hours   Lab Units 09/22/24  0428 09/21/24  0909 09/21/24  0130   ALK PHOS U/L  --   --  99   BILIRUBIN TOTAL mg/dL  --   --  0.7   PROTEIN TOTAL g/dL  --   --  7.9   ALT U/L  --   --  9   AST U/L  --   --  14   ALBUMIN g/dL 3.3* 3.6 4.1     IMAGING RESULTS:  MR brain w and wo IV contrast         MR venography intracranial w and wo IV contrast         Transthoracic Echo (TTE) Complete   Final Result      CT head wo IV contrast   Final Result   Unchanged vermian hematoma continues to measure a proximally 1.5 cm   in size        Unchanged right infratentorial subdural or subarachnoid hemorrhage   unchanged        Increased number of slightly increased caliber branches of left PICA   and associated venous structures within the vermis. Vascular   malformation, neoplasm or fistula not excluded        MACRO:   Critical Finding:  See findings. Notification was initiated on   9/21/2024 at 10:12 am by  Doe Chand.  (**-OCF-**)        Signed by: Doe Chand 9/21/2024 10:14 AM   Dictation workstation:   [id number]      CT angio head and neck w and wo IV contrast   Final Result   Addendum (preliminary) 1 of 1   Interpreted By:  Doe Chand,    ADDENDUM:   3D reconstructions were performed at the diagnostic workstation and   demonstrate increase in the size and number of distal PICA branches   on the left with slight increased size and number of associated   venous structures along the lateral left cerebellar hemisphere.   Consider a dural venous fistula, parenchymal vascular malformation or   vascular neoplasm.        Signed by: Doe  Mannie 9/21/2024 10:15 AM        -------- ORIGINAL REPORT --------   Dictation workstation:   [id number]      Final   1. Redemonstrated intraparenchymal and subarachnoid hemorrhage within   the right cerebellar hemisphere.   2. No hemodynamically significant intracranial or extracranial   stenosis, occlusion, or aneurysm.        I personally reviewed the image(s)/study and resident interpretation.   I agree with the findings as stated by resident Ozzie Mattson.   Data analyzed and images interpreted at [place], [place].        MACRO:   None.        Signed by: Doe Chand 9/21/2024 7:10 AM   Dictation workstation:   [id number]      US extremity nonvascular real time w image documentation limited anatomic specific   Final Result   Very limited exam as color Doppler images were not obtained and the   exam was prematurely terminated as per request of the emergency team.   Within these limitations, the right femoral vein is only partially   compressible with echogenic material along the lumen raising   suspicion for partially occlusive thrombus, age-indeterminate.        The right calf veins and left lower extremity deep venous system were   not evaluated due to early termination of the examination.        I personally reviewed the images/study and I agree with the findings   as stated by Dr. Arnulfo Gresham M.D. This study was interpreted at   [facility], [place], [place].        MACRO:   None.        Signed by: Henry Taveras 9/21/2024 4:52 AM   Dictation workstation:   [id number]      CT angio chest for pulmonary embolism   Final Result   1. No evidence of acute pulmonary embolism.   2. Dilated main pulmonary artery measuring 3.6 cm, nonspecific but   can be seen with pulmonary hypertension.   3. Scattered ground-glass opacities with areas of interlobular septal   thickening, suspicious for pulmonary edema. Atypical  infectious or   inflammatory process are not excluded.        I personally reviewed the image(s)/study and resident interpretation.   I agree with the findings as stated by resident Ozzie Mattson.   Data analyzed and images interpreted at [place], [place].        MACRO:   None        Signed by: Henry Taveras 9/21/2024 4:59 AM   Dictation workstation:   [id number]      CT head wo IV contrast   Final Result   CT HEAD:   1. Focal areas of hyperdensity within the right cerebellar hemisphere   and of the cerebellar vermis suggestive of intraparenchymal hematoma.   No significant mass effect or midline shift. The ventricles and basal   cisterns are patent. Recommend follow-up CT in 48 hours to ensure   stability.   2. Serpiginous hyperdensity over the sulci of the right cerebellar   hemisphere representing subarachnoid hemorrhage.        CT CERVICAL SPINE:   1. No acute fracture or traumatic malalignment of the cervical spine.   2. Surgical changes and multilevel spondylotic changes of the   cervical spine as detailed above.             I personally reviewed the image(s)/study and resident interpretation.   I agree with the findings as stated by resident Ozzie Mattson.   Data analyzed and images interpreted at [place], [place].        MACRO:   Ozzie Mattson discussed the significance and urgency of this   critical finding by telephone with  Federico Patel on 9/21/2024 at   4:01 am.  (**-RCF-**) Findings:  See findings.        Signed by: Henry Taveras 9/21/2024 4:31 AM   Dictation workstation:   [id number]      CT cervical spine wo IV contrast   Final Result   CT HEAD:   1. Focal areas of hyperdensity within the right cerebellar hemisphere   and of the cerebellar vermis suggestive of intraparenchymal hematoma.   No significant mass effect or midline shift. The ventricles and basal   cisterns are patent. Recommend  follow-up CT in 48 hours to ensure   stability.   2. Serpiginous hyperdensity over the sulci of the right cerebellar   hemisphere representing subarachnoid hemorrhage.        CT CERVICAL SPINE:   1. No acute fracture or traumatic malalignment of the cervical spine.   2. Surgical changes and multilevel spondylotic changes of the   cervical spine as detailed above.             I personally reviewed the image(s)/study and resident interpretation.   I agree with the findings as stated by resident Ozzie Mattson.   Data analyzed and images interpreted at [facility], [place], [place].        MACRO:   Ozzie Mattson discussed the significance and urgency of this   critical finding by telephone with  Federico Patel on 9/21/2024 at   4:01 am.  (**-RCF-**) Findings:  See findings.        Signed by: Henry Taveras 9/21/2024 4:31 AM   Dictation workstation:   [id number]      XR chest 2 views   Final Result   Cardiomegaly with mild pulmonary edema.        I personally reviewed the image(s)/study and resident interpretation.   I agree with the findings as stated by resident Ozzie Mattson.   Data analyzed and images interpreted at [facility], [place], [place].        MACRO:   None        Signed by: Henry Taveras 9/21/2024 4:22 AM   Dictation workstation:   [id number]      Point of Care Ultrasound    (Results Pending)   Vascular US lower extremity venous duplex bilateral    (Results Pending)   IR angiogram cerebral bilateral    (Results Pending)   Referral to Neurointervention    (Results Pending)     Assessment/Plan    79 year-old female with past medical history of PE/recurrent DVT s/p IVCF, HFpEF, hypertension, hyperlipidemia, JOY, schizophrenia, vascular parkinson, COPD, and CKD IV who presented to [facility] with cerebellar ICH with surrounding SAH. Admitted 9/21/2024 with cerebellar hemorrhage (Multi) after presenting s/p fall and confusion. CT  demonstrated right vermian cerebellar ICH with right cerebellar hemisphere ICH and/or SAH with 4th ventricular effacement. S/p Kcentra. CTA head/neck without aneurysm or vascular malformation. Admitted to NSU for neurological monitoring and hypertension management on Cardene infusion. Neurology consulted for co-management.     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  - 9/22: MRI/MRV motion degraded, no obvious underlying mass or vascular lesion  Plan:  - NSU  - Neuro Checks: Q1H  - EVD watch  - Angiogram Monday 9/23  - Neurology stroke recommendations  - Warfarin/ASA re-start plan per neurosurgery  - Pain: acetaminophen PRN  - Carbidopa-levodopa 1 tablet TID, trazodone 150mg nightly PRN, quetiapine 300mg nightly  - PT/OT    CARDIOVASCULAR:  #HTN, HLD  Assessment:  - Hypertensive on arrival to NSU requiring Cardene infusion  - 9/21/24 ECHO: ejection fraction 74%  Plan:  - Continue to monitor on telemetry  - Atorvastatin 40mg daily, carvedilol 25mg BID, hydrochlorothiazide 12.5mg daily, isosorbide mononitrate 30mg daily, lisinopril 40mg daily  - BP goal: -150 mmHg (presenting SBP was 150-220 mmHg)    --> PRN: Labetalol, Hydralazine, and Nicardipine     RESPIRATORY:  #No active issues  Assessment:  - RA  Plan:  - Continuous pulse oximetry   - O2 PRN to maintain SpO2 > 94%, wean as tolerated  - Incentive spirometry while awake    RENAL/:  #CKD IV  Assessment:  Results from last 72 hours   Lab Units 09/22/24  0428 09/21/24  0909   BUN mg/dL 30* 26*   CREATININE mg/dL 2.24* 2.06*   Plan:  - Monitor with daily RFP    FEN/GI:  #No active issues  Assessment:  - Last BM: PTA  Plan:  - Monitor and replace electrolytes per protocol  - IVF: NS @ 75 mL/hr  - Diet: NPO  - Bowel regimen:  Miralax    ENDOCRINE:  #Hyperglycemia  Assessment:  Results from last 7 days   Lab Units 24  0428 24  0418 24  2318 24  1612 24  1139 24  0909 24  0737   POCT GLUCOSE mg/dL  --  129* 137* 122* 141* 129*  --  139*   GLUCOSE mg/dL 142*  --   --   --   --   --  134*  --    Plan:  - Accuchecks and ISS Q4H while NPO    HEMATOLOGY:  #Anemia (stable)  #Concern for DVT  Assessment:  Results from last 7 days   Lab Units 24  04224  0909   HEMOGLOBIN g/dL 10.2* 10.3*   HEMATOCRIT % 31.4* 31.1*   PLATELETS AUTO x10*3/uL 178 150   - Patient with history of recurrent DVTs  Plan:  - Continue to monitor with daily CBC and Coag panel  - Consulted vascular medicine  - Obtain bilateral lower extremity DVT ultrasound    INFECTIOUS DISEASE:  #No active issues  Assessment:  Results from last 7 days   Lab Units 24  0428 24  0909   WBC AUTO x10*3/uL 6.4 6.0   - Temp (24hrs), Av.2 °C (97.2 °F), Min:36 °C (96.8 °F), Max:36.6 °C (97.9 °F)  Plan:  - Continue to monitor for s/sx of infection  - Pan culture for temperature > 38.4 C    MUSCULOSKELETAL:  - No acute issues    SKIN:  - No acute issues  - Turns and skin care per NSU protocol    ACCESS:  - PIVs  - Right radial arterial line    PROPHYLAXIS:  - DVT Ppx: SCDs  - GI Ppx: not indicated    RESTRAINTS:  Not indicated/Patient does not meet criteria for restraints    Oseas Palomares APRN-CNP  Neuroscience Intensive Care    Total critical care time of 60 minutes, with > 50% of time spent in direct contact with patient/family for education, counseling and coordination of care.

## 2024-09-22 NOTE — PROGRESS NOTES
Kelly Martinez is a 79 y.o. female on day 1 of admission presenting with Cerebellar hemorrhage (Multi).    78 yo woman with small cerebellar ICH.      Neuro - Drowsy, moves all llmbs. NIHSS score 3.  Repeat angio pending.      Cardiac - -150 mm Hg, on Cardene drip. Continue home antihypertensive meds.      Pulm - No issues, 2 l NC     Renal - CKD, Cr 2.2 (baseline)     GI - Passed swallow, advance diet, bowel regimen     Heme - Hgb 11.3 g/dL     ID - Afebrile     Vasc- SCDs     I spent 30 minutes in the professional and critical care of this patient.      Chilango Scott MD

## 2024-09-22 NOTE — PROGRESS NOTES
Physical Therapy    Physical Therapy Evaluation    Patient Name: Kelly Martinez  MRN: [id number]  Today's Date: 9/22/2024   Room: 06/06  Time Calculation  Start Time: 0857  Stop Time: 0919  Time Calculation (min): 22 min    Assessment/Plan   PT Assessment  PT Assessment Results: Decreased strength, Decreased endurance, Impaired balance, Decreased mobility, Decreased cognition, Decreased coordination, Impaired vision  Rehab Prognosis: Fair  Barriers to Discharge: Medical acuity  Evaluation/Treatment Tolerance: Patient limited by fatigue  End of Session Communication: Bedside nurse  Assessment Comment: Pt to benefit from ongoing PT services to address the above limitations and to prepare pt for timely return to prior level of function.  End of Session Patient Position: Bed, 3 rail up, Alarm on  IP OR SWING BED PT PLAN  Inpatient or Swing Bed: Inpatient  PT Plan  Treatment/Interventions: Bed mobility, Transfer training, Gait training, Balance training, Neuromuscular re-education, Strengthening, Endurance training, Therapeutic exercise, Therapeutic activity, Home exercise program, Positioning, Postural re-education  PT Plan: Ongoing PT  PT Frequency: 5 times per week  PT Discharge Recommendations: Moderate intensity level of continued care  PT Recommended Transfer Status: Total assist  PT - OK to Discharge: Yes (When medically ready)      Subjective   General Visit Information:  Reason for Referral: Pt presented s/p fall and confusion. Found to have cerebellar ICH with surrounding SAH. CTH demonstrated right vermian cerebellar ICH with right cerebellar hemisphere ICH and/or SAH with 4th ventricular effacement. S/p Kcentra. CTA head/neck without aneurysm or vascular malformation. Admitted to NSU for neurological monitoring and hypertension management. 9/22: GCS 14, NIHSS 2. rCTH stable.  Past Medical History Relevant to Rehab: PE/recurrent DVT s/p IVCF, HFpEF, hypertension, hyperlipidemia, JOY, schizophrenia, COPD, and  CKD IV  Co-Treatment: OT  Co-Treatment Reason: AMPAC <10, limited mobility tolerance, requires assist of 2 for safety  Prior to Session Communication: Bedside nurse  Patient Position Received: Bed, 3 rail up, Alarm off, not on at start of session  Family/Caregiver Present: No  Caregiver Feedback: n/a     Home Living:  Home Living  Type of Home: House  Lives With:  (Pt reports living with son, then reports living alone)  Home Layout: Two level    Prior Level of Function:  Prior Function Per Pt/Caregiver Report  Level of [place]: Independent with ADLs and functional transfers, Independent with homemaking with ambulation  ADL Assistance: Independent  Ambulatory Assistance: Independent  Prior Function Comments: (-) drives, reports history of falling but cannot recall frequency or cause    Precautions:  Precautions  Hearing/Visual Limitations: L gaze preference but able to cross midline to R with cues.  Medical Precautions: Fall precautions  Precautions Comment: -150    Vital Signs:  Vital Signs (Past 2hrs)        Date/Time Vitals Session Patient Position Pulse Resp SpO2 BP MAP (mmHg)    09/22/24 0800 --  --  71  18  96 %  --  --     09/22/24 0857 Pre PT  Lying  71  20  92 %  147/58  79     09/22/24 0900 --  --  69  24  94 %  --  --     09/22/24 0907 During PT  Lying  69  27  95 %  --  --     09/22/24 0909 --  --  69  22  95 %  --  --     09/22/24 0919 Post PT  Lying  68  --  95 %  138/60  68                     Objective   Lines/Tubes/Drains:  Arterial Line 09/21/24 Right Radial (Active)   Number of days: 1       Continuous Medications/Drips:  niCARdipine, 15 mg/hr  sodium chloride 0.9%, 75 mL/hr, Last Rate: 75 mL/hr (09/22/24 0848)        Oxygen: 2 L/min via NC     Pain:  Pain Assessment  Pain Assessment:  (Pt reports unrated pain RUE.)  Pain Interventions:  (Repositioning)  Response to Interventions: Pain appears to improve.    Cognition:  Cognition  Overall Cognitive Status: Impaired  Arousal/Alertness:  Delayed responses to stimuli  Orientation Level: Disoriented to place, Disoriented to situation, Disoriented to time  Following Commands: Follows one step commands with repetition (50 to 75% accuracy)  Cognition Comments: Expressive and receptive language deficits.  Insight: Mild  Impulsive: Within functional limits  Processing Speed: Delayed      Extremity/Trunk Assessments:  Strength:                 RLE   RLE : Exceptions to WFL (PROM WFL)  Strength RLE  R Hip Flexion: 2+/5  R Knee Flexion: 2-/5  R Knee Extension: 2-/5  R Ankle Dorsiflexion: 2+/5  R Ankle Plantar Flexion: 2+/5    LLE   LLE : Exceptions to WFL (PROM WFL)  Strength LLE  L Hip Flexion: 2+/5  L Knee Flexion: 2-/5  L Knee Extension: 2-/5  L Ankle Dorsiflexion: 2+/5  L Ankle Plantar Flexion: 2+/5    General Assessments:     General Observation  General Observation: Pt tolerates dependent chair position ~10 min with VSS.   Activity Tolerance  Endurance: Tolerates 10 - 20 min exercise with multiple rests  Early Mobility/Exercise Safety Screen: Proceed with mobilization - No exclusion criteria met  Sensation  Light Touch: No apparent deficits  Coordination  Movements are Fluid and Coordinated: No  Static Sitting Balance  Static Sitting-Comment/Number of Minutes: Deferred sitting up due to elevated SBP with cardene at max dose       Functional Assessments:  Bed Mobility  Bed Mobility: Yes  Bed Mobility 1  Bed Mobility 1: Forward lean  Level of Assistance 1: Minimum assistance (x2)  Bed Mobility Comments 1: HOB elevated  Bed Mobility 2  Bed Mobility  2: Scooting (boosting)  Level of Assistance 2: Dependent, +2  Bed Mobility Comments 2: HOB flat, used draw sheet    Transfers  Transfer: No    Ambulation/Gait Training  Ambulation/Gait Training Performed: No    Stairs  Stairs: No         Outcome Measures:  [facility] Basic Mobility  Turning from your back to your side while in a flat bed without using bedrails: A lot  Moving from lying on your back to sitting on the  side of a flat bed without using bedrails: Total  Moving to and from bed to chair (including a wheelchair): Total  Standing up from a chair using your arms (e.g. wheelchair or bedside chair): Total  To walk in hospital room: Total  Climbing 3-5 steps with railing: Total  Basic Mobility - Total Score: 7                   FSS-ICU  Ambulation: Unable to attempt due to weakness  Rolling: Moderate assistance (performs 50 - 74% of task)  Sitting: Unable to perform  Transfer Sit-to-Stand: Unable to perform  Transfer Supine-to-Sit: Unable to perform  Total Score: 3    ICU Mobility Screen  Early Mobility/Exercise Safety Screen: Proceed with mobilization - No exclusion criteria met  ICU Mobility Scale: Sitting in bed, exercises in bed                        Encounter Problems       Encounter Problems (Active)       PT Problem       Patient will perform bed mobility with </= MAX Ax 1 to reduce risk of developing decubitus ulcers.        Start:  09/22/24    Expected End:  10/06/24            Patient will perform sit to stand and stand to sit transfers with </= MAX A x1 and LRD to increase functional strength.        Start:  09/22/24    Expected End:  10/06/24            Patient will ambulate at least 15 ft. with </= MOD A x2 and LRD to improve tolerance of household distances.        Start:  09/22/24    Expected End:  10/06/24            Patient will participate in therapeutic exercise program with VSS and cues as needed.          Start:  09/22/24    Expected End:  10/06/24            Patient will score at least 42/56 on the Function in Sitting Test to improve sitting balance required for functional tasks.         Start:  09/22/24    Expected End:  10/06/24               Pain - Adult              Education Documentation  Precautions, taught by Melvi Galan, PT at 9/22/2024  9:54 AM.  Learner: Patient  Readiness: Acceptance  Method: Explanation  Response: Verbalizes Understanding, Needs Reinforcement    Body Mechanics,  taught by Melvi Galan PT at 9/22/2024  9:54 AM.  Learner: Patient  Readiness: Acceptance  Method: Explanation  Response: Verbalizes Understanding, Needs Reinforcement    Mobility Training, taught by Melvi Galan PT at 9/22/2024  9:54 AM.  Learner: Patient  Readiness: Acceptance  Method: Explanation  Response: Verbalizes Understanding, Needs Reinforcement    Education Comments  No comments found.            09/22/24 at 9:54 AM   Melvi Galan PT   Rehab Office: [phone]

## 2024-09-22 NOTE — CARE PLAN
The patient's goals for the shift include    Problem: Fall/Injury  Goal: Not fall by end of shift  Outcome: Progressing  Goal: Be free from injury by end of the shift  Outcome: Progressing     Problem: Pain - Adult  Goal: Verbalizes/displays adequate comfort level or baseline comfort level  Outcome: Progressing     Problem: Safety - Adult  Goal: Free from fall injury  Outcome: Progressing     Problem: General Stroke  Goal: Maintain BP within ordered limits throughout shift  Outcome: Progressing       The clinical goals for the shift include pt will maintain SBP<150 throughout shift    Pt did not meet the goal. She required several titrations in her nicardipine drip and one dose of labetalol and one dose of hydralazine.

## 2024-09-22 NOTE — CARE PLAN
Problem: Skin  Goal: Prevent/minimize sheer/friction injuries  Outcome: Progressing  Flowsheets (Taken 9/22/2024 0632)  Prevent/minimize sheer/friction injuries:   Complete micro-shifts as needed if patient unable. Adjust patient position to relieve pressure points, not a full turn   Use pull sheet   HOB 30 degrees or less   Turn/reposition every 2 hours/use positioning/transfer devices  Goal: Promote skin healing  Outcome: Progressing  Flowsheets (Taken 9/22/2024 0632)  Promote skin healing:   Assess skin/pad under line(s)/device(s)   Protective dressings over bony prominences   Turn/reposition every 2 hours/use positioning/transfer devices   Ensure correct size (line/device) and apply per  instructions   Rotate device position/do not position patient on device   The patient's goals for the shift include      The clinical goals for the shift include pt will maintain SBP<150 throughout shift    Se previous note.

## 2024-09-22 NOTE — CONSULTS
SLP Adult Inpatient Speech-Language Pathology Clinical Swallow Evaluation    Patient Name: Kelly Martinez  MRN: [id number]  Today's Date: 9/22/2024   Time Calculation  Start Time: 1240  Stop Time: 1306  Time Calculation (min): 26 min         Current Problem:   1. Cerebellar hemorrhage (Multi)  CANCELED: Vascular US lower extremity venous duplex bilateral    CANCELED: Vascular US lower extremity venous duplex bilateral      2. Cerebrovascular accident (CVA), unspecified mechanism (Multi)  Transthoracic Echo (TTE) Complete    Transthoracic Echo (TTE) Complete      3. Bacteremia  Transthoracic Echo (TTE) Complete    Transthoracic Echo (TTE) Complete      4. Chronic deep vein thrombosis (DVT) of proximal vein of lower extremity, unspecified laterality (Multi)  Vascular US lower extremity venous duplex bilateral    Vascular US lower extremity venous duplex bilateral      5. Pain and swelling of lower extremity  Vascular US lower extremity venous duplex bilateral    Vascular US lower extremity venous duplex bilateral      6. Localized edema  CANCELED: Vascular US lower extremity venous duplex bilateral    CANCELED: Vascular US lower extremity venous duplex bilateral            Recommendations:  Risk for Aspiration: No  Solid Diet Recommendations : Pureed/extremely thick  (IDDSI Level 4)  Liquid Diet Recommendations: Thin (IDDSI Level 0)  Compensatory Swallowing Strategies: Upright 90 degrees as possible for all oral intake, Remain upright for 20-30 minutes after meals, One to one assist with meals, Alternate solids and liquids  Dysphagia Goals: Patient will tolerate recommended diet without observed clinical signs of aspiration      Assessment:  Assessment Results:  (Pt unable to respond to orientation items, appears mildly confused, limited intelligibility of spontaneous speech. Tolerated ice chip trials and 3oz water test without difficulty. Pt is edentulous.)  Prognosis: Good      Plan:  Inpatient/Swing Bed or  Outpatient: Inpatient  SLP Plan:  (ST to follow up for diet tolerance and assess for diet advancement as appropriate.)  SLP Frequency: 2x per week  SLP Discharge Recommendations:  (pending diet tolerate outcomes)  Diet Recommendations: Solid (Puree Level 4)  Solid Consistency: Pureed/extremely thick (IDDSI Level 4)  Liquid Consistency: Thin (IDDSI Level 0)  Discussed Risks/Benefits: Yes, Patient, Caregiver/Family  Patient/Caregiver Agreeable: Yes      Subjective   Pt seated upright for CSE. Pt is edentulous. Daughter present for some of the evaluation. Daughter reports that pt was able to consume regular foods without difficulty at home. Pt alert and cooperative for evaluation, however mildly confused, limited speech intelligibility. Unable to complete orientation items.      General Visit Information:  Patient Class: Inpatient  Date of Onset: 09/22/24  [employer] Diet:  (NPO at time of eval. Pt and caregiver reported that pt eats regular diet at home.)  Current Diet :  (NPO)      Vital Signs:         Objective  Tolerated ice chips and 3oz water test, self fed via straw without difficulty.       Baseline Assessment:  Volitional Cough: Strong  Volitional Swallow: Within Functional Limits      Pain:  Pain Assessment  Pain Assessment: 0-10  0-10 (Numeric) Pain Score: 0 - No pain    Oral/Motor Assessment:  Oral Hygiene:  (Adequate)  Dentition: Edentulous  Oral Motor: Within Functional Limits (oral mechanism grossly WFL. Pt edentulous.)  Facial Symmetry: Within Functional Limits  Lingual ROM: Within Functional Limits  Lingual Strength: Other (Comment)  Lingual Symmetry:  (mild weakness. Grossly WFL.)  Vocal Quality: Within Functional Limits (low vocal intensity, diminised speech intelligibility)      Consistencies Trialed:  Consistencies Trialed: Yes  Consistencies Trialed: Ice Chips, Thin (IDDSI Level 0) - Straw      Clinical Observations:  Patient Positioning: Upright in Bed  Management of Oral Secretions: Adequate  Was  The 3 oz Swallow Protocol Completed: Yes            Outpatient Education:  Adult Outpatient Education  Individual(s) Educated: Patient, Child  Risk and Benefits Discussed with Patient/Caregiver/Other: yes  Patient/Caregiver Demonstrated Understanding: yes (discussed recommendations with daughter who demonstrated understanding.)      Inpatient:  Education Documentation  No documentation found.  Education Comments  No comments found.

## 2024-09-22 NOTE — PROGRESS NOTES
Occupational Therapy    Evaluation    Patient Name: Kelly Martinez  MRN: [id number]  Department: [facility]  Room: 06/06-A  Today's Date: 9/22/2024  Time Calculation  Start Time: 0858  Stop Time: 0919  Time Calculation (min): 21 min    Assessment  IP OT Assessment  OT Assessment: Patient would benefit from OT services to address deficits in ADLs/IADLs, UE ROM/strength, cognition and functional mobility.  Prognosis: Good  Barriers to Discharge: Inaccessible home environment, Decreased caregiver support  End of Session Communication: Bedside nurse  End of Session Patient Position: Bed, 3 rail up, Alarm on  Plan:  Treatment Interventions: ADL retraining, Visual perceptual retraining, Functional transfer training, UE strengthening/ROM, Endurance training, Cognitive reorientation, Patient/family training, Equipment evaluation/education, Neuromuscular reeducation, Compensatory technique education  OT Frequency: 3 times per week  OT Discharge Recommendations: Moderate intensity level of continued care  OT - OK to Discharge: Yes    Subjective   General:  General  Reason for Referral: Pt presented s/p fall and confusion. Found to have cerebellar ICH with surrounding SAH. CTH demonstrated right vermian cerebellar ICH with right cerebellar hemisphere ICH and/or SAH with 4th ventricular effacement. S/p Kcentra. CTA head/neck without aneurysm or vascular malformation. Admitted to NSU for neurological monitoring and hypertension management. 9/22: GCS 14, NIHSS 2. rCTH stable.  Past Medical History Relevant to Rehab: PE/recurrent DVT s/p IVCF, HFpEF, hypertension, hyperlipidemia, JOY, schizophrenia, COPD, and CKD IV  Family/Caregiver Present: No  Co-Treatment: PT  Co-Treatment Reason: AMPAC <10, limited mobility tolerance, requires assist of 2 for safety  Prior to Session Communication: Bedside nurse  Patient Position Received: Bed, 3 rail up, Alarm off, not on at start of session  General Comment: patient agreeable to  OT  Precautions:  Hearing/Visual Limitations: hearing appears grossly WFL  Medical Precautions: Fall precautions  Precautions Comment: -150    Vital Signs:  Pre: /58, HR 69, SpO2 92%  During: SBP in low 150s  Post: /60, HR 68, SpO2 95%       Pain:  Pain Assessment  Pain Assessment: 0-10  0-10 (Numeric) Pain Score:  (patient indicates having (R)UE pain, did not rate)    Objective   Cognition:  Overall Cognitive Status: Impaired  Arousal/Alertness: Delayed responses to stimuli  Orientation Level:  (oriented to self, required increased time and cues for place, not oriented to month/year despite choices)  Following Commands:  (follows one step commands with 50-75% accuracy with increased time and cues)  Attention:  (decreased attention)  Processing Speed: Delayed        Watkins Agitation Sedation Scale  Watkins Agitation Sedation Scale (RASS): Drowsy  Home Living:  Type of Home: House  Lives With:  (reports living with son then reports living alone)  Home Layout: Two level  Home Living Comments: patient is questionable historian and demonstrates difficulty answering questions   Prior Function:  Level of [place]: Independent with ADLs and functional transfers, Independent with homemaking with ambulation  ADL Assistance: Independent  Ambulatory Assistance: Independent  Prior Function Comments: (-) drives; patient is questionable historian     ADL:  Eating Assistance: Stand by  Eating Deficit: Setup, Supervision/safety (anticipated)  Grooming Assistance: Minimal  Bathing Assistance: Moderate  Bathing Deficit:  (anticipated)  UE Dressing Assistance: Moderate  UE Dressing Deficit:  (anticipated)  LE Dressing Assistance: Total  LE Dressing Deficit: Don/doff R sock, Don/doff L sock  Toileting Assistance with Device: Maximal  Toileting Deficit:  (anticipated)  Activity Tolerance:  Early Mobility/Exercise Safety Screen: Proceed with mobilization - No exclusion criteria met  Bed Mobility/Transfers: Bed  Mobility  Bed Mobility: Yes  Bed Mobility 1  Bed Mobility 1: Forward lean  Level of Assistance 1: Minimum assistance, +2, Moderate verbal cues  Bed Mobility Comments 1: HOB elevated, (B) hand hold assist  Bed Mobility 2  Bed Mobility Comments 2: unable to progress to EOB sitting 2/2 patient's SBP sustaining in low 150s throughout most of session and outside BP goal    Transfers  Transfer: No     Vision: Vision - Basic Assessment  Current Vision:  (patient indicates wearing reading glasses; (L) gaze preference, does not track to command however with look to (R) of midline with increased cues)  Sensation:  Light Touch: No apparent deficits  Strength:  Strength Comments: (B) shoulders 3-/5, (B) elbows >/= 3/5, (B)  3+/5  Perception:  Inattention/Neglect:  (cues to attend to (R) side visual field and (R) side of body)     Extremities: RUE   RUE :  (AROM shoulder 0-90 degrees, AA/PROM WFL, distal joints AROM grossly WFL) and LUE   LUE:  (AROM shoulder 0-90 degrees, AA/PROM WFL, distal joints AROM grossly WFL)    Outcome Measures: [facility] Daily Activity  Putting on and taking off regular lower body clothing: Total  Bathing (including washing, rinsing, drying): A lot  Putting on and taking off regular upper body clothing: A lot  Toileting, which includes using toilet, bedpan or urinal: A lot  Taking care of personal grooming such as brushing teeth: A little  Eating Meals: A little  Daily Activity - Total Score: 13    , Confusion Assessment Method-ICU (CAM-ICU)  Feature 1: Acute Onset or Fluctuating Course: Positive  Feature 2: Inattention: Positive  Feature 3: Altered Level of Consciousness: Positive  Feature 4: Disorganized Thinking: Positive  Overall CAM-ICU: Positive  , and E = Exercise and Early Mobility  Early Mobility/Exercise Safety Screen: Proceed with mobilization - No exclusion criteria met  ICU Mobility Scale: Sitting in bed, exercises in bed  Education Documentation  Body Mechanics, taught by Nellie GLORIA  BISI Trent at 9/22/2024 12:17 PM.  Learner: Patient  Readiness: Acceptance  Method: Explanation  Response: Needs Reinforcement    ADL Training, taught by Nellie Trent OT at 9/22/2024 12:17 PM.  Learner: Patient  Readiness: Acceptance  Method: Explanation  Response: Needs Reinforcement    Education Comments  No comments found.      Goals:   Encounter Problems       Encounter Problems (Active)       ADLs       Patient with complete upper body dressing with stand by assist level of assistance donning and doffing all UE clothes with PRN adaptive equipment. (Progressing)       Start:  09/22/24    Expected End:  10/06/24            Patient with complete lower body dressing with minimal assist  level of assistance donning and doffing all LE clothes  with PRN adaptive equipment. (Progressing)       Start:  09/22/24    Expected End:  10/06/24            Patient will feed self with independent level of assistance using PRN adaptive equipment. (Progressing)       Start:  09/22/24    Expected End:  10/06/24            Patient will complete daily grooming tasks with supervision level of assistance and PRN adaptive equipment. (Progressing)       Start:  09/22/24    Expected End:  10/06/24            Patient will complete toileting including hygiene clothing management/hygiene with minimal assist  level of assistance. (Progressing)       Start:  09/22/24    Expected End:  10/06/24               BALANCE       Pt will maintain dynamic sitting balance during ADL task with stand by assist level of assistance in order to demonstrate decreased risk of falling and improved postural control. (Progressing)       Start:  09/22/24    Expected End:  10/06/24               COGNITION/SAFETY       Patient will follow 100% Simple commands to allow improved ADL performance. (Progressing)       Start:  09/22/24    Expected End:  10/06/24            Patient will demonstrated orientation x 3 and CAM (-). (Progressing)       Start:  09/22/24     Expected End:  10/06/24       ORIENTATION            EXERCISE/STRENGTHENING       Patient will complete BUE exercises in order to improve strength and activity tolerance for ADL performance.  (Progressing)       Start:  09/22/24    Expected End:  10/06/24               MOBILITY       Patient will perform Functional mobility Household distances/Community Distances with contact guard assist level of assistance and least restrictive device in order to improve safety and functional mobility. (Progressing)       Start:  09/22/24    Expected End:  10/06/24               TRANSFERS       Patient will perform bed mobility stand by assist level of assistance in order to improve safety and independence with mobility (Progressing)       Start:  09/22/24    Expected End:  10/06/24            Patient will complete functional transfers with least restrictive device with contact guard assist level of assistance. (Progressing)       Start:  09/22/24    Expected End:  10/06/24               VISION       Patient will visually attend to Right side of Tray, Room, Paper, and Body using compensatory strategies and  supervision level of assistance.  (Progressing)       Start:  09/22/24    Expected End:  10/06/24               Nellie Trent OTR/L  Inpatient Occupational Therapist   Rehab Office: [phone]

## 2024-09-22 NOTE — PROGRESS NOTES
"Kelly Martinez is a 79 y.o. female on day 1 of admission presenting with Cerebellar hemorrhage (Multi).    Subjective   NAEO       Objective     Physical Exam  awake  Ox1-2, confused  RUE D3 (chronic) o/w 5/5    Last Recorded Vitals  Blood pressure 116/60, pulse 70, temperature 36 °C (96.8 °F), resp. rate 16, height 1.702 m (5' 7\"), weight 101 kg (222 lb 10.6 oz), SpO2 97%.  Intake/Output last 3 Shifts:  I/O last 3 completed shifts:  In: 813.8 (8.1 mL/kg) [I.V.:813.8 (8.1 mL/kg)]  Out: - (0 mL/kg)   Weight: 101 kg     Relevant Results            This patient currently has cardiac telemetry ordered; if you would like to modify or discontinue the telemetry order, click here to go to the orders activity to modify/discontinue the order.      Results for orders placed or performed during the hospital encounter of 09/21/24 (from the past 24 hour(s))   POCT GLUCOSE   Result Value Ref Range    POCT Glucose 102 (H) 74 - 99 mg/dL   Coagulation Screen   Result Value Ref Range    Protime 13.9 (H) 9.8 - 12.8 seconds    INR 1.2 (H) 0.9 - 1.1    aPTT 31 27 - 38 seconds   POCT GLUCOSE   Result Value Ref Range    POCT Glucose 139 (H) 74 - 99 mg/dL   CBC and Auto Differential   Result Value Ref Range    WBC 6.0 4.4 - 11.3 x10*3/uL    nRBC 0.0 0.0 - 0.0 /100 WBCs    RBC 3.41 (L) 4.00 - 5.20 x10*6/uL    Hemoglobin 10.3 (L) 12.0 - 16.0 g/dL    Hematocrit 31.1 (L) 36.0 - 46.0 %    MCV 91 80 - 100 fL    MCH 30.2 26.0 - 34.0 pg    MCHC 33.1 32.0 - 36.0 g/dL    RDW 14.7 (H) 11.5 - 14.5 %    Platelets 150 150 - 450 x10*3/uL    Neutrophils % 78.2 40.0 - 80.0 %    Immature Granulocytes %, Automated 0.3 0.0 - 0.9 %    Lymphocytes % 15.8 13.0 - 44.0 %    Monocytes % 4.2 2.0 - 10.0 %    Eosinophils % 1.0 0.0 - 6.0 %    Basophils % 0.5 0.0 - 2.0 %    Neutrophils Absolute 4.65 1.60 - 5.50 x10*3/uL    Immature Granulocytes Absolute, Automated 0.02 0.00 - 0.50 x10*3/uL    Lymphocytes Absolute 0.94 0.80 - 3.00 x10*3/uL    Monocytes Absolute 0.25 0.05 " - 0.80 x10*3/uL    Eosinophils Absolute 0.06 0.00 - 0.40 x10*3/uL    Basophils Absolute 0.03 0.00 - 0.10 x10*3/uL   Renal Function Panel   Result Value Ref Range    Glucose 134 (H) 74 - 99 mg/dL    Sodium 140 136 - 145 mmol/L    Potassium 3.8 3.5 - 5.3 mmol/L    Chloride 111 (H) 98 - 107 mmol/L    Bicarbonate 21 21 - 32 mmol/L    Anion Gap 12 10 - 20 mmol/L    Urea Nitrogen 26 (H) 6 - 23 mg/dL    Creatinine 2.06 (H) 0.50 - 1.05 mg/dL    eGFR 24 (L) >60 mL/min/1.73m*2    Calcium 9.2 8.6 - 10.6 mg/dL    Phosphorus 2.3 (L) 2.5 - 4.9 mg/dL    Albumin 3.6 3.4 - 5.0 g/dL   Magnesium   Result Value Ref Range    Magnesium 1.65 1.60 - 2.40 mg/dL   Calcium, Ionized   Result Value Ref Range    POCT Calcium, Ionized 1.13 1.1 - 1.33 mmol/L   Coagulation Screen   Result Value Ref Range    Protime 15.9 (H) 9.8 - 12.8 seconds    INR 1.4 (H) 0.9 - 1.1    aPTT 37 27 - 38 seconds   Light Blue Top   Result Value Ref Range    Extra Tube Hold for add-ons.    Lavender Top   Result Value Ref Range    Extra Tube Hold for add-ons.    POCT GLUCOSE   Result Value Ref Range    POCT Glucose 129 (H) 74 - 99 mg/dL   Transthoracic Echo (TTE) Complete   Result Value Ref Range    AV pk dorita 1.41 m/s    LVOT diam 2.00 cm    MV E/A ratio 0.94     LA vol index A/L 36.9 ml/m2    Tricuspid annular plane systolic excursion 2.9 cm    LV EF 74 %    RV free wall pk S' 13.00 cm/s    LVIDd 3.99 cm    RVSP 34.1 mmHg    Aortic Valve Area by Continuity of Peak Velocity 2.72 cm2    AV pk grad 7.9 mmHg    LV A4C EF 71.2    POCT GLUCOSE   Result Value Ref Range    POCT Glucose 141 (H) 74 - 99 mg/dL   POCT GLUCOSE   Result Value Ref Range    POCT Glucose 122 (H) 74 - 99 mg/dL   POCT GLUCOSE   Result Value Ref Range    POCT Glucose 137 (H) 74 - 99 mg/dL   POCT GLUCOSE   Result Value Ref Range    POCT Glucose 129 (H) 74 - 99 mg/dL   Calcium, Ionized   Result Value Ref Range    POCT Calcium, Ionized 1.17 1.1 - 1.33 mmol/L                Assessment/Plan   Assessment &  Plan  Cerebellar hemorrhage (Multi)    79 years old female with h/o parkinson's, HTN, HLD, HFpEF, 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 (s/p IVC filter removed 4/2016), JOY (CPAP at night), COPD, CKD stage 4, chronic tobacco use, schizophrenia, depression, p/w AMS, hypertensive 's, recent fall 2days ago, CTH R cerebellar and cerebellar vermis hemorrhage with 4th ventricular effacement, stable vents, CT C spine C5-7 ACDF, CTA c/f L PICA dAVM, s/p kcentra, TTE 74% EF, no wma, rCTH stable ICH, c/f L PICA dAVM, 9/22 MRI/V motion degraded, no obvious underlying mass or vascular lesion       Plan  [facility]   EVD watch  angio Mon 9/23 (ordered, NPO at [facility])  -150  ASA/warfarin restart plan  stroke rec   vasc med recs  PTOT    Kourtney Callahan MD

## 2024-09-23 ENCOUNTER — APPOINTMENT (OUTPATIENT)
Dept: RADIOLOGY | Facility: HOSPITAL | Age: 79
End: 2024-09-23
Payer: COMMERCIAL

## 2024-09-23 ENCOUNTER — APPOINTMENT (OUTPATIENT)
Dept: VASCULAR MEDICINE | Facility: HOSPITAL | Age: 79
End: 2024-09-23
Payer: COMMERCIAL

## 2024-09-23 VITALS
HEIGHT: 67 IN | WEIGHT: 239.86 LBS | TEMPERATURE: 96.3 F | DIASTOLIC BLOOD PRESSURE: 60 MMHG | BODY MASS INDEX: 37.65 KG/M2 | HEART RATE: 61 BPM | RESPIRATION RATE: 20 BRPM | OXYGEN SATURATION: 90 % | SYSTOLIC BLOOD PRESSURE: 138 MMHG

## 2024-09-23 LAB
ALBUMIN SERPL BCP-MCNC: 3.1 G/DL (ref 3.4–5)
ALBUMIN SERPL BCP-MCNC: 3.1 G/DL (ref 3.4–5)
ANION GAP SERPL CALC-SCNC: 14 MMOL/L (ref 10–20)
ANION GAP SERPL CALC-SCNC: 14 MMOL/L (ref 10–20)
BASOPHILS # BLD AUTO: 0 X10*3/UL (ref 0–0.1)
BASOPHILS NFR BLD AUTO: 0 %
BUN SERPL-MCNC: 42 MG/DL (ref 6–23)
BUN SERPL-MCNC: 44 MG/DL (ref 6–23)
CA-I BLD-SCNC: 1.17 MMOL/L (ref 1.1–1.33)
CALCIUM SERPL-MCNC: 8.4 MG/DL (ref 8.6–10.6)
CALCIUM SERPL-MCNC: 8.5 MG/DL (ref 8.6–10.6)
CHLORIDE SERPL-SCNC: 116 MMOL/L (ref 98–107)
CHLORIDE SERPL-SCNC: 117 MMOL/L (ref 98–107)
CO2 SERPL-SCNC: 15 MMOL/L (ref 21–32)
CO2 SERPL-SCNC: 16 MMOL/L (ref 21–32)
CREAT SERPL-MCNC: 2.9 MG/DL (ref 0.5–1.05)
CREAT SERPL-MCNC: 2.91 MG/DL (ref 0.5–1.05)
EGFRCR SERPLBLD CKD-EPI 2021: 16 ML/MIN/1.73M*2
EGFRCR SERPLBLD CKD-EPI 2021: 16 ML/MIN/1.73M*2
EOSINOPHIL # BLD AUTO: 0 X10*3/UL (ref 0–0.4)
EOSINOPHIL NFR BLD AUTO: 0 %
ERYTHROCYTE [DISTWIDTH] IN BLOOD BY AUTOMATED COUNT: 15 % (ref 11.5–14.5)
GLUCOSE BLD MANUAL STRIP-MCNC: 102 MG/DL (ref 74–99)
GLUCOSE BLD MANUAL STRIP-MCNC: 114 MG/DL (ref 74–99)
GLUCOSE BLD MANUAL STRIP-MCNC: 119 MG/DL (ref 74–99)
GLUCOSE BLD MANUAL STRIP-MCNC: 124 MG/DL (ref 74–99)
GLUCOSE BLD MANUAL STRIP-MCNC: 135 MG/DL (ref 74–99)
GLUCOSE SERPL-MCNC: 112 MG/DL (ref 74–99)
GLUCOSE SERPL-MCNC: 123 MG/DL (ref 74–99)
HCT VFR BLD AUTO: 30.9 % (ref 36–46)
HGB BLD-MCNC: 10.6 G/DL (ref 12–16)
IMM GRANULOCYTES # BLD AUTO: 0.05 X10*3/UL (ref 0–0.5)
IMM GRANULOCYTES NFR BLD AUTO: 0.4 % (ref 0–0.9)
LYMPHOCYTES # BLD AUTO: 0.59 X10*3/UL (ref 0.8–3)
LYMPHOCYTES NFR BLD AUTO: 5.1 %
MAGNESIUM SERPL-MCNC: 1.7 MG/DL (ref 1.6–2.4)
MCH RBC QN AUTO: 29.6 PG (ref 26–34)
MCHC RBC AUTO-ENTMCNC: 34.3 G/DL (ref 32–36)
MCV RBC AUTO: 86 FL (ref 80–100)
MONOCYTES # BLD AUTO: 0.25 X10*3/UL (ref 0.05–0.8)
MONOCYTES NFR BLD AUTO: 2.2 %
NEUTROPHILS # BLD AUTO: 10.73 X10*3/UL (ref 1.6–5.5)
NEUTROPHILS NFR BLD AUTO: 92.3 %
NRBC BLD-RTO: 0 /100 WBCS (ref 0–0)
PHOSPHATE SERPL-MCNC: 3.4 MG/DL (ref 2.5–4.9)
PHOSPHATE SERPL-MCNC: 4.5 MG/DL (ref 2.5–4.9)
PLATELET # BLD AUTO: 189 X10*3/UL (ref 150–450)
POTASSIUM SERPL-SCNC: 4.1 MMOL/L (ref 3.5–5.3)
POTASSIUM SERPL-SCNC: 4.3 MMOL/L (ref 3.5–5.3)
RBC # BLD AUTO: 3.58 X10*6/UL (ref 4–5.2)
SODIUM SERPL-SCNC: 141 MMOL/L (ref 136–145)
SODIUM SERPL-SCNC: 143 MMOL/L (ref 136–145)
WBC # BLD AUTO: 11.6 X10*3/UL (ref 4.4–11.3)

## 2024-09-23 PROCEDURE — 99233 SBSQ HOSP IP/OBS HIGH 50: CPT

## 2024-09-23 PROCEDURE — 99153 MOD SED SAME PHYS/QHP EA: CPT | Mod: BILATERAL PROCEDURE | Performed by: RADIOLOGY

## 2024-09-23 PROCEDURE — 83735 ASSAY OF MAGNESIUM: CPT

## 2024-09-23 PROCEDURE — 36222 PLACE CATH CAROTID/INOM ART: CPT | Mod: LT | Performed by: RADIOLOGY

## 2024-09-23 PROCEDURE — 2500000001 HC RX 250 WO HCPCS SELF ADMINISTERED DRUGS (ALT 637 FOR MEDICARE OP): Performed by: REGISTERED NURSE

## 2024-09-23 PROCEDURE — 94640 AIRWAY INHALATION TREATMENT: CPT

## 2024-09-23 PROCEDURE — 99152 MOD SED SAME PHYS/QHP 5/>YRS: CPT | Mod: BILATERAL PROCEDURE | Performed by: RADIOLOGY

## 2024-09-23 PROCEDURE — 2500000002 HC RX 250 W HCPCS SELF ADMINISTERED DRUGS (ALT 637 FOR MEDICARE OP, ALT 636 FOR OP/ED)

## 2024-09-23 PROCEDURE — C1760 CLOSURE DEV, VASC: HCPCS | Performed by: STUDENT IN AN ORGANIZED HEALTH CARE EDUCATION/TRAINING PROGRAM

## 2024-09-23 PROCEDURE — 2720000007 HC OR 272 NO HCPCS: Performed by: STUDENT IN AN ORGANIZED HEALTH CARE EDUCATION/TRAINING PROGRAM

## 2024-09-23 PROCEDURE — 37799 UNLISTED PX VASCULAR SURGERY: CPT

## 2024-09-23 PROCEDURE — 2500000005 HC RX 250 GENERAL PHARMACY W/O HCPCS

## 2024-09-23 PROCEDURE — 99291 CRITICAL CARE FIRST HOUR: CPT

## 2024-09-23 PROCEDURE — 36226 PLACE CATH VERTEBRAL ART: CPT | Mod: BILATERAL PROCEDURE | Performed by: RADIOLOGY

## 2024-09-23 PROCEDURE — 2500000004 HC RX 250 GENERAL PHARMACY W/ HCPCS (ALT 636 FOR OP/ED)

## 2024-09-23 PROCEDURE — B3121ZZ FLUOROSCOPY OF LEFT SUBCLAVIAN ARTERY USING LOW OSMOLAR CONTRAST: ICD-10-PCS | Performed by: RADIOLOGY

## 2024-09-23 PROCEDURE — 36226 PLACE CATH VERTEBRAL ART: CPT | Performed by: RADIOLOGY

## 2024-09-23 PROCEDURE — B31G1ZZ FLUOROSCOPY OF BILATERAL VERTEBRAL ARTERIES USING LOW OSMOLAR CONTRAST: ICD-10-PCS | Performed by: RADIOLOGY

## 2024-09-23 PROCEDURE — 80069 RENAL FUNCTION PANEL: CPT

## 2024-09-23 PROCEDURE — 94762 N-INVAS EAR/PLS OXIMTRY CONT: CPT

## 2024-09-23 PROCEDURE — 51701 INSERT BLADDER CATHETER: CPT

## 2024-09-23 PROCEDURE — 2500000004 HC RX 250 GENERAL PHARMACY W/ HCPCS (ALT 636 FOR OP/ED): Performed by: RADIOLOGY

## 2024-09-23 PROCEDURE — 2780000003 HC OR 278 NO HCPCS: Performed by: STUDENT IN AN ORGANIZED HEALTH CARE EDUCATION/TRAINING PROGRAM

## 2024-09-23 PROCEDURE — 2500000004 HC RX 250 GENERAL PHARMACY W/ HCPCS (ALT 636 FOR OP/ED): Performed by: REGISTERED NURSE

## 2024-09-23 PROCEDURE — 36225 PLACE CATH SUBCLAVIAN ART: CPT | Performed by: RADIOLOGY

## 2024-09-23 PROCEDURE — 82330 ASSAY OF CALCIUM: CPT

## 2024-09-23 PROCEDURE — 2500000001 HC RX 250 WO HCPCS SELF ADMINISTERED DRUGS (ALT 637 FOR MEDICARE OP)

## 2024-09-23 PROCEDURE — 99222 1ST HOSP IP/OBS MODERATE 55: CPT | Performed by: INTERNAL MEDICINE

## 2024-09-23 PROCEDURE — 82947 ASSAY GLUCOSE BLOOD QUANT: CPT

## 2024-09-23 PROCEDURE — 36224 PLACE CATH CAROTD ART: CPT | Performed by: RADIOLOGY

## 2024-09-23 PROCEDURE — 93970 EXTREMITY STUDY: CPT

## 2024-09-23 PROCEDURE — 36225 PLACE CATH SUBCLAVIAN ART: CPT | Mod: BILATERAL PROCEDURE | Performed by: RADIOLOGY

## 2024-09-23 PROCEDURE — 99231 SBSQ HOSP IP/OBS SF/LOW 25: CPT | Performed by: NEUROLOGICAL SURGERY

## 2024-09-23 PROCEDURE — C1769 GUIDE WIRE: HCPCS | Performed by: STUDENT IN AN ORGANIZED HEALTH CARE EDUCATION/TRAINING PROGRAM

## 2024-09-23 PROCEDURE — 36223 PLACE CATH CAROTID/INOM ART: CPT | Mod: BILATERAL PROCEDURE | Performed by: RADIOLOGY

## 2024-09-23 PROCEDURE — B3151ZZ FLUOROSCOPY OF BILATERAL COMMON CAROTID ARTERIES USING LOW OSMOLAR CONTRAST: ICD-10-PCS | Performed by: RADIOLOGY

## 2024-09-23 PROCEDURE — 81001 URINALYSIS AUTO W/SCOPE: CPT

## 2024-09-23 PROCEDURE — 85025 COMPLETE CBC W/AUTO DIFF WBC: CPT

## 2024-09-23 PROCEDURE — 93970 EXTREMITY STUDY: CPT | Performed by: SURGERY

## 2024-09-23 PROCEDURE — 84100 ASSAY OF PHOSPHORUS: CPT

## 2024-09-23 PROCEDURE — 2020000001 HC ICU ROOM DAILY

## 2024-09-23 RX ORDER — DULOXETIN HYDROCHLORIDE 30 MG/1
30 CAPSULE, DELAYED RELEASE ORAL DAILY
Status: ON HOLD | COMMUNITY
End: 2024-10-03

## 2024-09-23 RX ORDER — MIDAZOLAM HYDROCHLORIDE 1 MG/ML
INJECTION INTRAMUSCULAR; INTRAVENOUS
Status: COMPLETED | OUTPATIENT
Start: 2024-09-23 | End: 2024-09-23

## 2024-09-23 RX ORDER — BUPROPION HYDROCHLORIDE 150 MG/1
150 TABLET ORAL DAILY
COMMUNITY

## 2024-09-23 RX ORDER — HYDRALAZINE HYDROCHLORIDE 20 MG/ML
INJECTION INTRAMUSCULAR; INTRAVENOUS
Status: COMPLETED
Start: 2024-09-23 | End: 2024-09-23

## 2024-09-23 RX ORDER — DEXMEDETOMIDINE HYDROCHLORIDE 4 UG/ML
INJECTION, SOLUTION INTRAVENOUS
Status: COMPLETED
Start: 2024-09-23 | End: 2024-09-23

## 2024-09-23 RX ORDER — DEXMEDETOMIDINE HYDROCHLORIDE 4 UG/ML
.1-1 INJECTION, SOLUTION INTRAVENOUS CONTINUOUS
Status: DISCONTINUED | OUTPATIENT
Start: 2024-09-23 | End: 2024-09-24

## 2024-09-23 RX ORDER — MAGNESIUM SULFATE HEPTAHYDRATE 40 MG/ML
2 INJECTION, SOLUTION INTRAVENOUS ONCE
Status: COMPLETED | OUTPATIENT
Start: 2024-09-23 | End: 2024-09-23

## 2024-09-23 RX ORDER — HYDRALAZINE HYDROCHLORIDE 20 MG/ML
10 INJECTION INTRAMUSCULAR; INTRAVENOUS EVERY 10 MIN PRN
Status: DISCONTINUED | OUTPATIENT
Start: 2024-09-23 | End: 2024-09-23 | Stop reason: DRUGHIGH

## 2024-09-23 RX ORDER — TERAZOSIN 2 MG/1
2 CAPSULE ORAL DAILY
COMMUNITY

## 2024-09-23 RX ORDER — NAPROXEN SODIUM 220 MG
220 TABLET ORAL EVERY 12 HOURS PRN
COMMUNITY
End: 2024-10-04 | Stop reason: HOSPADM

## 2024-09-23 RX ORDER — HYDRALAZINE HYDROCHLORIDE 50 MG/1
50 TABLET, FILM COATED ORAL 3 TIMES DAILY
Status: DISCONTINUED | OUTPATIENT
Start: 2024-09-23 | End: 2024-09-26

## 2024-09-23 RX ORDER — LORAZEPAM 2 MG/ML
INJECTION INTRAMUSCULAR
Status: COMPLETED
Start: 2024-09-23 | End: 2024-09-23

## 2024-09-23 RX ORDER — DULOXETIN HYDROCHLORIDE 30 MG/1
30 CAPSULE, DELAYED RELEASE ORAL DAILY
Status: DISCONTINUED | OUTPATIENT
Start: 2024-09-23 | End: 2024-10-04 | Stop reason: HOSPADM

## 2024-09-23 RX ORDER — TERAZOSIN 2 MG/1
2 CAPSULE ORAL DAILY
Status: DISCONTINUED | OUTPATIENT
Start: 2024-09-23 | End: 2024-09-27

## 2024-09-23 RX ORDER — HEPARIN SODIUM 5000 [USP'U]/ML
5000 INJECTION, SOLUTION INTRAVENOUS; SUBCUTANEOUS EVERY 8 HOURS
Status: DISCONTINUED | OUTPATIENT
Start: 2024-09-23 | End: 2024-10-04 | Stop reason: HOSPADM

## 2024-09-23 RX ORDER — HYDRALAZINE HYDROCHLORIDE 20 MG/ML
10 INJECTION INTRAMUSCULAR; INTRAVENOUS
Status: DISCONTINUED | OUTPATIENT
Start: 2024-09-23 | End: 2024-10-01

## 2024-09-23 RX ORDER — HYDRALAZINE HYDROCHLORIDE 20 MG/ML
10 INJECTION INTRAMUSCULAR; INTRAVENOUS ONCE
Status: COMPLETED | OUTPATIENT
Start: 2024-09-23 | End: 2024-09-23

## 2024-09-23 RX ORDER — FENTANYL CITRATE 50 UG/ML
INJECTION, SOLUTION INTRAMUSCULAR; INTRAVENOUS
Status: COMPLETED | OUTPATIENT
Start: 2024-09-23 | End: 2024-09-23

## 2024-09-23 RX ORDER — BUPROPION HYDROCHLORIDE 150 MG/1
150 TABLET ORAL DAILY
Status: DISCONTINUED | OUTPATIENT
Start: 2024-09-23 | End: 2024-10-04 | Stop reason: HOSPADM

## 2024-09-23 RX ORDER — LORAZEPAM 2 MG/ML
0.5 INJECTION INTRAMUSCULAR ONCE
Status: COMPLETED | OUTPATIENT
Start: 2024-09-23 | End: 2024-09-23

## 2024-09-23 SDOH — SOCIAL STABILITY: SOCIAL NETWORK
DO YOU BELONG TO ANY CLUBS OR ORGANIZATIONS SUCH AS CHURCH GROUPS UNIONS, FRATERNAL OR ATHLETIC GROUPS, OR SCHOOL GROUPS?: NO

## 2024-09-23 SDOH — HEALTH STABILITY: MENTAL HEALTH
HOW OFTEN DO YOU NEED TO HAVE SOMEONE HELP YOU WHEN YOU READ INSTRUCTIONS, PAMPHLETS, OR OTHER WRITTEN MATERIAL FROM YOUR DOCTOR OR PHARMACY?: RARELY

## 2024-09-23 SDOH — SOCIAL STABILITY: SOCIAL NETWORK: HOW OFTEN DO YOU ATTENT MEETINGS OF THE CLUB OR ORGANIZATION YOU BELONG TO?: NEVER

## 2024-09-23 SDOH — HEALTH STABILITY: MENTAL HEALTH: HOW OFTEN DO YOU HAVE 6 OR MORE DRINKS ON ONE OCCASION?: NEVER

## 2024-09-23 SDOH — SOCIAL STABILITY: SOCIAL INSECURITY: WITHIN THE LAST YEAR, HAVE YOU BEEN AFRAID OF YOUR PARTNER OR EX-PARTNER?: NO

## 2024-09-23 SDOH — HEALTH STABILITY: MENTAL HEALTH: HOW OFTEN DO YOU HAVE A DRINK CONTAINING ALCOHOL?: NEVER

## 2024-09-23 SDOH — SOCIAL STABILITY: SOCIAL NETWORK: IN A TYPICAL WEEK, HOW MANY TIMES DO YOU TALK ON THE PHONE WITH FAMILY, FRIENDS, OR NEIGHBORS?: THREE TIMES A WEEK

## 2024-09-23 SDOH — HEALTH STABILITY: PHYSICAL HEALTH: ON AVERAGE, HOW MANY MINUTES DO YOU ENGAGE IN EXERCISE AT THIS LEVEL?: 0 MIN

## 2024-09-23 SDOH — SOCIAL STABILITY: SOCIAL INSECURITY: WITHIN THE LAST YEAR, HAVE YOU BEEN HUMILIATED OR EMOTIONALLY ABUSED IN OTHER WAYS BY YOUR PARTNER OR EX-PARTNER?: NO

## 2024-09-23 SDOH — SOCIAL STABILITY: SOCIAL INSECURITY
WITHIN THE LAST YEAR, HAVE TO BEEN RAPED OR FORCED TO HAVE ANY KIND OF SEXUAL ACTIVITY BY YOUR PARTNER OR EX-PARTNER?: NO

## 2024-09-23 SDOH — SOCIAL STABILITY: SOCIAL NETWORK: HOW OFTEN DO YOU GET TOGETHER WITH FRIENDS OR RELATIVES?: THREE TIMES A WEEK

## 2024-09-23 SDOH — HEALTH STABILITY: MENTAL HEALTH: HOW MANY STANDARD DRINKS CONTAINING ALCOHOL DO YOU HAVE ON A TYPICAL DAY?: PATIENT DOES NOT DRINK

## 2024-09-23 SDOH — ECONOMIC STABILITY: INCOME INSECURITY: IN THE PAST 12 MONTHS, HAS THE ELECTRIC, GAS, OIL, OR WATER COMPANY THREATENED TO SHUT OFF SERVICE IN YOUR HOME?: NO

## 2024-09-23 SDOH — SOCIAL STABILITY: SOCIAL INSECURITY
WITHIN THE LAST YEAR, HAVE YOU BEEN KICKED, HIT, SLAPPED, OR OTHERWISE PHYSICALLY HURT BY YOUR PARTNER OR EX-PARTNER?: NO

## 2024-09-23 SDOH — HEALTH STABILITY: PHYSICAL HEALTH: ON AVERAGE, HOW MANY DAYS PER WEEK DO YOU ENGAGE IN MODERATE TO STRENUOUS EXERCISE (LIKE A BRISK WALK)?: 0 DAYS

## 2024-09-23 SDOH — SOCIAL STABILITY: SOCIAL NETWORK: ARE YOU MARRIED, WIDOWED, DIVORCED, SEPARATED, NEVER MARRIED, OR LIVING WITH A PARTNER?: WIDOWED

## 2024-09-23 ASSESSMENT — PAIN - FUNCTIONAL ASSESSMENT
PAIN_FUNCTIONAL_ASSESSMENT: 0-10

## 2024-09-23 ASSESSMENT — PAIN SCALES - GENERAL
PAINLEVEL_OUTOF10: 0 - NO PAIN
PAINLEVEL_OUTOF10: 3
PAINLEVEL_OUTOF10: 0 - NO PAIN

## 2024-09-23 ASSESSMENT — LIFESTYLE VARIABLES
SKIP TO QUESTIONS 9-10: 1
AUDIT-C TOTAL SCORE: 0

## 2024-09-23 ASSESSMENT — PAIN DESCRIPTION - DESCRIPTORS: DESCRIPTORS: ACHING

## 2024-09-23 ASSESSMENT — ACTIVITIES OF DAILY LIVING (ADL): LACK_OF_TRANSPORTATION: NO

## 2024-09-23 NOTE — PROGRESS NOTES
[facility]  NEUROSCIENCE INTENSIVE CARE UNIT  DAILY PROGRESS NOTE       Patient Name: Kelly Martinez   MRN: [id number]     Admit Date: 2024     : 1945 AGE: 79 y.o. GENDER: female      Subjective    79 year-old female with past medical history of PE/recurrent DVT s/p IVCF, HFpEF, hypertension, hyperlipidemia, JOY, schizophrenia, COPD, and CKD IV who presented to [facility] with cerebellar ICH with surrounding SAH. Admitted 2024 with cerebellar hemorrhage (Multi) after presenting s/p fall and confusion. CTH demonstrated right vermian cerebellar ICH with right cerebellar hemisphere ICH and/or SAH with 4th ventricular effacement. S/p Kcentra. CTA head/neck without aneurysm or vascular malformation. Admitted to NSU for neurological monitoring and hypertension management on Cardene infusion. Neurology consulted for co-management.     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  - : MRI/MRV motion degraded, no obvious underlying mass or vascular lesion     Interval Events: Off Cardene, SBP 120s - 140s    Objective   VITALS:  Temp:  [35.6 °C (96.1 °F)-36.7 °C (98.1 °F)] 36.7 °C (98.1 °F)  Heart Rate:  [56-71] 65  Resp:  [16-30] 16  BP: (138-147)/(58-60) 138/60  Arterial Line BP 1: (122-157)/(54-72) 134/57  INTAKE/OUTPUT:  Intake/Output Summary (Last 24 hours) at 2024 0824  Last data filed at 2024 0700  Gross per 24 hour   Intake 2318.76 ml   Output 750 ml   Net 1568.76 ml         PHYSICAL EXAM:  NEURO:  - Awake, oriented x1-2, confused, mild dysarthria w/ expressive aphasia  - EOS, L-gaze preference, doesn't cross ML, pupils 3mm/reactive bilaterally,   - follows commands, WATKINS 5/5 strength, SILT  CV:  - RRR on telemetry, NSR  - Right radial arterial line in place  RESP:  - Regular, unlabored  - Oxygen: RA  :  - Voids  GI:  - Abdomen NT/ND, soft  SKIN:  - Intact    MEDICATIONS:  Scheduled: PRN: Continuous:   atorvastatin, 40 mg, oral, Daily  carbidopa-levodopa, 1  tablet, oral, TID  carvedilol, 25 mg, oral, BID  folic acid, 1 mg, oral, Daily  hydrALAZINE, 50 mg, oral, TID  hydroCHLOROthiazide, 12.5 mg, oral, Daily  insulin lispro, 0-5 Units, subcutaneous, q4h  isosorbide mononitrate ER, 30 mg, oral, Daily  lisinopril, 40 mg, oral, Daily  mometasone, 2 puff, inhalation, BID  perflutren protein A microsphere, 0.5 mL, intravenous, Once in imaging  polyethylene glycol, 17 g, oral, Daily  QUEtiapine, 300 mg, oral, Nightly  sulfur hexafluoride microsphr, 2 mL, intravenous, Once in imaging     PRN medications: acetaminophen **OR** acetaminophen **OR** acetaminophen, dextrose, dextrose, glucagon, glucagon, labetaloL, niCARdipine, oxygen, traZODone niCARdipine, 1-15 mg/hr, Last Rate: Stopped (09/23/24 0155)  sodium chloride 0.9%, 80 mL/hr, Last Rate: 80 mL/hr (09/22/24 2000)         LAB RESULTS:  Results from last 72 hours   Lab Units 09/23/24 0328 09/22/24  0428   GLUCOSE mg/dL 123* 142*   SODIUM mmol/L 143 142   POTASSIUM mmol/L 4.1 3.8   CHLORIDE mmol/L 117* 114*   CO2 mmol/L 16* 18*   ANION GAP mmol/L 14 14   BUN mg/dL 42* 30*   CREATININE mg/dL 2.90* 2.24*   EGFR mL/min/1.73m*2 16* 22*   CALCIUM mg/dL 8.5* 8.8   PHOSPHORUS mg/dL 3.4 2.9   ALBUMIN g/dL 3.1* 3.3*   MAGNESIUM mg/dL 1.70 1.62   POCT CALCIUM IONIZED (MMOL/L) IN BLOOD mmol/L 1.17 1.17      Results from last 72 hours   Lab Units 09/23/24 0328 09/22/24  0428   WBC AUTO x10*3/uL 11.6* 6.4   NRBC AUTO /100 WBCs 0.0 0.0   RBC AUTO x10*6/uL 3.58* 3.46*   HEMOGLOBIN g/dL 10.6* 10.2*   HEMATOCRIT % 30.9* 31.4*   MCV fL 86 91   MCH pg 29.6 29.5   MCHC g/dL 34.3 32.5   RDW % 15.0* 15.2*   PLATELETS AUTO x10*3/uL 189 178      Results from last 72 hours   Lab Units 09/23/24  0328 09/22/24  0428   WBC AUTO x10*3/uL 11.6* 6.4   NRBC AUTO /100 WBCs 0.0 0.0   RBC AUTO x10*6/uL 3.58* 3.46*   HEMOGLOBIN g/dL 10.6* 10.2*   HEMATOCRIT % 30.9* 31.4*   MCV fL 86 91   MCH pg 29.6 29.5   MCHC g/dL 34.3 32.5   RDW % 15.0* 15.2*   PLATELETS  AUTO x10*3/uL 189 178   NEUTROS PCT AUTO % 92.3 89.4   IG PCT AUTO % 0.4 0.5   LYMPHS PCT AUTO % 5.1 9.1   MONOS PCT AUTO % 2.2 0.8   EOS PCT AUTO % 0.0 0.0   BASOS PCT AUTO % 0.0 0.2   NEUTROS ABS x10*3/uL 10.73* 5.72*   IG AUTO x10*3/uL 0.05 0.03   LYMPHS ABS AUTO x10*3/uL 0.59* 0.58*   MONOS ABS AUTO x10*3/uL 0.25 0.05   EOS ABS AUTO x10*3/uL 0.00 0.00   BASOS ABS AUTO x10*3/uL 0.00 0.01      Results from last 72 hours   Lab Units 09/21/24  0909 09/21/24  0706   PROTIME seconds 15.9* 13.9*   INR  1.4* 1.2*   APTT seconds 37 31      Results from last 72 hours   Lab Units 09/23/24  0328 09/22/24  0428 09/21/24  0909 09/21/24  0130   ALK PHOS U/L  --   --   --  99   BILIRUBIN TOTAL mg/dL  --   --   --  0.7   PROTEIN TOTAL g/dL  --   --   --  7.9   ALT U/L  --   --   --  9   AST U/L  --   --   --  14   ALBUMIN g/dL 3.1* 3.3*   < > 4.1    < > = values in this interval not displayed.     IMAGING RESULTS:  MR brain w and wo IV contrast   Final Result   Postcontrast images somewhat limited due to patient motion artifact.   1. Intraparenchymal hematomas centered within the cerebellar vermis   and right cerebellar hemisphere, with scattered subarachnoid   hemorrhage throughout the right-greater-than-left cerebellar   hemispheres, similar to prior exams considering differences in   imaging technique. Additionally, there is surrounding vasogenic edema   throughout the cerebellum with minimal effacement of the 4th   ventricle. Otherwise no significant mass effect or midline shift.   Within the limitations of this exam, postcontrast images demonstrate   no abnormal areas of enhancement to suggest underlying mass or   evidence of vascular malformation.   2. Senescent changes of the brain, as detailed above.   3. No evidence of dural venous sinus thrombosis or deep venous   occlusion was identified within the major intracranial venous   structures.        I personally reviewed the images/study and I agree with the findings   as  stated by Dr. Arnulfo Gresham M.D. This study was interpreted at   [place], [place].        MACRO:   None        Signed by: Noe Kenny 9/22/2024 8:12 AM   Dictation workstation:   [id number]      MR venography intracranial w and wo IV contrast   Final Result   Postcontrast images somewhat limited due to patient motion artifact.   1. Intraparenchymal hematomas centered within the cerebellar vermis   and right cerebellar hemisphere, with scattered subarachnoid   hemorrhage throughout the right-greater-than-left cerebellar   hemispheres, similar to prior exams considering differences in   imaging technique. Additionally, there is surrounding vasogenic edema   throughout the cerebellum with minimal effacement of the 4th   ventricle. Otherwise no significant mass effect or midline shift.   Within the limitations of this exam, postcontrast images demonstrate   no abnormal areas of enhancement to suggest underlying mass or   evidence of vascular malformation.   2. Senescent changes of the brain, as detailed above.   3. No evidence of dural venous sinus thrombosis or deep venous   occlusion was identified within the major intracranial venous   structures.        I personally reviewed the images/study and I agree with the findings   as stated by Dr. Arnulfo Gresham M.D. This study was interpreted at   [place], [place].        MACRO:   None        Signed by: Noe Kenny 9/22/2024 8:12 AM   Dictation workstation:   [id number]      Transthoracic Echo (TTE) Complete   Final Result      CT head wo IV contrast   Final Result   Unchanged vermian hematoma continues to measure a proximally 1.5 cm   in size        Unchanged right infratentorial subdural or subarachnoid hemorrhage   unchanged        Increased number of slightly increased caliber branches of left PICA   and associated venous structures within the vermis. Vascular   malformation,  neoplasm or fistula not excluded        MACRO:   Critical Finding:  See findings. Notification was initiated on   9/21/2024 at 10:12 am by  Doe Chand.  (**-OCF-**)        Signed by: Doe Chand 9/21/2024 10:14 AM   Dictation workstation:   [id number]      CT angio head and neck w and wo IV contrast   Final Result   Addendum (preliminary) 1 of 1   Interpreted By:  Doe Chand,    ADDENDUM:   3D reconstructions were performed at the diagnostic workstation and   demonstrate increase in the size and number of distal PICA branches   on the left with slight increased size and number of associated   venous structures along the lateral left cerebellar hemisphere.   Consider a dural venous fistula, parenchymal vascular malformation or   vascular neoplasm.        Signed by: Doe Chand 9/21/2024 10:15 AM        -------- ORIGINAL REPORT --------   Dictation workstation:   [id number]      Final   1. Redemonstrated intraparenchymal and subarachnoid hemorrhage within   the right cerebellar hemisphere.   2. No hemodynamically significant intracranial or extracranial   stenosis, occlusion, or aneurysm.        I personally reviewed the image(s)/study and resident interpretation.   I agree with the findings as stated by resident Ozzie Mattson.   Data analyzed and images interpreted at [facility], [place], [place].        MACRO:   None.        Signed by: Doe Chand 9/21/2024 7:10 AM   Dictation workstation:   [id number]      US extremity nonvascular real time w image documentation limited anatomic specific   Final Result   Very limited exam as color Doppler images were not obtained and the   exam was prematurely terminated as per request of the emergency team.   Within these limitations, the right femoral vein is only partially   compressible with echogenic material along the lumen raising   suspicion for partially occlusive thrombus, age-indeterminate.         The right calf veins and left lower extremity deep venous system were   not evaluated due to early termination of the examination.        I personally reviewed the images/study and I agree with the findings   as stated by Dr. Arnulfo Gresham M.D. This study was interpreted at   [place], [place].        MACRO:   None.        Signed by: Henry Taveras 9/21/2024 4:52 AM   Dictation workstation:   [id number]      CT angio chest for pulmonary embolism   Final Result   1. No evidence of acute pulmonary embolism.   2. Dilated main pulmonary artery measuring 3.6 cm, nonspecific but   can be seen with pulmonary hypertension.   3. Scattered ground-glass opacities with areas of interlobular septal   thickening, suspicious for pulmonary edema. Atypical infectious or   inflammatory process are not excluded.        I personally reviewed the image(s)/study and resident interpretation.   I agree with the findings as stated by resident Ozzie Mattson.   Data analyzed and images interpreted at [place], [place].        MACRO:   None        Signed by: Henry Taveras 9/21/2024 4:59 AM   Dictation workstation:   [id number]      CT head wo IV contrast   Final Result   CT HEAD:   1. Focal areas of hyperdensity within the right cerebellar hemisphere   and of the cerebellar vermis suggestive of intraparenchymal hematoma.   No significant mass effect or midline shift. The ventricles and basal   cisterns are patent. Recommend follow-up CT in 48 hours to ensure   stability.   2. Serpiginous hyperdensity over the sulci of the right cerebellar   hemisphere representing subarachnoid hemorrhage.        CT CERVICAL SPINE:   1. No acute fracture or traumatic malalignment of the cervical spine.   2. Surgical changes and multilevel spondylotic changes of the   cervical spine as detailed above.             I personally reviewed the image(s)/study and  resident interpretation.   I agree with the findings as stated by resident Ozzie Mattson.   Data analyzed and images interpreted at [place], [place].        MACRO:   Ozzie Mattson discussed the significance and urgency of this   critical finding by telephone with  Federico Patel on 9/21/2024 at   4:01 am.  (**-RCF-**) Findings:  See findings.        Signed by: Henry Taveras 9/21/2024 4:31 AM   Dictation workstation:   [id number]      CT cervical spine wo IV contrast   Final Result   CT HEAD:   1. Focal areas of hyperdensity within the right cerebellar hemisphere   and of the cerebellar vermis suggestive of intraparenchymal hematoma.   No significant mass effect or midline shift. The ventricles and basal   cisterns are patent. Recommend follow-up CT in 48 hours to ensure   stability.   2. Serpiginous hyperdensity over the sulci of the right cerebellar   hemisphere representing subarachnoid hemorrhage.        CT CERVICAL SPINE:   1. No acute fracture or traumatic malalignment of the cervical spine.   2. Surgical changes and multilevel spondylotic changes of the   cervical spine as detailed above.             I personally reviewed the image(s)/study and resident interpretation.   I agree with the findings as stated by resident Ozzie Mattson.   Data analyzed and images interpreted at [facility], [place], [place].        MACRO:   Ozzie Mattson discussed the significance and urgency of this   critical finding by telephone with  Federico Patel on 9/21/2024 at   4:01 am.  (**-RCF-**) Findings:  See findings.        Signed by: Henry Taveras 9/21/2024 4:31 AM   Dictation workstation:   [id number]      XR chest 2 views   Final Result   Cardiomegaly with mild pulmonary edema.        I personally reviewed the image(s)/study and resident interpretation.   I agree with the findings as stated by resident Ozzie Mattson.   Data  analyzed and images interpreted at [facility], [place], [place].        MACRO:   None        Signed by: Henry Taveras 9/21/2024 4:22 AM   Dictation workstation:   [id number]      Point of Care Ultrasound    (Results Pending)   Vascular US lower extremity venous duplex bilateral    (Results Pending)   IR angiogram cerebral bilateral    (Results Pending)   Referral to Neurointervention    (Results Pending)     Assessment/Plan    79 year-old female with past medical history of PE/recurrent DVT s/p IVCF, HFpEF, hypertension, hyperlipidemia, JOY, schizophrenia, vascular parkinson, COPD, and CKD IV who presented to [facility] with cerebellar ICH with surrounding SAH. Admitted 9/21/2024 with cerebellar hemorrhage (Multi) after presenting s/p fall and confusion. CTH demonstrated right vermian cerebellar ICH with right cerebellar hemisphere ICH and/or SAH with 4th ventricular effacement. S/p Kcentra. CTA head/neck without aneurysm or vascular malformation. Admitted to NSU for neurological monitoring and hypertension management on Cardene infusion. Neurology consulted for co-management.     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 Mildly dilated LA.   Plan:  - Continue to monitor on telemetry  - Atorvastatin 40mg daily, carvedilol 25mg BID, hydrochlorothiazide 12.5mg daily, isosorbide mononitrate 30mg daily, lisinopril 40mg daily, Hydralazine 50 mg TID   - BP goal: -150 mmHg (presenting SBP was 150-220 mmHg)    --> PRN: Labetalol, Hydralazine, and Nicardipine   - off Cardene     RESPIRATORY:  #COPD  Assessment:  - RA  - not on home O2  Plan:  - Continuous pulse oximetry   - O2 PRN to maintain SpO2 > 94%, wean as tolerated  - Incentive spirometry while awake    RENAL/:  #CKD IV  Assessment:  Results from last 72 hours   Lab Units 09/23/24  0328 09/22/24  0428   BUN mg/dL 42* 30*   CREATININE mg/dL 2.90* 2.24*   Plan:  - Monitor with daily RFP  - Renal recs: NS@80-100cc/hr hydration 3 hours before and after procedure     FEN/GI:  #No active issues  Assessment:  - Last BM: PTA  Plan:  - Monitor and replace electrolytes per protocol  - IVF: NS @ 80  - Diet: NPO  - Bowel regimen: Miralax    ENDOCRINE:  #Hyperglycemia  Assessment:  Results from last 7 days   Lab Units 09/23/24  0802 09/23/24  0348 09/23/24  0328 09/22/24  2323 09/22/24  1949 09/22/24  1630 09/22/24  1152 09/22/24  0800 09/22/24  0428   POCT GLUCOSE mg/dL 119* 135*  --  145* 143* 111* 142*   < >  --    GLUCOSE mg/dL  --   --  123*  --   --   --   --   --  142*    < > = values in this interval not displayed.   Plan:  - Accuchecks and ISS Q4H while NPO    HEMATOLOGY:  #Anemia (stable)  #Concern for DVT  Assessment:  Results from last 7 days   Lab Units 09/23/24  0328 09/22/24  0428   HEMOGLOBIN g/dL 10.6* 10.2*   HEMATOCRIT % 30.9* 31.4*   PLATELETS AUTO x10*3/uL 189 178   - Patient with history of recurrent DVTs  - Home meds: Warfarin 3 mg Tu, Th, 1 mg M,W,F,Sa  - 9/23 DVT scan: Chronic changes R common fem,proximal fem & mid femoral veins. No DVTs. Chronic changes L distal external iliac, common femoral & proximal femoral veins.   Plan:  - Continue  to monitor with daily CBC and Coag panel  - Consulted vascular medicine    INFECTIOUS DISEASE:  #Leukocytosis   Assessment:  Results from last 7 days   Lab Units 24  0328 24  0428   WBC AUTO x10*3/uL 11.6* 6.4   - Temp (24hrs), Av.9 °C (96.7 °F), Min:35.6 °C (96.1 °F), Max:36.7 °C (98.1 °F)  - afebrile, HDS, no louie  Plan:  - Continue to monitor for s/sx of infection  - Pan culture for temperature > 38.4 C  - monitor WBC & temp     MUSCULOSKELETAL:  - No acute issues    SKIN:  - No acute issues  - Turns and skin care per NSU protocol    ACCESS:  - PIVs  - Right radial arterial line    PROPHYLAXIS:  - DVT Ppx: SCDs  - GI Ppx: not indicated    RESTRAINTS:  Not indicated/Patient does not meet criteria for restraints    Angeline Walters APRN-CNP  Neuroscience Intensive Care    Total critical care time of 60 minutes, with > 50% of time spent in direct contact with patient/family for education, counseling and coordination of care.

## 2024-09-23 NOTE — PROGRESS NOTES
"Kelly Martinez is a 79 y.o. female on day 2 of admission presenting with Cerebellar hemorrhage (Multi).    Subjective   No overnight events. Repeat angio with neurosurgery today    Objective     Last Recorded Vitals  Blood pressure 136/56, pulse 67, temperature 36.7 °C (98.1 °F), temperature source Temporal, resp. rate 18, height 1.702 m (5' 7\"), weight 109 kg (239 lb 13.8 oz), SpO2 95%.    Physical Exam  Neurological Exam    Mental Status:  Awake, oriented x1-2, follows simple commands  Eyes open spontaneously    Cranial Nerves:  PERRL  L gaze preference, does not cross midline  Moderate dysarthria  Tongue deviates to L    Motor:  Normal bulk and tone, no abnormal movements  Full strength throughout bilateral UE and LE    DTRs:  2+ in bilateral UE  0 in bilateral LE    Sensory:  Intact to light touch throughout bilateral UE and LE    Coordination:  Intact FTN bilaterally    Relevant Results                          Assessment/Plan   This patient currently has cardiac telemetry ordered; if you would like to modify or discontinue the telemetry order, click here to go to the orders activity to modify/discontinue the order.  Assessment & Plan  Cerebellar hemorrhage (Multi)      Kelly Martinez is a 79 y.o. female with PMH of PE and recurrent DVT s/p IVCF which was removed in 2016 was on eliquis switched to coumadin 2022, HFpEF, HLD, HTN, JOY, vascular parkinson, schizophrenia, COPD, CKD IV who presented to [facility] on 9/21/2024 with cerebellar ICH with surrounding SAH, neurology consulted for co-management. Pt presented with fall and confusion, found to have vermian cerebellar ICH with R cerebellar hemisphere ICH and/or SAH, no IVH or hydrocephalus. BP on presentation 220/121, INR 2.8, receiving Kcentra, requiring cardene. CTA concern for L PICA dAVM. Repeat CTH with stable ICH, still concern for L PICA dAVM. MRI and MRV motion degraded but no obvious underlying mass or vascular lesion. Clinically, the patient is drowsy, " confused and dysarthric, NIHSS 4. Mechanism likely multifactorial as she is coagulopathic (on Coumadin), hypertensive and traumatic (report of a fall).     Type: ICH              BP goal: 130-150              IVH: No              ICH volume: ~5cc  Vessels Involved: PICA/VA and branches  Neurologic Manifestations: drowsiness, confusion, dysarthria  Deficits: NIHSS 4   Initial treatment: Anti-hypertensive  Anti-platelets or Anti-coagulation management: Not indicated  Vascular Risk Factors: HTN, HLD, and Hypercoagulability (coumadin use)  BP goal: 130-150  Disposition: PT/OT eval     Vascular Risk Factor modification goals:  Blood pressure goals: avoid hypotension SBP <100 that could worsen cerebral perfusion, Inpatient Intracerebral hemorrhage- -150 mmHg (if presenting SBP was 150-220 mmHg)   Lipid Goals: education on healthy diet and statin therapy not indicated or contraindicated  Glucose Goals: early treatment of hyperglycemia to goal glucose 140-180 mg/dl with long-term goal A1c < 7%   Smoking Cessation and Education  Assessment for Rehabilitation needs including PT/OT and if indicated swallow evaluation and speech therapy   Patient and family education on signs and symptoms of stroke, calling 911, risk factors, and healthy strategies for stroke prevention.      Recommendations:   - Will follow up Angio results today  - -150    Krys Weiner MD   PGY-3 Neurology  Stroke [phone]

## 2024-09-23 NOTE — CARE PLAN
The patient's goals for the shift include      The clinical goals for the shift include pt will maintain SBP<150 throughout shift      Problem: Fall/Injury  Goal: Not fall by end of shift  Outcome: Progressing  Goal: Be free from injury by end of the shift  Outcome: Progressing  Goal: Verbalize understanding of personal risk factors for fall in the hospital  Outcome: Progressing  Goal: Verbalize understanding of risk factor reduction measures to prevent injury from fall in the home  Outcome: Progressing  Goal: Use assistive devices by end of the shift  Outcome: Progressing  Goal: Pace activities to prevent fatigue by end of the shift  Outcome: Progressing     Problem: Pain - Adult  Goal: Verbalizes/displays adequate comfort level or baseline comfort level  Outcome: Progressing     Problem: Safety - Adult  Goal: Free from fall injury  Outcome: Progressing     Problem: Chronic Conditions and Co-morbidities  Goal: Patient's chronic conditions and co-morbidity symptoms are monitored and maintained or improved  Outcome: Progressing     Problem: Pain  Goal: Takes deep breaths with improved pain control throughout the shift  Outcome: Progressing  Goal: Turns in bed with improved pain control throughout the shift  Outcome: Progressing  Goal: Performs ADL's with improved pain control throughout shift  Outcome: Progressing  Goal: Participates in PT with improved pain control throughout the shift  Outcome: Progressing  Goal: Free from opioid side effects throughout the shift  Outcome: Progressing  Goal: Free from acute confusion related to pain meds throughout the shift  Outcome: Progressing     Problem: General Stroke  Goal: Establish a mutual long term goal with patient by discharge  Outcome: Progressing  Goal: Demonstrate improvement in neurological exam throughout the shift  Outcome: Progressing  Goal: Maintain BP within ordered limits throughout shift  Outcome: Progressing  Note: SBP < 150.  Goal: Participate in treatment  (ie., meds, therapy) throughout shift  Outcome: Progressing  Goal: No symptoms of aspiration throughout shift  Outcome: Progressing  Goal: No symptoms of hemorrhage throughout shift  Outcome: Progressing  Goal: Tolerate enteral feeding throughout shift  Outcome: Progressing  Goal: Decreased nausea/vomiting throughout shift  Outcome: Progressing  Goal: Controlled blood glucose throughout shift  Outcome: Progressing  Goal: Out of bed three times today  Outcome: Progressing     Problem: ICU Stroke  Goal: Maintain ICP within ordered limits throughout shift  Outcome: Progressing  Goal: Tolerate EVD clamping trial throughout shift  Outcome: Progressing  Goal: Tolerate ventilator weaning trial during shift  Outcome: Progressing  Goal: Maintain patent airway throughout shift  Outcome: Progressing  Goal: Achieve/maintain targeted sodium level throughout shift  Outcome: Progressing     Problem: Skin  Goal: Decreased wound size/increased tissue granulation at next dressing change  Outcome: Progressing  Flowsheets (Taken 9/23/2024 0747)  Decreased wound size/increased tissue granulation at next dressing change: Utilize specialty bed per algorithm  Goal: Participates in plan/prevention/treatment measures  Outcome: Progressing  Goal: Prevent/manage excess moisture  Outcome: Progressing  Goal: Prevent/minimize sheer/friction injuries  Outcome: Progressing  Goal: Promote/optimize nutrition  Outcome: Progressing  Goal: Promote skin healing  Outcome: Progressing

## 2024-09-23 NOTE — CONSULTS
Inpatient consult to Vascular Medicine  Consult performed by: Prerna Kilgore MD  Consult ordered by: Indigo Medina MD  Reason for consult: recurrent DVT/PE, AC recs           Subjective   Kelly Martinez is a 79 y.o. female on day 2 of admission presenting with Cerebellar hemorrhage (Multi). 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  She was found to have right cerebellar and cerebellar vermis hemorrhage with fourth ventricular effacement.  On admission her INR was 2.8.  She was given Kcentra and was admitted to neuro ICU    On my evaluation this morning after angio she has received sedative medications and does not wake up.  Per nursing report prior to angio patient was alert and oriented to self and was able to follow commands.  Most of the history gathered through chart review.  Regarding her history of unprovoked DVT PE, her first DVT was in 2006 which was treated with Eliquis.  She was compliant with Eliquis and treatment however in 11/22 while on Eliquis she was found to have a DVT in the left CFV and PTV and discussed transition from Eliquis to warfarin (Eliquis failure).  She has been taking warfarin 3 mg every Sunday, Tuesday, Thursday and 4.5 mg all other days.  She follows up with [facility] for Coumadin clinic.  She was previously seen by vascular medicine during her previous admission on 4/2/2024.    Review of Systems   As noted above       Past Medical History:   Diagnosis Date    Other pulmonary embolism without acute cor pulmonale (Multi) 07/21/2013    Pulmonary embolism    Personal history of other mental and behavioral  disorders     History of depression    Personal history of other venous thrombosis and embolism     History of deep venous thrombosis     Past Surgical History:   Procedure Laterality Date    KNEE SURGERY  04/06/2016    Knee Surgery    NECK SURGERY  04/06/2016    Neck Surgery     Social History     Socioeconomic History    Marital status:      Spouse name: Not on file    Number of children: Not on file    Years of education: Not on file    Highest education level: Not on file   Occupational History    Not on file   Tobacco Use    Smoking status: Every Day     Current packs/day: 0.50     Types: Cigarettes     Passive exposure: Current    Smokeless tobacco: Never   Substance and Sexual Activity    Alcohol use: Not Currently    Drug use: Not on file    Sexual activity: Not on file   Other Topics Concern    Not on file   Social History Narrative    Not on file     Social Determinants of Health     Financial Resource Strain: Patient Unable To Answer (9/22/2024)    Overall Financial Resource Strain (CARDIA)     Difficulty of Paying Living Expenses: Patient unable to answer   Food Insecurity: No Food Insecurity (1/16/2024)    Received from [employer]    Hunger Vital Sign     Worried About Running Out of Food in the Last Year: Never true     Ran Out of Food in the Last Year: Never true   Transportation Needs: Patient Unable To Answer (9/22/2024)    PRAPARE - Transportation     Lack of Transportation (Medical): Patient unable to answer     Lack of Transportation (Non-Medical): Patient unable to answer   Physical Activity: Patient Unable To Answer (9/22/2024)    Exercise Vital Sign     Days of Exercise per Week: Patient unable to answer     Minutes of Exercise per Session: Patient unable to answer   Stress: Stress Concern Present (1/16/2024)    Received from [employer]    [employer] [place] - Occupational Stress Questionnaire     Feeling of Stress : Very much   Social  Connections: Socially Isolated (1/16/2024)    Received from "[employer]    Social Connection and Isolation Panel [NHANES]     Frequency of Communication with Friends and Family: More than three times a week     Frequency of Social Gatherings with Friends and Family: Once a week     Attends Buddhism Services: Never     Active Member of Clubs or Organizations: No     Attends Club or Organization Meetings: Never     Marital Status:    Intimate Partner Violence: Not At Risk (1/16/2024)    Received from "[employer]    Humiliation, Afraid, Rape, and Kick questionnaire     Fear of Current or Ex-Partner: No     Emotionally Abused: No     Physically Abused: No     Sexually Abused: No   Housing Stability: Patient Unable To Answer (9/22/2024)    Housing Stability Vital Sign     Unable to Pay for Housing in the Last Year: Patient unable to answer     Number of Times Moved in the Last Year: 1     Homeless in the Last Year: Patient unable to answer     No family history on file.   Allergies   Allergen Reactions    Meclizine Dizziness and Unknown    Naproxen Itching    Piperacillin-Tazobactam-Dextrs Other       Objective   Physical Exam  Vitals:    09/23/24 1145 09/23/24 1155 09/23/24 1200 09/23/24 1230   BP:       Pulse: 63  59 55   Resp: 24  15 15   Temp:  36 °C (96.8 °F)     TempSrc:  Temporal     SpO2: 98%  93% 92%   Weight:       Height:            General: Laying in bed, sleeping, no acute distress, on nasal cannula oxygen  Neuro: Does not follow command  CV:  RRR  Lungs: CTA bilaterally, on 4L supplemental oxygen via nasal cannula  Abd:  Soft, non-tender   Upper extremities: No swelling, +2 radial   Lower extremities: No edema.  +2 DP  Skin:  No open ulcers.  Spider/reticular/varicose veins.  +Chronic venous stasis changes     Medications   Scheduled medications  atorvastatin, 40 mg, oral, Daily  [Held by provider] carbidopa-levodopa, 1 tablet, oral, TID  carvedilol, 25 mg, oral, BID  folic  acid, 1 mg, oral, Daily  hydrALAZINE, 50 mg, oral, TID  hydroCHLOROthiazide, 12.5 mg, oral, Daily  insulin lispro, 0-5 Units, subcutaneous, q4h  isosorbide mononitrate ER, 30 mg, oral, Daily  lisinopril, 40 mg, oral, Daily  mometasone, 2 puff, inhalation, BID  perflutren protein A microsphere, 0.5 mL, intravenous, Once in imaging  polyethylene glycol, 17 g, oral, Daily  QUEtiapine, 300 mg, oral, Nightly  sulfur hexafluoride microsphr, 2 mL, intravenous, Once in imaging      Continuous medications  dexmedeTOMIDine, 0.1-1 mcg/kg/hr, Last Rate: 0.3 mcg/kg/hr (09/23/24 1200)  niCARdipine, 1-15 mg/hr, Last Rate: Stopped (09/23/24 0155)  sodium chloride 0.9%, 80 mL/hr, Last Rate: 80 mL/hr (09/23/24 1200)      PRN medications  PRN medications: acetaminophen **OR** acetaminophen **OR** acetaminophen, dextrose, dextrose, glucagon, glucagon, labetaloL, niCARdipine, oxygen, traZODone     Lab Review   Recent Labs     09/23/24  0328 09/22/24  0428 09/21/24  0909 09/21/24  0429 09/21/24  0130 04/02/24  0632 03/30/24  1409 12/29/23  0717 12/28/23  0118 02/17/23  1456    142 140  --  140 139 140 140 141 141   K 4.1 3.8 3.8  --  5.4* 4.2 4.5 3.9 4.0 4.4   * 114* 111*  --  110* 107 108* 109* 112* 110*   CO2 16* 18* 21  --  24 24 23 22 23 24   ANIONGAP 14 14 12  --  11 12 14 13 10 11   BUN 42* 30* 26*  --  26* 27* 31* 28* 21 26*   CREATININE 2.90* 2.24* 2.06*  --  2.20* 2.21* 2.33* 1.95* 1.79* 2.39*   EGFR 16* 22* 24*  --  23* 22* 21* 26* 29*  --    MG 1.70 1.62 1.65 1.73 1.80  --  1.79 1.82  --  2.11     Recent Labs     09/23/24  0328 09/22/24  0428 09/21/24  0909 09/21/24  0130 04/02/24  0632 03/30/24  1409 12/29/23  0717 12/28/23  0118 12/12/22  1113 12/10/22  0108 12/07/22  1420   ALBUMIN 3.1* 3.3* 3.6 4.1 3.4 3.6   < > 3.8   < > 4.0 4.0   ALKPHOS  --   --   --  99  --  95  --  85  --  85 86   ALT  --   --   --  9  --  7  --  4*  --  3* <3*   AST  --   --   --  14  --  10  --  10  --  8* 9   BILITOT  --   --   --  0.7   --  0.5  --  0.3  --  0.4 0.4    < > = values in this interval not displayed.     Recent Labs     09/23/24  0328 09/22/24  0428 09/21/24  0909 09/21/24  0130 04/04/24  0640 04/02/24  0632 04/01/24  0307 03/30/24  1409   WBC 11.6* 6.4 6.0 5.9 4.4 4.4 5.3 3.8*   HGB 10.6* 10.2* 10.3* 11.3* 10.2* 11.2* 11.5* 10.1*   HCT 30.9* 31.4* 31.1* 35.5* 32.3* 34.8* 34.5* 30.9*    178 150 189 180 181 181 205   MCV 86 91 91 91 96 94 91 92     Recent Labs     09/21/24  0909 09/21/24  0706 09/21/24  0429 09/21/24  0147 04/05/24  0822 04/04/24  0640 04/03/24  0810 03/31/24  1254 03/31/24  1050 03/31/24  0819 03/30/24  1409 12/19/22  2119 12/13/22  0811 12/12/22  1113 12/11/22  2313 12/11/22  1829 12/11/22  1309 12/11/22  0813   INR 1.4* 1.2* 2.7* 2.8* 2.4* 2.4* 2.6*  --   --   --  2.0*   < > 2.1*   < >  --   --   --   --    HAUF  --   --   --   --   --   --   --  0.8 1.4* 1.7*  --   --  0.8  --  0.7 0.7 0.8 0.7    < > = values in this interval not displayed.     PTT - 9/21/2024:  9:09 AM  Estimated Creatinine Clearance: 20.7 mL/min (A) (by C-G formula based on SCr of 2.9 mg/dL (H)).    Recent Labs     09/21/24  0429 12/07/22  1420   CHOL 133 116   LDLF  --  59   HDL 49.2 41.3   TRIG  --  78     Lab Results   Component Value Date    HGBA1C 5.9 (H) 09/21/2024     Lab Results   Component Value Date    TSH 2.07 12/07/2022       Imaging  Vascular US lower extremity venous duplex bilateral    Result Date: 9/23/2024  Preliminary Cardiology Report           [place] [address]   Tel [phone] and Fax [phone]          Preliminary Vascular Lab Report  VASC US LOWER EXTREMITY VENOUS DUPLEX BILATERAL  Patient Name:      YESSY Kathleen Physician:  [id number] Jhonatan Burch MD Study Date:        9/23/2024      Ordering Physician: [id number] MARCY BRICEÑO MRN/PID:           [id number]       Technologist:       Amy UNDERWOOD Accession#:        [id number]   Technologist 2:  Date of Birth/Age: 1945       Encounter#:         [id number] Gender:            F Admission Status:  Inpatient      [facility]  Diagnosis/ICD: Other specified soft tissue disorders-M79.89; Pain in left                leg-M79.605; Pain in right leg-M79.604 Indication:    Pain and swelling BLE Procedure/CPT: 98699 Peripheral venous duplex scan for DVT complete  Patient History: Obesity. Recent CVA                  Chronic DVT BLE 4-2-24.  PRELIMINARY CONCLUSIONS: Right Lower Venous: There are chronic changes visualized in the common femoral, proximal femoral and mid femoral veins. The remainder of the right leg is negative for deep vein thrombosis. Left Lower Venous: There are chronic changes visualized in the distal external iliac, common femoral and proximal femoral veins. Cannot rule out thrombus in non-visualized mid femoral, dist femoral, popliteal, posterior tibial and peroneal veins due to patient became combative unable to complete exam.  Imaging & Doppler Findings:  Right                 Compressible Thrombus        Flow Distal External Iliac                None   Spontaneous/Phasic CFV                     Partial    Chronic  Spontaneous/Phasic PFV                     Partial    Chronic FV Proximal             Partial    Chronic  Spontaneous/Phasic FV Mid                  Partial    Chronic FV Distal                 Yes        None Popliteal                 Yes        None   Spontaneous/Phasic Peroneal                  Yes        None PTV                       Yes        None  Left                  Compress Thrombus        Flow Distal External Iliac Partial  Chronic  Spontaneous/Phasic CFV                   Partial  Chronic  Spontaneous/Phasic PFV                     Yes      None FV Proximal           Partial  Chronic  Spontaneous/Phasic VASCULAR PRELIMINARY REPORT completed by Amy UNDERWOOD on 9/23/2024 at 9:44:27 AM  ** Final **     MR brain w and wo IV  contrast    Result Date: 9/22/2024  Interpreted By:  Noe Kenny  and Mp Arredondo STUDY: MR BRAIN W AND WO IV CONTRAST; MR VENOGRAPHY INTRACRANIAL W AND WO IV CONTRAST;  9/22/2024 12:33 am   INDICATION: Signs/Symptoms:R cerebellar ICH; Signs/Symptoms:R cerebellar ICH, CANDACE bolus.   COMPARISON: Multiple CT head on 09/21/2024. CTA head and neck on 09/21/2024.   ACCESSION NUMBER(S): [id number]; [id number]   ORDERING CLINICIAN: MARCY AMIN-HANJANI   TECHNIQUE: Axial T2, FLAIR, DWI, gradient echo T2 and sagittal and coronal T1 weighted images of brain were acquired. Post contrast T1 weighted images were acquired after administration of 20 ML Dotarem gadolinium based intravenous contrast.   Post contrast MRV imaging was performed through the brain with subtraction postcontrast imaging also obtained. 3D reconstructions were rendered using separate 3D workstation by the attending neuro radiologist.   FINDINGS: MRI brain:   Limited evaluation of the postcontrast images due to patient motion artifact.   Diffuse sulcal and ventricular prominence, likely secondary to age-related parenchymal volume loss. The basal cisterns are patent.   No diffusion restriction abnormality to suggest acute infarct. Intraparenchymal hematoma within the cerebellar vermis measuring approximately 1.7 x 1.4 cm, similar in size and appearance to prior CT considering differences in imaging technique. Associated mass effect within the posterior fossa, with minimal effacement of the 4th ventricle. Otherwise no significant mass effect or midline shift. Additional intraparenchymal hematoma centered within the right cerebellar hemisphere measuring approximately 1.9 x 1 cm, similar to prior exams considering differences in technique. Additional scattered areas of susceptibility artifact throughout the bilateral cerebral hemispheres, right-greater-than-left, favored to represent subarachnoid blood products. Increased T2/FLAIR signal within surrounding  the cerebellar vermis and throughout the bilateral cerebellar hemispheres, right-greater-than-left, likely representing edema. Additional nonspecific patchy and confluent foci of T2/FLAIR hyperintense signal throughout the subcortical and periventricular white matter, likely sequelae of chronic small-vessel ischemic disease. Bilateral basal ganglia calcification noted on gradient echo images. Within the limitations of the examination, no abnormal areas of enhancement identified on postcontrast images.   Mild scattered mucosal thickening of the ethmoid air cells. The remaining paranasal sinuses and mastoids are predominantly clear.     MRV brain:   Right transverse sinus is dominant. Superior sagittal sinus straight sinus bilateral transverse and sigmoid sinuses are patent.       Postcontrast images somewhat limited due to patient motion artifact. 1. Intraparenchymal hematomas centered within the cerebellar vermis and right cerebellar hemisphere, with scattered subarachnoid hemorrhage throughout the right-greater-than-left cerebellar hemispheres, similar to prior exams considering differences in imaging technique. Additionally, there is surrounding vasogenic edema throughout the cerebellum with minimal effacement of the 4th ventricle. Otherwise no significant mass effect or midline shift. Within the limitations of this exam, postcontrast images demonstrate no abnormal areas of enhancement to suggest underlying mass or evidence of vascular malformation. 2. Senescent changes of the brain, as detailed above. 3. No evidence of dural venous sinus thrombosis or deep venous occlusion was identified within the major intracranial venous structures.   I personally reviewed the images/study and I agree with the findings as stated by Dr. Arnulfo Gresham M.D. This study was interpreted at [facility], [place], [place].   MACRO: None   Signed by: Noe Kenny 9/22/2024 8:12 AM Dictation workstation:    [id number]    MR venography intracranial w and wo IV contrast    Result Date: 9/22/2024  Interpreted By:  Noe Kenny  and Mp Arredondo STUDY: MR BRAIN W AND WO IV CONTRAST; MR VENOGRAPHY INTRACRANIAL W AND WO IV CONTRAST;  9/22/2024 12:33 am   INDICATION: Signs/Symptoms:R cerebellar ICH; Signs/Symptoms:R cerebellar ICH, CANDCAE bolus.   COMPARISON: Multiple CT head on 09/21/2024. CTA head and neck on 09/21/2024.   ACCESSION NUMBER(S): [id number]; [id number]   ORDERING CLINICIAN: MARCY AMIN-HANJANI   TECHNIQUE: Axial T2, FLAIR, DWI, gradient echo T2 and sagittal and coronal T1 weighted images of brain were acquired. Post contrast T1 weighted images were acquired after administration of 20 ML Dotarem gadolinium based intravenous contrast.   Post contrast MRV imaging was performed through the brain with subtraction postcontrast imaging also obtained. 3D reconstructions were rendered using separate 3D workstation by the attending neuro radiologist.   FINDINGS: MRI brain:   Limited evaluation of the postcontrast images due to patient motion artifact.   Diffuse sulcal and ventricular prominence, likely secondary to age-related parenchymal volume loss. The basal cisterns are patent.   No diffusion restriction abnormality to suggest acute infarct. Intraparenchymal hematoma within the cerebellar vermis measuring approximately 1.7 x 1.4 cm, similar in size and appearance to prior CT considering differences in imaging technique. Associated mass effect within the posterior fossa, with minimal effacement of the 4th ventricle. Otherwise no significant mass effect or midline shift. Additional intraparenchymal hematoma centered within the right cerebellar hemisphere measuring approximately 1.9 x 1 cm, similar to prior exams considering differences in technique. Additional scattered areas of susceptibility artifact throughout the bilateral cerebral hemispheres, right-greater-than-left, favored to represent subarachnoid blood  products. Increased T2/FLAIR signal within surrounding the cerebellar vermis and throughout the bilateral cerebellar hemispheres, right-greater-than-left, likely representing edema. Additional nonspecific patchy and confluent foci of T2/FLAIR hyperintense signal throughout the subcortical and periventricular white matter, likely sequelae of chronic small-vessel ischemic disease. Bilateral basal ganglia calcification noted on gradient echo images. Within the limitations of the examination, no abnormal areas of enhancement identified on postcontrast images.   Mild scattered mucosal thickening of the ethmoid air cells. The remaining paranasal sinuses and mastoids are predominantly clear.     MRV brain:   Right transverse sinus is dominant. Superior sagittal sinus straight sinus bilateral transverse and sigmoid sinuses are patent.       Postcontrast images somewhat limited due to patient motion artifact. 1. Intraparenchymal hematomas centered within the cerebellar vermis and right cerebellar hemisphere, with scattered subarachnoid hemorrhage throughout the right-greater-than-left cerebellar hemispheres, similar to prior exams considering differences in imaging technique. Additionally, there is surrounding vasogenic edema throughout the cerebellum with minimal effacement of the 4th ventricle. Otherwise no significant mass effect or midline shift. Within the limitations of this exam, postcontrast images demonstrate no abnormal areas of enhancement to suggest underlying mass or evidence of vascular malformation. 2. Senescent changes of the brain, as detailed above. 3. No evidence of dural venous sinus thrombosis or deep venous occlusion was identified within the major intracranial venous structures.   I personally reviewed the images/study and I agree with the findings as stated by Dr. Arnulfo Gresham M.D. This study was interpreted at [facility], [place], [place].   MACRO: None   Signed by:  Noe Kenny 9/22/2024 8:12 AM Dictation workstation:   [id number]       Assessment/Plan   Kelly Martinez is a 79 y.o. female on day 2 of admission presenting with Cerebellar hemorrhage (Multi). 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  Lower extremity venous Doppler obtained today which shows bilateral chronic thrombosis.  Given her history of unprovoked recurrent DVT/PE and current hospitalization she remains high risk for acute VTE.  Please start IV heparin infusion when OKAY per neurosurgery team.  She needs daily anti Xa assay and CBC to monitor hemoglobin and platelets.  Apply SCDs to bilateral lower extremities.    Recommendations not final until signed by  attending.      Prerna Kilgore MD   Fellow

## 2024-09-23 NOTE — PROGRESS NOTES
09/23/24 1518   Current Planned Discharge Disposition   Current Planned Discharge Disposition SNF  (_tbd)     Patient not wakeable but talked to daughter Paola at bedside about 9/22-23 PT/OT recs for SNF loc.  Paola agreeable that patient should go to SNF d/t h/o falls and poor diet but concerned if receptive to idea.  Gave daughter list of 3+ star Medicare rated facilities in patient's hometown area should she want to research any. To discuss again at future junction if patient able to participate.       Jacki Zacarias (LSW, MSW)

## 2024-09-23 NOTE — PROGRESS NOTES
"Kelly Martinez is a 79 y.o. female on day 2 of admission presenting with Cerebellar hemorrhage (Multi).    Subjective   NAEO       Objective     Physical Exam  awake  Ox1-2, confused  RUE D3 (chronic) o/w 5/5    Last Recorded Vitals  Blood pressure 138/60, pulse 60, temperature 35.7 °C (96.3 °F), temperature source Temporal, resp. rate 22, height 1.702 m (5' 7\"), weight 109 kg (239 lb 13.8 oz), SpO2 96%.  Intake/Output last 3 Shifts:  I/O last 3 completed shifts:  In: 3278.3 (32.5 mL/kg) [I.V.:3278.3 (32.5 mL/kg)]  Out: 200 (2 mL/kg) [Urine:200 (0.1 mL/kg/hr)]  Weight: 101 kg     Relevant Results            This patient currently has cardiac telemetry ordered; if you would like to modify or discontinue the telemetry order, click here to go to the orders activity to modify/discontinue the order.      Results for orders placed or performed during the hospital encounter of 09/21/24 (from the past 24 hour(s))   POCT GLUCOSE   Result Value Ref Range    POCT Glucose 129 (H) 74 - 99 mg/dL   Renal function panel   Result Value Ref Range    Glucose 142 (H) 74 - 99 mg/dL    Sodium 142 136 - 145 mmol/L    Potassium 3.8 3.5 - 5.3 mmol/L    Chloride 114 (H) 98 - 107 mmol/L    Bicarbonate 18 (L) 21 - 32 mmol/L    Anion Gap 14 10 - 20 mmol/L    Urea Nitrogen 30 (H) 6 - 23 mg/dL    Creatinine 2.24 (H) 0.50 - 1.05 mg/dL    eGFR 22 (L) >60 mL/min/1.73m*2    Calcium 8.8 8.6 - 10.6 mg/dL    Phosphorus 2.9 2.5 - 4.9 mg/dL    Albumin 3.3 (L) 3.4 - 5.0 g/dL   CBC and Auto Differential   Result Value Ref Range    WBC 6.4 4.4 - 11.3 x10*3/uL    nRBC 0.0 0.0 - 0.0 /100 WBCs    RBC 3.46 (L) 4.00 - 5.20 x10*6/uL    Hemoglobin 10.2 (L) 12.0 - 16.0 g/dL    Hematocrit 31.4 (L) 36.0 - 46.0 %    MCV 91 80 - 100 fL    MCH 29.5 26.0 - 34.0 pg    MCHC 32.5 32.0 - 36.0 g/dL    RDW 15.2 (H) 11.5 - 14.5 %    Platelets 178 150 - 450 x10*3/uL    Neutrophils % 89.4 40.0 - 80.0 %    Immature Granulocytes %, Automated 0.5 0.0 - 0.9 %    Lymphocytes % 9.1 13.0 " - 44.0 %    Monocytes % 0.8 2.0 - 10.0 %    Eosinophils % 0.0 0.0 - 6.0 %    Basophils % 0.2 0.0 - 2.0 %    Neutrophils Absolute 5.72 (H) 1.60 - 5.50 x10*3/uL    Immature Granulocytes Absolute, Automated 0.03 0.00 - 0.50 x10*3/uL    Lymphocytes Absolute 0.58 (L) 0.80 - 3.00 x10*3/uL    Monocytes Absolute 0.05 0.05 - 0.80 x10*3/uL    Eosinophils Absolute 0.00 0.00 - 0.40 x10*3/uL    Basophils Absolute 0.01 0.00 - 0.10 x10*3/uL   Magnesium   Result Value Ref Range    Magnesium 1.62 1.60 - 2.40 mg/dL   Calcium, Ionized   Result Value Ref Range    POCT Calcium, Ionized 1.17 1.1 - 1.33 mmol/L   POCT GLUCOSE   Result Value Ref Range    POCT Glucose 157 (H) 74 - 99 mg/dL   POCT GLUCOSE   Result Value Ref Range    POCT Glucose 142 (H) 74 - 99 mg/dL   POCT GLUCOSE   Result Value Ref Range    POCT Glucose 111 (H) 74 - 99 mg/dL   POCT GLUCOSE   Result Value Ref Range    POCT Glucose 143 (H) 74 - 99 mg/dL   POCT GLUCOSE   Result Value Ref Range    POCT Glucose 145 (H) 74 - 99 mg/dL                Assessment/Plan   Assessment & Plan  Cerebellar hemorrhage (Multi)    79 years old female with h/o parkinson's, HTN, HLD, HFpEF, 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 (s/p IVC filter removed 4/2016), JOY (CPAP at night), COPD, CKD stage 4, chronic tobacco use, schizophrenia, depression, p/w AMS, hypertensive 's, recent fall 2days ago, CTH R cerebellar and cerebellar vermis hemorrhage with 4th ventricular effacement, stable vents, CT C spine C5-7 ACDF, CTA c/f L PICA dAVM, s/p kcentra, TTE 74% EF, no wma, rCTH stable ICH, c/f L PICA dAVM, 9/22 MRI/V motion degraded, no obvious underlying mass or vascular lesion       Plan  NSU   EVD watch  angio Mon 9/23   -150  ASA/warfarin restart plan  stroke rec   vasc med recs  DVT US today  Renal recs - hydrate with NS prior to and after angio  Wean cardene  PTOT - SNF    Matthias Calix MD

## 2024-09-23 NOTE — PROCEDURES
Pre-Procedure Verification and Time Out:  Procedure Location: procedure area  HUDDLE - Pre-procedure Verification:  completed  TIME OUT - Final Verification:  completed immediately prior to procedure start  DEBRIEF: completed    Complications:  None; patient tolerated the procedure well.     Disposition: NSU  Condition: stable  Specimens Collected: No specimens collected    General Information:   Anesthesia/ sedation: Non-Anesthesia  Indication(s)/Pre - Procedure Diagnoses: Cerebellar Hemorrhage  Post-Procedure Diagnosis: Cerebellar Hemorrhage   Procedure Name: Diagnostic Cerebral Angiogram  Procedure performed by: Brian Evans   Assistant(s): Nicolas Moses   Estimated Blood Loss (mL): 10  Specimen: no  Informed Consent: consent obtained and in chart    Access: 5 Fr Sheath in R CFA  Closure: StarClose   Vessels Injected: R subclavian artery, L subclavian artery, R common carotid artery, L common carotid artery, L vertebral artery   Moderate Sedation Time: 40 min  Findings: 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

## 2024-09-23 NOTE — PROGRESS NOTES
Pharmacy Medication History Review    Kelly Martinez is a 79 y.o. female admitted for Cerebellar hemorrhage (Multi). Pharmacy reviewed the patient's [id number] medications for accuracy.    Medications ADDED:  Naproxen, bupropion, duloxetine, terazosin  Medications CHANGED:  Warfarin (updated per daughter and per [facility] clinic note 8/21/24  Warfarin 3mg tablet-  1 tablet (3mg) on Sunday, Tuesday, Thursday  1.5 tablets (4.5mg) on Monday, Wednesday, Friday, Saturday  Medications REMOVED:   Hydrochlorothiazide     The list below reflects the updated PTA list.   Prior to Admission Medications   Prescriptions Last Dose Informant   DULoxetine (Cymbalta) 30 mg DR capsule  Child   Sig: Take 1 capsule (30 mg) by mouth once daily. Do not crush or chew.   QUEtiapine (SEROquel) 300 mg tablet Past Week Child   Sig: Take 1 tablet (300 mg) by mouth once daily at bedtime.   aspirin 81 mg chewable tablet 9/20/2024 Child   Sig: Chew 1 tablet (81 mg) once daily.   atorvastatin (Lipitor) 40 mg tablet 9/20/2024 Child   Sig: Take 1 tablet (40 mg) by mouth once daily.   buPROPion XL (Wellbutrin XL) 150 mg 24 hr tablet  Child   Sig: Take 1 tablet (150 mg) by mouth once daily. Do not crush, chew, or split.   budesonide (Pulmicort Flexhaler) 90 mcg/actuation inhaler  Child   Sig: Inhale 2 puffs 2 times a day.   carbidopa-levodopa (Sinemet)  mg tablet  Child   Sig: Take 1 tablet by mouth 3 times a day.   carvedilol (Coreg) 12.5 mg tablet 9/20/2024 Child   Sig: Take 1 tablet (12.5 mg) by mouth 2 times daily (morning and late afternoon).   folic acid (Folvite) 1 mg tablet  Child   Sig: Take 1 tablet (1 mg) by mouth once daily.   isosorbide mononitrate ER (Imdur) 30 mg 24 hr tablet 9/20/2024 Child   Sig: Take 1 tablet (30 mg) by mouth once daily.   lisinopril 40 mg tablet 9/20/2024 Child   Sig: Take 1 tablet (40 mg) by mouth once daily.   naproxen sodium (Aleve) 220 mg tablet  Child   Sig: Take 1 tablet (220 mg) by mouth  "every 12 hours if needed for mild pain (1 - 3).   terazosin (Hytrin) 2 mg capsule  Child   Sig: Take 1 capsule (2 mg) by mouth once daily.   traZODone (Desyrel) 150 mg tablet Past Week Child   Sig: Take 1 tablet (150 mg) by mouth as needed at bedtime for sleep.              warfarin (Coumadin) 3 mg tablet  Child      Per [facility] coumadin clinic note on 8/21/24 and per daughter, current dosing is:    1 tablet (3mg) on Sunday, Tuesday, Thursday  1.5 tablets (4.5mg) on Monday, Wednesday, Friday, Saturday      Facility-Administered Medications: None        Patient accepts M2B at discharge.     Sources:   -did not attempt patient interview (was at angio at time went to room and per notes appears patient would not be reliable to confirm home meds)  -called patient daughter (who fills patient pill box) and confirmed all home medications  -outpatient pharmacy dispense history with [facility]  -Care Everywhere medication lists  -OARRS  -anticoagulation note with [facility] 8/21/24  -[facility] psych office visit ntoe 1/11/24  -[facility] PCP office visit notes with Siena Garsia on 5/14/24 and 4/13/24      Additional Comments:  Not currently taking Sinemet or Pulmocort inhaler per daughter (has not been filled at pharmacy in at least a year).  Unsure if still supposed to be taking.  Daughter said needs followup with prescribing physicians.  Patient takes two 12.5mg carvedilol tablets once daily instead of as prescribed, 1 tablet (12.5mg) twice a day      Abhay Moses, PharmD  Transitions of Care Pharmacist  09/23/24     Secure Chat preferred   If no response call [phone] or Vocera \"Med Rec\"    "

## 2024-09-23 NOTE — CARE PLAN
Patient with history of CKD with baseline creatinine ~2. She is planned for angiography in am. Nephrology contacted for recs involving pre-contrast hydration. Discussed NS@80-100cc/hr hydration 3 hours before and after procedure. Thank you for involving us in this case.

## 2024-09-23 NOTE — PROGRESS NOTES
09/23/24 1507   Discharge Planning   Living Arrangements Alone   Support Systems Children  (daughter, Vinh is main and only support)   Assistance Needed Uses a rollator / relies on daughter for transport, shopping, and cooking / no home O2 / no HD / frequent falls   Type of Residence Private residence   Do you have animals or pets at home? No   Who is requesting discharge planning? Provider   Home or Post Acute Services Post acute facilities (Rehab/SNF/etc)   Type of Post Acute Facility Services Skilled nursing   Expected Discharge Disposition SNF   Does the patient need discharge transport arranged? Yes   RoundTrip coordination needed? No   Financial Resource Strain   How hard is it for you to pay for the very basics like food, housing, medical care, and heating? Not hard   Housing Stability   In the last 12 months, was there a time when you were not able to pay the mortgage or rent on time? N   In the past 12 months, how many times have you moved where you were living? 0   At any time in the past 12 months, were you homeless or living in a shelter (including now)? N   Transportation Needs   In the past 12 months, has lack of transportation kept you from medical appointments or from getting medications? no   In the past 12 months, has lack of transportation kept you from meetings, work, or from getting things needed for daily living? No   Patient Choice   Provider Choice list and CMS website (https://medicare.gov/care-compare#search) for post-acute Quality and Resource Measure Data were provided and reviewed with: Family  (09/23 - SNF list to patient's daughterVinh.)   Patient / Family choosing to utilize agency / facility established prior to hospitalization Yes  (If home, uses hha.)     DC/SDOH assessments completed with daughter (Paola) and her s/o (Walter) at bedside. Patient asleep and did not participate. Paola is primary support for patient other than patient has hha M-F, 4h/day and trying to  get increased to add 2h evening hha.  Social issues identified are that patient smokes but according to Paola will not be receptive to smoking cessation information.  Patient also has suffered much loss. Her  (Paola' step dad) passed two years ago and she has lost 2 daughters over the years - one in 1986 and another in past few years.  Per Paola, patient depressed & on wellbutrin and will most likely not be receptive to any information for grief counseling services.  Paola notes PMH of YVETTE but nothing current.  Paola also states patient with poor diet d/t depression and will only eat one healthy meal a day that Paola cooks and will eat junk food most of the day like chips and donuts.  Patient with 2 sons but Paola notes that patient is particular in only having her assist her.  Expectation set to periodically check in for support and should patient be receptive to any social work interventions for the above issues.      Jacki Zacarias (LSW, MSW)

## 2024-09-23 NOTE — PROGRESS NOTES
Communication Note    Patient Name: Kelly Martinez  MRN: [id number]  Today's Date: 9/23/2024   Room: 06/06-A    Discipline: Physical Therapy      Missed Visit: Yes  Missed Visit Reason:  (Pt leaving unit for angio and will be on bed rest for several hours upon returning. Plan to re-attempt as available and appropriate.)      09/23/24 at 9:23 AM   Melvi Galan, PT   Rehab Office: [phone]

## 2024-09-23 NOTE — PRE-PROCEDURE NOTE
Pre-Procedure H&P     Provider Assessment:  Diagnosis/Reason for Procedure: Cerebellar hemorrhage   Procedure: Diagnostic Cerebral Angiogram  Medications Reviewed:   yes   Prophylatic Antibiotics Needed:   no    Neuro status: A&Ox1-3, moving all extremities  Mouth Opening OK: yes   Neck Flexibility OK: yes   Sedation Plan: moderate sedation   COVID-19 Risk Consent:  Surgeon has reviewed key risks related to the risk of frederick COVID-19 and if they contract COVID-19 what the risks are.

## 2024-09-24 ENCOUNTER — APPOINTMENT (OUTPATIENT)
Dept: RADIOLOGY | Facility: HOSPITAL | Age: 79
End: 2024-09-24
Payer: COMMERCIAL

## 2024-09-24 LAB
ABO GROUP (TYPE) IN BLOOD: NORMAL
ALBUMIN SERPL BCP-MCNC: 3 G/DL (ref 3.4–5)
ALBUMIN SERPL BCP-MCNC: 3 G/DL (ref 3.4–5)
ANION GAP SERPL CALC-SCNC: 13 MMOL/L (ref 10–20)
ANION GAP SERPL CALC-SCNC: 14 MMOL/L (ref 10–20)
ANTIBODY SCREEN: NORMAL
APPEARANCE UR: CLEAR
BASOPHILS # BLD AUTO: 0.01 X10*3/UL (ref 0–0.1)
BASOPHILS NFR BLD AUTO: 0.1 %
BILIRUB UR STRIP.AUTO-MCNC: NEGATIVE MG/DL
BUN SERPL-MCNC: 45 MG/DL (ref 6–23)
BUN SERPL-MCNC: 48 MG/DL (ref 6–23)
CA-I BLD-SCNC: 1.19 MMOL/L (ref 1.1–1.33)
CALCIUM SERPL-MCNC: 8.1 MG/DL (ref 8.6–10.6)
CALCIUM SERPL-MCNC: 8.3 MG/DL (ref 8.6–10.6)
CHLORIDE SERPL-SCNC: 117 MMOL/L (ref 98–107)
CHLORIDE SERPL-SCNC: 118 MMOL/L (ref 98–107)
CO2 SERPL-SCNC: 14 MMOL/L (ref 21–32)
CO2 SERPL-SCNC: 16 MMOL/L (ref 21–32)
COLOR UR: ABNORMAL
CREAT SERPL-MCNC: 2.67 MG/DL (ref 0.5–1.05)
CREAT SERPL-MCNC: 2.9 MG/DL (ref 0.5–1.05)
EGFRCR SERPLBLD CKD-EPI 2021: 16 ML/MIN/1.73M*2
EGFRCR SERPLBLD CKD-EPI 2021: 18 ML/MIN/1.73M*2
EOSINOPHIL # BLD AUTO: 0 X10*3/UL (ref 0–0.4)
EOSINOPHIL NFR BLD AUTO: 0 %
ERYTHROCYTE [DISTWIDTH] IN BLOOD BY AUTOMATED COUNT: 15.3 % (ref 11.5–14.5)
GLUCOSE BLD MANUAL STRIP-MCNC: 100 MG/DL (ref 74–99)
GLUCOSE BLD MANUAL STRIP-MCNC: 100 MG/DL (ref 74–99)
GLUCOSE BLD MANUAL STRIP-MCNC: 105 MG/DL (ref 74–99)
GLUCOSE BLD MANUAL STRIP-MCNC: 109 MG/DL (ref 74–99)
GLUCOSE BLD MANUAL STRIP-MCNC: 115 MG/DL (ref 74–99)
GLUCOSE BLD MANUAL STRIP-MCNC: 122 MG/DL (ref 74–99)
GLUCOSE BLD MANUAL STRIP-MCNC: 90 MG/DL (ref 74–99)
GLUCOSE SERPL-MCNC: 111 MG/DL (ref 74–99)
GLUCOSE SERPL-MCNC: 97 MG/DL (ref 74–99)
GLUCOSE UR STRIP.AUTO-MCNC: NORMAL MG/DL
HCT VFR BLD AUTO: 31.7 % (ref 36–46)
HGB BLD-MCNC: 11 G/DL (ref 12–16)
HOLD SPECIMEN: NORMAL
IMM GRANULOCYTES # BLD AUTO: 0.03 X10*3/UL (ref 0–0.5)
IMM GRANULOCYTES NFR BLD AUTO: 0.3 % (ref 0–0.9)
INR PPP: 2.3 (ref 0.9–1.1)
INR PPP: 2.9 (ref 0.9–1.1)
KETONES UR STRIP.AUTO-MCNC: NEGATIVE MG/DL
LEUKOCYTE ESTERASE UR QL STRIP.AUTO: NEGATIVE
LYMPHOCYTES # BLD AUTO: 0.32 X10*3/UL (ref 0.8–3)
LYMPHOCYTES NFR BLD AUTO: 2.9 %
MAGNESIUM SERPL-MCNC: 2.13 MG/DL (ref 1.6–2.4)
MCH RBC QN AUTO: 30.4 PG (ref 26–34)
MCHC RBC AUTO-ENTMCNC: 34.7 G/DL (ref 32–36)
MCV RBC AUTO: 88 FL (ref 80–100)
MONOCYTES # BLD AUTO: 0.4 X10*3/UL (ref 0.05–0.8)
MONOCYTES NFR BLD AUTO: 3.7 %
MUCOUS THREADS #/AREA URNS AUTO: NORMAL /LPF
NEUTROPHILS # BLD AUTO: 10.15 X10*3/UL (ref 1.6–5.5)
NEUTROPHILS NFR BLD AUTO: 93 %
NITRITE UR QL STRIP.AUTO: NEGATIVE
NRBC BLD-RTO: 0 /100 WBCS (ref 0–0)
PH UR STRIP.AUTO: 5.5 [PH]
PHOSPHATE SERPL-MCNC: 4.5 MG/DL (ref 2.5–4.9)
PHOSPHATE SERPL-MCNC: 4.7 MG/DL (ref 2.5–4.9)
PLATELET # BLD AUTO: 183 X10*3/UL (ref 150–450)
POTASSIUM SERPL-SCNC: 4.2 MMOL/L (ref 3.5–5.3)
POTASSIUM SERPL-SCNC: 4.3 MMOL/L (ref 3.5–5.3)
PROT UR STRIP.AUTO-MCNC: ABNORMAL MG/DL
PROTHROMBIN TIME: 26.6 SECONDS (ref 9.8–12.8)
PROTHROMBIN TIME: 33.2 SECONDS (ref 9.8–12.8)
RBC # BLD AUTO: 3.62 X10*6/UL (ref 4–5.2)
RBC # UR STRIP.AUTO: NEGATIVE /UL
RBC #/AREA URNS AUTO: NORMAL /HPF
RH FACTOR (ANTIGEN D): NORMAL
SODIUM SERPL-SCNC: 141 MMOL/L (ref 136–145)
SODIUM SERPL-SCNC: 143 MMOL/L (ref 136–145)
SP GR UR STRIP.AUTO: 1.04
UFH PPP CHRO-ACNC: 0.1 IU/ML
UROBILINOGEN UR STRIP.AUTO-MCNC: NORMAL MG/DL
WBC # BLD AUTO: 10.9 X10*3/UL (ref 4.4–11.3)
WBC #/AREA URNS AUTO: NORMAL /HPF

## 2024-09-24 PROCEDURE — 2500000001 HC RX 250 WO HCPCS SELF ADMINISTERED DRUGS (ALT 637 FOR MEDICARE OP)

## 2024-09-24 PROCEDURE — 2020000001 HC ICU ROOM DAILY

## 2024-09-24 PROCEDURE — 37799 UNLISTED PX VASCULAR SURGERY: CPT

## 2024-09-24 PROCEDURE — 85610 PROTHROMBIN TIME: CPT

## 2024-09-24 PROCEDURE — 85520 HEPARIN ASSAY: CPT

## 2024-09-24 PROCEDURE — 80069 RENAL FUNCTION PANEL: CPT

## 2024-09-24 PROCEDURE — 2500000002 HC RX 250 W HCPCS SELF ADMINISTERED DRUGS (ALT 637 FOR MEDICARE OP, ALT 636 FOR OP/ED)

## 2024-09-24 PROCEDURE — 85025 COMPLETE CBC W/AUTO DIFF WBC: CPT

## 2024-09-24 PROCEDURE — 2500000004 HC RX 250 GENERAL PHARMACY W/ HCPCS (ALT 636 FOR OP/ED)

## 2024-09-24 PROCEDURE — 83735 ASSAY OF MAGNESIUM: CPT

## 2024-09-24 PROCEDURE — 71045 X-RAY EXAM CHEST 1 VIEW: CPT | Performed by: RADIOLOGY

## 2024-09-24 PROCEDURE — 86901 BLOOD TYPING SEROLOGIC RH(D): CPT

## 2024-09-24 PROCEDURE — 99223 1ST HOSP IP/OBS HIGH 75: CPT

## 2024-09-24 PROCEDURE — 2500000001 HC RX 250 WO HCPCS SELF ADMINISTERED DRUGS (ALT 637 FOR MEDICARE OP): Performed by: REGISTERED NURSE

## 2024-09-24 PROCEDURE — 71045 X-RAY EXAM CHEST 1 VIEW: CPT

## 2024-09-24 PROCEDURE — 86922 COMPATIBILITY TEST ANTIGLOB: CPT

## 2024-09-24 PROCEDURE — 99291 CRITICAL CARE FIRST HOUR: CPT

## 2024-09-24 PROCEDURE — 2500000005 HC RX 250 GENERAL PHARMACY W/O HCPCS

## 2024-09-24 PROCEDURE — 82947 ASSAY GLUCOSE BLOOD QUANT: CPT

## 2024-09-24 PROCEDURE — 82330 ASSAY OF CALCIUM: CPT

## 2024-09-24 RX ORDER — SODIUM CHLORIDE, SODIUM GLUCONATE, SODIUM ACETATE, POTASSIUM CHLORIDE AND MAGNESIUM CHLORIDE 30; 37; 368; 526; 502 MG/100ML; MG/100ML; MG/100ML; MG/100ML; MG/100ML
80 INJECTION, SOLUTION INTRAVENOUS CONTINUOUS
Status: DISCONTINUED | OUTPATIENT
Start: 2024-09-24 | End: 2024-09-24

## 2024-09-24 RX ORDER — SODIUM CHLORIDE, SODIUM GLUCONATE, SODIUM ACETATE, POTASSIUM CHLORIDE AND MAGNESIUM CHLORIDE 30; 37; 368; 526; 502 MG/100ML; MG/100ML; MG/100ML; MG/100ML; MG/100ML
80 INJECTION, SOLUTION INTRAVENOUS CONTINUOUS
Status: DISPENSED | OUTPATIENT
Start: 2024-09-25 | End: 2024-09-25

## 2024-09-24 RX ORDER — PHYTONADIONE 5 MG/1
10 TABLET ORAL ONCE
Status: COMPLETED | OUTPATIENT
Start: 2024-09-24 | End: 2024-09-24

## 2024-09-24 RX ORDER — SODIUM CHLORIDE, SODIUM GLUCONATE, SODIUM ACETATE, POTASSIUM CHLORIDE AND MAGNESIUM CHLORIDE 30; 37; 368; 526; 502 MG/100ML; MG/100ML; MG/100ML; MG/100ML; MG/100ML
80 INJECTION, SOLUTION INTRAVENOUS CONTINUOUS
Status: DISCONTINUED | OUTPATIENT
Start: 2024-09-25 | End: 2024-09-24

## 2024-09-24 ASSESSMENT — PAIN SCALES - GENERAL
PAINLEVEL_OUTOF10: 0 - NO PAIN
PAINLEVEL_OUTOF10: 0 - NO PAIN

## 2024-09-24 ASSESSMENT — PAIN - FUNCTIONAL ASSESSMENT
PAIN_FUNCTIONAL_ASSESSMENT: 0-10
PAIN_FUNCTIONAL_ASSESSMENT: 0-10
PAIN_FUNCTIONAL_ASSESSMENT: CPOT (CRITICAL CARE PAIN OBSERVATION TOOL)

## 2024-09-24 NOTE — PROGRESS NOTES
Communication Note    Patient Name: Kelly Martinez  MRN: [id number]  Today's Date: 9/24/2024   Room: 06/06-A    Discipline: Physical Therapy      Missed Visit: Yes  Missed Visit Reason:  (Per fellow, pt possibly pending IVCF placement. Plan to re-attempt as available and appropriate.)      09/24/24 at 8:45 AM   Melvi Galan, PT   Rehab Office: [phone]      s

## 2024-09-24 NOTE — SIGNIFICANT EVENT
Neurology Sign Off Note    See initial consult note for details    - Patient had angio on 9/23/24 which demonstrated L cerebellar AVM with L SCA and L PICA feeders.   - -150  - Will need stroke neurology follow up after discharge    Stroke neurology will sign off. Please feel free to reach out with any further questions or concerns.     Krys Weiner MD   PGY-3 Neurology  Stroke [phone]

## 2024-09-24 NOTE — CARE PLAN
Vascular medicine team has been asked to comment on indication for IVC filter due to increased risk of DVT given her prolonged hospitalization/immobility and history of DVT/PE.  Patient currently does not have any acute DVTs per venous Doppler (9/23/2024.)  There is no indication for IVC filter placement at this time. Patient is currently on subcu heparin 5000 units every 8 hours for DVT prophylaxis.  Advised the primary team to continue with DVT prophylaxis with subcu heparin and apply SCDs to lower extremities and call us back when ready for full dose anticoagulation.  The above plan was discussed with  attending, Dr. Haile.    Prerna Kilgore MD   Fellow

## 2024-09-24 NOTE — PROGRESS NOTES
Communication Note    Patient Name: Kelly Martinez  MRN: [id number]  Today's Date: 9/24/2024   Room: 06/06-A    Discipline: Occupational Therapy      Missed Visit: Yes  Missed Visit Reason:  (Per MD, pt with multiple DVTs possible plans for IVC filter. Will defer OT at this time.)      09/24/24 at 9:25 AM   Miesha Ann OT   Rehab Office: [phone]

## 2024-09-24 NOTE — CONSULTS
Inpatient consult to Medicine  Consult performed by: Erasto Aguilar MD  Consult ordered by: Indigo Medina MD          Reason For Consult  Pre-operative clearance and risk stratification    History Of Present Illness  Kelly Martinez is a 79 y.o. female  with past medical history of PE/recurrent DVT s/p IVCF, HFpEF, hypertension, hyperlipidemia, JOY, schizophrenia, vascular parkinson, COPD, and CKD IV who presented to [facility] with cerebellar ICH with surrounding SAH. Admitted 9/21/2024 with cerebellar hemorrhage (Multi) after presenting s/p fall and confusion. CTH demonstrated right vermian cerebellar ICH with right cerebellar hemisphere ICH and/or SAH with 4th ventricular effacement. S/p Kcentra. Angiogram 9/23 showing  diffuse left cerebellar AVM with L PICA and L SCA arterial feeders. Consulted for pre-op clearance.     Past Medical History  She has a past medical history of Other pulmonary embolism without acute cor pulmonale (Multi) (07/21/2013), Personal history of other mental and behavioral disorders, and Personal history of other venous thrombosis and embolism.    Surgical History  She has a past surgical history that includes Knee surgery (04/06/2016) and Neck surgery (04/06/2016).     Social History  She reports that she has been smoking cigarettes. She has been exposed to tobacco smoke. She has never used smokeless tobacco. She reports that she does not currently use alcohol. No history on file for drug use.    Family History  No family history on file.     Allergies  Meclizine, Naproxen, and Piperacillin-tazobactam-dextrs    Review of Systems     Physical Exam  Cardiovascular:      Rate and Rhythm: Normal rate and regular rhythm.      Pulses: Normal pulses.      Heart sounds: Normal heart sounds.   Pulmonary:      Effort: Pulmonary effort is normal.      Breath sounds: Normal breath sounds.   Abdominal:      General: Abdomen is flat.      Palpations: Abdomen is soft.   Skin:     General: Skin is  warm and dry.   Neurological:      Mental Status: Mental status is at baseline.      Comments: A&O x1          Last Recorded Vitals  /56   Pulse 61   Temp 35.8 °C (96.4 °F) (Temporal)   Resp 13   Wt 109 kg (239 lb 13.8 oz)   SpO2 99%     Relevant Results  - 9/23: Angio: Diffuse left cerebellar AVM with L PICA and L SCA arterial feeders     -9/21: CT head non-contrast  IMPRESSION:  CT HEAD:  1. Focal areas of hyperdensity within the right cerebellar hemisphere  and of the cerebellar vermis suggestive of intraparenchymal hematoma.  No significant mass effect or midline shift. The ventricles and basal  cisterns are patent. Recommend follow-up CT in 48 hours to ensure  stability.  2. Serpiginous hyperdensity over the sulci of the right cerebellar  hemisphere representing subarachnoid hemorrhage.     Assessment/Plan     Kelyl Martinez is a 79 y.o. female  with past medical history of PE/recurrent DVT s/p IVCF, HFpEF, hypertension, hyperlipidemia, JOY, schizophrenia, vascular parkinson, COPD, and CKD IV who presented to [facility] with cerebellar ICH with surrounding SAH. Admitted 9/21/2024 with cerebellar hemorrhage (Multi) after presenting s/p fall and confusion. CTH demonstrated right vermian cerebellar ICH with right cerebellar hemisphere ICH and/or SAH with 4th ventricular effacement. Angiogram 9/23 showing  diffuse left cerebellar AVM with L PICA and L SCA arterial feeders. Consulted for pre-op clearance/optimization    -RCRI Class IV risk, 15% 30-day risk of death , MI, or cardiac arrest (1 point history of CHF, 1 point history of stroke, 1 point Pre-op Cr >2)  -DASI 3.63 METS  -Echo (9/21) EF 74%, mild LVH, mildly dilated LA, Mildly elevated right ventricular systolic pressure    Recs:  -Patient is moderate surgical risk given RCRI, low functional status at baseline and current clinical picture  -Given history of HFpEF, clinical hypervolemia (Bilateral lower extremity edema), and evidence of pulmonary  congestion on imaging, recommend being cautious with administration of IV fluids. Can give IV lasix 40mg x1 prior to OR.   -Continue to monitor fluid status    -Medicine will sign off, reach out with further questions     *Recommendations not finalized until signed by attending*    Erasto Aguilar MD  PGY-1

## 2024-09-24 NOTE — PROGRESS NOTES
"Kelly Martinez is a 79 y.o. female on day 3 of admission presenting with Cerebellar hemorrhage (Multi).    Subjective   NAEO       Objective     Physical Exam  awake  Ox1-2, confused  RUE D3 (chronic) o/w 5/5    Last Recorded Vitals  Blood pressure 138/56, pulse 61, temperature 35.8 °C (96.4 °F), temperature source Temporal, resp. rate 13, height 1.702 m (5' 7\"), weight 109 kg (239 lb 13.8 oz), SpO2 99%.  Intake/Output last 3 Shifts:  I/O last 3 completed shifts:  In: 4120.8 (37.9 mL/kg) [P.O.:240; I.V.:3880.8 (35.7 mL/kg)]  Out: 1400 (12.9 mL/kg) [Urine:1400 (0.4 mL/kg/hr)]  Weight: 108.8 kg     Relevant Results            This patient currently has cardiac telemetry ordered; if you would like to modify or discontinue the telemetry order, click here to go to the orders activity to modify/discontinue the order.      Results for orders placed or performed during the hospital encounter of 09/21/24 (from the past 24 hour(s))   POCT GLUCOSE   Result Value Ref Range    POCT Glucose 114 (H) 74 - 99 mg/dL   POCT GLUCOSE   Result Value Ref Range    POCT Glucose 124 (H) 74 - 99 mg/dL   Renal function panel   Result Value Ref Range    Glucose 112 (H) 74 - 99 mg/dL    Sodium 141 136 - 145 mmol/L    Potassium 4.3 3.5 - 5.3 mmol/L    Chloride 116 (H) 98 - 107 mmol/L    Bicarbonate 15 (L) 21 - 32 mmol/L    Anion Gap 14 10 - 20 mmol/L    Urea Nitrogen 44 (H) 6 - 23 mg/dL    Creatinine 2.91 (H) 0.50 - 1.05 mg/dL    eGFR 16 (L) >60 mL/min/1.73m*2    Calcium 8.4 (L) 8.6 - 10.6 mg/dL    Phosphorus 4.5 2.5 - 4.9 mg/dL    Albumin 3.1 (L) 3.4 - 5.0 g/dL   POCT GLUCOSE   Result Value Ref Range    POCT Glucose 102 (H) 74 - 99 mg/dL   Urinalysis with Reflex Culture and Microscopic   Result Value Ref Range    Color, Urine Light-Yellow Light-Yellow, Yellow, Dark-Yellow    Appearance, Urine Clear Clear    Specific Gravity, Urine 1.043 (N) 1.005 - 1.035    pH, Urine 5.5 5.0, 5.5, 6.0, 6.5, 7.0, 7.5, 8.0    Protein, Urine 20 (TRACE) NEGATIVE, 10 " (TRACE), 20 (TRACE) mg/dL    Glucose, Urine Normal Normal mg/dL    Blood, Urine NEGATIVE NEGATIVE    Ketones, Urine NEGATIVE NEGATIVE mg/dL    Bilirubin, Urine NEGATIVE NEGATIVE    Urobilinogen, Urine Normal Normal mg/dL    Nitrite, Urine NEGATIVE NEGATIVE    Leukocyte Esterase, Urine NEGATIVE NEGATIVE   Urinalysis Microscopic   Result Value Ref Range    WBC, Urine NONE 1-5, NONE /HPF    RBC, Urine NONE NONE, 1-2, 3-5 /HPF    Mucus, Urine FEW Reference range not established. /LPF   POCT GLUCOSE   Result Value Ref Range    POCT Glucose 122 (H) 74 - 99 mg/dL   Calcium, Ionized   Result Value Ref Range    POCT Calcium, Ionized 1.19 1.1 - 1.33 mmol/L   Magnesium   Result Value Ref Range    Magnesium 2.13 1.60 - 2.40 mg/dL   CBC and Auto Differential   Result Value Ref Range    WBC 10.9 4.4 - 11.3 x10*3/uL    nRBC 0.0 0.0 - 0.0 /100 WBCs    RBC 3.62 (L) 4.00 - 5.20 x10*6/uL    Hemoglobin 11.0 (L) 12.0 - 16.0 g/dL    Hematocrit 31.7 (L) 36.0 - 46.0 %    MCV 88 80 - 100 fL    MCH 30.4 26.0 - 34.0 pg    MCHC 34.7 32.0 - 36.0 g/dL    RDW 15.3 (H) 11.5 - 14.5 %    Platelets 183 150 - 450 x10*3/uL    Neutrophils % 93.0 40.0 - 80.0 %    Immature Granulocytes %, Automated 0.3 0.0 - 0.9 %    Lymphocytes % 2.9 13.0 - 44.0 %    Monocytes % 3.7 2.0 - 10.0 %    Eosinophils % 0.0 0.0 - 6.0 %    Basophils % 0.1 0.0 - 2.0 %    Neutrophils Absolute 10.15 (H) 1.60 - 5.50 x10*3/uL    Immature Granulocytes Absolute, Automated 0.03 0.00 - 0.50 x10*3/uL    Lymphocytes Absolute 0.32 (L) 0.80 - 3.00 x10*3/uL    Monocytes Absolute 0.40 0.05 - 0.80 x10*3/uL    Eosinophils Absolute 0.00 0.00 - 0.40 x10*3/uL    Basophils Absolute 0.01 0.00 - 0.10 x10*3/uL   Renal function panel   Result Value Ref Range    Glucose 111 (H) 74 - 99 mg/dL    Sodium 141 136 - 145 mmol/L    Potassium 4.3 3.5 - 5.3 mmol/L    Chloride 117 (H) 98 - 107 mmol/L    Bicarbonate 14 (L) 21 - 32 mmol/L    Anion Gap 14 10 - 20 mmol/L    Urea Nitrogen 45 (H) 6 - 23 mg/dL     Creatinine 2.90 (H) 0.50 - 1.05 mg/dL    eGFR 16 (L) >60 mL/min/1.73m*2    Calcium 8.3 (L) 8.6 - 10.6 mg/dL    Phosphorus 4.5 2.5 - 4.9 mg/dL    Albumin 3.0 (L) 3.4 - 5.0 g/dL   Heparin Assay   Result Value Ref Range    Heparin Unfractionated 0.1 See Comment Below for Therapeutic Ranges IU/mL   POCT GLUCOSE   Result Value Ref Range    POCT Glucose 109 (H) 74 - 99 mg/dL   POCT GLUCOSE   Result Value Ref Range    POCT Glucose 100 (H) 74 - 99 mg/dL                Assessment/Plan   Assessment & Plan  Cerebellar hemorrhage (Multi)    79 years old female with h/o parkinson's, HTN, HLD, HFpEF, 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 (s/p IVC filter removed 4/2016), JOY (CPAP at night), COPD, CKD stage 4, chronic tobacco use, schizophrenia, depression, p/w AMS, hypertensive 's, recent fall 2days ago, CTH R cerebellar and cerebellar vermis hemorrhage with 4th ventricular effacement, stable vents, CT C spine C5-7 ACDF, CTA c/f L PICA dAVM, s/p kcentra, TTE 74% EF, no wma, rCTH stable ICH, c/f L PICA dAVM, 9/22 MRI/V motion degraded, no obvious underlying mass or vascular lesion  9/23 BLE DVT US chronic changes, 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

## 2024-09-24 NOTE — CARE PLAN
Problem: Fall/Injury  Goal: Not fall by end of shift  9/24/2024 0730 by Renita Daniels RN  Outcome: Progressing  9/24/2024 0729 by Renita Daniels RN  Outcome: Progressing  Goal: Be free from injury by end of the shift  9/24/2024 0730 by Renita Daniels RN  Outcome: Progressing  9/24/2024 0729 by Renita Daniels RN  Outcome: Progressing  Goal: Verbalize understanding of personal risk factors for fall in the hospital  Outcome: Progressing  Goal: Verbalize understanding of risk factor reduction measures to prevent injury from fall in the home  Outcome: Progressing     Problem: Pain - Adult  Goal: Verbalizes/displays adequate comfort level or baseline comfort level  9/24/2024 0730 by Renita Daniels RN  Outcome: Progressing  9/24/2024 0729 by Renita Daniels RN  Outcome: Progressing     Problem: Safety - Adult  Goal: Free from fall injury  9/24/2024 0730 by Renita Daniels RN  Outcome: Progressing  9/24/2024 0729 by Renita Daniels RN  Outcome: Progressing     Problem: Pain  Goal: Takes deep breaths with improved pain control throughout the shift  Outcome: Progressing  Goal: Turns in bed with improved pain control throughout the shift  Outcome: Progressing     Problem: General Stroke  Goal: No symptoms of aspiration throughout shift  Outcome: Progressing  Goal: No symptoms of hemorrhage throughout shift  Outcome: Progressing  Goal: Controlled blood glucose throughout shift  9/24/2024 0730 by Renita Daniels RN  Outcome: Progressing  9/24/2024 0729 by Renita Daniels RN  Outcome: Progressing     Problem: ICU Stroke  Goal: Maintain patent airway throughout shift  Outcome: Progressing     Problem: Skin  Goal: Participates in plan/prevention/treatment measures  9/24/2024 0730 by Renita Daniels RN  Outcome: Progressing  9/24/2024 0729 by Renita Daniels RN  Outcome: Progressing  Goal: Prevent/manage excess moisture  9/24/2024 0730 by Renita Daniels RN  Outcome: Progressing  9/24/2024 0729 by Renita  SUE Daniels  Outcome: Progressing  Goal: Prevent/minimize sheer/friction injuries  Outcome: Progressing     Problem: Safety - Medical Restraint  Goal: Remains free of injury from restraints (Restraint for Interference with Medical Device)  9/24/2024 0730 by Renita Daniels RN  Outcome: Progressing  9/24/2024 0730 by Renita Daniels RN  Outcome: Progressing  Goal: Free from restraint(s) (Restraint for Interference with Medical Device)  9/24/2024 0730 by Renita Daniels RN  Outcome: Progressing  9/24/2024 0730 by Renita Daniels RN  Outcome: Progressing

## 2024-09-24 NOTE — PROGRESS NOTES
[facility]  NEUROSCIENCE INTENSIVE CARE UNIT  DAILY PROGRESS NOTE       Patient Name: Kelly Martinez   MRN: [id number]     Admit Date: 2024     : 1945 AGE: 79 y.o. GENDER: female      Subjective    79 year-old female with past medical history of PE/recurrent DVT s/p IVCF, HFpEF, hypertension, hyperlipidemia, JOY, schizophrenia, COPD, and CKD IV who presented to [facility] with cerebellar ICH with surrounding SAH. Admitted 2024 with cerebellar hemorrhage (Multi) after presenting s/p fall and confusion. CTH demonstrated right vermian cerebellar ICH with right cerebellar hemisphere ICH and/or SAH with 4th ventricular effacement. S/p Kcentra. CTA head/neck without aneurysm or vascular malformation. Admitted to NSU for neurological monitoring and hypertension management on Cardene infusion. Neurology consulted for co-management.     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     Objective   VITALS:  Temp:  [35.1 °C (95.2 °F)-36.7 °C (98.1 °F)] 36.6 °C (97.9 °F)  Heart Rate:  [51-71] 62  Resp:  [11-26] 14  BP: (136-144)/(56-77) 138/56  Arterial Line BP 1: (121-156)/(55-75) 129/58  INTAKE/OUTPUT:  Intake/Output Summary (Last 24 hours) at 2024 0649  Last data filed at 2024 0600  Gross per 24 hour   Intake 2720.18 ml   Output 650 ml   Net 2070.18 ml         PHYSICAL EXAM:  NEURO:  - Awake, oriented x3, mild dysarthria  - EOS, L-gaze preference, doesn't cross ML, pupils 3mm/reactive bilaterally,   - follows commands, WATKINS 5/5 strength, SILT  CV:  - RRR on telemetry, NSR  - Right radial arterial line in place  RESP:  - Regular, unlabored  - Oxygen: 2L  :  - Voids  GI:  - Abdomen NT/ND, soft  SKIN:  - R arm saline infiltration yesterday. PIV taken out & extremity elevated.   - R groin site intact, no hematoma. Distal pulses intact.      MEDICATIONS:  Scheduled: PRN: Continuous:   atorvastatin, 40 mg, oral, Daily  buPROPion XL, 150 mg, oral, Daily  carvedilol, 25 mg, oral, BID  [Held by provider] DULoxetine, 30 mg, oral, Daily  folic acid, 1 mg, oral, Daily  heparin (porcine), 5,000 Units, subcutaneous, q8h  hydrALAZINE, 50 mg, oral, TID  insulin lispro, 0-5 Units, subcutaneous, q4h  isosorbide mononitrate ER, 30 mg, oral, Daily  lisinopril, 40 mg, oral, Daily  mometasone, 2 puff, inhalation, BID  perflutren protein A microsphere, 0.5 mL, intravenous, Once in imaging  polyethylene glycol, 17 g, oral, Daily  QUEtiapine, 300 mg, oral, Nightly  sulfur hexafluoride microsphr, 2 mL, intravenous, Once in imaging  terazosin, 2 mg, oral, Daily     PRN medications: acetaminophen **OR** acetaminophen **OR** acetaminophen, dextrose, dextrose, glucagon, glucagon, hydrALAZINE, labetaloL, niCARdipine, oxygen, traZODone dexmedeTOMIDine, 0.1-1 mcg/kg/hr, Last Rate: Stopped (09/23/24 1530)  niCARdipine, 1-15 mg/hr, Last Rate: Stopped (09/23/24 0155)  sodium chloride 0.9%, 80 mL/hr, Last Rate: 120 mL/hr (09/23/24 2158)         LAB RESULTS:  Results from last 72 hours   Lab Units 09/24/24  0212 09/23/24  1822 09/23/24  0328   GLUCOSE mg/dL 111* 112* 123*   SODIUM mmol/L 141 141 143   POTASSIUM mmol/L 4.3 4.3 4.1   CHLORIDE mmol/L 117* 116* 117*   CO2 mmol/L 14* 15* 16*   ANION GAP mmol/L 14 14 14   BUN mg/dL 45* 44* 42*   CREATININE mg/dL 2.90* 2.91* 2.90*   EGFR mL/min/1.73m*2 16* 16* 16*   CALCIUM mg/dL 8.3* 8.4* 8.5*   PHOSPHORUS mg/dL 4.5 4.5 3.4   ALBUMIN g/dL 3.0* 3.1* 3.1*   MAGNESIUM mg/dL 2.13  --  1.70   POCT CALCIUM IONIZED (MMOL/L) IN BLOOD mmol/L 1.19  --  1.17      Results from last 72 hours   Lab Units 09/24/24  0212 09/23/24  0328   WBC AUTO x10*3/uL 10.9 11.6*   NRBC AUTO /100 WBCs 0.0 0.0   RBC AUTO x10*6/uL 3.62* 3.58*   HEMOGLOBIN g/dL 11.0* 10.6*   HEMATOCRIT % 31.7* 30.9*   MCV fL 88 86   MCH pg 30.4 29.6   MCHC g/dL 34.7 34.3   RDW % 15.3*  15.0*   PLATELETS AUTO x10*3/uL 183 189      Results from last 72 hours   Lab Units 09/24/24  0212 09/23/24  0328   WBC AUTO x10*3/uL 10.9 11.6*   NRBC AUTO /100 WBCs 0.0 0.0   RBC AUTO x10*6/uL 3.62* 3.58*   HEMOGLOBIN g/dL 11.0* 10.6*   HEMATOCRIT % 31.7* 30.9*   MCV fL 88 86   MCH pg 30.4 29.6   MCHC g/dL 34.7 34.3   RDW % 15.3* 15.0*   PLATELETS AUTO x10*3/uL 183 189   NEUTROS PCT AUTO % 93.0 92.3   IG PCT AUTO % 0.3 0.4   LYMPHS PCT AUTO % 2.9 5.1   MONOS PCT AUTO % 3.7 2.2   EOS PCT AUTO % 0.0 0.0   BASOS PCT AUTO % 0.1 0.0   NEUTROS ABS x10*3/uL 10.15* 10.73*   IG AUTO x10*3/uL 0.03 0.05   LYMPHS ABS AUTO x10*3/uL 0.32* 0.59*   MONOS ABS AUTO x10*3/uL 0.40 0.25   EOS ABS AUTO x10*3/uL 0.00 0.00   BASOS ABS AUTO x10*3/uL 0.01 0.00      Results from last 72 hours   Lab Units 09/21/24  0909 09/21/24  0706   PROTIME seconds 15.9* 13.9*   INR  1.4* 1.2*   APTT seconds 37 31      Results from last 72 hours   Lab Units 09/24/24  0212 09/23/24  1822   ALBUMIN g/dL 3.0* 3.1*     IMAGING RESULTS:  Vascular US lower extremity venous duplex bilateral   Final Result      MR brain w and wo IV contrast   Final Result   Postcontrast images somewhat limited due to patient motion artifact.   1. Intraparenchymal hematomas centered within the cerebellar vermis   and right cerebellar hemisphere, with scattered subarachnoid   hemorrhage throughout the right-greater-than-left cerebellar   hemispheres, similar to prior exams considering differences in   imaging technique. Additionally, there is surrounding vasogenic edema   throughout the cerebellum with minimal effacement of the 4th   ventricle. Otherwise no significant mass effect or midline shift.   Within the limitations of this exam, postcontrast images demonstrate   no abnormal areas of enhancement to suggest underlying mass or   evidence of vascular malformation.   2. Senescent changes of the brain, as detailed above.   3. No evidence of dural venous sinus thrombosis or deep  venous   occlusion was identified within the major intracranial venous   structures.        I personally reviewed the images/study and I agree with the findings   as stated by Dr. Arnulfo Gresham M.D. This study was interpreted at   [place], [place].        MACRO:   None        Signed by: Noe Kenny 9/22/2024 8:12 AM   Dictation workstation:   [id number]      MR venography intracranial w and wo IV contrast   Final Result   Postcontrast images somewhat limited due to patient motion artifact.   1. Intraparenchymal hematomas centered within the cerebellar vermis   and right cerebellar hemisphere, with scattered subarachnoid   hemorrhage throughout the right-greater-than-left cerebellar   hemispheres, similar to prior exams considering differences in   imaging technique. Additionally, there is surrounding vasogenic edema   throughout the cerebellum with minimal effacement of the 4th   ventricle. Otherwise no significant mass effect or midline shift.   Within the limitations of this exam, postcontrast images demonstrate   no abnormal areas of enhancement to suggest underlying mass or   evidence of vascular malformation.   2. Senescent changes of the brain, as detailed above.   3. No evidence of dural venous sinus thrombosis or deep venous   occlusion was identified within the major intracranial venous   structures.        I personally reviewed the images/study and I agree with the findings   as stated by Dr. Arnulfo Gresham M.D. This study was interpreted at   [place], [place].        MACRO:   None        Signed by: Noe Kenny 9/22/2024 8:12 AM   Dictation workstation:   [id number]      Transthoracic Echo (TTE) Complete   Final Result      CT head wo IV contrast   Final Result   Unchanged vermian hematoma continues to measure a proximally 1.5 cm   in size        Unchanged right infratentorial subdural or subarachnoid hemorrhage    unchanged        Increased number of slightly increased caliber branches of left PICA   and associated venous structures within the vermis. Vascular   malformation, neoplasm or fistula not excluded        MACRO:   Critical Finding:  See findings. Notification was initiated on   9/21/2024 at 10:12 am by  Doe Chand.  (**-OCF-**)        Signed by: Doe Chand 9/21/2024 10:14 AM   Dictation workstation:   [id number]      CT angio head and neck w and wo IV contrast   Final Result   Addendum (preliminary) 1 of 1   Interpreted By:  Doe Chand,    ADDENDUM:   3D reconstructions were performed at the diagnostic workstation and   demonstrate increase in the size and number of distal PICA branches   on the left with slight increased size and number of associated   venous structures along the lateral left cerebellar hemisphere.   Consider a dural venous fistula, parenchymal vascular malformation or   vascular neoplasm.        Signed by: Doe Chand 9/21/2024 10:15 AM        -------- ORIGINAL REPORT --------   Dictation workstation:   [id number]      Final   1. Redemonstrated intraparenchymal and subarachnoid hemorrhage within   the right cerebellar hemisphere.   2. No hemodynamically significant intracranial or extracranial   stenosis, occlusion, or aneurysm.        I personally reviewed the image(s)/study and resident interpretation.   I agree with the findings as stated by resident Ozzie Mattson.   Data analyzed and images interpreted at , [place], [place].        MACRO:   None.        Signed by: Doe Chand 9/21/2024 7:10 AM   Dictation workstation:   [id number]      US extremity nonvascular real time w image documentation limited anatomic specific   Final Result   Very limited exam as color Doppler images were not obtained and the   exam was prematurely terminated as per request of the emergency team.   Within these limitations, the right  femoral vein is only partially   compressible with echogenic material along the lumen raising   suspicion for partially occlusive thrombus, age-indeterminate.        The right calf veins and left lower extremity deep venous system were   not evaluated due to early termination of the examination.        I personally reviewed the images/study and I agree with the findings   as stated by Dr. Arnulfo Gresham M.D. This study was interpreted at   [facility], [place], [place].        MACRO:   None.        Signed by: Henry Taveras 9/21/2024 4:52 AM   Dictation workstation:   [id number]      CT angio chest for pulmonary embolism   Final Result   1. No evidence of acute pulmonary embolism.   2. Dilated main pulmonary artery measuring 3.6 cm, nonspecific but   can be seen with pulmonary hypertension.   3. Scattered ground-glass opacities with areas of interlobular septal   thickening, suspicious for pulmonary edema. Atypical infectious or   inflammatory process are not excluded.        I personally reviewed the image(s)/study and resident interpretation.   I agree with the findings as stated by resident Ozzie Mattson.   Data analyzed and images interpreted at [facility], [place], [place].        MACRO:   None        Signed by: Henry Taveras 9/21/2024 4:59 AM   Dictation workstation:   [id number]      CT head wo IV contrast   Final Result   CT HEAD:   1. Focal areas of hyperdensity within the right cerebellar hemisphere   and of the cerebellar vermis suggestive of intraparenchymal hematoma.   No significant mass effect or midline shift. The ventricles and basal   cisterns are patent. Recommend follow-up CT in 48 hours to ensure   stability.   2. Serpiginous hyperdensity over the sulci of the right cerebellar   hemisphere representing subarachnoid hemorrhage.        CT CERVICAL SPINE:   1. No acute fracture or traumatic malalignment of the cervical spine.    2. Surgical changes and multilevel spondylotic changes of the   cervical spine as detailed above.             I personally reviewed the image(s)/study and resident interpretation.   I agree with the findings as stated by resident Ozzie Mattson.   Data analyzed and images interpreted at [facility], [place], [place].        MACRO:   Ozzie Mattson discussed the significance and urgency of this   critical finding by telephone with  Federico Patel on 9/21/2024 at   4:01 am.  (**-RCF-**) Findings:  See findings.        Signed by: Henry Taveras 9/21/2024 4:31 AM   Dictation workstation:   [id number]      CT cervical spine wo IV contrast   Final Result   CT HEAD:   1. Focal areas of hyperdensity within the right cerebellar hemisphere   and of the cerebellar vermis suggestive of intraparenchymal hematoma.   No significant mass effect or midline shift. The ventricles and basal   cisterns are patent. Recommend follow-up CT in 48 hours to ensure   stability.   2. Serpiginous hyperdensity over the sulci of the right cerebellar   hemisphere representing subarachnoid hemorrhage.        CT CERVICAL SPINE:   1. No acute fracture or traumatic malalignment of the cervical spine.   2. Surgical changes and multilevel spondylotic changes of the   cervical spine as detailed above.             I personally reviewed the image(s)/study and resident interpretation.   I agree with the findings as stated by resident Ozzie Mattson.   Data analyzed and images interpreted at [facility], [place], [place].        MACRO:   Ozzie Mattson discussed the significance and urgency of this   critical finding by telephone with  Federico Patel on 9/21/2024 at   4:01 am.  (**-RCF-**) Findings:  See findings.        Signed by: Henry Taveras 9/21/2024 4:31 AM   Dictation workstation:   [id number]      XR chest 2 views   Final Result   Cardiomegaly with mild pulmonary edema.         I personally reviewed the image(s)/study and resident interpretation.   I agree with the findings as stated by resident Ozzie Mattson.   Data analyzed and images interpreted at [facility], [place], [place].        MACRO:   None        Signed by: Herny Taveras 9/21/2024 4:22 AM   Dictation workstation:   [id number]      Point of Care Ultrasound    (Results Pending)   IR angiogram cerebral bilateral    (Results Pending)   Referral to Neurointervention    (Results Pending)     Assessment/Plan    79 year-old female with past medical history of PE/recurrent DVT s/p IVCF, HFpEF, hypertension, hyperlipidemia, JOY, schizophrenia, vascular parkinson, COPD, and CKD IV who presented to [facility] with cerebellar ICH with surrounding SAH. Admitted 9/21/2024 with cerebellar hemorrhage (Multi) after presenting s/p fall and confusion. CTH demonstrated right vermian cerebellar ICH with right cerebellar hemisphere ICH and/or SAH with 4th ventricular effacement. S/p Kcentra. CTA head/neck without aneurysm or vascular malformation. Admitted to NSU for neurological monitoring and hypertension management on Cardene infusion. Neurology consulted for co-management.     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   Plan:  - NSU  - Neuro Checks: Q1H  - EVD watch  - Neurology stroke recommendations  - Warfarin/ASA re-start plan per neurosurgery  - Pain: acetaminophen PRN  - Continue Bupropion 150 mg daily, Cymbalta 30 mg daily, Quetiapine 300mg  nightly  - PT/OT  - Repeat Angio later this week. Medicine c/s for risk stratification. Ongoing discussion regarding AVM treatment -  likely needs endovascular intervention.     CARDIOVASCULAR:  #HTN, HLD  Assessment:  - Hypertensive on arrival to NSU requiring Cardene infusion  - 9/21/24 ECHO: ejection fraction 74%.m ild concentric LVH. Mildly dilated LA.   - Off Cardene since 9/23 AM   Plan:  - Continue to monitor on telemetry  - Atorvastatin 40mg daily, carvedilol 25mg BID, isosorbide mononitrate 30mg daily, lisinopril 40mg daily, Hydralazine 50 mg TID, Terazosin 2 mg daily   - BP goal: -150 mmHg (presenting SBP was 150-220 mmHg)    --> PRN: Labetalol, Hydralazine, and Nicardipine     RESPIRATORY:  #COPD  Assessment:  - On 2L NC   - not on home O2  Plan:  - Continuous pulse oximetry   - O2 PRN to maintain SpO2 > 94%, wean as tolerated  - Incentive spirometry while awake    RENAL/:  #CKD IV (baseline Cr ~2 )  #Hyperchloremic metabolic acidosis   Assessment:  Results from last 72 hours   Lab Units 09/24/24  0212 09/23/24  1822   BUN mg/dL 45* 44*   CREATININE mg/dL 2.90* 2.91*   - required straight cath last night. Made 650 ml uop overnight.   - Chloride 117 ->116 ->116  Plan:  - Monitor with daily RFP  - Renal recs: NS@80-100cc/hr hydration 3 hours before and after procedure   - IVF: NS @ 80 - Change to plasmalyte given high chloride. Will keep maintenance fluids today for hydration given recent angio & worsening kidney function.   - Maintain louie for strict I/Os    FEN/GI:  #No active issues  Assessment:  - Last BM: PTA  Plan:  - Monitor and replace electrolytes per protocol  - IVF: NS @ 80  - Diet: regular   - Bowel regimen: Miralax    ENDOCRINE:  #Hyperglycemia  Assessment:  Results from last 7 days   Lab Units 09/24/24  0418 09/24/24  0212 09/24/24  0018 09/23/24  1943 09/23/24  1822 09/23/24  1707 09/23/24  1154 09/23/24  0802   POCT GLUCOSE mg/dL 109*  --  122* 102*  --  124* 114* 119*   GLUCOSE  mg/dL  --  111*  --   --  112*  --   --   --    Plan:  - Accuchecks and ISS Q4H while NPO    HEMATOLOGY:  #Anemia (stable)  #Concern for DVT  Assessment:  Results from last 7 days   Lab Units 24  0212 24  0328   HEMOGLOBIN g/dL 11.0* 10.6*   HEMATOCRIT % 31.7* 30.9*   PLATELETS AUTO x10*3/uL 183 189   - Patient with history of recurrent DVTs  - Home meds: Warfarin 3 mg , , 1 mg ,,,Sa  -  DVT scan: Chronic changes R common fem,proximal fem & mid femoral veins. No DVTs. Chronic changes L distal external iliac, common femoral & proximal femoral veins.   Plan:  - Continue to monitor with daily CBC and Coag panel  - Consulted vascular medicine - signed off now    - Recommended AC on NSGY timing   - Will need to re-evaluate again today & discuss utility of IVC filter     INFECTIOUS DISEASE:  #Leukocytosis   Assessment:  Results from last 7 days   Lab Units 24  0212 24  0328   WBC AUTO x10*3/uL 10.9 11.6*   - Temp (24hrs), Av.2 °C (97.2 °F), Min:35.1 °C (95.2 °F), Max:36.7 °C (98.1 °F)    Plan:  - Continue to monitor for s/sx of infection  - Pan culture for temperature > 38.4 C    MUSCULOSKELETAL:  - No acute issues    SKIN:  - No acute issues  - Turns and skin care per NSU protocol    ACCESS:  - PIVs  - Right radial arterial line    PROPHYLAXIS:  - DVT Ppx: SCDs, SQH  - GI Ppx: not indicated    RESTRAINTS:  Not indicated/Patient does not meet criteria for restraints    Angeline Walters, APRN-CNP  Neuroscience Intensive Care    Total critical care time of 60 minutes, with > 50% of time spent in direct contact with patient/family for education, counseling and coordination of care.

## 2024-09-25 ENCOUNTER — APPOINTMENT (OUTPATIENT)
Dept: RADIOLOGY | Facility: HOSPITAL | Age: 79
End: 2024-09-25
Payer: COMMERCIAL

## 2024-09-25 ENCOUNTER — ANESTHESIA EVENT (OUTPATIENT)
Dept: RADIOLOGY | Facility: HOSPITAL | Age: 79
End: 2024-09-25
Payer: COMMERCIAL

## 2024-09-25 ENCOUNTER — ANESTHESIA (OUTPATIENT)
Dept: RADIOLOGY | Facility: HOSPITAL | Age: 79
End: 2024-09-25
Payer: COMMERCIAL

## 2024-09-25 PROBLEM — Q27.30 AVM (ARTERIOVENOUS MALFORMATION) (HHS-HCC): Status: ACTIVE | Noted: 2024-09-20

## 2024-09-25 LAB
ACT BLD: 166 SEC (ref 83–199)
ALBUMIN SERPL BCP-MCNC: 2.9 G/DL (ref 3.4–5)
ALBUMIN SERPL BCP-MCNC: 3.3 G/DL (ref 3.4–5)
ANION GAP BLDA CALCULATED.4IONS-SCNC: 11 MMO/L (ref 10–25)
ANION GAP BLDA CALCULATED.4IONS-SCNC: 12 MMO/L (ref 10–25)
ANION GAP SERPL CALC-SCNC: 12 MMOL/L (ref 10–20)
ANION GAP SERPL CALC-SCNC: 15 MMOL/L (ref 10–20)
APTT PPP: 46 SECONDS (ref 27–38)
APTT PPP: 52 SECONDS (ref 27–38)
BASE EXCESS BLDA CALC-SCNC: -8.2 MMOL/L (ref -2–3)
BASE EXCESS BLDA CALC-SCNC: -8.6 MMOL/L (ref -2–3)
BASOPHILS # BLD AUTO: 0 X10*3/UL (ref 0–0.1)
BASOPHILS NFR BLD AUTO: 0 %
BODY TEMPERATURE: ABNORMAL
BODY TEMPERATURE: ABNORMAL
BUN SERPL-MCNC: 50 MG/DL (ref 6–23)
BUN SERPL-MCNC: 51 MG/DL (ref 6–23)
CA-I BLD-SCNC: 1.26 MMOL/L (ref 1.1–1.33)
CA-I BLDA-SCNC: 1.2 MMOL/L (ref 1.1–1.33)
CA-I BLDA-SCNC: 1.25 MMOL/L (ref 1.1–1.33)
CALCIUM SERPL-MCNC: 8.4 MG/DL (ref 8.6–10.6)
CALCIUM SERPL-MCNC: 8.4 MG/DL (ref 8.6–10.6)
CHLORIDE BLDA-SCNC: 114 MMOL/L (ref 98–107)
CHLORIDE BLDA-SCNC: 116 MMOL/L (ref 98–107)
CHLORIDE SERPL-SCNC: 114 MMOL/L (ref 98–107)
CHLORIDE SERPL-SCNC: 117 MMOL/L (ref 98–107)
CO2 SERPL-SCNC: 18 MMOL/L (ref 21–32)
CO2 SERPL-SCNC: 18 MMOL/L (ref 21–32)
CREAT SERPL-MCNC: 2.81 MG/DL (ref 0.5–1.05)
CREAT SERPL-MCNC: 2.85 MG/DL (ref 0.5–1.05)
EGFRCR SERPLBLD CKD-EPI 2021: 17 ML/MIN/1.73M*2
EGFRCR SERPLBLD CKD-EPI 2021: 17 ML/MIN/1.73M*2
EOSINOPHIL # BLD AUTO: 0 X10*3/UL (ref 0–0.4)
EOSINOPHIL NFR BLD AUTO: 0 %
ERYTHROCYTE [DISTWIDTH] IN BLOOD BY AUTOMATED COUNT: 15.5 % (ref 11.5–14.5)
GLUCOSE BLD MANUAL STRIP-MCNC: 81 MG/DL (ref 74–99)
GLUCOSE BLD MANUAL STRIP-MCNC: 83 MG/DL (ref 74–99)
GLUCOSE BLD MANUAL STRIP-MCNC: 85 MG/DL (ref 74–99)
GLUCOSE BLD MANUAL STRIP-MCNC: 88 MG/DL (ref 74–99)
GLUCOSE BLD MANUAL STRIP-MCNC: 90 MG/DL (ref 74–99)
GLUCOSE BLD MANUAL STRIP-MCNC: 91 MG/DL (ref 74–99)
GLUCOSE BLDA-MCNC: 90 MG/DL (ref 74–99)
GLUCOSE BLDA-MCNC: 91 MG/DL (ref 74–99)
GLUCOSE SERPL-MCNC: 81 MG/DL (ref 74–99)
GLUCOSE SERPL-MCNC: 92 MG/DL (ref 74–99)
HCO3 BLDA-SCNC: 17.9 MMOL/L (ref 22–26)
HCO3 BLDA-SCNC: 17.9 MMOL/L (ref 22–26)
HCT VFR BLD AUTO: 28.9 % (ref 36–46)
HCT VFR BLD EST: 32 % (ref 36–46)
HCT VFR BLD EST: 47 % (ref 36–46)
HGB BLD-MCNC: 10 G/DL (ref 12–16)
HGB BLDA-MCNC: 10.5 G/DL (ref 12–16)
HGB BLDA-MCNC: 15.7 G/DL (ref 12–16)
IMM GRANULOCYTES # BLD AUTO: 0.04 X10*3/UL (ref 0–0.5)
IMM GRANULOCYTES NFR BLD AUTO: 0.5 % (ref 0–0.9)
INHALED O2 CONCENTRATION: 50 %
INHALED O2 CONCENTRATION: 60 %
INR PPP: 1.4 (ref 0.9–1.1)
INR PPP: 2.4 (ref 0.9–1.1)
LACTATE BLDA-SCNC: 0.6 MMOL/L (ref 0.4–2)
LACTATE BLDA-SCNC: 0.8 MMOL/L (ref 0.4–2)
LYMPHOCYTES # BLD AUTO: 0.47 X10*3/UL (ref 0.8–3)
LYMPHOCYTES NFR BLD AUTO: 5.6 %
MAGNESIUM SERPL-MCNC: 2.14 MG/DL (ref 1.6–2.4)
MCH RBC QN AUTO: 30.1 PG (ref 26–34)
MCHC RBC AUTO-ENTMCNC: 34.6 G/DL (ref 32–36)
MCV RBC AUTO: 87 FL (ref 80–100)
MONOCYTES # BLD AUTO: 0.46 X10*3/UL (ref 0.05–0.8)
MONOCYTES NFR BLD AUTO: 5.4 %
NEUTROPHILS # BLD AUTO: 7.48 X10*3/UL (ref 1.6–5.5)
NEUTROPHILS NFR BLD AUTO: 88.5 %
NRBC BLD-RTO: 0 /100 WBCS (ref 0–0)
OXYHGB MFR BLDA: 92.4 % (ref 94–98)
OXYHGB MFR BLDA: 94.3 % (ref 94–98)
PCO2 BLDA: 38 MM HG (ref 38–42)
PCO2 BLDA: 40 MM HG (ref 38–42)
PH BLDA: 7.26 PH (ref 7.38–7.42)
PH BLDA: 7.28 PH (ref 7.38–7.42)
PHOSPHATE SERPL-MCNC: 4.6 MG/DL (ref 2.5–4.9)
PHOSPHATE SERPL-MCNC: 4.8 MG/DL (ref 2.5–4.9)
PLATELET # BLD AUTO: 150 X10*3/UL (ref 150–450)
PO2 BLDA: 67 MM HG (ref 85–95)
PO2 BLDA: 79 MM HG (ref 85–95)
POTASSIUM BLDA-SCNC: 4 MMOL/L (ref 3.5–5.3)
POTASSIUM BLDA-SCNC: 4.2 MMOL/L (ref 3.5–5.3)
POTASSIUM SERPL-SCNC: 4 MMOL/L (ref 3.5–5.3)
POTASSIUM SERPL-SCNC: 4 MMOL/L (ref 3.5–5.3)
PROTHROMBIN TIME: 15.9 SECONDS (ref 9.8–12.8)
PROTHROMBIN TIME: 27 SECONDS (ref 9.8–12.8)
RBC # BLD AUTO: 3.32 X10*6/UL (ref 4–5.2)
SAO2 % BLDA: 94 % (ref 94–100)
SAO2 % BLDA: 97 % (ref 94–100)
SODIUM BLDA-SCNC: 139 MMOL/L (ref 136–145)
SODIUM BLDA-SCNC: 142 MMOL/L (ref 136–145)
SODIUM SERPL-SCNC: 143 MMOL/L (ref 136–145)
SODIUM SERPL-SCNC: 143 MMOL/L (ref 136–145)
UFH PPP CHRO-ACNC: 0.1 IU/ML
WBC # BLD AUTO: 8.5 X10*3/UL (ref 4.4–11.3)

## 2024-09-25 PROCEDURE — 2780000003 HC OR 278 NO HCPCS: Performed by: STUDENT IN AN ORGANIZED HEALTH CARE EDUCATION/TRAINING PROGRAM

## 2024-09-25 PROCEDURE — P9017 PLASMA 1 DONOR FRZ W/IN 8 HR: HCPCS

## 2024-09-25 PROCEDURE — 2500000004 HC RX 250 GENERAL PHARMACY W/ HCPCS (ALT 636 FOR OP/ED): Performed by: STUDENT IN AN ORGANIZED HEALTH CARE EDUCATION/TRAINING PROGRAM

## 2024-09-25 PROCEDURE — 71045 X-RAY EXAM CHEST 1 VIEW: CPT

## 2024-09-25 PROCEDURE — 84132 ASSAY OF SERUM POTASSIUM: CPT

## 2024-09-25 PROCEDURE — 85025 COMPLETE CBC W/AUTO DIFF WBC: CPT

## 2024-09-25 PROCEDURE — C1887 CATHETER, GUIDING: HCPCS | Performed by: STUDENT IN AN ORGANIZED HEALTH CARE EDUCATION/TRAINING PROGRAM

## 2024-09-25 PROCEDURE — A61626 PR PERCUT CATH OCCLUSN NON CNS LESN: Performed by: ANESTHESIOLOGY

## 2024-09-25 PROCEDURE — 2500000004 HC RX 250 GENERAL PHARMACY W/ HCPCS (ALT 636 FOR OP/ED)

## 2024-09-25 PROCEDURE — 3700000002 HC GENERAL ANESTHESIA TIME - EACH INCREMENTAL 1 MINUTE: Performed by: STUDENT IN AN ORGANIZED HEALTH CARE EDUCATION/TRAINING PROGRAM

## 2024-09-25 PROCEDURE — 36430 TRANSFUSION BLD/BLD COMPNT: CPT

## 2024-09-25 PROCEDURE — 2500000005 HC RX 250 GENERAL PHARMACY W/O HCPCS: Performed by: ANESTHESIOLOGIST ASSISTANT

## 2024-09-25 PROCEDURE — 75710 ARTERY X-RAYS ARM/LEG: CPT | Mod: RT | Performed by: STUDENT IN AN ORGANIZED HEALTH CARE EDUCATION/TRAINING PROGRAM

## 2024-09-25 PROCEDURE — 2500000005 HC RX 250 GENERAL PHARMACY W/O HCPCS

## 2024-09-25 PROCEDURE — 83735 ASSAY OF MAGNESIUM: CPT

## 2024-09-25 PROCEDURE — 99291 CRITICAL CARE FIRST HOUR: CPT

## 2024-09-25 PROCEDURE — 3700000001 HC GENERAL ANESTHESIA TIME - INITIAL BASE CHARGE: Performed by: STUDENT IN AN ORGANIZED HEALTH CARE EDUCATION/TRAINING PROGRAM

## 2024-09-25 PROCEDURE — 37799 UNLISTED PX VASCULAR SURGERY: CPT

## 2024-09-25 PROCEDURE — 2550000001 HC RX 255 CONTRASTS: Performed by: NEUROLOGICAL SURGERY

## 2024-09-25 PROCEDURE — 82947 ASSAY GLUCOSE BLOOD QUANT: CPT

## 2024-09-25 PROCEDURE — 36226 PLACE CATH VERTEBRAL ART: CPT | Mod: LT | Performed by: STUDENT IN AN ORGANIZED HEALTH CARE EDUCATION/TRAINING PROGRAM

## 2024-09-25 PROCEDURE — 2720000007 HC OR 272 NO HCPCS: Performed by: STUDENT IN AN ORGANIZED HEALTH CARE EDUCATION/TRAINING PROGRAM

## 2024-09-25 PROCEDURE — 36226 PLACE CATH VERTEBRAL ART: CPT | Performed by: STUDENT IN AN ORGANIZED HEALTH CARE EDUCATION/TRAINING PROGRAM

## 2024-09-25 PROCEDURE — 2500000005 HC RX 250 GENERAL PHARMACY W/O HCPCS: Performed by: NEUROLOGICAL SURGERY

## 2024-09-25 PROCEDURE — 71045 X-RAY EXAM CHEST 1 VIEW: CPT | Performed by: RADIOLOGY

## 2024-09-25 PROCEDURE — 37244 VASC EMBOLIZE/OCCLUDE BLEED: CPT | Mod: 52 | Performed by: STUDENT IN AN ORGANIZED HEALTH CARE EDUCATION/TRAINING PROGRAM

## 2024-09-25 PROCEDURE — 99140 ANES COMP EMERGENCY COND: CPT | Performed by: ANESTHESIOLOGY

## 2024-09-25 PROCEDURE — 85610 PROTHROMBIN TIME: CPT

## 2024-09-25 PROCEDURE — 99100 ANES PT EXTEME AGE<1 YR&>70: CPT | Performed by: ANESTHESIOLOGY

## 2024-09-25 PROCEDURE — 2500000004 HC RX 250 GENERAL PHARMACY W/ HCPCS (ALT 636 FOR OP/ED): Performed by: ANESTHESIOLOGIST ASSISTANT

## 2024-09-25 PROCEDURE — 85520 HEPARIN ASSAY: CPT

## 2024-09-25 PROCEDURE — A61626 PR PERCUT CATH OCCLUSN NON CNS LESN: Performed by: ANESTHESIOLOGIST ASSISTANT

## 2024-09-25 PROCEDURE — 85347 COAGULATION TIME ACTIVATED: CPT

## 2024-09-25 PROCEDURE — 82330 ASSAY OF CALCIUM: CPT

## 2024-09-25 PROCEDURE — 2020000001 HC ICU ROOM DAILY

## 2024-09-25 PROCEDURE — C1769 GUIDE WIRE: HCPCS | Performed by: STUDENT IN AN ORGANIZED HEALTH CARE EDUCATION/TRAINING PROGRAM

## 2024-09-25 PROCEDURE — 80069 RENAL FUNCTION PANEL: CPT

## 2024-09-25 PROCEDURE — C1760 CLOSURE DEV, VASC: HCPCS | Performed by: STUDENT IN AN ORGANIZED HEALTH CARE EDUCATION/TRAINING PROGRAM

## 2024-09-25 RX ORDER — GLYCOPYRROLATE 0.2 MG/ML
INJECTION INTRAMUSCULAR; INTRAVENOUS AS NEEDED
Status: DISCONTINUED | OUTPATIENT
Start: 2024-09-25 | End: 2024-09-25

## 2024-09-25 RX ORDER — FUROSEMIDE 10 MG/ML
40 INJECTION INTRAMUSCULAR; INTRAVENOUS ONCE
Status: COMPLETED | OUTPATIENT
Start: 2024-09-25 | End: 2024-09-25

## 2024-09-25 RX ORDER — ONDANSETRON HYDROCHLORIDE 2 MG/ML
INJECTION, SOLUTION INTRAVENOUS AS NEEDED
Status: DISCONTINUED | OUTPATIENT
Start: 2024-09-25 | End: 2024-09-25

## 2024-09-25 RX ORDER — FENTANYL CITRATE 50 UG/ML
INJECTION, SOLUTION INTRAMUSCULAR; INTRAVENOUS AS NEEDED
Status: DISCONTINUED | OUTPATIENT
Start: 2024-09-25 | End: 2024-09-25

## 2024-09-25 RX ORDER — PROPOFOL 10 MG/ML
INJECTION, EMULSION INTRAVENOUS AS NEEDED
Status: DISCONTINUED | OUTPATIENT
Start: 2024-09-25 | End: 2024-09-25

## 2024-09-25 RX ORDER — ROCURONIUM BROMIDE 10 MG/ML
INJECTION, SOLUTION INTRAVENOUS AS NEEDED
Status: DISCONTINUED | OUTPATIENT
Start: 2024-09-25 | End: 2024-09-25

## 2024-09-25 RX ORDER — PHENYLEPHRINE HCL IN 0.9% NACL 0.4MG/10ML
SYRINGE (ML) INTRAVENOUS AS NEEDED
Status: DISCONTINUED | OUTPATIENT
Start: 2024-09-25 | End: 2024-09-25

## 2024-09-25 RX ORDER — FUROSEMIDE 10 MG/ML
20 INJECTION INTRAMUSCULAR; INTRAVENOUS ONCE
Status: COMPLETED | OUTPATIENT
Start: 2024-09-25 | End: 2024-09-25

## 2024-09-25 RX ORDER — LABETALOL HYDROCHLORIDE 5 MG/ML
10 INJECTION, SOLUTION INTRAVENOUS EVERY 10 MIN PRN
Status: DISCONTINUED | OUTPATIENT
Start: 2024-09-25 | End: 2024-10-01

## 2024-09-25 RX ORDER — DEXMEDETOMIDINE HYDROCHLORIDE 4 UG/ML
.1-1.5 INJECTION, SOLUTION INTRAVENOUS CONTINUOUS
Status: DISCONTINUED | OUTPATIENT
Start: 2024-09-25 | End: 2024-09-26

## 2024-09-25 RX ORDER — LIDOCAINE HYDROCHLORIDE 20 MG/ML
INJECTION, SOLUTION INFILTRATION; PERINEURAL AS NEEDED
Status: DISCONTINUED | OUTPATIENT
Start: 2024-09-25 | End: 2024-09-25

## 2024-09-25 RX ORDER — NORETHINDRONE AND ETHINYL ESTRADIOL 0.5-0.035
KIT ORAL AS NEEDED
Status: DISCONTINUED | OUTPATIENT
Start: 2024-09-25 | End: 2024-09-25

## 2024-09-25 RX ORDER — ESMOLOL HYDROCHLORIDE 10 MG/ML
INJECTION INTRAVENOUS AS NEEDED
Status: DISCONTINUED | OUTPATIENT
Start: 2024-09-25 | End: 2024-09-25

## 2024-09-25 ASSESSMENT — PAIN - FUNCTIONAL ASSESSMENT
PAIN_FUNCTIONAL_ASSESSMENT: CPOT (CRITICAL CARE PAIN OBSERVATION TOOL)

## 2024-09-25 ASSESSMENT — PAIN SCALES - GENERAL: PAIN_LEVEL: 0

## 2024-09-25 ASSESSMENT — ENCOUNTER SYMPTOMS: EXERCISE TOLERANCE: POOR

## 2024-09-25 ASSESSMENT — COPD QUESTIONNAIRES: COPD: 1

## 2024-09-25 NOTE — ANESTHESIA PREPROCEDURE EVALUATION
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   - CTH demonstrated right vermian cerebellar ICH with right cerebellar hemisphere ICH and/or SAH with 4th ventricular effacement. S/p Kcentra.   - 9/22 MRI/V motion degraded, no obvious underlying mass or vascular lesion  - Angiogram 9/23 showing  diffuse left cerebellar AVM with L PICA and L SCA arterial feeders.   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    -RCRI Class IV risk, 15% 30-day risk of death , MI, or cardiac arrest (1 point history of CHF, 1 point history of stroke, 1 point Pre-op Cr >2)  -DASI 3.63 METS  -Echo (9/21) EF 74%, mild LVH, mildly dilated LA, Mildly elevated right ventricular systolic pressure  -Stress Test (4/24) no signs of ischemia          Anesthesia Evaluation     Patient summary reviewed    Airway   Mallampati: unable to assess  Dental      Comment: edentulous    Pulmonary    (+) COPD, sleep apnea  Cardiovascular   Exercise tolerance: poor  (+) hypertension, CHF (pEF)    ROS comment: HLD    Neuro/Psych    (+) psychiatric history (shizophrenia,depression)    Comments: Cerebellar hemorrhage    GI/Hepatic/Renal    (+) chronic renal disease (Stage IV) CRI    Endo/Other      Comments: Obesity  Anemia  Coagulopathy  Abdominal      Other findings: AO x 0                Clinical  information reviewed:                   NPO Detail:  No data recorded     There were no vitals filed for this visit.    Past Surgical History:   Procedure Laterality Date    KNEE SURGERY  04/06/2016    Knee Surgery    NECK SURGERY  04/06/2016    Neck Surgery     Past Medical History:   Diagnosis Date    Other pulmonary embolism without acute cor pulmonale (Multi) 07/21/2013    Pulmonary embolism    Personal history of other mental and behavioral disorders     History of depression    Personal history of other venous thrombosis and embolism     History of deep venous thrombosis     No current facility-administered medications for this visit.  No current outpatient medications on file.    Facility-Administered Medications Ordered in Other Visits:     acetaminophen (Tylenol) tablet 650 mg, 650 mg, oral, q4h PRN, 650 mg at 09/24/24 2106 **OR** acetaminophen (Tylenol) oral liquid 650 mg, 650 mg, nasogastric tube, q4h PRN **OR** acetaminophen (Tylenol) suppository 650 mg, 650 mg, rectal, q4h PRN, Julia Gallegos MD    atorvastatin (Lipitor) tablet 40 mg, 40 mg, oral, Daily, Julia Gallegos MD, 40 mg at 09/24/24 0821    buPROPion XL (Wellbutrin XL) 24 hr tablet 150 mg, 150 mg, oral, Daily, JESUS Bustos, 150 mg at 09/24/24 0821    carvedilol (Coreg) tablet 25 mg, 25 mg, oral, BID, Julia Gallegos MD, 25 mg at 09/24/24 1718    dextrose 50 % injection 12.5 g, 12.5 g, intravenous, q15 min PRN, JESUS Ramos    dextrose 50 % injection 25 g, 25 g, intravenous, q15 min PRN, JESUS Ramos    DULoxetine (Cymbalta) DR capsule 30 mg, 30 mg, oral, Daily, JESUS Bustos, 30 mg at 09/24/24 0821    electrolyte-A (Plasmalyte-A) solution, 80 mL/hr, intravenous, Continuous, JESUS Bustos, Last Rate: 80 mL/hr at 09/25/24 0525, 80 mL/hr at 09/25/24 0525    folic acid (Folvite) tablet 1 mg, 1 mg, oral, Daily, Julia Gallegos MD, 1 mg at 09/24/24 0822    glucagon (Glucagen) injection 1  mg, 1 mg, intramuscular, q15 min PRN, JESUS Ramos    glucagon (Glucagen) injection 1 mg, 1 mg, intramuscular, q15 min PRN, JESUS Ramos    heparin (porcine) injection 5,000 Units, 5,000 Units, subcutaneous, q8h, Kourtney Callahan MD, 5,000 Units at 09/24/24 2337    hydrALAZINE (Apresoline) injection 10 mg, 10 mg, intravenous, q20 min PRN, Julia Gallegos MD    hydrALAZINE (Apresoline) tablet 50 mg, 50 mg, oral, TID, Augie Funes, YUNIOR-AL, 50 mg at 09/24/24 2106    insulin lispro (HumaLOG) injection 0-5 Units, 0-5 Units, subcutaneous, q4h, JESUS Ramos, 1 Units at 09/22/24 0819    isosorbide mononitrate ER (Imdur) 24 hr tablet 30 mg, 30 mg, oral, Daily, Julia Gallegos MD, 30 mg at 09/24/24 0821    labetaloL (Normodyne,Trandate) injection 10 mg, 10 mg, intravenous, q10 min PRN, Julia Gallegos MD, 10 mg at 09/23/24 2152    [Held by provider] lisinopril tablet 40 mg, 40 mg, oral, Daily, Julia Gallegos MD, 40 mg at 09/24/24 0821    mometasone (Asmanex) 220 mcg/ actuation (14) inhaler 2 puff, 2 puff, inhalation, BID, Julia Gallegos MD, 2 puff at 09/24/24 2028    niCARdipine (Cardene) 40 mg in sodium chloride 200 mL (0.2 mg/mL) infusion (premix), 1-15 mg/hr, intravenous, Continuous PRN, Julia Gallegos MD, Stopped at 09/23/24 0155    oxygen (O2) therapy, , inhalation, Continuous PRN - O2/gases, Julia Gallegos MD, 2 L/min at 09/24/24 0814    perflutren protein A microsphere (Optison) injection 0.5 mL, 0.5 mL, intravenous, Once in imaging, Julia Gallegos MD    phytonadione (Vitamin K) 10 mg in dextrose 5% 50 mL IV, 10 mg, intravenous, q12h, Kourtney Callahan MD, Stopped at 09/25/24 0417    polyethylene glycol (Glycolax, Miralax) packet 17 g, 17 g, oral, Daily, Oseas Palomares, APRN-CNP, 17 g at 09/24/24 0822    QUEtiapine (SEROquel) tablet 300 mg, 300 mg, oral, Nightly, Julia Gallegos MD, 300 mg at 09/24/24 2106    sulfur hexafluoride microsphr (Lumason) injection 24.28 mg, 2  mL, intravenous, Once in imaging, Julia Gallegos MD    terazosin (Hytrin) capsule 2 mg, 2 mg, oral, Daily, Angeline Walters, APRN-CNP, 2 mg at 09/24/24 0822    traZODone (Desyrel) tablet 150 mg, 150 mg, oral, Nightly PRN, Julia Gallegos MD, 150 mg at 09/24/24 2200  Allergies   Allergen Reactions    Meclizine Dizziness and Unknown    Naproxen Itching    Piperacillin-Tazobactam-Dextrs Other     Social History     Tobacco Use    Smoking status: Every Day     Current packs/day: 0.50     Types: Cigarettes     Passive exposure: Current    Smokeless tobacco: Never   Substance Use Topics    Alcohol use: Not Currently         Chemistry    Lab Results   Component Value Date/Time     09/25/2024 0148    K 4.0 09/25/2024 0148     (H) 09/25/2024 0148    CO2 18 (L) 09/25/2024 0148    BUN 50 (H) 09/25/2024 0148    CREATININE 2.85 (H) 09/25/2024 0148    Lab Results   Component Value Date/Time    CALCIUM 8.4 (L) 09/25/2024 0148    ALKPHOS 99 09/21/2024 0130    AST 14 09/21/2024 0130    ALT 9 09/21/2024 0130    BILITOT 0.7 09/21/2024 0130          Lab Results   Component Value Date/Time    WBC 8.5 09/25/2024 0148    HGB 10.0 (L) 09/25/2024 0148    HCT 28.9 (L) 09/25/2024 0148     09/25/2024 0148     Lab Results   Component Value Date/Time    PROTIME 27.0 (H) 09/25/2024 0148    INR 2.4 (H) 09/25/2024 0148     Encounter Date: 09/21/24   ECG 12 lead   Result Value    Ventricular Rate 83    Atrial Rate 83    MT Interval 198    QRS Duration 128    QT Interval 382    QTC Calculation(Bazett) 448    P Axis 54    R Axis -19    T Axis 13    QRS Count 14    Q Onset 231    P Onset 132    P Offset 184    T Offset 422    QTC Fredericia 425    Narrative    Normal sinus rhythm  Right bundle branch block  Abnormal ECG  When compared with ECG of 30-MAR-2024 13:34,  No significant change was found    See ED provider note for full interpretation and clinical correlation  Confirmed by Ruth Dean ([id number]) on 9/21/2024 11:43:21 PM      TTE 09/2024  CONCLUSIONS:   1. Left ventricular ejection fraction is normal, calculated by Regan's biplane at 74%.   2. Spectral Doppler shows an impaired relaxation pattern of left ventricular diastolic filling.   3. There is mild concentric left ventricular hypertrophy.   4. There is normal right ventricular global systolic function.   5. The left atrium is mildly dilated.   6. Mildly elevated right ventricular systolic pressure.   7. There is no evidence of a patent foramen ovale.   8. Compared with study dated 12/29/2023, no significant change.           NPO Detail:  No data recorded     Review of Systems   Cardiovascular:         HLD       Physical Exam    Airway  Mallampati: unable to assess     Cardiovascular    Dental   Comments: edentulous   Pulmonary    Abdominal      Other findings: AO x 0          Anesthesia Plan    History of general anesthesia?: yes  History of complications of general anesthesia?: unknown/emergency    ASA 4 - emergent   (Unable to get a hold of patient's daughter on phone.  Proceeding with surgical consent.)  Use of blood products discussed with who consented to blood products.  Blood Products Consent Comment: Surgical consent signed by patient's daughter  Plan discussed with CAA and attending.

## 2024-09-25 NOTE — CONSULTS
"Nutrition Initial Assessment:   Nutrition Assessment    Reason for Assessment: Admission nursing screening    Patient is a 79 y.o. female presenting with AMS, hypertensive 's, recent fall.      CTH demonstrated right vermian cerebellar ICH with right cerebellar hemisphere ICH and/or SAH with 4th ventricular effacement. Now s/p Kcentra.     SLP remedios 9/22 - rec'd pureed diet with thin liquids     PMH: PE/recurrent DVT s/p IVCF, HFpEF, hypertension, hyperlipidemia, JOY, schizophrenia, vascular parkinson, COPD, and CKD IV       Nutrition History:  Energy Intake: Poor < 50 % (for at least 5 days)  Food and Nutrient History: Pt off unit for angio. Per flowsheet documentation, pt taking minimal PO intake (one meal of 25% intake documented). On/off NPO and pureed diet.  Food Allergies/Intolerances:  None per chart review        Anthropometrics:  Height: 170.2 cm (5' 7\")   Weight: 109 kg (239 lb 13.8 oz)   BMI (Calculated): 37.56  IBW/kg (Dietitian Calculated): 61.4 kg  Percent of IBW: 177 %  Adjusted Body Weight (kg): 73.3 kg    Weight History:   Wt Readings from Last 4 Encounters:   09/23/24 109 kg (239 lb 13.8 oz)   03/30/24 93.4 kg (206 lb)   12/29/23 94.1 kg (207 lb 7.3 oz)   01/26/23 102 kg (223 lb 14.4 oz)     Weight Change %:  Significant Weight Loss: No    Nutrition Focused Physical Exam Findings:  defer: pt off unit    Nutrition Significant Labs:  CBC Trend:   Results from last 7 days   Lab Units 09/25/24  0148 09/24/24  0212 09/23/24  0328 09/22/24  0428   WBC AUTO x10*3/uL 8.5 10.9 11.6* 6.4   RBC AUTO x10*6/uL 3.32* 3.62* 3.58* 3.46*   HEMOGLOBIN g/dL 10.0* 11.0* 10.6* 10.2*   HEMATOCRIT % 28.9* 31.7* 30.9* 31.4*   MCV fL 87 88 86 91   PLATELETS AUTO x10*3/uL 150 183 189 178    , BMP Trend:   Results from last 7 days   Lab Units 09/25/24  0148 09/24/24  1553 09/24/24  0212 09/23/24  1822   GLUCOSE mg/dL 92 97 111* 112*   CALCIUM mg/dL 8.4* 8.1* 8.3* 8.4*   SODIUM mmol/L 143 143 141 141   POTASSIUM mmol/L " pt states sent from urgent care for abdominal pain and diarrhea x 3 days. pt reports subjective fever 2 days ago, "but has been taking a baby aspirin to keep it away" denies fever, chills, n/v, urinary sx 4.0 4.2 4.3 4.3   CO2 mmol/L 18* 16* 14* 15*   CHLORIDE mmol/L 117* 118* 117* 116*   BUN mg/dL 50* 48* 45* 44*   CREATININE mg/dL 2.85* 2.67* 2.90* 2.91*    , A1C:  Lab Results   Component Value Date    HGBA1C 5.9 (H) 09/21/2024   , BG POCT trend:   Results from last 7 days   Lab Units 09/25/24  0746 09/25/24  0410 09/24/24  2302 09/24/24  1917 09/24/24  1658   POCT GLUCOSE mg/dL 91 90 90 105* 115*    , Renal Lab Trend:   Results from last 7 days   Lab Units 09/25/24  0148 09/24/24  1553 09/24/24  0212 09/23/24  1822   POTASSIUM mmol/L 4.0 4.2 4.3 4.3   PHOSPHORUS mg/dL 4.6 4.7 4.5 4.5   SODIUM mmol/L 143 143 141 141   MAGNESIUM mg/dL 2.14  --  2.13  --    EGFR mL/min/1.73m*2 17* 18* 16* 16*   BUN mg/dL 50* 48* 45* 44*   CREATININE mg/dL 2.85* 2.67* 2.90* 2.91*        Nutrition Specific Medications:  Scheduled medications  folic acid, 1 mg, oral, Daily  hydrALAZINE, 50 mg, oral, TID  insulin lispro, 0-5 Units, subcutaneous, q4h  polyethylene glycol, 17 g, oral, Daily  terazosin, 2 mg, oral, Daily    Continuous medications  electrolyte-A, 80 mL/hr, Last Rate: 80 mL/hr (09/25/24 0843)      Dietary Orders (From admission, onward)       Start     Ordered    09/25/24 0001  NPO Diet; Effective midnight  Diet effective midnight         09/24/24 1343         Estimated Needs:   Total Energy Estimated Needs (kCal): 1900 kCal  Method for Estimating Needs: MSJ vs kcal/kg  Total Protein Estimated Needs (g): 90 g  Method for Estimating Needs: 1.4g/kg per IBW  Total Fluid Estimated Needs (mL):  (per team)        Nutrition Diagnosis   Malnutrition Diagnosis  Patient has Malnutrition Diagnosis:  (unable to determine at this time)    Nutrition Diagnosis  Patient has Nutrition Diagnosis: Yes  Diagnosis Status (1): New  Nutrition Diagnosis 1: Inadequate protein energy intake  Related to (1): hospital course/acute injury/illness  As Evidenced by (1): PO intake meeting <50% of needs for >/=5 days  Additional Nutrition Diagnosis:  Diagnosis 2  Diagnosis Status (2): New  Nutrition Diagnosis 2: Increased nutrient needs  Related to (2): increased metabolic demand  As Evidenced by (2): ICH       Nutrition Interventions/Recommendations         Nutrition Prescription:  Individualized Nutrition Prescription Provided for : diet + ONS once cleared to resume PO        Nutrition Interventions:   Interventions: Commercial beverage  Goal: Ensure Plus TID       Nutrition Monitoring and Evaluation   Food/Nutrient Related History Monitoring  Monitoring and Evaluation Plan: Energy intake  Energy Intake: Estimated energy intake  Criteria: PO + ONS meets >/=75% of needs    Body Composition/Growth/Weight History  Monitoring and Evaluation Plan: Weight  Weight: Measured weight  Criteria: Weekly weights     Time Spent (min): 45 minutes

## 2024-09-25 NOTE — ANESTHESIA POSTPROCEDURE EVALUATION
Patient: Kelly Martinez    Procedure Summary       Date: 09/25/24 Room / Location: [facility]    Anesthesia Start: 0843 Anesthesia Stop:     Procedure: IR INTERVENTION NEURO EMBOLIZATION Diagnosis: (Cerebellar AVM)    Scheduled Providers: Tonia Medina MD Responsible Provider: Tonia Medina MD    Anesthesia Type: general ASA Status: 4 - Emergent            Anesthesia Type: general    Vitals Value Taken Time   /76 09/25/24 1153   Temp 36.3 09/25/24 1153   Pulse 61 09/25/24 1152   Resp 20 09/25/24 1152   SpO2 97 % 09/25/24 1152   Vitals shown include unfiled device data.    Anesthesia Post Evaluation    Patient location during evaluation: ICU  Patient participation: complete - patient cannot participate  Level of consciousness: obtunded/minimal responses  Pain score: 0  Pain management: adequate  Airway patency: patent  Cardiovascular status: acceptable  Respiratory status: ETT and ventilator  Hydration status: acceptable  Postoperative Nausea and Vomiting: none        There were no known notable events for this encounter.

## 2024-09-25 NOTE — CONSULTS
Kelly Martinez   79 y.o.      Vitals:    09/23/24 0800   Weight: 109 kg (239 lb 13.8 oz)      MRN/Room: [id number]/06/06-A    Reason for consult: renoprotective strategies for angiogram  Requesting physician: YUNIOR Ramos-CNP    Subjective   History Of Present Illness  Kelly Martinez is a 79 y.o. female presenting with history of Parkinson disease, hypertension (on lisinopril 40mg, coreg 12.5 BID, imdur 30mg, terazosin 2mg), hyperlipidemia, HFpEF (EF 74% 9/21/24), recurrent DVT/PE (on warfarin, s/p IVCF placement and removal), JOY, COPD, CKD stage IV (bl Cr ?~2), chronic tobacco use, and schizophrenia, who initially presented 9/21/2024 s/p fall and confusion, found to have cerebellar ICH with surrounding SAH. CTH demonstrated right vermian cerebellar ICH with right cerebellar hemisphere ICH and/or SAH with 4th ventricular effacement. S/p Kcentra. CTA (9/21/24) head/neck without aneurysm or vascular malformation. Diagnostic Cerebral Angiogram 9/23 demonstrated diffuse left cerebellar arteriovenous malformation with L PICA and L SCA arterial feeders (5 vessels injected).     Patient has history of CKD, with recent outpatient labwork (4/2/24) with Cr 2.21, prompting referral to nephrology, which patient missed per [facility] notes. Cr in 2022-23 ranged 1.8-3.1. Cr 4/5/2019 5.47, no followup at that time.     On admission, Cr 2.2, BUN 26. Pt has been on mIVF (NaCl 75mL admission-> 9/24 8am, plasmalyte 80mL/hr from then) & nicardipine for goal -150.    Past Medical History  She has a past medical history of Other pulmonary embolism without acute cor pulmonale (Multi) (07/21/2013), Personal history of other mental and behavioral disorders, and Personal history of other venous thrombosis and embolism.    Surgical History  She has a past surgical history that includes Knee surgery (04/06/2016) and Neck surgery (04/06/2016).     Social History  She reports that she has been smoking cigarettes. She has been  exposed to tobacco smoke. She has never used smokeless tobacco. She reports that she does not currently use alcohol. No history on file for drug use.    Family History  No family history on file.     Allergies  Meclizine, Naproxen, and Piperacillin-tazobactam-dextrs    Meds:   Prior to Admission Medications   Prescriptions Last Dose Informant Patient Reported? Taking?   DULoxetine (Cymbalta) 30 mg DR capsule  Child Yes No   Sig: Take 1 capsule (30 mg) by mouth once daily. Do not crush or chew.   QUEtiapine (SEROquel) 300 mg tablet Past Week Child Yes Yes   Sig: Take 1 tablet (300 mg) by mouth once daily at bedtime.   aspirin 81 mg chewable tablet 9/20/2024 Child Yes Yes   Sig: Chew 1 tablet (81 mg) once daily.   atorvastatin (Lipitor) 40 mg tablet 9/20/2024 Child Yes Yes   Sig: Take 1 tablet (40 mg) by mouth once daily.   buPROPion XL (Wellbutrin XL) 150 mg 24 hr tablet  Child Yes No   Sig: Take 1 tablet (150 mg) by mouth once daily. Do not crush, chew, or split.   budesonide (Pulmicort Flexhaler) 90 mcg/actuation inhaler  Child Yes No   Sig: Inhale 2 puffs 2 times a day.   carbidopa-levodopa (Sinemet)  mg tablet  Child Yes No   Sig: Take 1 tablet by mouth 3 times a day.   carvedilol (Coreg) 12.5 mg tablet 9/20/2024 Child Yes Yes   Sig: Take 1 tablet (12.5 mg) by mouth 2 times daily (morning and late afternoon).   folic acid (Folvite) 1 mg tablet  Child Yes No   Sig: Take 1 tablet (1 mg) by mouth once daily.   isosorbide mononitrate ER (Imdur) 30 mg 24 hr tablet 9/20/2024 Child Yes Yes   Sig: Take 1 tablet (30 mg) by mouth once daily.   lisinopril 40 mg tablet 9/20/2024 Child Yes Yes   Sig: Take 1 tablet (40 mg) by mouth once daily.   naproxen sodium (Aleve) 220 mg tablet  Child Yes No   Sig: Take 1 tablet (220 mg) by mouth every 12 hours if needed for mild pain (1 - 3).   terazosin (Hytrin) 2 mg capsule  Child Yes No   Sig: Take 1 capsule (2 mg) by mouth once daily.   traZODone (Desyrel) 150 mg tablet Past  Week Child No Yes   Sig: Take 1 tablet (150 mg) by mouth as needed at bedtime for sleep.   warfarin (Coumadin) 1 mg tablet Not Taking Child No No   Sig: Take 4 and half (4.5 mg) tablets on Monday, Wednesday, Friday and Saturday or take as directed per After Visit Summary. Do not start before April 5, 2024.   Patient not taking: Reported on 9/23/2024   warfarin (Coumadin) 3 mg tablet  Child No No   Sig: Take 1 tablet by mouth daily on Tuesday and Thursday or take as directed per After Visit Summary.   Patient taking differently: Per [facility] note on 8/21/24 and per daughter, current dosing is:    1 tablet (3mg) on Sunday, Tuesday, Thursday  1.5 tablets (4.5mg) on Monday, Wednesday, Friday, Saturday      Facility-Administered Medications: None      atorvastatin, 40 mg, Daily  buPROPion XL, 150 mg, Daily  carvedilol, 25 mg, BID  DULoxetine, 30 mg, Daily  folic acid, 1 mg, Daily  furosemide, 40 mg, Once  heparin (porcine), 5,000 Units, q8h  hydrALAZINE, 50 mg, TID  insulin lispro, 0-5 Units, q4h  isosorbide mononitrate ER, 30 mg, Daily  [Held by provider] lisinopril, 40 mg, Daily  mometasone, 2 puff, BID  perflutren protein A microsphere, 0.5 mL, Once in imaging  polyethylene glycol, 17 g, Daily  QUEtiapine, 300 mg, Nightly  sulfur hexafluoride microsphr, 2 mL, Once in imaging  terazosin, 2 mg, Daily      electrolyte-A, Last Rate: 80 mL/hr (09/25/24 0525)      acetaminophen, 650 mg, q4h PRN   Or  acetaminophen, 650 mg, q4h PRN   Or  acetaminophen, 650 mg, q4h PRN  dextrose, 12.5 g, q15 min PRN  dextrose, 25 g, q15 min PRN  glucagon, 1 mg, q15 min PRN  glucagon, 1 mg, q15 min PRN  hydrALAZINE, 10 mg, q20 min PRN  labetaloL, 10 mg, q10 min PRN  oxygen, , Continuous PRN - O2/gases  traZODone, 150 mg, Nightly PRN         Objective   Vitals:    09/25/24 0700   BP:    Pulse: 64   Resp: 15   Temp:    SpO2: 93%        09/23 1900 - 09/25 0659  In: 3611 [P.O.:860; I.V.:1549.3]  Out: 1465 [Urine:1465]      PHYS EXAM  (completed 9/24/24, patient in OR this morning):  General appearance: Eyes closed, slurred speech. Reported by nurse (who understands) to be oriented to self only, sometimes oriented to year  Eyes: closed, some periorbital swelling  Skin: no apparent rash, warm, cap refill<1sec  Heart: RRR. no rub  Lungs: Anterior inspiratory rhonchi  Abdomen: soft, nt/nd  Extremities: nonpitting edema bilat, significant R>L upper extremity edema, pulls away when palpated  : Draining yellow urine in Cavazos  Neuro: Slurring of speech, full neuro exam deferred (see [facility] notes)   ACCESS: PIVx2, right art line    Blood Labs:  Results for orders placed or performed during the hospital encounter of 09/21/24 (from the past 24 hour(s))   POCT GLUCOSE   Result Value Ref Range    POCT Glucose 100 (H) 74 - 99 mg/dL   Renal function panel   Result Value Ref Range    Glucose 97 74 - 99 mg/dL    Sodium 143 136 - 145 mmol/L    Potassium 4.2 3.5 - 5.3 mmol/L    Chloride 118 (H) 98 - 107 mmol/L    Bicarbonate 16 (L) 21 - 32 mmol/L    Anion Gap 13 10 - 20 mmol/L    Urea Nitrogen 48 (H) 6 - 23 mg/dL    Creatinine 2.67 (H) 0.50 - 1.05 mg/dL    eGFR 18 (L) >60 mL/min/1.73m*2    Calcium 8.1 (L) 8.6 - 10.6 mg/dL    Phosphorus 4.7 2.5 - 4.9 mg/dL    Albumin 3.0 (L) 3.4 - 5.0 g/dL   Protime-INR   Result Value Ref Range    Protime 33.2 (H) 9.8 - 12.8 seconds    INR 2.9 (H) 0.9 - 1.1   POCT GLUCOSE   Result Value Ref Range    POCT Glucose 115 (H) 74 - 99 mg/dL   Type And Screen   Result Value Ref Range    ABO TYPE B     Rh TYPE POS     ANTIBODY SCREEN NEG    POCT GLUCOSE   Result Value Ref Range    POCT Glucose 105 (H) 74 - 99 mg/dL   POCT GLUCOSE   Result Value Ref Range    POCT Glucose 90 74 - 99 mg/dL   Calcium, Ionized   Result Value Ref Range    POCT Calcium, Ionized 1.26 1.1 - 1.33 mmol/L   Magnesium   Result Value Ref Range    Magnesium 2.14 1.60 - 2.40 mg/dL   CBC and Auto Differential   Result Value Ref Range    WBC 8.5 4.4 - 11.3 x10*3/uL    nRBC  0.0 0.0 - 0.0 /100 WBCs    RBC 3.32 (L) 4.00 - 5.20 x10*6/uL    Hemoglobin 10.0 (L) 12.0 - 16.0 g/dL    Hematocrit 28.9 (L) 36.0 - 46.0 %    MCV 87 80 - 100 fL    MCH 30.1 26.0 - 34.0 pg    MCHC 34.6 32.0 - 36.0 g/dL    RDW 15.5 (H) 11.5 - 14.5 %    Platelets 150 150 - 450 x10*3/uL    Neutrophils % 88.5 40.0 - 80.0 %    Immature Granulocytes %, Automated 0.5 0.0 - 0.9 %    Lymphocytes % 5.6 13.0 - 44.0 %    Monocytes % 5.4 2.0 - 10.0 %    Eosinophils % 0.0 0.0 - 6.0 %    Basophils % 0.0 0.0 - 2.0 %    Neutrophils Absolute 7.48 (H) 1.60 - 5.50 x10*3/uL    Immature Granulocytes Absolute, Automated 0.04 0.00 - 0.50 x10*3/uL    Lymphocytes Absolute 0.47 (L) 0.80 - 3.00 x10*3/uL    Monocytes Absolute 0.46 0.05 - 0.80 x10*3/uL    Eosinophils Absolute 0.00 0.00 - 0.40 x10*3/uL    Basophils Absolute 0.00 0.00 - 0.10 x10*3/uL   Renal function panel   Result Value Ref Range    Glucose 92 74 - 99 mg/dL    Sodium 143 136 - 145 mmol/L    Potassium 4.0 3.5 - 5.3 mmol/L    Chloride 117 (H) 98 - 107 mmol/L    Bicarbonate 18 (L) 21 - 32 mmol/L    Anion Gap 12 10 - 20 mmol/L    Urea Nitrogen 50 (H) 6 - 23 mg/dL    Creatinine 2.85 (H) 0.50 - 1.05 mg/dL    eGFR 17 (L) >60 mL/min/1.73m*2    Calcium 8.4 (L) 8.6 - 10.6 mg/dL    Phosphorus 4.6 2.5 - 4.9 mg/dL    Albumin 2.9 (L) 3.4 - 5.0 g/dL   Heparin Assay   Result Value Ref Range    Heparin Unfractionated 0.1 See Comment Below for Therapeutic Ranges IU/mL   Coagulation Screen   Result Value Ref Range    Protime 27.0 (H) 9.8 - 12.8 seconds    INR 2.4 (H) 0.9 - 1.1    aPTT 46 (H) 27 - 38 seconds   POCT GLUCOSE   Result Value Ref Range    POCT Glucose 90 74 - 99 mg/dL   Prepare Plasma: 2 Units   Result Value Ref Range    PRODUCT CODE U7772X45     Unit Number [id number]     Unit ABO B     Unit RH POS     Dispense Status IS     Blood Expiration Date 9/28/2024  7:10:00 AM EDT     PRODUCT BLOOD TYPE 7300     UNIT VOLUME 216     PRODUCT CODE W1674X31     Unit Number [id number]      Unit ABO B     Unit RH POS     Dispense Status XM     Blood Expiration Date 2024  1:25:00 AM EDT     PRODUCT BLOOD TYPE 7300     UNIT VOLUME 293    POCT GLUCOSE   Result Value Ref Range    POCT Glucose 91 74 - 99 mg/dL            ASSESSMENT:  Kelly Martinez is a 79 y.o. female presenting with history significant for Parkinson disease, hypertension (on lisinopril 40mg, coreg 12.5 BID, imdur 30mg, terazosin 2mg), HFpEF (EF 74% 24), CKD stage IV (bl Cr ?~2), in the [facility] with cerebellar ICH with surrounding SAH. Nephrology consulted for renoprotective strategies for contrast. Initially recommended IV hydration 3 hours before and after IV contrast, now reconsulted for planned intervention with further contrast use. This is a case of contrast-induced NICOLÁS on CKD, would recommend conservative IVF before and after contrast and nephrology will sign off.    #Contrast-induced NICOLÁS on CKD  ::recommended 3 hours of IVF before and after angiogram  ::repeat angiography in 48 hours will increase NICOLÁS  ::unclear baseline Cr but may be near baseline, CKD has not been followed up outpatient  Plan:  -IVF per primary team, would recommend 6 hours before and after contrast exposure  -Nephrology will sign off    Etiology: contrast  Baseline Cr: ~2  Cr trend: up  Urine output: 0.3cc/kg/hr yesterday, oliguric    UA: (24): Color:Light-Yellow  Clarity:_  pH:5.5  Protein: 20 (TRACE) Spec Grav: 1.043  Blood: NEGATIVE  LE: NEGATIVE  Nitrite: NEGATIVE  WBC:NONE  Casts:_  Bacteria:1+    Electrolytes:                    10.0     8.5>-----<150            28.9   143  117  50                  ----------------<92     4.0  18  2.85          Ca 8.4 Phos 4.6 Mg 2.14       ALT 9 AST 14 AlkPhos 99 tBili 0.7         Acid-base:  AB.26/40/67/17.9    RECOMMENDATIONS:  Renal function has remained stable after contrast administration, and patient is non-oliguric. Nephrology will sign off for now. Please do not hesitate to re-consult  or reach out via Epic Chat with questions.   ---  Discussed with fellow and Dr. De Santiago, attending.    Heavenly Beaulieu MD (Anna)  PGY1, Internal Medicine  Available by [employer]  Nephrology pager [phone]

## 2024-09-25 NOTE — ANESTHESIA PROCEDURE NOTES
Airway  Date/Time: 9/25/2024 8:56 AM  Urgency: elective    Airway not difficult    Staffing  Performed: CAA and AMINATA   Authorized by: Tonia Medina MD    Performed by: LISBET Genao  Patient location during procedure: OR    Indications and Patient Condition  Indications for airway management: anesthesia  Spontaneous Ventilation: absent  Sedation level: deep  Preoxygenated: yes  Patient position: sniffing  Mask difficulty assessment: 1 - vent by mask    Final Airway Details  Final airway type: endotracheal airway      Successful airway: ETT  Cuffed: yes   Successful intubation technique: video laryngoscopy  Facilitating devices/methods: Bougie and intubating stylet  Endotracheal tube insertion site: oral  Blade: Dania  Blade size: #4  ETT size (mm): 7.5  Cormack-Lehane Classification: grade I - full view of glottis  Placement verified by: chest auscultation   Measured from: lips  ETT to lips (cm): 22  Number of attempts at approach: 1  Ventilation between attempts: BVM    Additional Comments  Easy mask by LISBET, Fredo with Beltre to optimize first chance success, easy placement by CAA after failed attempt by student

## 2024-09-25 NOTE — PROGRESS NOTES
Communication Note    Patient Name: Kelly Martinez  MRN: [id number]  Today's Date: 9/25/2024   Room: 06/06A    Discipline: Physical Therapy      Missed Visit: Yes  Missed Visit Reason:  (Pt s/p angio and intubation earlier this afternoon. PT to hold for today.)      09/25/24 at 2:36 PM   Melvi Galan, PT   Rehab Office: [phone]

## 2024-09-25 NOTE — PROGRESS NOTES
[facility] (formerly [facility])  NEUROSCIENCE INTENSIVE CARE UNIT  DAILY PROGRESS NOTE       Patient Name: Kelly Martinez   MRN: [id number]     Admit Date: 2024     : 1945 AGE: 79 y.o. GENDER: female      Subjective    79 year-old female with past medical history of PE/recurrent DVT s/p IVCF, HFpEF, hypertension, hyperlipidemia, JOY, schizophrenia, COPD, and CKD IV who presented to [facility] with cerebellar ICH with surrounding SAH. Admitted 2024 with cerebellar hemorrhage (Multi) after presenting s/p fall and confusion. CTH demonstrated right vermian cerebellar ICH with right cerebellar hemisphere ICH and/or SAH with 4th ventricular effacement. S/p Kcentra. CTA head/neck without aneurysm or vascular malformation. Admitted to NSU for neurological monitoring and hypertension management on Cardene infusion. Neurology consulted for co-management.     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  - : MRI/MRV motion degraded, no obvious underlying mass or vascular lesion   - 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     Interval Events: NAEON.    Objective   VITALS:  Temp:  [35.6 °C (96.1 °F)-36.4 °C (97.5 °F)] 35.9 °C (96.6 °F)  Heart Rate:  [59-70] 65  Resp:  [11-25] 14  BP: (140)/(64) 140/64  Arterial Line BP 1: (127-139)/(56-69) 134/59  INTAKE/OUTPUT:  Intake/Output Summary (Last 24 hours) at 2024 0746  Last data filed at 2024 0417  Gross per 24 hour   Intake 1501.67 ml   Output 625 ml   Net 876.67 ml         PHYSICAL EXAM:  NEURO:  - Awake, alert, oriented x3, mild dysarthria, pupils 3mm/reactive bilaterally  - Follows commands, WATKINS 5/5 strength, SILT  CV:  - RRR on telemetry, SB-NSR  - Right radial arterial line in place  RESP:  - Regular, unlabored  - Oxygen: NC  :  - Indwelling catheter in place  GI:  - Abdomen NT/ND, soft  SKIN:  - Right groin site intact, no hematoma, pulses  intact    MEDICATIONS:  Scheduled: PRN: Continuous:   atorvastatin, 40 mg, oral, Daily  buPROPion XL, 150 mg, oral, Daily  carvedilol, 25 mg, oral, BID  DULoxetine, 30 mg, oral, Daily  folic acid, 1 mg, oral, Daily  heparin (porcine), 5,000 Units, subcutaneous, q8h  hydrALAZINE, 50 mg, oral, TID  insulin lispro, 0-5 Units, subcutaneous, q4h  isosorbide mononitrate ER, 30 mg, oral, Daily  [Held by provider] lisinopril, 40 mg, oral, Daily  mometasone, 2 puff, inhalation, BID  perflutren protein A microsphere, 0.5 mL, intravenous, Once in imaging  phytonadione, 10 mg, intravenous, q12h  polyethylene glycol, 17 g, oral, Daily  QUEtiapine, 300 mg, oral, Nightly  sulfur hexafluoride microsphr, 2 mL, intravenous, Once in imaging  terazosin, 2 mg, oral, Daily     PRN medications: acetaminophen **OR** acetaminophen **OR** acetaminophen, dextrose, dextrose, glucagon, glucagon, hydrALAZINE, labetaloL, oxygen, traZODone electrolyte-A, 80 mL/hr, Last Rate: 80 mL/hr (09/25/24 0525)         LAB RESULTS:  Results from last 72 hours   Lab Units 09/25/24  0148 09/24/24  1553 09/24/24  0212   GLUCOSE mg/dL 92 97 111*   SODIUM mmol/L 143 143 141   POTASSIUM mmol/L 4.0 4.2 4.3   CHLORIDE mmol/L 117* 118* 117*   CO2 mmol/L 18* 16* 14*   ANION GAP mmol/L 12 13 14   BUN mg/dL 50* 48* 45*   CREATININE mg/dL 2.85* 2.67* 2.90*   EGFR mL/min/1.73m*2 17* 18* 16*   CALCIUM mg/dL 8.4* 8.1* 8.3*   PHOSPHORUS mg/dL 4.6 4.7 4.5   ALBUMIN g/dL 2.9* 3.0* 3.0*   MAGNESIUM mg/dL 2.14  --  2.13   POCT CALCIUM IONIZED (MMOL/L) IN BLOOD mmol/L 1.26  --  1.19      Results from last 72 hours   Lab Units 09/25/24  0148 09/24/24 0212   WBC AUTO x10*3/uL 8.5 10.9   NRBC AUTO /100 WBCs 0.0 0.0   RBC AUTO x10*6/uL 3.32* 3.62*   HEMOGLOBIN g/dL 10.0* 11.0*   HEMATOCRIT % 28.9* 31.7*   MCV fL 87 88   MCH pg 30.1 30.4   MCHC g/dL 34.6 34.7   RDW % 15.5* 15.3*   PLATELETS AUTO x10*3/uL 150 183      Results from last 72 hours   Lab Units 09/25/24 0148 09/24/24 0212    WBC AUTO x10*3/uL 8.5 10.9   NRBC AUTO /100 WBCs 0.0 0.0   RBC AUTO x10*6/uL 3.32* 3.62*   HEMOGLOBIN g/dL 10.0* 11.0*   HEMATOCRIT % 28.9* 31.7*   MCV fL 87 88   MCH pg 30.1 30.4   MCHC g/dL 34.6 34.7   RDW % 15.5* 15.3*   PLATELETS AUTO x10*3/uL 150 183   NEUTROS PCT AUTO % 88.5 93.0   IG PCT AUTO % 0.5 0.3   LYMPHS PCT AUTO % 5.6 2.9   MONOS PCT AUTO % 5.4 3.7   EOS PCT AUTO % 0.0 0.0   BASOS PCT AUTO % 0.0 0.1   NEUTROS ABS x10*3/uL 7.48* 10.15*   IG AUTO x10*3/uL 0.04 0.03   LYMPHS ABS AUTO x10*3/uL 0.47* 0.32*   MONOS ABS AUTO x10*3/uL 0.46 0.40   EOS ABS AUTO x10*3/uL 0.00 0.00   BASOS ABS AUTO x10*3/uL 0.00 0.01      Results from last 72 hours   Lab Units 09/25/24  0148 09/24/24  1553   PROTIME seconds 27.0* 33.2*   INR  2.4* 2.9*   APTT seconds 46*  --       Results from last 72 hours   Lab Units 09/25/24  0148 09/24/24  1553   ALBUMIN g/dL 2.9* 3.0*     IMAGING RESULTS:  XR chest 1 view   Final Result   1.  Minimal interstitial edema with bibasilar atelectasis/edema                  MACRO:   None        Signed by: Nimisha Benjamin 9/24/2024 3:57 PM   Dictation workstation:   [id number]      IR angiogram cerebral bilateral   Final Result   1. There is a superficial tangle of vessels in the cerebellum that   measures approximately 15 x 13 x 15 mm with superficial early venous   drainage. This tangle of blood vessels is fed by the left distal   posteroinferior cerebellar artery in the distal left superior   cerebellar artery.   2. 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        I was present for and/or performed the critical portions of the   procedure and immediately available throughout the entire procedure.   I personally reviewed the image(s)/study and interpretation. I agree   with the findings as stated. Performed and dictated at [facility].        MACRO:   None        Signed by: Brian Evans  9/24/2024 5:42 PM   Dictation workstation:   [id number]      Vascular US lower extremity venous duplex bilateral   Final Result      MR brain w and wo IV contrast   Final Result   Postcontrast images somewhat limited due to patient motion artifact.   1. Intraparenchymal hematomas centered within the cerebellar vermis   and right cerebellar hemisphere, with scattered subarachnoid   hemorrhage throughout the right-greater-than-left cerebellar   hemispheres, similar to prior exams considering differences in   imaging technique. Additionally, there is surrounding vasogenic edema   throughout the cerebellum with minimal effacement of the 4th   ventricle. Otherwise no significant mass effect or midline shift.   Within the limitations of this exam, postcontrast images demonstrate   no abnormal areas of enhancement to suggest underlying mass or   evidence of vascular malformation.   2. Senescent changes of the brain, as detailed above.   3. No evidence of dural venous sinus thrombosis or deep venous   occlusion was identified within the major intracranial venous   structures.        I personally reviewed the images/study and I agree with the findings   as stated by Dr. Arnulfo Gresham M.D. This study was interpreted at   [facility], [place], [place].        MACRO:   None        Signed by: Noe Kenny 9/22/2024 8:12 AM   Dictation workstation:   [id number]      MR venography intracranial w and wo IV contrast   Final Result   Postcontrast images somewhat limited due to patient motion artifact.   1. Intraparenchymal hematomas centered within the cerebellar vermis   and right cerebellar hemisphere, with scattered subarachnoid   hemorrhage throughout the right-greater-than-left cerebellar   hemispheres, similar to prior exams considering differences in   imaging technique. Additionally, there is surrounding vasogenic edema   throughout the cerebellum with minimal effacement of the 4th    ventricle. Otherwise no significant mass effect or midline shift.   Within the limitations of this exam, postcontrast images demonstrate   no abnormal areas of enhancement to suggest underlying mass or   evidence of vascular malformation.   2. Senescent changes of the brain, as detailed above.   3. No evidence of dural venous sinus thrombosis or deep venous   occlusion was identified within the major intracranial venous   structures.        I personally reviewed the images/study and I agree with the findings   as stated by Dr. Arnulfo Gresham M.D. This study was interpreted at   [place], [place].        MACRO:   None        Signed by: Noe Kenny 9/22/2024 8:12 AM   Dictation workstation:   [id number]      Transthoracic Echo (TTE) Complete   Final Result      CT head wo IV contrast   Final Result   Unchanged vermian hematoma continues to measure a proximally 1.5 cm   in size        Unchanged right infratentorial subdural or subarachnoid hemorrhage   unchanged        Increased number of slightly increased caliber branches of left PICA   and associated venous structures within the vermis. Vascular   malformation, neoplasm or fistula not excluded        MACRO:   Critical Finding:  See findings. Notification was initiated on   9/21/2024 at 10:12 am by  Doe Chand.  (**-OCF-**)        Signed by: Doe Chand 9/21/2024 10:14 AM   Dictation workstation:   [id number]      CT angio head and neck w and wo IV contrast   Final Result   Addendum (preliminary) 1 of 1   Interpreted By:  Doe Chand,    ADDENDUM:   3D reconstructions were performed at the diagnostic workstation and   demonstrate increase in the size and number of distal PICA branches   on the left with slight increased size and number of associated   venous structures along the lateral left cerebellar hemisphere.   Consider a dural venous fistula, parenchymal vascular malformation or   vascular  neoplasm.        Signed by: Doe Chand 9/21/2024 10:15 AM        -------- ORIGINAL REPORT --------   Dictation workstation:   [id number]      Final   1. Redemonstrated intraparenchymal and subarachnoid hemorrhage within   the right cerebellar hemisphere.   2. No hemodynamically significant intracranial or extracranial   stenosis, occlusion, or aneurysm.        I personally reviewed the image(s)/study and resident interpretation.   I agree with the findings as stated by resident Ozzie Mattson.   Data analyzed and images interpreted at [place], [place].        MACRO:   None.        Signed by: Doe Chand 9/21/2024 7:10 AM   Dictation workstation:   [id number]      US extremity nonvascular real time w image documentation limited anatomic specific   Final Result   Very limited exam as color Doppler images were not obtained and the   exam was prematurely terminated as per request of the emergency team.   Within these limitations, the right femoral vein is only partially   compressible with echogenic material along the lumen raising   suspicion for partially occlusive thrombus, age-indeterminate.        The right calf veins and left lower extremity deep venous system were   not evaluated due to early termination of the examination.        I personally reviewed the images/study and I agree with the findings   as stated by Dr. Arnulfo Gresham M.D. This study was interpreted at   [place], [place].        MACRO:   None.        Signed by: Henry Taveras 9/21/2024 4:52 AM   Dictation workstation:   [id number]      CT angio chest for pulmonary embolism   Final Result   1. No evidence of acute pulmonary embolism.   2. Dilated main pulmonary artery measuring 3.6 cm, nonspecific but   can be seen with pulmonary hypertension.   3. Scattered ground-glass opacities with areas of interlobular septal   thickening, suspicious for  pulmonary edema. Atypical infectious or   inflammatory process are not excluded.        I personally reviewed the image(s)/study and resident interpretation.   I agree with the findings as stated by resident Ozzie Mattson.   Data analyzed and images interpreted at [place], [place].        MACRO:   None        Signed by: Henry Taveras 9/21/2024 4:59 AM   Dictation workstation:   [id number]      CT head wo IV contrast   Final Result   CT HEAD:   1. Focal areas of hyperdensity within the right cerebellar hemisphere   and of the cerebellar vermis suggestive of intraparenchymal hematoma.   No significant mass effect or midline shift. The ventricles and basal   cisterns are patent. Recommend follow-up CT in 48 hours to ensure   stability.   2. Serpiginous hyperdensity over the sulci of the right cerebellar   hemisphere representing subarachnoid hemorrhage.        CT CERVICAL SPINE:   1. No acute fracture or traumatic malalignment of the cervical spine.   2. Surgical changes and multilevel spondylotic changes of the   cervical spine as detailed above.             I personally reviewed the image(s)/study and resident interpretation.   I agree with the findings as stated by resident Ozzie Mattson.   Data analyzed and images interpreted at [place], [place].        MACRO:   Ozzie Mattson discussed the significance and urgency of this   critical finding by telephone with  Federico Patel on 9/21/2024 at   4:01 am.  (**-RCF-**) Findings:  See findings.        Signed by: Henry Taveras 9/21/2024 4:31 AM   Dictation workstation:   [id number]      CT cervical spine wo IV contrast   Final Result   CT HEAD:   1. Focal areas of hyperdensity within the right cerebellar hemisphere   and of the cerebellar vermis suggestive of intraparenchymal hematoma.   No significant mass effect or midline shift. The ventricles and basal   cisterns  are patent. Recommend follow-up CT in 48 hours to ensure   stability.   2. Serpiginous hyperdensity over the sulci of the right cerebellar   hemisphere representing subarachnoid hemorrhage.        CT CERVICAL SPINE:   1. No acute fracture or traumatic malalignment of the cervical spine.   2. Surgical changes and multilevel spondylotic changes of the   cervical spine as detailed above.             I personally reviewed the image(s)/study and resident interpretation.   I agree with the findings as stated by resident Ozzie Mattson.   Data analyzed and images interpreted at [facility], [place], [place].        MACRO:   Ozzie Mattson discussed the significance and urgency of this   critical finding by telephone with  Federico Patel on 9/21/2024 at   4:01 am.  (**-RCF-**) Findings:  See findings.        Signed by: Henry Taveras 9/21/2024 4:31 AM   Dictation workstation:   [id number]      XR chest 2 views   Final Result   Cardiomegaly with mild pulmonary edema.        I personally reviewed the image(s)/study and resident interpretation.   I agree with the findings as stated by resident Ozzie Mattson.   Data analyzed and images interpreted at [facility], [place], [place].        MACRO:   None        Signed by: Henry Taveras 9/21/2024 4:22 AM   Dictation workstation:   [id number]      Point of Care Ultrasound    (Results Pending)   Referral to Neurointervention    (Results Pending)   IR intervention NEURO embolization    (Results Pending)   Referral to Neurointervention    (Results Pending)     Assessment/Plan    79 year-old female with past medical history of PE/recurrent DVT s/p IVCF, HFpEF, hypertension, hyperlipidemia, JOY, schizophrenia, vascular parkinson, COPD, and CKD IV who presented to [facility] with cerebellar ICH with surrounding SAH. Admitted 9/21/2024 with cerebellar hemorrhage (Multi) after presenting s/p fall and confusion. CT  demonstrated right vermian cerebellar ICH with right cerebellar hemisphere ICH and/or SAH with 4th ventricular effacement. S/p Kcentra. CTA head/neck without aneurysm or vascular malformation. Admitted to NSU for neurological monitoring and hypertension management on Cardene infusion. Neurology consulted for co-management.     NEURO:  #Right vermian cerebellar ICH with right cerebellar hemisphere ICH with 4th ventricular effacement  #Cerebellar AVM  #Vascular parkinson   #Schizophrenia   Assessment:  - Neurologically: see above  - S/p Kcentra  - See above significant events  Plan:  - NSU  - Neuro Checks: Q1H  - EVD watch  - Repeat angiogram today with intention to treat  - Neurology stroke signed off  - Warfarin/ASA re-start plan per neurosurgery  - Pain: acetaminophen PRN  - Bupropion 150mg daily, duloxetine 30mg daily, quetiapine 300mg nightly  - PT/OT/SLP    CARDIOVASCULAR:  #HTN, HLD  Assessment:  - Hypertensive on arrival to NSU requiring Cardene infusion  - 9/21/24 ECHO: ejection fraction 74%, mild concentric LVH, mildly dilated LA  Plan:  - Continue to monitor on telemetry  - Atorvastatin 40mg daily, carvedilol 25mg BID, isosorbide mononitrate 30mg daily, lisinopril 40mg daily, hydralazine 50mg TID, terazosin 2mg daily   - BP goal: -150 mmHg (presenting SBP was 150-220 mmHg)    --> PRN: Labetalol and Hydralazine    RESPIRATORY:  #COPD  Assessment:  - NC   Plan:  - Continuous pulse oximetry   - O2 PRN to maintain SpO2 > 94%, wean as tolerated  - Incentive spirometry while awake    RENAL/:  #CKD IV (baseline creatinine ~2)  #Hyperchloremic metabolic acidosis   Assessment:  Results from last 72 hours   Lab Units 09/25/24  0148 09/24/24  1553   BUN mg/dL 50* 48*   CREATININE mg/dL 2.85* 2.67*   Plan:  - Monitor with daily RFP  - Renal recommendations: NS hydration 3 hours before and after procedure   - IVF: Changed to plasmalyte given high chloride, keep maintenance fluids for hydration given recent  angiogram and worsening kidney function  - Maintain indwelling catheter for strict intake/output    FEN/GI:  #No active issues  Assessment:  - Last BM: 24  Plan:  - Monitor and replace electrolytes per protocol  - IVF: plasmalyte  - Diet: NPO   - Bowel regimen: Miralax    ENDOCRINE:  #Hyperglycemia, resolved  Assessment:  Results from last 7 days   Lab Units 24  0410 24  0148 24  2302 24  1917 24  1658 24  1553 24  1143 24  0800   POCT GLUCOSE mg/dL 90  --  90 105* 115*  --  100* 100*   GLUCOSE mg/dL  --  92  --   --   --  97  --   --    Plan:  - Accuchecks and ISS Q4H while NPO    HEMATOLOGY:  #Anemia (stable)  #Concern for DVT  #Increased INR  Assessment:  Results from last 7 days   Lab Units 24  0148 24  0212   HEMOGLOBIN g/dL 10.0* 11.0*   HEMATOCRIT % 28.9* 31.7*   PLATELETS AUTO x10*3/uL 150 183   - Patient with history of recurrent DVTs  -  DVT ultrasound: Chronic changes, negative for DVTs  - : S/p vitamin K, INR 2.3-2.4  Plan:  - Continue to monitor with daily CBC and Coag panel  - Consulted vascular medicine for IVC filter indication due to increased risk/history of DVT/PE   - No indication for IVC filter placement   - Continue subcutaneous heparin and SCDs application  - Transfuse 2units FFP prior to angiogram    INFECTIOUS DISEASE:  #Leukocytosis, resolved  Assessment:  Results from last 7 days   Lab Units 24  0148 24  0212   WBC AUTO x10*3/uL 8.5 10.9   - Temp (24hrs), Av.9 °C (96.7 °F), Min:35.6 °C (96.1 °F), Max:36.4 °C (97.5 °F)  Plan:  - Continue to monitor for s/sx of infection  - Pan culture for temperature > 38.4 C    MUSCULOSKELETAL:  - No acute issues    SKIN:  - No acute issues  - Turns and skin care per NSU protocol    ACCESS:  - PIVs  - Right radial arterial line    PROPHYLAXIS:  - DVT Ppx: SCDs, SQH  - GI Ppx: not indicated    RESTRAINTS:  I agree with nursing assessment of the patient’s need for  restraints to protect the patient from injury and facilitate healing. The patient is unable to cooperate with the plan of care and at risk for disrupting critical therapy (i.e., removing medical devices, lines, tubes and/or dressings). Please see order for specifics. Restraints can be removed when the patient is able to cooperate with plan of care and allow healing to occur, or the medical devices at risk are discontinued by the medical team.     Oseas Palomares, APRN-CNP  Neuroscience Intensive Care    Total critical care time of 60 minutes, with > 50% of time spent in direct contact with patient/family for education, counseling and coordination of care.

## 2024-09-25 NOTE — CARE PLAN
The patient's goals for the shift include      Problem: Fall/Injury  Goal: Not fall by end of shift  Outcome: Progressing  Goal: Be free from injury by end of the shift  Outcome: Progressing     Problem: General Stroke  Goal: Demonstrate improvement in neurological exam throughout the shift  Outcome: Progressing  Goal: Maintain BP within ordered limits throughout shift  Outcome: Progressing  Goal: Participate in treatment (ie., meds, therapy) throughout shift  Outcome: Progressing  Goal: No symptoms of aspiration throughout shift  Outcome: Progressing  Goal: Controlled blood glucose throughout shift  Outcome: Progressing     Problem: ICU Stroke  Goal: Maintain patent airway throughout shift  Outcome: Progressing

## 2024-09-25 NOTE — PROGRESS NOTES
Communication Note    Patient Name: Kelly Martinez  MRN: [id number]  Today's Date: 9/25/2024   Room: 06/06A    Discipline: Occupational Therapy      Missed Visit: Yes  Missed Visit Reason:  (Pt s/p angio,on bedrest. Defer OT at this time.)      09/25/24 at 3:11 PM   Miesha Ann, OT   Rehab Office: [phone]

## 2024-09-25 NOTE — PROGRESS NOTES
"Kelly Martinez is a 79 y.o. female on day 4 of admission presenting with Cerebellar hemorrhage (Multi).    Subjective   NAEO       Objective     Physical Exam  awake  Ox1-2, confused  RUE D3 (chronic) o/w 5/5    Last Recorded Vitals  Blood pressure 140/64, pulse 66, temperature 36 °C (96.8 °F), temperature source Temporal, resp. rate 18, height 1.702 m (5' 7\"), weight 109 kg (239 lb 13.8 oz), SpO2 95%.  Intake/Output last 3 Shifts:  I/O last 3 completed shifts:  In: 3611 (33.2 mL/kg) [P.O.:860; I.V.:1549.3 (14.2 mL/kg); IV Piggyback:1201.7]  Out: 1465 (13.5 mL/kg) [Urine:1465 (0.4 mL/kg/hr)]  Weight: 108.8 kg     Relevant Results            This patient currently has cardiac telemetry ordered; if you would like to modify or discontinue the telemetry order, click here to go to the orders activity to modify/discontinue the order.      Results for orders placed or performed during the hospital encounter of 09/21/24 (from the past 24 hour(s))   POCT GLUCOSE   Result Value Ref Range    POCT Glucose 100 (H) 74 - 99 mg/dL   Renal function panel   Result Value Ref Range    Glucose 97 74 - 99 mg/dL    Sodium 143 136 - 145 mmol/L    Potassium 4.2 3.5 - 5.3 mmol/L    Chloride 118 (H) 98 - 107 mmol/L    Bicarbonate 16 (L) 21 - 32 mmol/L    Anion Gap 13 10 - 20 mmol/L    Urea Nitrogen 48 (H) 6 - 23 mg/dL    Creatinine 2.67 (H) 0.50 - 1.05 mg/dL    eGFR 18 (L) >60 mL/min/1.73m*2    Calcium 8.1 (L) 8.6 - 10.6 mg/dL    Phosphorus 4.7 2.5 - 4.9 mg/dL    Albumin 3.0 (L) 3.4 - 5.0 g/dL   Protime-INR   Result Value Ref Range    Protime 33.2 (H) 9.8 - 12.8 seconds    INR 2.9 (H) 0.9 - 1.1   POCT GLUCOSE   Result Value Ref Range    POCT Glucose 115 (H) 74 - 99 mg/dL   Type And Screen   Result Value Ref Range    ABO TYPE B     Rh TYPE POS     ANTIBODY SCREEN NEG    POCT GLUCOSE   Result Value Ref Range    POCT Glucose 105 (H) 74 - 99 mg/dL   POCT GLUCOSE   Result Value Ref Range    POCT Glucose 90 74 - 99 mg/dL   Calcium, Ionized   Result " Value Ref Range    POCT Calcium, Ionized 1.26 1.1 - 1.33 mmol/L   Magnesium   Result Value Ref Range    Magnesium 2.14 1.60 - 2.40 mg/dL   CBC and Auto Differential   Result Value Ref Range    WBC 8.5 4.4 - 11.3 x10*3/uL    nRBC 0.0 0.0 - 0.0 /100 WBCs    RBC 3.32 (L) 4.00 - 5.20 x10*6/uL    Hemoglobin 10.0 (L) 12.0 - 16.0 g/dL    Hematocrit 28.9 (L) 36.0 - 46.0 %    MCV 87 80 - 100 fL    MCH 30.1 26.0 - 34.0 pg    MCHC 34.6 32.0 - 36.0 g/dL    RDW 15.5 (H) 11.5 - 14.5 %    Platelets 150 150 - 450 x10*3/uL    Neutrophils % 88.5 40.0 - 80.0 %    Immature Granulocytes %, Automated 0.5 0.0 - 0.9 %    Lymphocytes % 5.6 13.0 - 44.0 %    Monocytes % 5.4 2.0 - 10.0 %    Eosinophils % 0.0 0.0 - 6.0 %    Basophils % 0.0 0.0 - 2.0 %    Neutrophils Absolute 7.48 (H) 1.60 - 5.50 x10*3/uL    Immature Granulocytes Absolute, Automated 0.04 0.00 - 0.50 x10*3/uL    Lymphocytes Absolute 0.47 (L) 0.80 - 3.00 x10*3/uL    Monocytes Absolute 0.46 0.05 - 0.80 x10*3/uL    Eosinophils Absolute 0.00 0.00 - 0.40 x10*3/uL    Basophils Absolute 0.00 0.00 - 0.10 x10*3/uL   Renal function panel   Result Value Ref Range    Glucose 92 74 - 99 mg/dL    Sodium 143 136 - 145 mmol/L    Potassium 4.0 3.5 - 5.3 mmol/L    Chloride 117 (H) 98 - 107 mmol/L    Bicarbonate 18 (L) 21 - 32 mmol/L    Anion Gap 12 10 - 20 mmol/L    Urea Nitrogen 50 (H) 6 - 23 mg/dL    Creatinine 2.85 (H) 0.50 - 1.05 mg/dL    eGFR 17 (L) >60 mL/min/1.73m*2    Calcium 8.4 (L) 8.6 - 10.6 mg/dL    Phosphorus 4.6 2.5 - 4.9 mg/dL    Albumin 2.9 (L) 3.4 - 5.0 g/dL   Heparin Assay   Result Value Ref Range    Heparin Unfractionated 0.1 See Comment Below for Therapeutic Ranges IU/mL   Coagulation Screen   Result Value Ref Range    Protime 27.0 (H) 9.8 - 12.8 seconds    INR 2.4 (H) 0.9 - 1.1    aPTT 46 (H) 27 - 38 seconds   POCT GLUCOSE   Result Value Ref Range    POCT Glucose 90 74 - 99 mg/dL   Prepare Plasma: 2 Units   Result Value Ref Range    PRODUCT CODE Z7131J48     Unit Number  [id number]     Unit ABO B     Unit RH POS     Dispense Status TR     Blood Expiration Date 9/28/2024  7:10:00 AM EDT     PRODUCT BLOOD TYPE 7300     UNIT VOLUME 216     PRODUCT CODE D5386U22     Unit Number [id number]     Unit ABO B     Unit RH POS     Dispense Status IS     Blood Expiration Date 9/29/2024  1:25:00 AM EDT     PRODUCT BLOOD TYPE 7300     UNIT VOLUME 293    POCT GLUCOSE   Result Value Ref Range    POCT Glucose 91 74 - 99 mg/dL   Prepare RBC: 2 Units   Result Value Ref Range    PRODUCT CODE G9141V70     Unit Number [id number]     Unit ABO B     Unit RH POS     XM INTEP COMP     Dispense Status XM     Blood Expiration Date 10/23/2024 11:59:00 PM EDT     PRODUCT BLOOD TYPE 7300     UNIT VOLUME 350     PRODUCT CODE W4510U42     Unit Number [id number]     Unit ABO B     Unit RH POS     XM INTEP COMP     Dispense Status XM     Blood Expiration Date 10/30/2024 11:59:00 PM EDT     PRODUCT BLOOD TYPE 7300     UNIT VOLUME 350    ACTIVATED CLOTTING TIME LOW   Result Value Ref Range    POCT Activated Clotting Time Low Range 166 83 - 199 sec                Assessment/Plan   Assessment & Plan  Cerebellar hemorrhage (Multi)    79 years old female with h/o parkinson's, HTN, HLD, HFpEF, 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 (s/p IVC filter removed 4/2016), JOY (CPAP at night), COPD, CKD stage 4, chronic tobacco use, schizophrenia, depression, p/w AMS, hypertensive 's, recent fall 2days ago, CTH R cerebellar and cerebellar vermis hemorrhage with 4th ventricular effacement, stable vents, CT C spine C5-7 ACDF, CTA c/f L PICA dAVM, s/p kcentra, TTE 74% EF, no wma, rCTH stable ICH, c/f L PICA dAVM, 9/22 MRI/V motion degraded, no obvious underlying mass or vascular lesion  9/23 BLE DVT US chronic changes, 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

## 2024-09-25 NOTE — PROCEDURES
Pre-Procedure Verification and Time Out:  Procedure Location: procedure area  HUDDLE - Pre-procedure Verification:  completed  TIME OUT - Final Verification:  completed immediately prior to procedure start  DEBRIEF: completed    Complications:  None; patient tolerated the procedure well.     Disposition: NSU  Condition: stable  Specimens Collected: No specimens collected    General Information:   Anesthesia/ sedation: General Anesthesia   Indication(s)/Pre - Procedure Diagnoses: Arteriovenous malformation  Post-Procedure Diagnosis: Arteriovenous malformation  Procedure Name: Diagnostic Cerebral Angiogram  Procedure performed by: Valeria Mackay  Assistant(s): Nicolas Moses   Estimated Blood Loss (mL): 10  Specimen: no  Informed Consent: consent obtained and in chart    Access: 6 Fr Sheath in R CFA  Closure: Mynx  Vessels Injected: L VA, R CFA   Deep sedation by general anesthesia   Findings: SM1 cerebellar AVM primarily feed by L PICA and R greater than L SCA feeders, Full report to follow in PACS dictation

## 2024-09-25 NOTE — CARE PLAN
Interprofessional Rounds    Summary:  Right ICH and AV malformation, intubated and Angio today, possible OR planning. Daughter involved.     Participants: Advance Practice Provider, Pharmacist, Physical Therapist, Physician, RN, and     Care Plan Reviewed with:  Interdisciplinary Team

## 2024-09-25 NOTE — PRE-PROCEDURE NOTE
Pre-Procedure H&P     Provider Assessment:  Diagnosis/Reason for Procedure: AVM  Procedure: Diagnostic Cerebral Angiogram, possible AVM embolization   Medications Reviewed:   yes   Prophylatic Antibiotics Needed:   no    Neuro status: Awake, MARIO  Mouth Opening OK: yes   Neck Flexibility OK: yes   Sedation Plan:general anesthesia  COVID-19 Risk Consent:  Surgeon has reviewed key risks related to the risk of f[place]ick COVID-19 and if they contract COVID-19 what the risks are.

## 2024-09-25 NOTE — SIGNIFICANT EVENT
I agree with nursing assessment of the patient’s need for restraints to protect the patient from injury and facilitate healing. The patient is unable to cooperate with the plan of care and at risk for disrupting critical therapy (i.e., removing medical devices, lines, tubes and/or dressings).  Please see order for specifics.    Restraints can be removed when the patient is able to cooperate with plan of care and allow healing to occur, or the medical devices at risk are discontinued by the medical team.

## 2024-09-26 ENCOUNTER — APPOINTMENT (OUTPATIENT)
Dept: RADIOLOGY | Facility: HOSPITAL | Age: 79
End: 2024-09-26
Payer: COMMERCIAL

## 2024-09-26 ENCOUNTER — ANESTHESIA EVENT (OUTPATIENT)
Dept: OPERATING ROOM | Facility: HOSPITAL | Age: 79
End: 2024-09-26
Payer: COMMERCIAL

## 2024-09-26 LAB
ALBUMIN SERPL BCP-MCNC: 3.3 G/DL (ref 3.4–5)
ANION GAP BLDA CALCULATED.4IONS-SCNC: 13 MMO/L (ref 10–25)
ANION GAP SERPL CALC-SCNC: 17 MMOL/L (ref 10–20)
BASE EXCESS BLDA CALC-SCNC: -6.5 MMOL/L (ref -2–3)
BASOPHILS # BLD AUTO: 0 X10*3/UL (ref 0–0.1)
BASOPHILS NFR BLD AUTO: 0 %
BLOOD EXPIRATION DATE: NORMAL
BLOOD EXPIRATION DATE: NORMAL
BODY TEMPERATURE: ABNORMAL
BUN SERPL-MCNC: 52 MG/DL (ref 6–23)
CA-I BLD-SCNC: 1.2 MMOL/L (ref 1.1–1.33)
CA-I BLDA-SCNC: 1.23 MMOL/L (ref 1.1–1.33)
CALCIUM SERPL-MCNC: 8.7 MG/DL (ref 8.6–10.6)
CHLORIDE BLDA-SCNC: 115 MMOL/L (ref 98–107)
CHLORIDE SERPL-SCNC: 112 MMOL/L (ref 98–107)
CO2 SERPL-SCNC: 18 MMOL/L (ref 21–32)
CREAT SERPL-MCNC: 2.91 MG/DL (ref 0.5–1.05)
DISPENSE STATUS: NORMAL
DISPENSE STATUS: NORMAL
EGFRCR SERPLBLD CKD-EPI 2021: 16 ML/MIN/1.73M*2
EOSINOPHIL # BLD AUTO: 0 X10*3/UL (ref 0–0.4)
EOSINOPHIL NFR BLD AUTO: 0 %
ERYTHROCYTE [DISTWIDTH] IN BLOOD BY AUTOMATED COUNT: 15.5 % (ref 11.5–14.5)
GLUCOSE BLD MANUAL STRIP-MCNC: 76 MG/DL (ref 74–99)
GLUCOSE BLD MANUAL STRIP-MCNC: 81 MG/DL (ref 74–99)
GLUCOSE BLD MANUAL STRIP-MCNC: 85 MG/DL (ref 74–99)
GLUCOSE BLD MANUAL STRIP-MCNC: 89 MG/DL (ref 74–99)
GLUCOSE BLD MANUAL STRIP-MCNC: 90 MG/DL (ref 74–99)
GLUCOSE BLD MANUAL STRIP-MCNC: 95 MG/DL (ref 74–99)
GLUCOSE BLDA-MCNC: 83 MG/DL (ref 74–99)
GLUCOSE SERPL-MCNC: 75 MG/DL (ref 74–99)
HCO3 BLDA-SCNC: 17.9 MMOL/L (ref 22–26)
HCT VFR BLD AUTO: 31.9 % (ref 36–46)
HCT VFR BLD EST: 32 % (ref 36–46)
HGB BLD-MCNC: 10.5 G/DL (ref 12–16)
HGB BLDA-MCNC: 10.6 G/DL (ref 12–16)
IMM GRANULOCYTES # BLD AUTO: 0.04 X10*3/UL (ref 0–0.5)
IMM GRANULOCYTES NFR BLD AUTO: 0.5 % (ref 0–0.9)
INHALED O2 CONCENTRATION: 40 %
INR PPP: 1.1 (ref 0.9–1.1)
LACTATE BLDA-SCNC: 0.6 MMOL/L (ref 0.4–2)
LYMPHOCYTES # BLD AUTO: 0.65 X10*3/UL (ref 0.8–3)
LYMPHOCYTES NFR BLD AUTO: 8.6 %
MAGNESIUM SERPL-MCNC: 2.1 MG/DL (ref 1.6–2.4)
MCH RBC QN AUTO: 29.2 PG (ref 26–34)
MCHC RBC AUTO-ENTMCNC: 32.9 G/DL (ref 32–36)
MCV RBC AUTO: 89 FL (ref 80–100)
MONOCYTES # BLD AUTO: 0.53 X10*3/UL (ref 0.05–0.8)
MONOCYTES NFR BLD AUTO: 7 %
NEUTROPHILS # BLD AUTO: 6.35 X10*3/UL (ref 1.6–5.5)
NEUTROPHILS NFR BLD AUTO: 83.9 %
NRBC BLD-RTO: 0 /100 WBCS (ref 0–0)
OXYHGB MFR BLDA: 93.1 % (ref 94–98)
PCO2 BLDA: 31 MM HG (ref 38–42)
PH BLDA: 7.37 PH (ref 7.38–7.42)
PHOSPHATE SERPL-MCNC: 4.5 MG/DL (ref 2.5–4.9)
PLATELET # BLD AUTO: 161 X10*3/UL (ref 150–450)
PO2 BLDA: 65 MM HG (ref 85–95)
POTASSIUM BLDA-SCNC: 3.9 MMOL/L (ref 3.5–5.3)
POTASSIUM SERPL-SCNC: 3.8 MMOL/L (ref 3.5–5.3)
PRODUCT BLOOD TYPE: 7300
PRODUCT BLOOD TYPE: 7300
PRODUCT CODE: NORMAL
PRODUCT CODE: NORMAL
PROTHROMBIN TIME: 12.1 SECONDS (ref 9.8–12.8)
RBC # BLD AUTO: 3.59 X10*6/UL (ref 4–5.2)
SAO2 % BLDA: 95 % (ref 94–100)
SODIUM BLDA-SCNC: 142 MMOL/L (ref 136–145)
SODIUM SERPL-SCNC: 143 MMOL/L (ref 136–145)
UFH PPP CHRO-ACNC: 0.1 IU/ML
UNIT ABO: NORMAL
UNIT ABO: NORMAL
UNIT NUMBER: NORMAL
UNIT NUMBER: NORMAL
UNIT RH: NORMAL
UNIT RH: NORMAL
UNIT VOLUME: 216
UNIT VOLUME: 293
WBC # BLD AUTO: 7.6 X10*3/UL (ref 4.4–11.3)

## 2024-09-26 PROCEDURE — 85610 PROTHROMBIN TIME: CPT

## 2024-09-26 PROCEDURE — 99291 CRITICAL CARE FIRST HOUR: CPT

## 2024-09-26 PROCEDURE — 2500000005 HC RX 250 GENERAL PHARMACY W/O HCPCS

## 2024-09-26 PROCEDURE — 74018 RADEX ABDOMEN 1 VIEW: CPT

## 2024-09-26 PROCEDURE — 74018 RADEX ABDOMEN 1 VIEW: CPT | Performed by: RADIOLOGY

## 2024-09-26 PROCEDURE — 2500000004 HC RX 250 GENERAL PHARMACY W/ HCPCS (ALT 636 FOR OP/ED): Performed by: REGISTERED NURSE

## 2024-09-26 PROCEDURE — 94667 MNPJ CHEST WALL 1ST: CPT

## 2024-09-26 PROCEDURE — 87077 CULTURE AEROBIC IDENTIFY: CPT

## 2024-09-26 PROCEDURE — 94003 VENT MGMT INPAT SUBQ DAY: CPT

## 2024-09-26 PROCEDURE — 94640 AIRWAY INHALATION TREATMENT: CPT

## 2024-09-26 PROCEDURE — 2500000004 HC RX 250 GENERAL PHARMACY W/ HCPCS (ALT 636 FOR OP/ED)

## 2024-09-26 PROCEDURE — 83735 ASSAY OF MAGNESIUM: CPT

## 2024-09-26 PROCEDURE — 37799 UNLISTED PX VASCULAR SURGERY: CPT

## 2024-09-26 PROCEDURE — 94668 MNPJ CHEST WALL SBSQ: CPT

## 2024-09-26 PROCEDURE — 71045 X-RAY EXAM CHEST 1 VIEW: CPT | Performed by: RADIOLOGY

## 2024-09-26 PROCEDURE — 2500000005 HC RX 250 GENERAL PHARMACY W/O HCPCS: Performed by: NEUROLOGICAL SURGERY

## 2024-09-26 PROCEDURE — 82947 ASSAY GLUCOSE BLOOD QUANT: CPT

## 2024-09-26 PROCEDURE — 2020000001 HC ICU ROOM DAILY

## 2024-09-26 PROCEDURE — 85025 COMPLETE CBC W/AUTO DIFF WBC: CPT

## 2024-09-26 PROCEDURE — 85520 HEPARIN ASSAY: CPT

## 2024-09-26 PROCEDURE — 84132 ASSAY OF SERUM POTASSIUM: CPT | Performed by: STUDENT IN AN ORGANIZED HEALTH CARE EDUCATION/TRAINING PROGRAM

## 2024-09-26 PROCEDURE — 80069 RENAL FUNCTION PANEL: CPT

## 2024-09-26 PROCEDURE — 82330 ASSAY OF CALCIUM: CPT

## 2024-09-26 PROCEDURE — 71045 X-RAY EXAM CHEST 1 VIEW: CPT

## 2024-09-26 RX ORDER — SODIUM CHLORIDE FOR INHALATION 3 %
3 VIAL, NEBULIZER (ML) INHALATION EVERY 6 HOURS SCHEDULED
Status: DISCONTINUED | OUTPATIENT
Start: 2024-09-26 | End: 2024-09-30

## 2024-09-26 RX ORDER — HYDRALAZINE HYDROCHLORIDE 50 MG/1
50 TABLET, FILM COATED ORAL EVERY 8 HOURS SCHEDULED
Status: DISCONTINUED | OUTPATIENT
Start: 2024-09-26 | End: 2024-09-27

## 2024-09-26 RX ORDER — AMLODIPINE BESYLATE 10 MG/1
10 TABLET ORAL DAILY
Status: DISCONTINUED | OUTPATIENT
Start: 2024-09-26 | End: 2024-09-27

## 2024-09-26 RX ORDER — SODIUM CHLORIDE, SODIUM GLUCONATE, SODIUM ACETATE, POTASSIUM CHLORIDE AND MAGNESIUM CHLORIDE 30; 37; 368; 526; 502 MG/100ML; MG/100ML; MG/100ML; MG/100ML; MG/100ML
75 INJECTION, SOLUTION INTRAVENOUS CONTINUOUS
Status: DISCONTINUED | OUTPATIENT
Start: 2024-09-26 | End: 2024-09-28

## 2024-09-26 RX ORDER — MIDAZOLAM HYDROCHLORIDE 1 MG/ML
0.5 INJECTION INTRAMUSCULAR; INTRAVENOUS ONCE
Status: COMPLETED | OUTPATIENT
Start: 2024-09-26 | End: 2024-09-26

## 2024-09-26 RX ORDER — MIDAZOLAM HYDROCHLORIDE 1 MG/ML
INJECTION INTRAMUSCULAR; INTRAVENOUS
Status: COMPLETED
Start: 2024-09-26 | End: 2024-09-26

## 2024-09-26 RX ORDER — NICARDIPINE HYDROCHLORIDE 0.2 MG/ML
2.5-15 INJECTION INTRAVENOUS CONTINUOUS
Status: DISCONTINUED | OUTPATIENT
Start: 2024-09-26 | End: 2024-09-29

## 2024-09-26 ASSESSMENT — PAIN - FUNCTIONAL ASSESSMENT
PAIN_FUNCTIONAL_ASSESSMENT: CPOT (CRITICAL CARE PAIN OBSERVATION TOOL)

## 2024-09-26 ASSESSMENT — PAIN SCALES - GENERAL: PAINLEVEL_OUTOF10: 0 - NO PAIN

## 2024-09-26 NOTE — PROGRESS NOTES
[facility]  NEUROSCIENCE INTENSIVE CARE UNIT  DAILY PROGRESS NOTE       Patient Name: Kelly Martinez   MRN: [id number]     Admit Date: 2024     : 1945 AGE: 79 y.o. GENDER: female      Subjective    79 year-old female with past medical history of PE/recurrent DVT s/p IVCF, HFpEF, hypertension, hyperlipidemia, JOY, schizophrenia, COPD, and CKD IV who presented to [facility] with cerebellar ICH with surrounding SAH. Admitted 2024 with cerebellar hemorrhage (Multi) after presenting s/p fall and confusion. CTH demonstrated right vermian cerebellar ICH with right cerebellar hemisphere ICH and/or SAH with 4th ventricular effacement. S/p Kcentra. CTA head/neck without aneurysm or vascular malformation. Admitted to NSU for neurological monitoring and hypertension management on Cardene infusion. Neurology consulted for co-management.     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  - : MRI/MRV motion degraded, no obvious underlying mass or vascular lesion   - 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   - : INR 2.4 s/p 30mg vitamin K  - : S/p 2units FFP, 40mg Lasix, diagnostic angiogram primary feeders left PICA, R>L SCA feeders, unable to extubate post-procedure, atelectasis on chest x-ray, diuresed with 20mg Lasix    Interval Events: NAEON.    Objective   VITALS:  Temp:  [35.5 °C (95.9 °F)-36.8 °C (98.2 °F)] 36.2 °C (97.2 °F)  Heart Rate:  [60-94] 77  Resp:  [11-22] 17  BP: (140)/(64) 140/64  Arterial Line BP 1: ()/(61-75) 150/61  FiO2 (%):  [40 %-50 %] 40 %  INTAKE/OUTPUT:  Intake/Output Summary (Last 24 hours) at 2024 0811  Last data filed at 2024 0700  Gross per 24 hour   Intake 2053.72 ml   Output 5330 ml   Net -3276.28 ml         PHYSICAL EXAM:  NEURO:  - EONS, pupils 3mm/reactive bilaterally  - BUE flicker withdraw to nox stim, BLE spontaneous  - Follows commands with  encouragement  CV:  - RRR on telemetry, NSR  - Right radial arterial line in place  RESP:  - Regular, unlabored  - Oxygen: ventilator  :  - Indwelling catheter in place  GI:  - Abdomen NT/ND, soft  SKIN:  - Right groin site intact, no hematoma, pulses intact    MEDICATIONS:  Scheduled: PRN: Continuous:   atorvastatin, 40 mg, oral, Daily  buPROPion XL, 150 mg, oral, Daily  carvedilol, 25 mg, oral, BID  DULoxetine, 30 mg, oral, Daily  folic acid, 1 mg, oral, Daily  heparin (porcine), 5,000 Units, subcutaneous, q8h  hydrALAZINE, 50 mg, oral, TID  insulin lispro, 0-5 Units, subcutaneous, q4h  isosorbide mononitrate ER, 30 mg, oral, Daily  [Held by provider] lisinopril, 40 mg, oral, Daily  mometasone, 2 puff, inhalation, BID  oxygen, , inhalation, Continuous - Inhalation  perflutren protein A microsphere, 0.5 mL, intravenous, Once in imaging  polyethylene glycol, 17 g, oral, Daily  QUEtiapine, 300 mg, oral, Nightly  sulfur hexafluoride microsphr, 2 mL, intravenous, Once in imaging  terazosin, 2 mg, oral, Daily     PRN medications: acetaminophen **OR** acetaminophen **OR** acetaminophen, dextrose, dextrose, glucagon, glucagon, hydrALAZINE, labetaloL, oxygen, traZODone niCARdipine, 2.5-15 mg/hr, Last Rate: 15 mg/hr (09/26/24 0745)         LAB RESULTS:  Results from last 72 hours   Lab Units 09/26/24  0428 09/25/24  1903 09/25/24  0148   GLUCOSE mg/dL 75 81 92   SODIUM mmol/L 143 143 143   POTASSIUM mmol/L 3.8 4.0 4.0   CHLORIDE mmol/L 112* 114* 117*   CO2 mmol/L 18* 18* 18*   ANION GAP mmol/L 17 15 12   BUN mg/dL 52* 51* 50*   CREATININE mg/dL 2.91* 2.81* 2.85*   EGFR mL/min/1.73m*2 16* 17* 17*   CALCIUM mg/dL 8.7 8.4* 8.4*   PHOSPHORUS mg/dL 4.5 4.8 4.6   ALBUMIN g/dL 3.3* 3.3* 2.9*   MAGNESIUM mg/dL 2.10  --  2.14   POCT CALCIUM IONIZED (MMOL/L) IN BLOOD mmol/L 1.20  --  1.26      Results from last 72 hours   Lab Units 09/26/24  0428 09/25/24  0148   WBC AUTO x10*3/uL 7.6 8.5   NRBC AUTO /100 WBCs 0.0 0.0   RBC AUTO  x10*6/uL 3.59* 3.32*   HEMOGLOBIN g/dL 10.5* 10.0*   HEMATOCRIT % 31.9* 28.9*   MCV fL 89 87   MCH pg 29.2 30.1   MCHC g/dL 32.9 34.6   RDW % 15.5* 15.5*   PLATELETS AUTO x10*3/uL 161 150      Results from last 72 hours   Lab Units 09/26/24  0428 09/25/24  0148   WBC AUTO x10*3/uL 7.6 8.5   NRBC AUTO /100 WBCs 0.0 0.0   RBC AUTO x10*6/uL 3.59* 3.32*   HEMOGLOBIN g/dL 10.5* 10.0*   HEMATOCRIT % 31.9* 28.9*   MCV fL 89 87   MCH pg 29.2 30.1   MCHC g/dL 32.9 34.6   RDW % 15.5* 15.5*   PLATELETS AUTO x10*3/uL 161 150   NEUTROS PCT AUTO % 83.9 88.5   IG PCT AUTO % 0.5 0.5   LYMPHS PCT AUTO % 8.6 5.6   MONOS PCT AUTO % 7.0 5.4   EOS PCT AUTO % 0.0 0.0   BASOS PCT AUTO % 0.0 0.0   NEUTROS ABS x10*3/uL 6.35* 7.48*   IG AUTO x10*3/uL 0.04 0.04   LYMPHS ABS AUTO x10*3/uL 0.65* 0.47*   MONOS ABS AUTO x10*3/uL 0.53 0.46   EOS ABS AUTO x10*3/uL 0.00 0.00   BASOS ABS AUTO x10*3/uL 0.00 0.00      Results from last 72 hours   Lab Units 09/26/24  0428 09/25/24  1200 09/25/24  0148   PROTIME seconds 12.1 15.9* 27.0*   INR  1.1 1.4* 2.4*   APTT seconds  --  52* 46*      Results from last 72 hours   Lab Units 09/26/24  0428 09/25/24  1903   ALBUMIN g/dL 3.3* 3.3*     IMAGING RESULTS:  XR chest 1 view   Final Result   1.  Mild interstitial edema. Right lower lobe atelectasis/edema.   2. ETT with the tip 7.9 cm above the christiano.                  MACRO:   None        Signed by: Nimisha Benjamin 9/25/2024 1:05 PM   Dictation workstation:   [id number]      IR intervention NEURO embolization   Final Result      XR chest 1 view   Final Result   1.  Minimal interstitial edema with bibasilar atelectasis/edema                  MACRO:   None        Signed by: Nimisha Benjamin 9/24/2024 3:57 PM   Dictation workstation:   [id number]      IR angiogram cerebral bilateral   Final Result   1. There is a superficial tangle of vessels in the cerebellum that   measures approximately 15 x 13 x 15 mm with superficial early venous   drainage. This tangle of blood  vessels is fed by the left distal   posteroinferior cerebellar artery in the distal left superior   cerebellar artery.   2. 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        I was present for and/or performed the critical portions of the   procedure and immediately available throughout the entire procedure.   I personally reviewed the image(s)/study and interpretation. I agree   with the findings as stated. Performed and dictated at [facility].        MACRO:   None        Signed by: Brian Evans 9/24/2024 5:42 PM   Dictation workstation:   [id number]      Vascular US lower extremity venous duplex bilateral   Final Result      MR brain w and wo IV contrast   Final Result   Postcontrast images somewhat limited due to patient motion artifact.   1. Intraparenchymal hematomas centered within the cerebellar vermis   and right cerebellar hemisphere, with scattered subarachnoid   hemorrhage throughout the right-greater-than-left cerebellar   hemispheres, similar to prior exams considering differences in   imaging technique. Additionally, there is surrounding vasogenic edema   throughout the cerebellum with minimal effacement of the 4th   ventricle. Otherwise no significant mass effect or midline shift.   Within the limitations of this exam, postcontrast images demonstrate   no abnormal areas of enhancement to suggest underlying mass or   evidence of vascular malformation.   2. Senescent changes of the brain, as detailed above.   3. No evidence of dural venous sinus thrombosis or deep venous   occlusion was identified within the major intracranial venous   structures.        I personally reviewed the images/study and I agree with the findings   as stated by Dr. Arnulfo Gresham M.D. This study was interpreted at   [facility], [place], [place].        MACRO:   None        Signed by: Noe  Blitz 9/22/2024 8:12 AM   Dictation workstation:   [id number]      MR venography intracranial w and wo IV contrast   Final Result   Postcontrast images somewhat limited due to patient motion artifact.   1. Intraparenchymal hematomas centered within the cerebellar vermis   and right cerebellar hemisphere, with scattered subarachnoid   hemorrhage throughout the right-greater-than-left cerebellar   hemispheres, similar to prior exams considering differences in   imaging technique. Additionally, there is surrounding vasogenic edema   throughout the cerebellum with minimal effacement of the 4th   ventricle. Otherwise no significant mass effect or midline shift.   Within the limitations of this exam, postcontrast images demonstrate   no abnormal areas of enhancement to suggest underlying mass or   evidence of vascular malformation.   2. Senescent changes of the brain, as detailed above.   3. No evidence of dural venous sinus thrombosis or deep venous   occlusion was identified within the major intracranial venous   structures.        I personally reviewed the images/study and I agree with the findings   as stated by Dr. Arnulfo Gresham M.D. This study was interpreted at   [facility], [place], [place].        MACRO:   None        Signed by: Noe Kenny 9/22/2024 8:12 AM   Dictation workstation:   [id number]      Transthoracic Echo (TTE) Complete   Final Result      CT head wo IV contrast   Final Result   Unchanged vermian hematoma continues to measure a proximally 1.5 cm   in size        Unchanged right infratentorial subdural or subarachnoid hemorrhage   unchanged        Increased number of slightly increased caliber branches of left PICA   and associated venous structures within the vermis. Vascular   malformation, neoplasm or fistula not excluded        MACRO:   Critical Finding:  See findings. Notification was initiated on   9/21/2024 at 10:12 am by  Doe Chand.  (**-OCF-**)         Signed by: Doe Chand 9/21/2024 10:14 AM   Dictation workstation:   [id number]      CT angio head and neck w and wo IV contrast   Final Result   Addendum (preliminary) 1 of 1   Interpreted By:  Doe Chand,    ADDENDUM:   3D reconstructions were performed at the diagnostic workstation and   demonstrate increase in the size and number of distal PICA branches   on the left with slight increased size and number of associated   venous structures along the lateral left cerebellar hemisphere.   Consider a dural venous fistula, parenchymal vascular malformation or   vascular neoplasm.        Signed by: Doe Chand 9/21/2024 10:15 AM        -------- ORIGINAL REPORT --------   Dictation workstation:   [id number]      Final   1. Redemonstrated intraparenchymal and subarachnoid hemorrhage within   the right cerebellar hemisphere.   2. No hemodynamically significant intracranial or extracranial   stenosis, occlusion, or aneurysm.        I personally reviewed the image(s)/study and resident interpretation.   I agree with the findings as stated by resident Ozzie Mattson.   Data analyzed and images interpreted at [facility], [place], [place].        MACRO:   None.        Signed by: Doe Chand 9/21/2024 7:10 AM   Dictation workstation:   [id number]      US extremity nonvascular real time w image documentation limited anatomic specific   Final Result   Very limited exam as color Doppler images were not obtained and the   exam was prematurely terminated as per request of the emergency team.   Within these limitations, the right femoral vein is only partially   compressible with echogenic material along the lumen raising   suspicion for partially occlusive thrombus, age-indeterminate.        The right calf veins and left lower extremity deep venous system were   not evaluated due to early termination of the examination.        I personally reviewed the images/study  and I agree with the findings   as stated by Dr. Arnulfo Gresham M.D. This study was interpreted at   [place], [place].        MACRO:   None.        Signed by: Henry Taveras 9/21/2024 4:52 AM   Dictation workstation:   [id number]      CT angio chest for pulmonary embolism   Final Result   1. No evidence of acute pulmonary embolism.   2. Dilated main pulmonary artery measuring 3.6 cm, nonspecific but   can be seen with pulmonary hypertension.   3. Scattered ground-glass opacities with areas of interlobular septal   thickening, suspicious for pulmonary edema. Atypical infectious or   inflammatory process are not excluded.        I personally reviewed the image(s)/study and resident interpretation.   I agree with the findings as stated by resident Ozzie Mattson.   Data analyzed and images interpreted at [place], [place].        MACRO:   None        Signed by: Henry Taveras 9/21/2024 4:59 AM   Dictation workstation:   [id number]      CT head wo IV contrast   Final Result   CT HEAD:   1. Focal areas of hyperdensity within the right cerebellar hemisphere   and of the cerebellar vermis suggestive of intraparenchymal hematoma.   No significant mass effect or midline shift. The ventricles and basal   cisterns are patent. Recommend follow-up CT in 48 hours to ensure   stability.   2. Serpiginous hyperdensity over the sulci of the right cerebellar   hemisphere representing subarachnoid hemorrhage.        CT CERVICAL SPINE:   1. No acute fracture or traumatic malalignment of the cervical spine.   2. Surgical changes and multilevel spondylotic changes of the   cervical spine as detailed above.             I personally reviewed the image(s)/study and resident interpretation.   I agree with the findings as stated by resident Ozzie Mattson.   Data analyzed and images interpreted at [facility],  [place], [place].        MACRO:   Ozzie Mattson discussed the significance and urgency of this   critical finding by telephone with  Federico Patel on 9/21/2024 at   4:01 am.  (**-RCF-**) Findings:  See findings.        Signed by: Henry Taveras 9/21/2024 4:31 AM   Dictation workstation:   [id number]      CT cervical spine wo IV contrast   Final Result   CT HEAD:   1. Focal areas of hyperdensity within the right cerebellar hemisphere   and of the cerebellar vermis suggestive of intraparenchymal hematoma.   No significant mass effect or midline shift. The ventricles and basal   cisterns are patent. Recommend follow-up CT in 48 hours to ensure   stability.   2. Serpiginous hyperdensity over the sulci of the right cerebellar   hemisphere representing subarachnoid hemorrhage.        CT CERVICAL SPINE:   1. No acute fracture or traumatic malalignment of the cervical spine.   2. Surgical changes and multilevel spondylotic changes of the   cervical spine as detailed above.             I personally reviewed the image(s)/study and resident interpretation.   I agree with the findings as stated by resident Ozzie Mattson.   Data analyzed and images interpreted at [place], [place].        MACRO:   Ozzie Mattson discussed the significance and urgency of this   critical finding by telephone with  Federico Patel on 9/21/2024 at   4:01 am.  (**-RCF-**) Findings:  See findings.        Signed by: Henry Taveras 9/21/2024 4:31 AM   Dictation workstation:   [id number]      XR chest 2 views   Final Result   Cardiomegaly with mild pulmonary edema.        I personally reviewed the image(s)/study and resident interpretation.   I agree with the findings as stated by resident Ozzie Mattson.   Data analyzed and images interpreted at [place], [place].        MACRO:   None        Signed by: Henry Taveras 9/21/2024 4:22 AM   Dictation  workstation:   [id number]      Point of Care Ultrasound    (Results Pending)   Referral to Neurointervention    (Results Pending)   Referral to Neurointervention    (Results Pending)   XR chest 1 view    (Results Pending)     Assessment/Plan    79 year-old female with past medical history of PE/recurrent DVT s/p IVCF, HFpEF, hypertension, hyperlipidemia, JOY, schizophrenia, vascular parkinson, COPD, and CKD IV who presented to [facility] with cerebellar ICH with surrounding SAH. Admitted 9/21/2024 with cerebellar hemorrhage (Multi) after presenting s/p fall and confusion. CTH demonstrated right vermian cerebellar ICH with right cerebellar hemisphere ICH and/or SAH with 4th ventricular effacement. S/p Kcentra. CTA head/neck without aneurysm or vascular malformation. Admitted to NSU for neurological monitoring and hypertension management on Cardene infusion. Neurology consulted for co-management.     NEURO:  #Right vermian cerebellar ICH with right cerebellar hemisphere ICH with 4th ventricular effacement  #Cerebellar AVM  #Vascular parkinson   #Schizophrenia   Assessment:  - Neurologically: see above  - S/p Kcentra  - See above significant events  Plan:  - NSU  - Neuro Checks: Q1H  - EVD watch  - No neurosurgical intervention at this time, plan for radio-surgery as outpatient for AVM  - Neurology stroke signed off  - Warfarin/ASA re-start plan per neurosurgery  - Pain: acetaminophen PRN  - Bupropion 150mg daily, duloxetine 30mg daily, quetiapine 300mg nightly  - PT/OT/SLP    CARDIOVASCULAR:  #HTN, HLD  Assessment:  - Hypertensive on arrival to NSU requiring Cardene infusion  - 9/21/24 ECHO: ejection fraction 74%, mild concentric LVH, mildly dilated LA  Plan:  - Continue to monitor on telemetry  - Atorvastatin 40mg daily, carvedilol 25mg BID, isosorbide mononitrate 30mg daily, hydralazine 50mg TID, terazosin 2mg daily   - BP goal: -150 mmHg (presenting SBP was 150-220 mmHg)    --> PRN: Labetalol, Hydralazine, and  Cardene    RESPIRATORY:  #COPD  #Acute respiratory insufficiency secondary to procedural sedation causing prolonged intubation  Assessment:  - Intubated  Plan:  - Continuous pulse oximetry   - O2 PRN to maintain SpO2 > 94%, wean as tolerated  - Ventilator bundle while intubated  - WTE  - Obtain chest x-ray in AM    RENAL/:  #CKD IV (baseline creatinine ~2)  #Hyperchloremic metabolic acidosis   Assessment:  Results from last 72 hours   Lab Units 09/26/24  0428 09/25/24  1903   BUN mg/dL 52* 51*   CREATININE mg/dL 2.91* 2.81*   Plan:  - Monitor with daily RFP   - IVF: Changed to plasmalyte given high chloride, keep maintenance fluids for hydration given recent angiogram and worsening kidney function  - Maintain indwelling catheter for strict intake/output    FEN/GI:  #No active issues  Assessment:  - Last BM: 9/26/24  Plan:  - Monitor and replace electrolytes per protocol  - IVF: plasmalyte  - Diet: NPO   - Bowel regimen: Miralax    ENDOCRINE:  #Hyperglycemia, resolved  Assessment:  Results from last 7 days   Lab Units 09/26/24  0428 09/26/24  0411 09/25/24  2315 09/25/24  1949 09/25/24  1903 09/25/24  1628 09/25/24  1213 09/25/24  0746   POCT GLUCOSE mg/dL  --  76 83 81  --  85 88 91   GLUCOSE mg/dL 75  --   --   --  81  --   --   --    Plan:  - Accuchecks and ISS Q4H while NPO    HEMATOLOGY:  #Anemia (stable)  #Concern for DVT  #Increased INR  Assessment:  Results from last 7 days   Lab Units 09/26/24 0428 09/25/24  0148   HEMOGLOBIN g/dL 10.5* 10.0*   HEMATOCRIT % 31.9* 28.9*   PLATELETS AUTO x10*3/uL 161 150   - Patient with history of recurrent DVTs  - 9/23 DVT ultrasound: Chronic changes, negative for DVTs  - 9/24: S/p vitamin K, INR 2.3-2.4  - 9/25: Transfused 2units FFP prior to angiogram  Plan:  - Continue to monitor with daily CBC and Coag panel  - Consulted vascular medicine for IVC filter indication due to increased risk/history of DVT/PE   - No indication for IVC filter placement   - Continue  subcutaneous heparin and SCDs application    INFECTIOUS DISEASE:  #Leukocytosis, resolved  Assessment:  Results from last 7 days   Lab Units 24  0428 24  0148   WBC AUTO x10*3/uL 7.6 8.5   - Temp (24hrs), Av.2 °C (97.1 °F), Min:35.5 °C (95.9 °F), Max:36.8 °C (98.2 °F)  Plan:  - Continue to monitor for s/sx of infection  - Pan culture for temperature > 38.4 C    MUSCULOSKELETAL:  - No acute issues    SKIN:  - No acute issues  - Turns and skin care per NSU protocol    ACCESS:  - PIVs  - Right radial arterial line    PROPHYLAXIS:  - DVT Ppx: SCDs, SQH  - GI Ppx: not indicated    RESTRAINTS:  I agree with nursing assessment of the patient’s need for restraints to protect the patient from injury and facilitate healing. The patient is unable to cooperate with the plan of care and at risk for disrupting critical therapy (i.e., removing medical devices, lines, tubes and/or dressings). Please see order for specifics. Restraints can be removed when the patient is able to cooperate with plan of care and allow healing to occur, or the medical devices at risk are discontinued by the medical team.     YUNIOR Ramos-CNP  Neuroscience Intensive Care    Total critical care time of 60 minutes, with > 50% of time spent in direct contact with patient/family for education, counseling and coordination of care.

## 2024-09-26 NOTE — PROGRESS NOTES
"Kelly Martinez is a 79 y.o. female on day 5 of admission presenting with Cerebellar hemorrhage (Multi).    Subjective   NAEO       Objective     Physical Exam  Intubated  Eyes opened to stim  Follows commands x4    Last Recorded Vitals  Blood pressure 140/64, pulse 77, temperature 35.7 °C (96.3 °F), resp. rate 17, height 1.702 m (5' 7.01\"), weight 109 kg (239 lb 13.8 oz), SpO2 94%.  Intake/Output last 3 Shifts:  I/O last 3 completed shifts:  In: 3120.4 (28.7 mL/kg) [P.O.:370; I.V.:112.7 (1 mL/kg); Blood:216; IV Piggyback:2421.7]  Out: 5540 (50.9 mL/kg) [Urine:5540 (1.4 mL/kg/hr)]  Weight: 108.8 kg     Relevant Results            This patient currently has cardiac telemetry ordered; if you would like to modify or discontinue the telemetry order, click here to go to the orders activity to modify/discontinue the order.      Results for orders placed or performed during the hospital encounter of 09/21/24 (from the past 24 hour(s))   Coagulation Screen   Result Value Ref Range    Protime 15.9 (H) 9.8 - 12.8 seconds    INR 1.4 (H) 0.9 - 1.1    aPTT 52 (H) 27 - 38 seconds   POCT GLUCOSE   Result Value Ref Range    POCT Glucose 88 74 - 99 mg/dL   Blood Gas Arterial Full Panel   Result Value Ref Range    POCT pH, Arterial 7.26 (L) 7.38 - 7.42 pH    POCT pCO2, Arterial 40 38 - 42 mm Hg    POCT pO2, Arterial 67 (L) 85 - 95 mm Hg    POCT SO2, Arterial 94 94 - 100 %    POCT Oxy Hemoglobin, Arterial 92.4 (L) 94.0 - 98.0 %    POCT Hematocrit Calculated, Arterial 32.0 (L) 36.0 - 46.0 %    POCT Sodium, Arterial 142 136 - 145 mmol/L    POCT Potassium, Arterial 4.0 3.5 - 5.3 mmol/L    POCT Chloride, Arterial 116 (H) 98 - 107 mmol/L    POCT Ionized Calcium, Arterial 1.25 1.10 - 1.33 mmol/L    POCT Glucose, Arterial 91 74 - 99 mg/dL    POCT Lactate, Arterial 0.6 0.4 - 2.0 mmol/L    POCT Base Excess, Arterial -8.6 (L) -2.0 - 3.0 mmol/L    POCT HCO3 Calculated, Arterial 17.9 (L) 22.0 - 26.0 mmol/L    POCT Hemoglobin, Arterial 10.5 (L) " 12.0 - 16.0 g/dL    POCT Anion Gap, Arterial 12 10 - 25 mmo/L    Patient Temperature      FiO2 50 %   Blood Gas Arterial Full Panel   Result Value Ref Range    POCT pH, Arterial 7.28 (L) 7.38 - 7.42 pH    POCT pCO2, Arterial 38 38 - 42 mm Hg    POCT pO2, Arterial 79 (L) 85 - 95 mm Hg    POCT SO2, Arterial 97 94 - 100 %    POCT Oxy Hemoglobin, Arterial 94.3 94.0 - 98.0 %    POCT Hematocrit Calculated, Arterial 47.0 (H) 36.0 - 46.0 %    POCT Sodium, Arterial 139 136 - 145 mmol/L    POCT Potassium, Arterial 4.2 3.5 - 5.3 mmol/L    POCT Chloride, Arterial 114 (H) 98 - 107 mmol/L    POCT Ionized Calcium, Arterial 1.20 1.10 - 1.33 mmol/L    POCT Glucose, Arterial 90 74 - 99 mg/dL    POCT Lactate, Arterial 0.8 0.4 - 2.0 mmol/L    POCT Base Excess, Arterial -8.2 (L) -2.0 - 3.0 mmol/L    POCT HCO3 Calculated, Arterial 17.9 (L) 22.0 - 26.0 mmol/L    POCT Hemoglobin, Arterial 15.7 12.0 - 16.0 g/dL    POCT Anion Gap, Arterial 11 10 - 25 mmo/L    Patient Temperature      FiO2 60 %   POCT GLUCOSE   Result Value Ref Range    POCT Glucose 85 74 - 99 mg/dL   Renal function panel   Result Value Ref Range    Glucose 81 74 - 99 mg/dL    Sodium 143 136 - 145 mmol/L    Potassium 4.0 3.5 - 5.3 mmol/L    Chloride 114 (H) 98 - 107 mmol/L    Bicarbonate 18 (L) 21 - 32 mmol/L    Anion Gap 15 10 - 20 mmol/L    Urea Nitrogen 51 (H) 6 - 23 mg/dL    Creatinine 2.81 (H) 0.50 - 1.05 mg/dL    eGFR 17 (L) >60 mL/min/1.73m*2    Calcium 8.4 (L) 8.6 - 10.6 mg/dL    Phosphorus 4.8 2.5 - 4.9 mg/dL    Albumin 3.3 (L) 3.4 - 5.0 g/dL   POCT GLUCOSE   Result Value Ref Range    POCT Glucose 81 74 - 99 mg/dL   POCT GLUCOSE   Result Value Ref Range    POCT Glucose 83 74 - 99 mg/dL   POCT GLUCOSE   Result Value Ref Range    POCT Glucose 76 74 - 99 mg/dL   Calcium, Ionized   Result Value Ref Range    POCT Calcium, Ionized 1.20 1.1 - 1.33 mmol/L   CBC and Auto Differential   Result Value Ref Range    WBC 7.6 4.4 - 11.3 x10*3/uL    nRBC 0.0 0.0 - 0.0 /100 WBCs     RBC 3.59 (L) 4.00 - 5.20 x10*6/uL    Hemoglobin 10.5 (L) 12.0 - 16.0 g/dL    Hematocrit 31.9 (L) 36.0 - 46.0 %    MCV 89 80 - 100 fL    MCH 29.2 26.0 - 34.0 pg    MCHC 32.9 32.0 - 36.0 g/dL    RDW 15.5 (H) 11.5 - 14.5 %    Platelets 161 150 - 450 x10*3/uL    Neutrophils % 83.9 40.0 - 80.0 %    Immature Granulocytes %, Automated 0.5 0.0 - 0.9 %    Lymphocytes % 8.6 13.0 - 44.0 %    Monocytes % 7.0 2.0 - 10.0 %    Eosinophils % 0.0 0.0 - 6.0 %    Basophils % 0.0 0.0 - 2.0 %    Neutrophils Absolute 6.35 (H) 1.60 - 5.50 x10*3/uL    Immature Granulocytes Absolute, Automated 0.04 0.00 - 0.50 x10*3/uL    Lymphocytes Absolute 0.65 (L) 0.80 - 3.00 x10*3/uL    Monocytes Absolute 0.53 0.05 - 0.80 x10*3/uL    Eosinophils Absolute 0.00 0.00 - 0.40 x10*3/uL    Basophils Absolute 0.00 0.00 - 0.10 x10*3/uL   Heparin Assay   Result Value Ref Range    Heparin Unfractionated 0.1 See Comment Below for Therapeutic Ranges IU/mL   Magnesium   Result Value Ref Range    Magnesium 2.10 1.60 - 2.40 mg/dL   Renal function panel   Result Value Ref Range    Glucose 75 74 - 99 mg/dL    Sodium 143 136 - 145 mmol/L    Potassium 3.8 3.5 - 5.3 mmol/L    Chloride 112 (H) 98 - 107 mmol/L    Bicarbonate 18 (L) 21 - 32 mmol/L    Anion Gap 17 10 - 20 mmol/L    Urea Nitrogen 52 (H) 6 - 23 mg/dL    Creatinine 2.91 (H) 0.50 - 1.05 mg/dL    eGFR 16 (L) >60 mL/min/1.73m*2    Calcium 8.7 8.6 - 10.6 mg/dL    Phosphorus 4.5 2.5 - 4.9 mg/dL    Albumin 3.3 (L) 3.4 - 5.0 g/dL   Protime-INR   Result Value Ref Range    Protime 12.1 9.8 - 12.8 seconds    INR 1.1 0.9 - 1.1   POCT GLUCOSE   Result Value Ref Range    POCT Glucose 81 74 - 99 mg/dL                Assessment/Plan   Assessment & Plan  Cerebellar hemorrhage (Multi)    AVM (arteriovenous malformation) ([facility])    79 years old female with h/o parkinson's, HTN, HLD, HFpEF, 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 (s/p IVC filter removed 4/2016), JOY (CPAP at night), COPD, CKD stage 4, chronic tobacco use, schizophrenia,  depression, p/w AMS, hypertensive 's, recent fall 2days ago, CTH R cerebellar and cerebellar vermis hemorrhage with 4th ventricular effacement, stable vents, CT C spine C5-7 ACDF, CTA c/f L PICA dAVM, s/p kcentra, TTE 74% EF, no wma, rCTH stable ICH, c/f L PICA dAVM, 9/22 MRI/V motion degraded, no obvious underlying mass or vascular lesion  9/23 BLE DVT US chronic changes, 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     9/24 INR 2.3 s/p 10mg Vit K (3 doses), 9/25 s/p 2u FFP, s/p 40IV lasix, 9/25 angio SM1 cerebellar AVM, primary feeders L PICA, R>L SCA feeders    Plan  NSU  Wean to extubate  -150  ASA/warfarin restart plan  Vasc med recs-AC restart when able, no IVCF  renal recs  medicine recs-class IV  SLP-reg diet  PTOT-SNF    Julia Gallegos MD

## 2024-09-26 NOTE — CARE PLAN
Problem: Fall/Injury  Goal: Not fall by end of shift  Outcome: Progressing  Goal: Be free from injury by end of the shift  Outcome: Progressing     Problem: Pain - Adult  Goal: Verbalizes/displays adequate comfort level or baseline comfort level  Outcome: Progressing     Problem: Safety - Adult  Goal: Free from fall injury  Outcome: Progressing     Problem: Chronic Conditions and Co-morbidities  Goal: Patient's chronic conditions and co-morbidity symptoms are monitored and maintained or improved  Outcome: Progressing     Problem: General Stroke  Goal: Demonstrate improvement in neurological exam throughout the shift  Outcome: Progressing  Goal: Maintain BP within ordered limits throughout shift  Outcome: Progressing  Goal: Participate in treatment (ie., meds, therapy) throughout shift  Outcome: Progressing  Goal: No symptoms of aspiration throughout shift  Outcome: Progressing  Goal: Controlled blood glucose throughout shift  Outcome: Progressing     Problem: ICU Stroke  Goal: Tolerate ventilator weaning trial during shift  Outcome: Progressing     Problem: Safety - Medical Restraint  Goal: Remains free of injury from restraints (Restraint for Interference with Medical Device)  Outcome: Progressing  Goal: Free from restraint(s) (Restraint for Interference with Medical Device)  Outcome: Progressing   The patient's goals for the shift include   CRYSTAL  The clinical goals for the shift include SBP < 150 throughout shift.

## 2024-09-27 ENCOUNTER — ANESTHESIA (OUTPATIENT)
Dept: OPERATING ROOM | Facility: HOSPITAL | Age: 79
End: 2024-09-27
Payer: COMMERCIAL

## 2024-09-27 ENCOUNTER — APPOINTMENT (OUTPATIENT)
Dept: RADIOLOGY | Facility: HOSPITAL | Age: 79
End: 2024-09-27
Payer: COMMERCIAL

## 2024-09-27 LAB
ALBUMIN SERPL BCP-MCNC: 3 G/DL (ref 3.4–5)
ANION GAP SERPL CALC-SCNC: 17 MMOL/L (ref 10–20)
BASOPHILS # BLD AUTO: 0.01 X10*3/UL (ref 0–0.1)
BASOPHILS NFR BLD AUTO: 0.1 %
BUN SERPL-MCNC: 52 MG/DL (ref 6–23)
CA-I BLD-SCNC: 1.22 MMOL/L (ref 1.1–1.33)
CALCIUM SERPL-MCNC: 8.6 MG/DL (ref 8.6–10.6)
CHLORIDE SERPL-SCNC: 113 MMOL/L (ref 98–107)
CO2 SERPL-SCNC: 18 MMOL/L (ref 21–32)
CREAT SERPL-MCNC: 3.01 MG/DL (ref 0.5–1.05)
EGFRCR SERPLBLD CKD-EPI 2021: 15 ML/MIN/1.73M*2
EOSINOPHIL # BLD AUTO: 0.01 X10*3/UL (ref 0–0.4)
EOSINOPHIL NFR BLD AUTO: 0.1 %
ERYTHROCYTE [DISTWIDTH] IN BLOOD BY AUTOMATED COUNT: 15.6 % (ref 11.5–14.5)
GLUCOSE BLD MANUAL STRIP-MCNC: 103 MG/DL (ref 74–99)
GLUCOSE BLD MANUAL STRIP-MCNC: 109 MG/DL (ref 74–99)
GLUCOSE BLD MANUAL STRIP-MCNC: 112 MG/DL (ref 74–99)
GLUCOSE BLD MANUAL STRIP-MCNC: 124 MG/DL (ref 74–99)
GLUCOSE BLD MANUAL STRIP-MCNC: 126 MG/DL (ref 74–99)
GLUCOSE BLD MANUAL STRIP-MCNC: 96 MG/DL (ref 74–99)
GLUCOSE SERPL-MCNC: 92 MG/DL (ref 74–99)
HCT VFR BLD AUTO: 28.5 % (ref 36–46)
HGB BLD-MCNC: 9.8 G/DL (ref 12–16)
IMM GRANULOCYTES # BLD AUTO: 0.04 X10*3/UL (ref 0–0.5)
IMM GRANULOCYTES NFR BLD AUTO: 0.5 % (ref 0–0.9)
LYMPHOCYTES # BLD AUTO: 0.68 X10*3/UL (ref 0.8–3)
LYMPHOCYTES NFR BLD AUTO: 8.1 %
MAGNESIUM SERPL-MCNC: 2.1 MG/DL (ref 1.6–2.4)
MCH RBC QN AUTO: 30 PG (ref 26–34)
MCHC RBC AUTO-ENTMCNC: 34.4 G/DL (ref 32–36)
MCV RBC AUTO: 87 FL (ref 80–100)
MONOCYTES # BLD AUTO: 0.52 X10*3/UL (ref 0.05–0.8)
MONOCYTES NFR BLD AUTO: 6.2 %
MRSA DNA SPEC QL NAA+PROBE: DETECTED
NEUTROPHILS # BLD AUTO: 7.13 X10*3/UL (ref 1.6–5.5)
NEUTROPHILS NFR BLD AUTO: 85 %
NRBC BLD-RTO: 0 /100 WBCS (ref 0–0)
PHOSPHATE SERPL-MCNC: 4.2 MG/DL (ref 2.5–4.9)
PLATELET # BLD AUTO: 142 X10*3/UL (ref 150–450)
POTASSIUM SERPL-SCNC: 3.9 MMOL/L (ref 3.5–5.3)
RBC # BLD AUTO: 3.27 X10*6/UL (ref 4–5.2)
SODIUM SERPL-SCNC: 144 MMOL/L (ref 136–145)
UFH PPP CHRO-ACNC: 0.1 IU/ML
WBC # BLD AUTO: 8.4 X10*3/UL (ref 4.4–11.3)

## 2024-09-27 PROCEDURE — 94640 AIRWAY INHALATION TREATMENT: CPT

## 2024-09-27 PROCEDURE — 2500000002 HC RX 250 W HCPCS SELF ADMINISTERED DRUGS (ALT 637 FOR MEDICARE OP, ALT 636 FOR OP/ED): Performed by: REGISTERED NURSE

## 2024-09-27 PROCEDURE — 80069 RENAL FUNCTION PANEL: CPT

## 2024-09-27 PROCEDURE — 83735 ASSAY OF MAGNESIUM: CPT

## 2024-09-27 PROCEDURE — 2500000001 HC RX 250 WO HCPCS SELF ADMINISTERED DRUGS (ALT 637 FOR MEDICARE OP)

## 2024-09-27 PROCEDURE — 74018 RADEX ABDOMEN 1 VIEW: CPT

## 2024-09-27 PROCEDURE — 2500000005 HC RX 250 GENERAL PHARMACY W/O HCPCS: Performed by: NEUROLOGICAL SURGERY

## 2024-09-27 PROCEDURE — 71045 X-RAY EXAM CHEST 1 VIEW: CPT | Performed by: RADIOLOGY

## 2024-09-27 PROCEDURE — 85025 COMPLETE CBC W/AUTO DIFF WBC: CPT

## 2024-09-27 PROCEDURE — 2500000001 HC RX 250 WO HCPCS SELF ADMINISTERED DRUGS (ALT 637 FOR MEDICARE OP): Performed by: REGISTERED NURSE

## 2024-09-27 PROCEDURE — 2500000004 HC RX 250 GENERAL PHARMACY W/ HCPCS (ALT 636 FOR OP/ED)

## 2024-09-27 PROCEDURE — 2500000005 HC RX 250 GENERAL PHARMACY W/O HCPCS

## 2024-09-27 PROCEDURE — 71045 X-RAY EXAM CHEST 1 VIEW: CPT

## 2024-09-27 PROCEDURE — 85520 HEPARIN ASSAY: CPT

## 2024-09-27 PROCEDURE — 99291 CRITICAL CARE FIRST HOUR: CPT | Performed by: PSYCHIATRY & NEUROLOGY

## 2024-09-27 PROCEDURE — 37799 UNLISTED PX VASCULAR SURGERY: CPT

## 2024-09-27 PROCEDURE — 99291 CRITICAL CARE FIRST HOUR: CPT

## 2024-09-27 PROCEDURE — 82330 ASSAY OF CALCIUM: CPT

## 2024-09-27 PROCEDURE — 94668 MNPJ CHEST WALL SBSQ: CPT

## 2024-09-27 PROCEDURE — 87641 MR-STAPH DNA AMP PROBE: CPT

## 2024-09-27 PROCEDURE — 74018 RADEX ABDOMEN 1 VIEW: CPT | Performed by: RADIOLOGY

## 2024-09-27 PROCEDURE — 94003 VENT MGMT INPAT SUBQ DAY: CPT

## 2024-09-27 PROCEDURE — 2500000004 HC RX 250 GENERAL PHARMACY W/ HCPCS (ALT 636 FOR OP/ED): Performed by: REGISTERED NURSE

## 2024-09-27 PROCEDURE — 82947 ASSAY GLUCOSE BLOOD QUANT: CPT

## 2024-09-27 PROCEDURE — 2020000001 HC ICU ROOM DAILY

## 2024-09-27 RX ORDER — DULOXETIN HYDROCHLORIDE 30 MG/1
30 CAPSULE, DELAYED RELEASE ORAL DAILY
Status: DISCONTINUED | OUTPATIENT
Start: 2024-09-27 | End: 2024-09-28

## 2024-09-27 RX ORDER — FOLIC ACID 1 MG/1
1 TABLET ORAL DAILY
Status: DISCONTINUED | OUTPATIENT
Start: 2024-09-27 | End: 2024-09-28

## 2024-09-27 RX ORDER — AMLODIPINE BESYLATE 10 MG/1
10 TABLET ORAL DAILY
Status: DISCONTINUED | OUTPATIENT
Start: 2024-09-27 | End: 2024-09-28

## 2024-09-27 RX ORDER — ISOSORBIDE DINITRATE 10 MG/1
10 TABLET ORAL
Status: DISCONTINUED | OUTPATIENT
Start: 2024-09-28 | End: 2024-09-28

## 2024-09-27 RX ORDER — DEXTROMETHORPHAN POLISTIREX 30 MG/5 ML
1 SUSPENSION, EXTENDED RELEASE 12 HR ORAL ONCE
Status: COMPLETED | OUTPATIENT
Start: 2024-09-27 | End: 2024-09-27

## 2024-09-27 RX ORDER — TERAZOSIN 2 MG/1
2 CAPSULE ORAL DAILY
Status: DISCONTINUED | OUTPATIENT
Start: 2024-09-27 | End: 2024-09-28

## 2024-09-27 RX ORDER — TRAZODONE HYDROCHLORIDE 50 MG/1
150 TABLET ORAL NIGHTLY PRN
Status: DISCONTINUED | OUTPATIENT
Start: 2024-09-27 | End: 2024-09-28

## 2024-09-27 RX ORDER — BISACODYL 10 MG/1
10 SUPPOSITORY RECTAL ONCE
Status: COMPLETED | OUTPATIENT
Start: 2024-09-27 | End: 2024-09-27

## 2024-09-27 RX ORDER — ACETAMINOPHEN 325 MG/1
650 TABLET ORAL EVERY 6 HOURS PRN
Status: DISCONTINUED | OUTPATIENT
Start: 2024-09-27 | End: 2024-09-28

## 2024-09-27 RX ORDER — QUETIAPINE FUMARATE 300 MG/1
300 TABLET, FILM COATED ORAL NIGHTLY
Status: DISCONTINUED | OUTPATIENT
Start: 2024-09-27 | End: 2024-09-28

## 2024-09-27 RX ORDER — POLYETHYLENE GLYCOL 3350 17 G/17G
17 POWDER, FOR SOLUTION ORAL 2 TIMES DAILY
Status: DISCONTINUED | OUTPATIENT
Start: 2024-09-27 | End: 2024-09-28

## 2024-09-27 RX ORDER — BUPROPION HYDROCHLORIDE 75 MG/1
75 TABLET ORAL 2 TIMES DAILY
Status: DISCONTINUED | OUTPATIENT
Start: 2024-09-27 | End: 2024-09-28

## 2024-09-27 RX ORDER — LIDOCAINE HYDROCHLORIDE 20 MG/ML
1 JELLY TOPICAL
Status: COMPLETED | OUTPATIENT
Start: 2024-09-27 | End: 2024-09-27

## 2024-09-27 RX ORDER — ATORVASTATIN CALCIUM 40 MG/1
40 TABLET, FILM COATED ORAL DAILY
Status: DISCONTINUED | OUTPATIENT
Start: 2024-09-27 | End: 2024-09-28

## 2024-09-27 RX ORDER — AMOXICILLIN 250 MG
1 CAPSULE ORAL 2 TIMES DAILY
Status: DISCONTINUED | OUTPATIENT
Start: 2024-09-27 | End: 2024-09-28

## 2024-09-27 RX ORDER — POLYETHYLENE GLYCOL 3350 17 G/17G
17 POWDER, FOR SOLUTION ORAL DAILY
Status: DISCONTINUED | OUTPATIENT
Start: 2024-09-27 | End: 2024-09-27

## 2024-09-27 RX ORDER — HYDRALAZINE HYDROCHLORIDE 50 MG/1
50 TABLET, FILM COATED ORAL EVERY 8 HOURS SCHEDULED
Status: DISCONTINUED | OUTPATIENT
Start: 2024-09-27 | End: 2024-09-28

## 2024-09-27 RX ORDER — VANCOMYCIN HYDROCHLORIDE 1 G/20ML
INJECTION, POWDER, LYOPHILIZED, FOR SOLUTION INTRAVENOUS DAILY PRN
Status: DISCONTINUED | OUTPATIENT
Start: 2024-09-27 | End: 2024-10-02

## 2024-09-27 RX ORDER — ISOSORBIDE DINITRATE 30 MG/1
30 TABLET ORAL
Status: DISCONTINUED | OUTPATIENT
Start: 2024-09-27 | End: 2024-09-27

## 2024-09-27 RX ORDER — CEFEPIME 1 G/50ML
1 INJECTION, SOLUTION INTRAVENOUS EVERY 12 HOURS
Status: DISCONTINUED | OUTPATIENT
Start: 2024-09-27 | End: 2024-09-29

## 2024-09-27 RX ORDER — CARVEDILOL 25 MG/1
25 TABLET ORAL
Status: DISCONTINUED | OUTPATIENT
Start: 2024-09-27 | End: 2024-09-28

## 2024-09-27 RX ORDER — PANTOPRAZOLE SODIUM 40 MG/10ML
40 INJECTION, POWDER, LYOPHILIZED, FOR SOLUTION INTRAVENOUS DAILY
Status: DISCONTINUED | OUTPATIENT
Start: 2024-09-27 | End: 2024-09-30

## 2024-09-27 ASSESSMENT — COGNITIVE AND FUNCTIONAL STATUS - GENERAL
DAILY ACTIVITIY SCORE: 6
HELP NEEDED FOR BATHING: TOTAL
TURNING FROM BACK TO SIDE WHILE IN FLAT BAD: TOTAL
DRESSING REGULAR LOWER BODY CLOTHING: TOTAL
EATING MEALS: TOTAL
MOVING TO AND FROM BED TO CHAIR: TOTAL
TOILETING: TOTAL
DRESSING REGULAR UPPER BODY CLOTHING: TOTAL
MOVING FROM LYING ON BACK TO SITTING ON SIDE OF FLAT BED WITH BEDRAILS: TOTAL
STANDING UP FROM CHAIR USING ARMS: TOTAL
CLIMB 3 TO 5 STEPS WITH RAILING: TOTAL
WALKING IN HOSPITAL ROOM: TOTAL
PERSONAL GROOMING: TOTAL
MOBILITY SCORE: 6

## 2024-09-27 ASSESSMENT — PAIN - FUNCTIONAL ASSESSMENT
PAIN_FUNCTIONAL_ASSESSMENT: CPOT (CRITICAL CARE PAIN OBSERVATION TOOL)

## 2024-09-27 NOTE — PROGRESS NOTES
Communication Note    Patient Name: Kelly Martinez  MRN: [id number]  Today's Date: 9/27/2024   Room: 06/06-A    Discipline: Physical Therapy      Missed Visit: Yes  Missed Visit Reason:  (Per RN, pt borderline hypertensive, maxed out on cardene. Plan to re-attempt as available and appropriate.)      09/27/24 at 8:39 AM   Melvi Galan, PT   Rehab Office: [phone]

## 2024-09-27 NOTE — PROGRESS NOTES
"Kelly Martinez is a 79 y.o. female on day 6 of admission presenting with Cerebellar hemorrhage (Multi).    Subjective   NAEO       Objective     Physical Exam  Intubated  Eyes opened to stim  Follows commands x4    Last Recorded Vitals  Blood pressure (!) 85/44, pulse 66, temperature 36.3 °C (97.3 °F), temperature source Temporal, resp. rate 12, height 1.702 m (5' 7.01\"), weight 109 kg (239 lb 13.8 oz), SpO2 96%.  Intake/Output last 3 Shifts:  I/O last 3 completed shifts:  In: 3866.5 (35.5 mL/kg) [I.V.:1812.7 (16.7 mL/kg); NG/GT:60; IV Piggyback:1993.8]  Out: 4780 (43.9 mL/kg) [Urine:4780 (1.2 mL/kg/hr)]  Weight: 108.8 kg     Relevant Results            This patient currently has cardiac telemetry ordered; if you would like to modify or discontinue the telemetry order, click here to go to the orders activity to modify/discontinue the order.      Results for orders placed or performed during the hospital encounter of 09/21/24 (from the past 24 hour(s))   POCT GLUCOSE   Result Value Ref Range    POCT Glucose 85 74 - 99 mg/dL   Blood Gas Arterial Full Panel   Result Value Ref Range    POCT pH, Arterial 7.37 (L) 7.38 - 7.42 pH    POCT pCO2, Arterial 31 (L) 38 - 42 mm Hg    POCT pO2, Arterial 65 (L) 85 - 95 mm Hg    POCT SO2, Arterial 95 94 - 100 %    POCT Oxy Hemoglobin, Arterial 93.1 (L) 94.0 - 98.0 %    POCT Hematocrit Calculated, Arterial 32.0 (L) 36.0 - 46.0 %    POCT Sodium, Arterial 142 136 - 145 mmol/L    POCT Potassium, Arterial 3.9 3.5 - 5.3 mmol/L    POCT Chloride, Arterial 115 (H) 98 - 107 mmol/L    POCT Ionized Calcium, Arterial 1.23 1.10 - 1.33 mmol/L    POCT Glucose, Arterial 83 74 - 99 mg/dL    POCT Lactate, Arterial 0.6 0.4 - 2.0 mmol/L    POCT Base Excess, Arterial -6.5 (L) -2.0 - 3.0 mmol/L    POCT HCO3 Calculated, Arterial 17.9 (L) 22.0 - 26.0 mmol/L    POCT Hemoglobin, Arterial 10.6 (L) 12.0 - 16.0 g/dL    POCT Anion Gap, Arterial 13 10 - 25 mmo/L    Patient Temperature      FiO2 40 %   Respiratory " Culture/Smear    Specimen: SPUTUM; Fluid   Result Value Ref Range    Respiratory Culture/Smear Culture in progress     Gram Stain       Gram stain indicates specimen consists of lower respiratory tract secretions.    Gram Stain No predominant organism    POCT GLUCOSE   Result Value Ref Range    POCT Glucose 95 74 - 99 mg/dL   POCT GLUCOSE   Result Value Ref Range    POCT Glucose 89 74 - 99 mg/dL   POCT GLUCOSE   Result Value Ref Range    POCT Glucose 90 74 - 99 mg/dL   Calcium, Ionized   Result Value Ref Range    POCT Calcium, Ionized 1.22 1.1 - 1.33 mmol/L   CBC and Auto Differential   Result Value Ref Range    WBC 8.4 4.4 - 11.3 x10*3/uL    nRBC 0.0 0.0 - 0.0 /100 WBCs    RBC 3.27 (L) 4.00 - 5.20 x10*6/uL    Hemoglobin 9.8 (L) 12.0 - 16.0 g/dL    Hematocrit 28.5 (L) 36.0 - 46.0 %    MCV 87 80 - 100 fL    MCH 30.0 26.0 - 34.0 pg    MCHC 34.4 32.0 - 36.0 g/dL    RDW 15.6 (H) 11.5 - 14.5 %    Platelets 142 (L) 150 - 450 x10*3/uL    Neutrophils % 85.0 40.0 - 80.0 %    Immature Granulocytes %, Automated 0.5 0.0 - 0.9 %    Lymphocytes % 8.1 13.0 - 44.0 %    Monocytes % 6.2 2.0 - 10.0 %    Eosinophils % 0.1 0.0 - 6.0 %    Basophils % 0.1 0.0 - 2.0 %    Neutrophils Absolute 7.13 (H) 1.60 - 5.50 x10*3/uL    Immature Granulocytes Absolute, Automated 0.04 0.00 - 0.50 x10*3/uL    Lymphocytes Absolute 0.68 (L) 0.80 - 3.00 x10*3/uL    Monocytes Absolute 0.52 0.05 - 0.80 x10*3/uL    Eosinophils Absolute 0.01 0.00 - 0.40 x10*3/uL    Basophils Absolute 0.01 0.00 - 0.10 x10*3/uL   Heparin Assay   Result Value Ref Range    Heparin Unfractionated 0.1 See Comment Below for Therapeutic Ranges IU/mL   Magnesium   Result Value Ref Range    Magnesium 2.10 1.60 - 2.40 mg/dL   Renal function panel   Result Value Ref Range    Glucose 92 74 - 99 mg/dL    Sodium 144 136 - 145 mmol/L    Potassium 3.9 3.5 - 5.3 mmol/L    Chloride 113 (H) 98 - 107 mmol/L    Bicarbonate 18 (L) 21 - 32 mmol/L    Anion Gap 17 10 - 20 mmol/L    Urea Nitrogen 52 (H) 6  - 23 mg/dL    Creatinine 3.01 (H) 0.50 - 1.05 mg/dL    eGFR 15 (L) >60 mL/min/1.73m*2    Calcium 8.6 8.6 - 10.6 mg/dL    Phosphorus 4.2 2.5 - 4.9 mg/dL    Albumin 3.0 (L) 3.4 - 5.0 g/dL   POCT GLUCOSE   Result Value Ref Range    POCT Glucose 103 (H) 74 - 99 mg/dL   POCT GLUCOSE   Result Value Ref Range    POCT Glucose 96 74 - 99 mg/dL                Assessment/Plan   Assessment & Plan  Cerebellar hemorrhage (Multi)    AVM (arteriovenous malformation) ([facility])    79 years old female with h/o parkinson's, HTN, HLD, HFpEF, 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 (s/p IVC filter removed 4/2016), JOY (CPAP at night), COPD, CKD stage 4, chronic tobacco use, schizophrenia, depression, p/w AMS, hypertensive 's, recent fall 2days ago, CTH R cerebellar and cerebellar vermis hemorrhage with 4th ventricular effacement, stable vents, CT C spine C5-7 ACDF, CTA c/f L PICA dAVM, s/p kcentra, TTE 74% EF, no wma, rCTH stable ICH, c/f L PICA dAVM, 9/22 MRI/V motion degraded, no obvious underlying mass or vascular lesion  9/23 BLE DVT US chronic changes, 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     9/24 INR 2.3 s/p 10mg Vit K (3 doses), 9/25 s/p 2u FFP, s/p 40IV lasix, 9/25 angio SM1 cerebellar AVM, primary feeders L PICA, R>L SCA feeders    Plan  NSU  Wean to extubate  -150  ASA/warfarin restart plan  Vasc med recs-AC restart when able, no IVCF  renal recs  medicine recs-class IV  SLP-reg diet  PTOT-SNF    Matthias Calix MD

## 2024-09-27 NOTE — PROGRESS NOTES
Communication Note    Patient Name: Kelly Martinez  MRN: [id number]  Today's Date: 9/27/2024   Room: 06/06A    Discipline: Occupational Therapy      Missed Visit: Yes  Missed Visit Reason:  (Pt maxed out on Cardene with elevated SBP outside/borderline goal parameters. Will defer OT at this time.)      09/27/24 at 10:39 AM   Miesha Ann, BISI   Rehab Office: [phone]

## 2024-09-27 NOTE — CONSULTS
"Nutrition Note:   Nutrition Assessment         Since initial RDN assessment:  Pt remains intubated following angio (9/23). Currently with OGT in place. NP requested enteral recommendations.    Anthropometrics:  Height: 170.2 cm (5' 7.01\")   Weight: 109 kg (239 lb 13.8 oz)   BMI (Calculated): 37.56  IBW/kg (Dietitian Calculated): 61.4 kg  Adjusted Body Weight (kg): 73.3 kg    Weight History:   09/23/24 109 kg (239 lb 13.8 oz)   03/30/24 93.4 kg (206 lb)   12/29/23 94.1 kg (207 lb 7.3 oz)   01/26/23 102 kg (223 lb 14.4 oz)     Weight Change %:  Significant Weight Loss: No    Nutrition Focused Physical Exam Findings:    Subcutaneous Fat Loss:   Orbital Fat Pads: Well nourished (slightly bulging fat pads)  Buccal Fat Pads: Defer  Triceps: Well nourished (ample fat tissue)  Muscle Wasting:  Temporalis: Well nourished (well-defined muscle)  Pectoralis (Clavicular Region): Well nourished (clavicle not visible)  Deltoid/Trapezius: Well nourished (rounded appearance at arm, shoulder, neck)  Quadriceps: Well nourished (well developed, well rounded)    Nutrition Significant Labs:  CBC Trend:   Results from last 7 days   Lab Units 09/27/24 0116 09/26/24 0428 09/25/24  0148 09/24/24  0212   WBC AUTO x10*3/uL 8.4 7.6 8.5 10.9   RBC AUTO x10*6/uL 3.27* 3.59* 3.32* 3.62*   HEMOGLOBIN g/dL 9.8* 10.5* 10.0* 11.0*   HEMATOCRIT % 28.5* 31.9* 28.9* 31.7*   MCV fL 87 89 87 88   PLATELETS AUTO x10*3/uL 142* 161 150 183    , BMP Trend:   Results from last 7 days   Lab Units 09/27/24 0116 09/26/24 0428 09/25/24  1903 09/25/24  0148   GLUCOSE mg/dL 92 75 81 92   CALCIUM mg/dL 8.6 8.7 8.4* 8.4*   SODIUM mmol/L 144 143 143 143   POTASSIUM mmol/L 3.9 3.8 4.0 4.0   CO2 mmol/L 18* 18* 18* 18*   CHLORIDE mmol/L 113* 112* 114* 117*   BUN mg/dL 52* 52* 51* 50*   CREATININE mg/dL 3.01* 2.91* 2.81* 2.85*    , A1C:  Lab Results   Component Value Date    HGBA1C 5.9 (H) 09/21/2024   , BG POCT trend:   Results from last 7 days   Lab Units " 09/27/24  0801 09/27/24  0500 09/26/24  2317 09/26/24  1927 09/26/24  1547   POCT GLUCOSE mg/dL 96 103* 90 89 95    , Renal Lab Trend:   Results from last 7 days   Lab Units 09/27/24  0116 09/26/24  0428 09/25/24  1903 09/25/24  0148   POTASSIUM mmol/L 3.9 3.8 4.0 4.0   PHOSPHORUS mg/dL 4.2 4.5 4.8 4.6   SODIUM mmol/L 144 143 143 143   MAGNESIUM mg/dL 2.10 2.10  --  2.14   EGFR mL/min/1.73m*2 15* 16* 17* 17*   BUN mg/dL 52* 52* 51* 50*   CREATININE mg/dL 3.01* 2.91* 2.81* 2.85*        Nutrition Specific Medications:  Scheduled medications  amLODIPine, 10 mg, orogastric tube, Daily  atorvastatin, 40 mg, orogastric tube, Daily  cefepime, 1 g, intravenous, q12h  folic acid, 1 mg, orogastric tube, Daily  hydrALAZINE, 50 mg, orogastric tube, q8h JAJA  insulin lispro, 0-5 Units, subcutaneous, q4h  pantoprazole, 40 mg, intravenous, Daily  polyethylene glycol, 17 g, orogastric tube, BID  sennosides-docusate sodium, 1 tablet, oral, BID  vancomycin, 2,000 mg, intravenous, Once    Continuous medications  electrolyte-A, 75 mL/hr, Last Rate: 75 mL/hr (09/27/24 1000)  niCARdipine, 2.5-15 mg/hr, Last Rate: Stopped (09/27/24 1025)      I/O:   Last BM Date: 09/27/24; Stool Appearance: Loose, Soft, Watery (09/27/24 0900)    Dietary Orders (From admission, onward)       Start     Ordered    09/25/24 0001  NPO Diet; Effective midnight  Diet effective midnight         09/24/24 1343         Estimated Needs:   Total Energy Estimated Needs (kCal):  (on vent: 2990-9639; off vent: 1900)  Method for Estimating Needs: on vent: 11-14kcal/kg per act wt (ASPEN Critical Care guidelines BMI >30); off vent MSJ vs kcal/kg  Total Protein Estimated Needs (g):  (on vent: 120; off vent: 90)  Method for Estimating Needs: on vent: 2.0g/kg per IBW (ASPEN Critical Care guidelines BMI >30); off vent: 1.4g/kg per IBW  Total Fluid Estimated Needs (mL):  (per team)        Nutrition Diagnosis   Malnutrition Diagnosis  Patient has Malnutrition Diagnosis:  (unable  to determine at this time)    Nutrition Diagnosis  Patient has Nutrition Diagnosis: Yes  Diagnosis Status (1): Ongoing  Nutrition Diagnosis 1: Inadequate protein energy intake  Related to (1): hospital course/acute injury/illness  As Evidenced by (1): PO intake meeting <50% of needs for >/=5 days  Additional Nutrition Diagnosis: Diagnosis 2  Diagnosis Status (2): Ongoing  Nutrition Diagnosis 2: Increased nutrient needs  Related to (2): increased metabolic demand  As Evidenced by (2): ICH       Nutrition Interventions/Recommendations         Nutrition Prescription:  Individualized Nutrition Prescription Provided for : enteral nutrition while on vent        Nutrition Interventions:   Enteral Nutrition while on vent  Start Isosource 1.5 @ 10ml/hr, increase by 10mls every 12hrs to reach goal of 35ml/hr.   Provide 2 packets Pro Stat AWC BID  TF + Pro Stat provides 840mls, 1660kcals, 125g pro, 641mls free H2O    Additional Interventions:   Recommend 100mg thiamine daily given mininmal nutrition over the past week.   Additional water flushes per team.     If pt requires TF once extubated:  Isosource 1.5 goal is 50ml/hr (slowly titrate as noted above if not previously receiving TF)  Decrease Pro Stat to 1 packet a day  Provides 1200mls, 1900kals, 98g pro, 916mls free H2O       Nutrition Monitoring and Evaluation   Food/Nutrient Related History Monitoring  Monitoring and Evaluation Plan: Enteral and parenteral nutrition intake  Enteral and Parenteral Nutrition Intake: Enteral nutrition intake  Criteria: meet >/=75% of needs; TF tolerance    Body Composition/Growth/Weight History  Monitoring and Evaluation Plan: Weight  Weight: Measured weight  Criteria: Weekly weights    Biochemical Data, Medical Tests and Procedures  Monitoring and Evaluation Plan: Electrolyte/renal panel, Glucose/endocrine profile  Criteria: WNL with TF titration    Time Spent (min): 30 minutes

## 2024-09-27 NOTE — PROGRESS NOTES
NSU Attending Note     80 yo F w cerebellar hemorrhage, with cerebellar AVM.    Goal -150, on nicardipine gtt.  On amlodipine, isordil.    Acute respiratory failure, intubated, on mech ventilation. Development of thick secretions, concerning for pna.  Started on abx.  Wean PEEP as tolerated.    CKD 4, slight worsening.    Start TF.    ID: On cefepime/vanc. Afebrile, no leukocytosis.  Sputum cultures pending.    H/o PE/DVTs, Vasc Med consulted, on DVT prophy.    Statement of Acuity: I have reviewed and evaluated all relevant data of the last 24 hours, personally examined the patient and formulated the plan of care.  This patient was critically ill and required continued critical care treatment.  Total critical care time: 35min.

## 2024-09-27 NOTE — CONSULTS
[facility]  [facility]  CLINICAL NUTRITION PROCEDURE NOTE       Small Bore Feeding Tube Placement  Patient with dysphagia 2/2 Cerebellar hemorrhage (Multi), requiring small bore feeding tube placement for medication and nutrition administration.  All labs and images examined for appropriateness of tube placement. Procedure explained to the patient and/or family.    Patient positioned and small bore tubing prepped per device protocol. Tubing inserted into the L nare and using Cortrak electromagnetic sensing device, was advanced per imaging guidance into gastric body. Tube would not appropriately advance beyond gastric body with each attempt; therefore, decision was made to secure tube with bridle at 65 cm at the nares.   KUB ordered by NP to confirm placement.     Note: when OGT was in place it was at 65cm

## 2024-09-27 NOTE — CONSULTS
Vancomycin Dosing by Pharmacy- INITIAL    Kelly Martinez is a 79 y.o. year old female who Pharmacy has been consulted for vancomycin dosing for pneumonia. Based on the patient's indication and renal status this patient will be dosed based on a goal of trough target 10-20 mcg/mL.    Renal function is currently CKD, not on iHD or CRRT.    Visit Vitals  /61 (BP Location: Left arm, Patient Position: Lying)   Pulse 69   Temp 36.3 °C (97.3 °F) (Temporal)   Resp 20        Lab Results   Component Value Date    CREATININE 3.01 (H) 2024    CREATININE 2.91 (H) 2024    CREATININE 2.81 (H) 2024    CREATININE 2.85 (H) 2024        Patient weight is as follows:   Vitals:    24 0800   Weight: 109 kg (239 lb 13.8 oz)       Cultures:  No results found for the encounter in last 14 days.        I/O last 3 completed shifts:  In: 3866.5 (35.5 mL/kg) [I.V.:1812.7 (16.7 mL/kg); NG/GT:60; IV Piggyback:1993.8]  Out: 4780 (43.9 mL/kg) [Urine:4780 (1.2 mL/kg/hr)]  Weight: 108.8 kg   I/O during current shift:  I/O this shift:  In: 360 [I.V.:150; NG/GT:60; IV Piggyback:150]  Out: -     Temp (24hrs), Av.1 °C (97 °F), Min:35.5 °C (95.9 °F), Max:36.4 °C (97.5 °F)         Assessment/Plan     Patient will be given a loading dose of 2000 mg.  Will initiate vancomycin maintenance dose based on random level.  Follow-up level will be ordered on 24 at 05:00 unless clinically indicated sooner.  Will continue to monitor renal function daily while on vancomycin and order serum creatinine at least every 48 hours if not already ordered.  Follow for continued vancomycin needs, clinical response, and signs/symptoms of toxicity.       Bertin Recinos [facility]

## 2024-09-28 LAB
ALBUMIN SERPL BCP-MCNC: 2.8 G/DL (ref 3.4–5)
ANION GAP SERPL CALC-SCNC: 13 MMOL/L (ref 10–20)
BACTERIA SPEC RESP CULT: ABNORMAL
BACTERIA SPEC RESP CULT: ABNORMAL
BASOPHILS # BLD AUTO: 0.01 X10*3/UL (ref 0–0.1)
BASOPHILS NFR BLD AUTO: 0.1 %
BLOOD EXPIRATION DATE: NORMAL
BLOOD EXPIRATION DATE: NORMAL
BUN SERPL-MCNC: 53 MG/DL (ref 6–23)
CA-I BLD-SCNC: 1.3 MMOL/L (ref 1.1–1.33)
CALCIUM SERPL-MCNC: 8.6 MG/DL (ref 8.6–10.6)
CHLORIDE SERPL-SCNC: 114 MMOL/L (ref 98–107)
CO2 SERPL-SCNC: 20 MMOL/L (ref 21–32)
CREAT SERPL-MCNC: 3.14 MG/DL (ref 0.5–1.05)
DISPENSE STATUS: NORMAL
DISPENSE STATUS: NORMAL
EGFRCR SERPLBLD CKD-EPI 2021: 15 ML/MIN/1.73M*2
EOSINOPHIL # BLD AUTO: 0.19 X10*3/UL (ref 0–0.4)
EOSINOPHIL NFR BLD AUTO: 2.3 %
ERYTHROCYTE [DISTWIDTH] IN BLOOD BY AUTOMATED COUNT: 15.9 % (ref 11.5–14.5)
GLUCOSE BLD MANUAL STRIP-MCNC: 103 MG/DL (ref 74–99)
GLUCOSE BLD MANUAL STRIP-MCNC: 113 MG/DL (ref 74–99)
GLUCOSE BLD MANUAL STRIP-MCNC: 115 MG/DL (ref 74–99)
GLUCOSE BLD MANUAL STRIP-MCNC: 126 MG/DL (ref 74–99)
GLUCOSE BLD MANUAL STRIP-MCNC: 130 MG/DL (ref 74–99)
GLUCOSE BLD MANUAL STRIP-MCNC: 95 MG/DL (ref 74–99)
GLUCOSE SERPL-MCNC: 115 MG/DL (ref 74–99)
GRAM STN SPEC: ABNORMAL
GRAM STN SPEC: ABNORMAL
HCT VFR BLD AUTO: 27.7 % (ref 36–46)
HGB BLD-MCNC: 9.2 G/DL (ref 12–16)
IMM GRANULOCYTES # BLD AUTO: 0.09 X10*3/UL (ref 0–0.5)
IMM GRANULOCYTES NFR BLD AUTO: 1.1 % (ref 0–0.9)
LYMPHOCYTES # BLD AUTO: 0.57 X10*3/UL (ref 0.8–3)
LYMPHOCYTES NFR BLD AUTO: 6.8 %
MAGNESIUM SERPL-MCNC: 2.17 MG/DL (ref 1.6–2.4)
MCH RBC QN AUTO: 29.8 PG (ref 26–34)
MCHC RBC AUTO-ENTMCNC: 33.2 G/DL (ref 32–36)
MCV RBC AUTO: 90 FL (ref 80–100)
MONOCYTES # BLD AUTO: 0.72 X10*3/UL (ref 0.05–0.8)
MONOCYTES NFR BLD AUTO: 8.6 %
NEUTROPHILS # BLD AUTO: 6.75 X10*3/UL (ref 1.6–5.5)
NEUTROPHILS NFR BLD AUTO: 81.1 %
NRBC BLD-RTO: 0 /100 WBCS (ref 0–0)
PHOSPHATE SERPL-MCNC: 3.4 MG/DL (ref 2.5–4.9)
PLATELET # BLD AUTO: 148 X10*3/UL (ref 150–450)
POTASSIUM SERPL-SCNC: 3.8 MMOL/L (ref 3.5–5.3)
PRODUCT BLOOD TYPE: 7300
PRODUCT BLOOD TYPE: 7300
PRODUCT CODE: NORMAL
PRODUCT CODE: NORMAL
RBC # BLD AUTO: 3.09 X10*6/UL (ref 4–5.2)
SODIUM SERPL-SCNC: 143 MMOL/L (ref 136–145)
UFH PPP CHRO-ACNC: <0.1 IU/ML
UNIT ABO: NORMAL
UNIT ABO: NORMAL
UNIT NUMBER: NORMAL
UNIT NUMBER: NORMAL
UNIT RH: NORMAL
UNIT RH: NORMAL
UNIT VOLUME: 350
UNIT VOLUME: 350
VANCOMYCIN SERPL-MCNC: 16.9 UG/ML (ref 5–20)
WBC # BLD AUTO: 8.3 X10*3/UL (ref 4.4–11.3)
XM INTEP: NORMAL
XM INTEP: NORMAL

## 2024-09-28 PROCEDURE — 82947 ASSAY GLUCOSE BLOOD QUANT: CPT

## 2024-09-28 PROCEDURE — 2500000004 HC RX 250 GENERAL PHARMACY W/ HCPCS (ALT 636 FOR OP/ED)

## 2024-09-28 PROCEDURE — 2500000004 HC RX 250 GENERAL PHARMACY W/ HCPCS (ALT 636 FOR OP/ED): Performed by: NEUROLOGICAL SURGERY

## 2024-09-28 PROCEDURE — 2500000001 HC RX 250 WO HCPCS SELF ADMINISTERED DRUGS (ALT 637 FOR MEDICARE OP): Performed by: NURSE PRACTITIONER

## 2024-09-28 PROCEDURE — 2500000005 HC RX 250 GENERAL PHARMACY W/O HCPCS

## 2024-09-28 PROCEDURE — 82330 ASSAY OF CALCIUM: CPT

## 2024-09-28 PROCEDURE — 2500000001 HC RX 250 WO HCPCS SELF ADMINISTERED DRUGS (ALT 637 FOR MEDICARE OP): Performed by: REGISTERED NURSE

## 2024-09-28 PROCEDURE — 99291 CRITICAL CARE FIRST HOUR: CPT | Performed by: NURSE PRACTITIONER

## 2024-09-28 PROCEDURE — 94003 VENT MGMT INPAT SUBQ DAY: CPT

## 2024-09-28 PROCEDURE — 83735 ASSAY OF MAGNESIUM: CPT

## 2024-09-28 PROCEDURE — 94640 AIRWAY INHALATION TREATMENT: CPT

## 2024-09-28 PROCEDURE — 2500000005 HC RX 250 GENERAL PHARMACY W/O HCPCS: Performed by: NEUROLOGICAL SURGERY

## 2024-09-28 PROCEDURE — 2500000004 HC RX 250 GENERAL PHARMACY W/ HCPCS (ALT 636 FOR OP/ED): Mod: JZ

## 2024-09-28 PROCEDURE — 2500000002 HC RX 250 W HCPCS SELF ADMINISTERED DRUGS (ALT 637 FOR MEDICARE OP, ALT 636 FOR OP/ED): Performed by: NURSE PRACTITIONER

## 2024-09-28 PROCEDURE — 85520 HEPARIN ASSAY: CPT

## 2024-09-28 PROCEDURE — 2020000001 HC ICU ROOM DAILY

## 2024-09-28 PROCEDURE — 80202 ASSAY OF VANCOMYCIN: CPT

## 2024-09-28 PROCEDURE — 94668 MNPJ CHEST WALL SBSQ: CPT

## 2024-09-28 PROCEDURE — 2500000004 HC RX 250 GENERAL PHARMACY W/ HCPCS (ALT 636 FOR OP/ED): Performed by: NURSE PRACTITIONER

## 2024-09-28 PROCEDURE — 85025 COMPLETE CBC W/AUTO DIFF WBC: CPT

## 2024-09-28 PROCEDURE — 80069 RENAL FUNCTION PANEL: CPT

## 2024-09-28 PROCEDURE — 37799 UNLISTED PX VASCULAR SURGERY: CPT

## 2024-09-28 RX ORDER — ACETAMINOPHEN 325 MG/1
650 TABLET ORAL EVERY 6 HOURS PRN
Status: DISCONTINUED | OUTPATIENT
Start: 2024-09-28 | End: 2024-10-02

## 2024-09-28 RX ORDER — AMOXICILLIN 250 MG
1 CAPSULE ORAL 2 TIMES DAILY
Status: DISCONTINUED | OUTPATIENT
Start: 2024-09-28 | End: 2024-10-02

## 2024-09-28 RX ORDER — QUETIAPINE FUMARATE 300 MG/1
300 TABLET, FILM COATED ORAL NIGHTLY
Status: DISCONTINUED | OUTPATIENT
Start: 2024-09-28 | End: 2024-10-02

## 2024-09-28 RX ORDER — ALBUTEROL SULFATE 0.83 MG/ML
2.5 SOLUTION RESPIRATORY (INHALATION) EVERY 4 HOURS PRN
Status: DISCONTINUED | OUTPATIENT
Start: 2024-09-28 | End: 2024-10-04 | Stop reason: HOSPADM

## 2024-09-28 RX ORDER — BUPROPION HYDROCHLORIDE 75 MG/1
75 TABLET ORAL 2 TIMES DAILY
Status: DISCONTINUED | OUTPATIENT
Start: 2024-09-28 | End: 2024-10-02

## 2024-09-28 RX ORDER — TERAZOSIN 2 MG/1
2 CAPSULE ORAL DAILY
Status: DISCONTINUED | OUTPATIENT
Start: 2024-09-28 | End: 2024-10-02

## 2024-09-28 RX ORDER — ISOSORBIDE DINITRATE 10 MG/1
10 TABLET ORAL
Status: DISCONTINUED | OUTPATIENT
Start: 2024-09-28 | End: 2024-10-02

## 2024-09-28 RX ORDER — IPRATROPIUM BROMIDE AND ALBUTEROL SULFATE 2.5; .5 MG/3ML; MG/3ML
SOLUTION RESPIRATORY (INHALATION)
Status: DISPENSED
Start: 2024-09-28 | End: 2024-09-28

## 2024-09-28 RX ORDER — HYDRALAZINE HYDROCHLORIDE 50 MG/1
50 TABLET, FILM COATED ORAL EVERY 8 HOURS SCHEDULED
Status: DISCONTINUED | OUTPATIENT
Start: 2024-09-28 | End: 2024-10-02

## 2024-09-28 RX ORDER — AMLODIPINE BESYLATE 10 MG/1
10 TABLET ORAL DAILY
Status: DISCONTINUED | OUTPATIENT
Start: 2024-09-28 | End: 2024-10-02

## 2024-09-28 RX ORDER — BUMETANIDE 0.25 MG/ML
2 INJECTION, SOLUTION INTRAMUSCULAR; INTRAVENOUS EVERY 12 HOURS
Status: DISCONTINUED | OUTPATIENT
Start: 2024-09-28 | End: 2024-09-30

## 2024-09-28 RX ORDER — TRAZODONE HYDROCHLORIDE 50 MG/1
150 TABLET ORAL NIGHTLY PRN
Status: DISCONTINUED | OUTPATIENT
Start: 2024-09-28 | End: 2024-10-02

## 2024-09-28 RX ORDER — ALBUTEROL SULFATE 0.83 MG/ML
SOLUTION RESPIRATORY (INHALATION)
Status: DISPENSED
Start: 2024-09-28 | End: 2024-09-28

## 2024-09-28 RX ORDER — POLYETHYLENE GLYCOL 3350 17 G/17G
17 POWDER, FOR SOLUTION ORAL 2 TIMES DAILY
Status: DISCONTINUED | OUTPATIENT
Start: 2024-09-28 | End: 2024-10-02

## 2024-09-28 RX ORDER — CARVEDILOL 25 MG/1
25 TABLET ORAL
Status: DISCONTINUED | OUTPATIENT
Start: 2024-09-28 | End: 2024-10-02

## 2024-09-28 RX ORDER — ATORVASTATIN CALCIUM 40 MG/1
40 TABLET, FILM COATED ORAL DAILY
Status: DISCONTINUED | OUTPATIENT
Start: 2024-09-28 | End: 2024-10-02

## 2024-09-28 RX ORDER — LISINOPRIL 20 MG/1
40 TABLET ORAL DAILY
Status: DISCONTINUED | OUTPATIENT
Start: 2024-09-28 | End: 2024-10-02

## 2024-09-28 RX ORDER — FOLIC ACID 1 MG/1
1 TABLET ORAL DAILY
Status: DISCONTINUED | OUTPATIENT
Start: 2024-09-28 | End: 2024-10-02

## 2024-09-28 ASSESSMENT — PAIN - FUNCTIONAL ASSESSMENT
PAIN_FUNCTIONAL_ASSESSMENT: CPOT (CRITICAL CARE PAIN OBSERVATION TOOL)

## 2024-09-28 NOTE — PROGRESS NOTES
"Kelly Martinez is a 79 y.o. female on day 7 of admission presenting with Cerebellar hemorrhage (Multi).    Subjective   NAEO       Objective     Physical Exam  Intubated  awake  Follows commands x4    Last Recorded Vitals  Blood pressure 109/68, pulse 66, temperature 35.9 °C (96.6 °F), temperature source Temporal, resp. rate 17, height 1.702 m (5' 7.01\"), weight 116 kg (255 lb 11.7 oz), SpO2 92%.  Intake/Output last 3 Shifts:  I/O last 3 completed shifts:  In: 5086.3 (46.7 mL/kg) [I.V.:2002.5 (18.4 mL/kg); NG/GT:120; IV Piggyback:2963.8]  Out: 2135 (19.6 mL/kg) [Urine:2135 (0.5 mL/kg/hr)]  Weight: 108.8 kg     Relevant Results            This patient currently has cardiac telemetry ordered; if you would like to modify or discontinue the telemetry order, click here to go to the orders activity to modify/discontinue the order.      Results for orders placed or performed during the hospital encounter of 09/21/24 (from the past 24 hour(s))   POCT GLUCOSE   Result Value Ref Range    POCT Glucose 96 74 - 99 mg/dL   MRSA Surveillance for Vancomycin De-escalation, PCR    Specimen: Anterior Nares; Swab   Result Value Ref Range    MRSA PCR Detected (A) Not Detected   POCT GLUCOSE   Result Value Ref Range    POCT Glucose 109 (H) 74 - 99 mg/dL   POCT GLUCOSE   Result Value Ref Range    POCT Glucose 126 (H) 74 - 99 mg/dL   POCT GLUCOSE   Result Value Ref Range    POCT Glucose 112 (H) 74 - 99 mg/dL   POCT GLUCOSE   Result Value Ref Range    POCT Glucose 124 (H) 74 - 99 mg/dL   Calcium, Ionized   Result Value Ref Range    POCT Calcium, Ionized 1.30 1.1 - 1.33 mmol/L   CBC and Auto Differential   Result Value Ref Range    WBC 8.3 4.4 - 11.3 x10*3/uL    nRBC 0.0 0.0 - 0.0 /100 WBCs    RBC 3.09 (L) 4.00 - 5.20 x10*6/uL    Hemoglobin 9.2 (L) 12.0 - 16.0 g/dL    Hematocrit 27.7 (L) 36.0 - 46.0 %    MCV 90 80 - 100 fL    MCH 29.8 26.0 - 34.0 pg    MCHC 33.2 32.0 - 36.0 g/dL    RDW 15.9 (H) 11.5 - 14.5 %    Platelets 148 (L) 150 - 450 " x10*3/uL    Neutrophils % 81.1 40.0 - 80.0 %    Immature Granulocytes %, Automated 1.1 (H) 0.0 - 0.9 %    Lymphocytes % 6.8 13.0 - 44.0 %    Monocytes % 8.6 2.0 - 10.0 %    Eosinophils % 2.3 0.0 - 6.0 %    Basophils % 0.1 0.0 - 2.0 %    Neutrophils Absolute 6.75 (H) 1.60 - 5.50 x10*3/uL    Immature Granulocytes Absolute, Automated 0.09 0.00 - 0.50 x10*3/uL    Lymphocytes Absolute 0.57 (L) 0.80 - 3.00 x10*3/uL    Monocytes Absolute 0.72 0.05 - 0.80 x10*3/uL    Eosinophils Absolute 0.19 0.00 - 0.40 x10*3/uL    Basophils Absolute 0.01 0.00 - 0.10 x10*3/uL   Heparin Assay   Result Value Ref Range    Heparin Unfractionated <0.1 See Comment Below for Therapeutic Ranges IU/mL   Magnesium   Result Value Ref Range    Magnesium 2.17 1.60 - 2.40 mg/dL   Renal function panel   Result Value Ref Range    Glucose 115 (H) 74 - 99 mg/dL    Sodium 143 136 - 145 mmol/L    Potassium 3.8 3.5 - 5.3 mmol/L    Chloride 114 (H) 98 - 107 mmol/L    Bicarbonate 20 (L) 21 - 32 mmol/L    Anion Gap 13 10 - 20 mmol/L    Urea Nitrogen 53 (H) 6 - 23 mg/dL    Creatinine 3.14 (H) 0.50 - 1.05 mg/dL    eGFR 15 (L) >60 mL/min/1.73m*2    Calcium 8.6 8.6 - 10.6 mg/dL    Phosphorus 3.4 2.5 - 4.9 mg/dL    Albumin 2.8 (L) 3.4 - 5.0 g/dL   Vancomycin   Result Value Ref Range    Vancomycin 16.9 5.0 - 20.0 ug/mL   POCT GLUCOSE   Result Value Ref Range    POCT Glucose 103 (H) 74 - 99 mg/dL                Assessment/Plan   Assessment & Plan  Cerebellar hemorrhage (Multi)    AVM (arteriovenous malformation) ([facility]HS-HCC)    79 years old female with h/o parkinson's, HTN, HLD, HFpEF, 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 (s/p IVC filter removed 4/2016), JOY (CPAP at night), COPD, CKD stage 4, chronic tobacco use, schizophrenia, depression, p/w AMS, hypertensive 's, recent fall 2days ago, CTH R cerebellar and cerebellar vermis hemorrhage with 4th ventricular effacement, stable vents, CT C spine C5-7 ACDF, CTA c/f L PICA dAVM, s/p kcentra, TTE 74% EF, no wma, rCTH stable  ICH, c/f L PICA dAVM, 9/22 MRI/V motion degraded, no obvious underlying mass or vascular lesion  9/23 BLE DVT US chronic changes, 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     9/24 INR 2.3 s/p 10mg Vit K (3 doses), 9/25 s/p 2u FFP, s/p 40IV lasix, 9/25 angio SM1 cerebellar AVM, primary feeders L PICA, R>L SCA feeders    Plan  [facility]  Wean to extubate as able  Vanc/cefepime for PNA  -150  ASA/warfarin restart plan  Vasc med recs-AC restart when able, no IVCF  renal recs  medicine recs-class IV  SLP-reg diet  PTOT-SNF    Dante Love MD

## 2024-09-28 NOTE — PROGRESS NOTES
Kelly Martinez   79 y.o.     MRN/Room: [id number]/06/06-A    Subjective:  Intubated     Objective:     Meds:   albuterol, ,   amLODIPine, 10 mg, Daily  atorvastatin, 40 mg, Daily  bumetanide, 2 mg, q12h  buPROPion, 75 mg, BID  [Held by provider] buPROPion XL, 150 mg, Daily  carvedilol, 25 mg, BID  cefepime, 1 g, q12h  [Held by provider] DULoxetine, 30 mg, Daily  folic acid, 1 mg, Daily  heparin (porcine), 5,000 Units, q8h  hydrALAZINE, 50 mg, q8h JAJA  insulin lispro, 0-5 Units, q4h  ipratropium-albuteroL, ,   isosorbide dinitrate, 10 mg, TID  [Held by provider] lisinopril, 40 mg, Daily  [Held by provider] mometasone, 2 puff, BID  oxygen, , Continuous - Inhalation  pantoprazole, 40 mg, Daily  perflutren protein A microsphere, 0.5 mL, Once in imaging  polyethylene glycol, 17 g, BID  QUEtiapine, 300 mg, Nightly  sennosides-docusate sodium, 1 tablet, BID  sodium chloride, 3 mL, q6h JAJA  sulfur hexafluoride microsphr, 2 mL, Once in imaging  terazosin, 2 mg, Daily      niCARdipine, Last Rate: Stopped (09/27/24 1025)      acetaminophen, 650 mg, q6h PRN  albuterol, 2.5 mg, q4h PRN  albuterol, ,   dextrose, 12.5 g, q15 min PRN  dextrose, 25 g, q15 min PRN  glucagon, 1 mg, q15 min PRN  glucagon, 1 mg, q15 min PRN  hydrALAZINE, 10 mg, q20 min PRN  ipratropium-albuteroL, ,   labetaloL, 10 mg, q10 min PRN  oxygen, , Continuous PRN - O2/gases  traZODone, 150 mg, Nightly PRN  vancomycin, , Daily PRN        Vitals:    09/28/24 1513   BP:    Pulse: 60   Resp: 20   Temp:    SpO2: 99%          Intake/Output Summary (Last 24 hours) at 9/28/2024 1741  Last data filed at 9/28/2024 1200  Gross per 24 hour   Intake 1850 ml   Output 895 ml   Net 955 ml       General appearance: intubated  Eyes: non-icteric  Skin: no apparent rash  Heart: S1 S2 regular  Lungs: Diminished air entry bilaterally   Abdomen: soft, nt/nd  Extremities: Trace edema bilat  Cavazos  Neuro: unable to assess      Blood Labs:  Results for orders placed or performed during  the hospital encounter of 09/21/24 (from the past 24 hour(s))   POCT GLUCOSE   Result Value Ref Range    POCT Glucose 112 (H) 74 - 99 mg/dL   POCT GLUCOSE   Result Value Ref Range    POCT Glucose 124 (H) 74 - 99 mg/dL   Calcium, Ionized   Result Value Ref Range    POCT Calcium, Ionized 1.30 1.1 - 1.33 mmol/L   CBC and Auto Differential   Result Value Ref Range    WBC 8.3 4.4 - 11.3 x10*3/uL    nRBC 0.0 0.0 - 0.0 /100 WBCs    RBC 3.09 (L) 4.00 - 5.20 x10*6/uL    Hemoglobin 9.2 (L) 12.0 - 16.0 g/dL    Hematocrit 27.7 (L) 36.0 - 46.0 %    MCV 90 80 - 100 fL    MCH 29.8 26.0 - 34.0 pg    MCHC 33.2 32.0 - 36.0 g/dL    RDW 15.9 (H) 11.5 - 14.5 %    Platelets 148 (L) 150 - 450 x10*3/uL    Neutrophils % 81.1 40.0 - 80.0 %    Immature Granulocytes %, Automated 1.1 (H) 0.0 - 0.9 %    Lymphocytes % 6.8 13.0 - 44.0 %    Monocytes % 8.6 2.0 - 10.0 %    Eosinophils % 2.3 0.0 - 6.0 %    Basophils % 0.1 0.0 - 2.0 %    Neutrophils Absolute 6.75 (H) 1.60 - 5.50 x10*3/uL    Immature Granulocytes Absolute, Automated 0.09 0.00 - 0.50 x10*3/uL    Lymphocytes Absolute 0.57 (L) 0.80 - 3.00 x10*3/uL    Monocytes Absolute 0.72 0.05 - 0.80 x10*3/uL    Eosinophils Absolute 0.19 0.00 - 0.40 x10*3/uL    Basophils Absolute 0.01 0.00 - 0.10 x10*3/uL   Heparin Assay   Result Value Ref Range    Heparin Unfractionated <0.1 See Comment Below for Therapeutic Ranges IU/mL   Magnesium   Result Value Ref Range    Magnesium 2.17 1.60 - 2.40 mg/dL   Renal function panel   Result Value Ref Range    Glucose 115 (H) 74 - 99 mg/dL    Sodium 143 136 - 145 mmol/L    Potassium 3.8 3.5 - 5.3 mmol/L    Chloride 114 (H) 98 - 107 mmol/L    Bicarbonate 20 (L) 21 - 32 mmol/L    Anion Gap 13 10 - 20 mmol/L    Urea Nitrogen 53 (H) 6 - 23 mg/dL    Creatinine 3.14 (H) 0.50 - 1.05 mg/dL    eGFR 15 (L) >60 mL/min/1.73m*2    Calcium 8.6 8.6 - 10.6 mg/dL    Phosphorus 3.4 2.5 - 4.9 mg/dL    Albumin 2.8 (L) 3.4 - 5.0 g/dL   Vancomycin   Result Value Ref Range    Vancomycin 16.9  5.0 - 20.0 ug/mL   POCT GLUCOSE   Result Value Ref Range    POCT Glucose 103 (H) 74 - 99 mg/dL   POCT GLUCOSE   Result Value Ref Range    POCT Glucose 95 74 - 99 mg/dL   POCT GLUCOSE   Result Value Ref Range    POCT Glucose 130 (H) 74 - 99 mg/dL      ASSESSMENT:  Kelly Martinez is a  79 y.o. F with PMH CKD IV (baseline Cr 2-2.3), history of PE, HFpEF, HTN, HLD, JOY, schizophrenia, COPD who is currently admitted to the [facility] after presenting with cerebellar hemorrhage on 09/21 after a fall. Nephrology consulted previously for recommendations re: contrast induced nephropathy prophylaxis. Now re-engaged for diuretic recommendations.     #NICOLÁS on CKD 4  -Serum Cr a little worse than baseline at 3.1 today  -Likely due to contrast injury, hemodynamic mediated ATN   -Remains non-oliguric, with 945cc charted yesterday    -Also being covered with abx for suspected pneumonia     RECOMMENDATIONS:  -Start Bumex 2mg IV bid  -Strict I and O charting  -No indications for RRT  -Avoid further contrast  -Dose medications for renal function. Caution with Cefepime in setting of NICOLÁS, risk of neurotoxicity     Irina Berger MD  Nephrology Fellow   Nephrology pager [phone]

## 2024-09-28 NOTE — PROGRESS NOTES
Vancomycin Dosing by Pharmacy- FOLLOW UP    Kelly Martinez is a 79 y.o. year old female who Pharmacy has been consulted for vancomycin dosing for pneumonia. Based on the patient's indication and renal status this patient is being dosed based on a goal trough/random level of 15-20.     Renal function is currently declining.    Current vancomycin dose: DBL (last dose was 2 gm on  at 1200)    Most recent trough level: 16.9 mcg/mL    Visit Vitals  /57 (BP Location: Left arm, Patient Position: Lying)   Pulse 63   Temp 35.9 °C (96.6 °F) (Temporal)   Resp 16        Lab Results   Component Value Date    CREATININE 3.14 (H) 2024    CREATININE 3.01 (H) 2024    CREATININE 2.91 (H) 2024    CREATININE 2.81 (H) 2024        Patient weight is as follows:   Vitals:    24 0000   Weight: 116 kg (255 lb 11.7 oz)       Cultures:  Susceptibility data for the encounter in last 14 days.  Collected Specimen Info Organism   24 Fluid from SPUTUM Staphylococcus aureus     Pseudomonas aeruginosa        I/O last 3 completed shifts:  In: 4922.5 (42.4 mL/kg) [I.V.:1202.5 (10.4 mL/kg); NG/GT:420; IV Piggyback:3300]  Out: 1525 (13.1 mL/kg) [Urine:1525 (0.4 mL/kg/hr)]  Weight: 116 kg   I/O during current shift:  I/O this shift:  In: 75 [IV Piggyback:75]  Out: -     Temp (24hrs), Av.8 °C (96.4 °F), Min:35.3 °C (95.5 °F), Max:36 °C (96.8 °F)      Assessment/Plan    Within goal random/trough level. Redose 2 gm x1  The next level will be obtained on  at 0800. May be obtained sooner if clinically indicated.   Will continue to monitor renal function daily while on vancomycin and order serum creatinine at least every 48 hours if not already ordered.  Follow for continued vancomycin needs, clinical response, and signs/symptoms of toxicity.       Kamini Will, PharmD

## 2024-09-28 NOTE — PROGRESS NOTES
[facility]  NEUROSCIENCE INTENSIVE CARE UNIT  DAILY PROGRESS NOTE       Patient Name: Kelly Martinez   MRN: [id number]     Admit Date: 2024     : 1945 AGE: 79 y.o. GENDER: female      Subjective    79 year-old female with past medical history of PE/recurrent DVT s/p IVCF, HFpEF, hypertension, hyperlipidemia, OJY, schizophrenia, COPD, and CKD IV.  Admitted 2024 with cerebellar hemorrhage (Multi) with surrounding SAH after presenting s/p fall and confusion. CTH demonstrated right vermian cerebellar ICH with right cerebellar hemisphere ICH and/or SAH with 4th ventricular effacement. S/p Kcentra. CTA head/neck without aneurysm or vascular malformation. Admitted to NSU for neurological monitoring and hypertension management on Cardene infusion. Neurology consulted for co-management.     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  - : MRI/MRV motion degraded, no obvious underlying mass or vascular lesion   - 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   - : INR 2.4 s/p 30mg vitamin K  - : S/p 2units FFP, 40mg Lasix, diagnostic angiogram primary feeders left PICA, R>L SCA feeders, unable to extubate post-procedure, atelectasis on chest x-ray, diuresed with 20mg Lasix  - : sputum culture sent. + pseudomonas and staph aureus, nasal swab + MRSA  - : Vancomycin and cefepime started.    Interval Events: No acute events overnight.    Objective   VITALS:  Temp:  [35.3 °C (95.5 °F)-36 °C (96.8 °F)] 35.9 °C (96.6 °F)  Heart Rate:  [61-69] 63  Resp:  [12-25] 20  BP: ()/(43-68) 110/57  Arterial Line BP 1: (129-148)/(51-63) 148/63  FiO2 (%):  [40 %-50 %] 50 %  INTAKE/OUTPUT:  Intake/Output Summary (Last 24 hours) at 2024 0836  Last data filed at 2024 0700  Gross per 24 hour   Intake 2777.5 ml   Output 880 ml   Net 1897.5 ml     VENT:  Vent Mode: Volume control/assist control  FiO2  (%):  [40 %-50 %] 50 %  S RR:  [12] 12  S VT:  [370 mL] 370 mL  PEEP/CPAP (cm H2O):  [10 cm H20] 10 cm H20  MAP (cm H2O):  [10-13] 10       PHYSICAL EXAM:  NEURO:  - Intubated, EOV, pupils 3mm/reactive bilaterally  - follow commands x4  - + c/c/g  CV:  - RRR on telemetry, NSR  - Right radial arterial line in place  RESP:  - Regular, unlabored  - Oxygen: ventilator  :  - Indwelling catheter in place  GI:  - Abdomen NT/ND, soft  SKIN:  - Right groin site intact, no hematoma, pulses intact    MEDICATIONS:  Scheduled: PRN: Continuous:   amLODIPine, 10 mg, orogastric tube, Daily  atorvastatin, 40 mg, orogastric tube, Daily  buPROPion, 75 mg, orogastric tube, BID  [Held by provider] buPROPion XL, 150 mg, oral, Daily  carvedilol, 25 mg, orogastric tube, BID  cefepime, 1 g, intravenous, q12h  [Held by provider] DULoxetine, 30 mg, oral, Daily  DULoxetine, 30 mg, oral, Daily  folic acid, 1 mg, orogastric tube, Daily  heparin (porcine), 5,000 Units, subcutaneous, q8h  hydrALAZINE, 50 mg, orogastric tube, q8h JAJA  insulin lispro, 0-5 Units, subcutaneous, q4h  isosorbide dinitrate, 10 mg, orogastric tube, TID  [Held by provider] lisinopril, 40 mg, oral, Daily  [Held by provider] mometasone, 2 puff, inhalation, BID  oxygen, , inhalation, Continuous - Inhalation  pantoprazole, 40 mg, intravenous, Daily  perflutren protein A microsphere, 0.5 mL, intravenous, Once in imaging  polyethylene glycol, 17 g, orogastric tube, BID  QUEtiapine, 300 mg, orogastric tube, Nightly  sennosides-docusate sodium, 1 tablet, oral, BID  sodium chloride, 3 mL, nebulization, q6h JAJA  sulfur hexafluoride microsphr, 2 mL, intravenous, Once in imaging  terazosin, 2 mg, orogastric tube, Daily  vancomycin, 2,000 mg, intravenous, Once     PRN medications: acetaminophen, dextrose, dextrose, glucagon, glucagon, hydrALAZINE, labetaloL, oxygen, traZODone, vancomycin electrolyte-A, 75 mL/hr, Last Rate: 75 mL/hr (09/28/24 0400)  niCARdipine, 2.5-15 mg/hr, Last  Rate: Stopped (09/27/24 1025)         LAB RESULTS:  Results from last 72 hours   Lab Units 09/28/24  0108 09/27/24  0116   GLUCOSE mg/dL 115* 92   SODIUM mmol/L 143 144   POTASSIUM mmol/L 3.8 3.9   CHLORIDE mmol/L 114* 113*   CO2 mmol/L 20* 18*   ANION GAP mmol/L 13 17   BUN mg/dL 53* 52*   CREATININE mg/dL 3.14* 3.01*   EGFR mL/min/1.73m*2 15* 15*   CALCIUM mg/dL 8.6 8.6   PHOSPHORUS mg/dL 3.4 4.2   ALBUMIN g/dL 2.8* 3.0*   MAGNESIUM mg/dL 2.17 2.10   POCT CALCIUM IONIZED (MMOL/L) IN BLOOD mmol/L 1.30 1.22      Results from last 72 hours   Lab Units 09/28/24  0108 09/27/24  0116   WBC AUTO x10*3/uL 8.3 8.4   NRBC AUTO /100 WBCs 0.0 0.0   RBC AUTO x10*6/uL 3.09* 3.27*   HEMOGLOBIN g/dL 9.2* 9.8*   HEMATOCRIT % 27.7* 28.5*   MCV fL 90 87   MCH pg 29.8 30.0   MCHC g/dL 33.2 34.4   RDW % 15.9* 15.6*   PLATELETS AUTO x10*3/uL 148* 142*      Results from last 72 hours   Lab Units 09/28/24  0108 09/27/24  0116   WBC AUTO x10*3/uL 8.3 8.4   NRBC AUTO /100 WBCs 0.0 0.0   RBC AUTO x10*6/uL 3.09* 3.27*   HEMOGLOBIN g/dL 9.2* 9.8*   HEMATOCRIT % 27.7* 28.5*   MCV fL 90 87   MCH pg 29.8 30.0   MCHC g/dL 33.2 34.4   RDW % 15.9* 15.6*   PLATELETS AUTO x10*3/uL 148* 142*   NEUTROS PCT AUTO % 81.1 85.0   IG PCT AUTO % 1.1* 0.5   LYMPHS PCT AUTO % 6.8 8.1   MONOS PCT AUTO % 8.6 6.2   EOS PCT AUTO % 2.3 0.1   BASOS PCT AUTO % 0.1 0.1   NEUTROS ABS x10*3/uL 6.75* 7.13*   IG AUTO x10*3/uL 0.09 0.04   LYMPHS ABS AUTO x10*3/uL 0.57* 0.68*   MONOS ABS AUTO x10*3/uL 0.72 0.52   EOS ABS AUTO x10*3/uL 0.19 0.01   BASOS ABS AUTO x10*3/uL 0.01 0.01      Results from last 72 hours   Lab Units 09/26/24  0428 09/25/24  1200   PROTIME seconds 12.1 15.9*   INR  1.1 1.4*   APTT seconds  --  52*      Results from last 72 hours   Lab Units 09/28/24  0108 09/27/24  0116   ALBUMIN g/dL 2.8* 3.0*     IMAGING RESULTS:  XR abdomen 1 view   Final Result   1.  Nonspecific non obstructive bowel gas pattern        MACRO:   None        Signed by: Nimisha Benjamin  9/27/2024 4:32 PM   Dictation workstation:   [id number]      XR chest 1 view   Final Result   1.  Slight interval worsening in perihilar edema and effusions and   correlate with cardiac and fluid status.             Signed by: Federico Mello 9/27/2024 7:56 AM   Dictation workstation:   [id number]      XR abdomen 1 view   Final Result   1. Enteric tube tip and side port project in the gastric body.   2. Continued prominent small large bowel loops and correlate with a   component of postoperative ileus.        I personally reviewed the images/study and I agree with Dr. Jose Melchor findings as stated. This study was interpreted at [place], [place]        MACRO:   None        Signed by: Federico Mello 9/27/2024 7:31 AM   Dictation workstation:   [id number]      XR chest 1 view   Final Result   1.  Minimal interstitial edema. Right basilar atelectasis/edema                  MACRO:   None        Signed by: Nimisha Benjamin 9/26/2024 11:10 AM   Dictation workstation:   [id number]      XR chest 1 view   Final Result   1.  Mild interstitial edema. Right lower lobe atelectasis/edema.   2. ETT with the tip 7.9 cm above the christiano.                  MACRO:   None        Signed by: Nimisha Benjamin 9/25/2024 1:05 PM   Dictation workstation:   [id number]      IR intervention NEURO embolization   Final Result      XR chest 1 view   Final Result   1.  Minimal interstitial edema with bibasilar atelectasis/edema                  MACRO:   None        Signed by: Nimisha Benjamin 9/24/2024 3:57 PM   Dictation workstation:   [id number]      IR angiogram cerebral bilateral   Final Result   1. There is a superficial tangle of vessels in the cerebellum that   measures approximately 15 x 13 x 15 mm with superficial early venous   drainage. This tangle of blood vessels is fed by the left distal   posteroinferior cerebellar artery in the distal left superior   cerebellar artery.   2. 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        I was present for and/or performed the critical portions of the   procedure and immediately available throughout the entire procedure.   I personally reviewed the image(s)/study and interpretation. I agree   with the findings as stated. Performed and dictated at [facility].        MACRO:   None        Signed by: Brian Evans 9/24/2024 5:42 PM   Dictation workstation:   [id number]      Vascular US lower extremity venous duplex bilateral   Final Result      MR brain w and wo IV contrast   Final Result   Postcontrast images somewhat limited due to patient motion artifact.   1. Intraparenchymal hematomas centered within the cerebellar vermis   and right cerebellar hemisphere, with scattered subarachnoid   hemorrhage throughout the right-greater-than-left cerebellar   hemispheres, similar to prior exams considering differences in   imaging technique. Additionally, there is surrounding vasogenic edema   throughout the cerebellum with minimal effacement of the 4th   ventricle. Otherwise no significant mass effect or midline shift.   Within the limitations of this exam, postcontrast images demonstrate   no abnormal areas of enhancement to suggest underlying mass or   evidence of vascular malformation.   2. Senescent changes of the brain, as detailed above.   3. No evidence of dural venous sinus thrombosis or deep venous   occlusion was identified within the major intracranial venous   structures.        I personally reviewed the images/study and I agree with the findings   as stated by Dr. Arnulfo Gresham M.D. This study was interpreted at   [facility], [place], [place].        MACRO:   None        Signed by: Noe Kenny 9/22/2024 8:12 AM   Dictation workstation:   [id number]      MR venography intracranial w and wo IV contrast   Final  Result   Postcontrast images somewhat limited due to patient motion artifact.   1. Intraparenchymal hematomas centered within the cerebellar vermis   and right cerebellar hemisphere, with scattered subarachnoid   hemorrhage throughout the right-greater-than-left cerebellar   hemispheres, similar to prior exams considering differences in   imaging technique. Additionally, there is surrounding vasogenic edema   throughout the cerebellum with minimal effacement of the 4th   ventricle. Otherwise no significant mass effect or midline shift.   Within the limitations of this exam, postcontrast images demonstrate   no abnormal areas of enhancement to suggest underlying mass or   evidence of vascular malformation.   2. Senescent changes of the brain, as detailed above.   3. No evidence of dural venous sinus thrombosis or deep venous   occlusion was identified within the major intracranial venous   structures.        I personally reviewed the images/study and I agree with the findings   as stated by Dr. Arnulfo Gresham M.D. This study was interpreted at   [place], [place].        MACRO:   None        Signed by: Noe Kenny 9/22/2024 8:12 AM   Dictation workstation:   [id number]      Transthoracic Echo (TTE) Complete   Final Result      CT head wo IV contrast   Final Result   Unchanged vermian hematoma continues to measure a proximally 1.5 cm   in size        Unchanged right infratentorial subdural or subarachnoid hemorrhage   unchanged        Increased number of slightly increased caliber branches of left PICA   and associated venous structures within the vermis. Vascular   malformation, neoplasm or fistula not excluded        MACRO:   Critical Finding:  See findings. Notification was initiated on   9/21/2024 at 10:12 am by  Doe Chand.  (**-OCF-**)        Signed by: Doe Chand 9/21/2024 10:14 AM   Dictation workstation:   [id number]      CT angio head and neck w and  wo IV contrast   Final Result   Addendum (preliminary) 1 of 1   Interpreted By:  Doe Chand,    ADDENDUM:   3D reconstructions were performed at the diagnostic workstation and   demonstrate increase in the size and number of distal PICA branches   on the left with slight increased size and number of associated   venous structures along the lateral left cerebellar hemisphere.   Consider a dural venous fistula, parenchymal vascular malformation or   vascular neoplasm.        Signed by: Doe Chand 9/21/2024 10:15 AM        -------- ORIGINAL REPORT --------   Dictation workstation:   [id number]      Final   1. Redemonstrated intraparenchymal and subarachnoid hemorrhage within   the right cerebellar hemisphere.   2. No hemodynamically significant intracranial or extracranial   stenosis, occlusion, or aneurysm.        I personally reviewed the image(s)/study and resident interpretation.   I agree with the findings as stated by resident Ozzie Mattson.   Data analyzed and images interpreted at [facility], [place], [place].        MACRO:   None.        Signed by: Doe Chand 9/21/2024 7:10 AM   Dictation workstation:   [id number]      US extremity nonvascular real time w image documentation limited anatomic specific   Final Result   Very limited exam as color Doppler images were not obtained and the   exam was prematurely terminated as per request of the emergency team.   Within these limitations, the right femoral vein is only partially   compressible with echogenic material along the lumen raising   suspicion for partially occlusive thrombus, age-indeterminate.        The right calf veins and left lower extremity deep venous system were   not evaluated due to early termination of the examination.        I personally reviewed the images/study and I agree with the findings   as stated by Dr. Arnulfo Gresham M.D. This study was interpreted at   [facility]  [place], [place].        MACRO:   None.        Signed by: Henry Taveras 9/21/2024 4:52 AM   Dictation workstation:   [id number]      CT angio chest for pulmonary embolism   Final Result   1. No evidence of acute pulmonary embolism.   2. Dilated main pulmonary artery measuring 3.6 cm, nonspecific but   can be seen with pulmonary hypertension.   3. Scattered ground-glass opacities with areas of interlobular septal   thickening, suspicious for pulmonary edema. Atypical infectious or   inflammatory process are not excluded.        I personally reviewed the image(s)/study and resident interpretation.   I agree with the findings as stated by resident Ozzie Mattson.   Data analyzed and images interpreted at [place], [place].        MACRO:   None        Signed by: Henry Taveras 9/21/2024 4:59 AM   Dictation workstation:   [id number]      CT head wo IV contrast   Final Result   CT HEAD:   1. Focal areas of hyperdensity within the right cerebellar hemisphere   and of the cerebellar vermis suggestive of intraparenchymal hematoma.   No significant mass effect or midline shift. The ventricles and basal   cisterns are patent. Recommend follow-up CT in 48 hours to ensure   stability.   2. Serpiginous hyperdensity over the sulci of the right cerebellar   hemisphere representing subarachnoid hemorrhage.        CT CERVICAL SPINE:   1. No acute fracture or traumatic malalignment of the cervical spine.   2. Surgical changes and multilevel spondylotic changes of the   cervical spine as detailed above.             I personally reviewed the image(s)/study and resident interpretation.   I agree with the findings as stated by resident Ozzie Mattson.   Data analyzed and images interpreted at [place], [place].        MACRO:   Ozzie Mattson discussed the significance and urgency of this   critical finding by  telephone with  Federico Patel on 9/21/2024 at   4:01 am.  (**-RCF-**) Findings:  See findings.        Signed by: Henry Taveras 9/21/2024 4:31 AM   Dictation workstation:   [id number]      CT cervical spine wo IV contrast   Final Result   CT HEAD:   1. Focal areas of hyperdensity within the right cerebellar hemisphere   and of the cerebellar vermis suggestive of intraparenchymal hematoma.   No significant mass effect or midline shift. The ventricles and basal   cisterns are patent. Recommend follow-up CT in 48 hours to ensure   stability.   2. Serpiginous hyperdensity over the sulci of the right cerebellar   hemisphere representing subarachnoid hemorrhage.        CT CERVICAL SPINE:   1. No acute fracture or traumatic malalignment of the cervical spine.   2. Surgical changes and multilevel spondylotic changes of the   cervical spine as detailed above.             I personally reviewed the image(s)/study and resident interpretation.   I agree with the findings as stated by resident Ozzie Mattson.   Data analyzed and images interpreted at [place], [place].        MACRO:   Ozzie Mattson discussed the significance and urgency of this   critical finding by telephone with  Federico Patel on 9/21/2024 at   4:01 am.  (**-RCF-**) Findings:  See findings.        Signed by: Henry Taveras 9/21/2024 4:31 AM   Dictation workstation:   [id number]      XR chest 2 views   Final Result   Cardiomegaly with mild pulmonary edema.        I personally reviewed the image(s)/study and resident interpretation.   I agree with the findings as stated by resident Ozzie Mattson.   Data analyzed and images interpreted at [place], [place].        MACRO:   None        Signed by: Henry Taveras 9/21/2024 4:22 AM   Dictation workstation:   [id number]      Point of Care Ultrasound    (Results Pending)   Referral to Neurointervention    (Results  Pending)   Referral to Neurointervention    (Results Pending)     Assessment/Plan    NEURO:  #Right vermian cerebellar ICH with right cerebellar hemisphere ICH with 4th ventricular effacement  #Cerebellar AVM  #Vascular parkinson   #Schizophrenia   Assessment:  - Neurologically: see above  - S/p Kcentra  - See above significant events  Plan:  - NSU  - Neuro Checks: Q1H  - No neurosurgical intervention at this time, plan for radio-surgery as outpatient for AVM  - Neurology stroke signed off  - Warfarin/ASA re-start plan per neurosurgery  - Pain: acetaminophen PRN  - Bupropion 150mg daily, duloxetine 30mg daily, quetiapine 300mg nightly  - PT/OT/SLP    CARDIOVASCULAR:  #HTN, HLD  Assessment:  - Hypertensive on arrival to NSU requiring Cardene infusion  - 9/21/24 ECHO: ejection fraction 74%, mild concentric LVH, mildly dilated LA  Plan:  - Continue to monitor on telemetry  - Atorvastatin 40mg daily, carvedilol 25mg BID, Isordil 10 mg TID, hydralazine 50mg TID, terazosin 2mg daily   - Continue to hold Lisinopril d/t worsening kidney function   - BP goal: -150 mmHg (presenting SBP was 150-220 mmHg)    --> PRN: Labetalol, Hydralazine, and Cardene  - Wean cardene as able    RESPIRATORY:  #COPD  #Acute respiratory insufficiency secondary to procedural sedation causing prolonged intubation  Assessment:  - Intubated: PS trial this AM on 8/8, 40%. Has thick secrections   Plan:  - Continuous pulse oximetry   - O2 PRN to maintain SpO2 > 94%, wean as tolerated  - Ventilator bundle while intubated  - WTE as able   - Hypertonic saline & IPV started     RENAL/:  #CKD IV (baseline creatinine ~2)  #Hyperchloremic metabolic acidosis   Assessment:  Results from last 72 hours   Lab Units 09/28/24  0108 09/27/24  0116   BUN mg/dL 53* 52*   CREATININE mg/dL 3.14* 3.01*   Plan:  - Monitor with daily RFP   - IVF: Plasmalyte @ 75 ml/hr  - Maintain indwelling catheter for strict intake/output  - C/s Renal 8/28 AM if Cr continues to  uptrend     FEN/GI:  #c/f ileus  Assessment:  - Last BM: 24  - s/p suppository overnight  Plan:  - Monitor and replace electrolytes per protocol  - IVF: plasmalyte  - Diet: NPO, start trickle feeding   - Bowel regimen: Miralax BID, add margaret colace BID     ENDOCRINE:  #Hyperglycemia, resolved  Assessment:  Results from last 7 days   Lab Units 24  0741 24  0331 24  0108 24  2330 24  2028 24  1553 24  1200 24  0500 24  0116   POCT GLUCOSE mg/dL 95 103*  --  124* 112* 126* 109*   < >  --    GLUCOSE mg/dL  --   --  115*  --   --   --   --   --  92    < > = values in this interval not displayed.   Plan:  - Accuchecks and ISS Q4H while NPO    HEMATOLOGY:  #Anemia (stable)  #Thrombocytopenia    #Increased INR  Assessment:  Results from last 7 days   Lab Units 24  0116   HEMOGLOBIN g/dL 9.2* 9.8*   HEMATOCRIT % 27.7* 28.5*   PLATELETS AUTO x10*3/uL 148* 142*   - Patient with history of recurrent DVTs  -  DVT ultrasound: Chronic changes, negative for DVTs  - : S/p vitamin K, INR 2.3-2.4  - : Transfused 2units FFP prior to angiogram  Plan:  - Continue to monitor with daily CBC and Coag panel  - Consulted vascular medicine for IVC filter indication due to increased risk/history of DVT/PE   - No indication for IVC filter placement   - Continue subcutaneous heparin and SCDs application  - Documented Zosyn allergy(blood dyscrasia), Cefepime started today for c/f PNA. Monitor CBC     INFECTIOUS DISEASE:  #MRSA and Pseudomonas Pneumonia  Assessment:  Results from last 7 days   Lab Units 24  01024  0116   WBC AUTO x10*3/uL 8.3 8.4   - Temp (24hrs), Av.8 °C (96.4 °F), Min:35.3 °C (95.5 °F), Max:36 °C (96.8 °F)    Results from last 7 days   Lab Units 24  1351   GRAM STAIN  Gram stain indicates specimen consists of lower respiratory tract secretions.  No predominant organism   RESPIRATORY CULTURE/SMEAR  (4+) Abundant  Staphylococcus aureus*  (4+) Abundant Pseudomonas aeruginosa*   - (+) MRSA swab  - Vanc/Cefepime started (9/27 - )  Plan:  - Continue to monitor for s/sx of infection  - Pan culture for temperature > 38.4 C  - F/u 9/26 sensitivities, continue Vancomycin and Cefepime    MUSCULOSKELETAL:  - No acute issues    SKIN:  - No acute issues  - Turns and skin care per NSU protocol    ACCESS:  - PIVs  - Right radial arterial line    PROPHYLAXIS:  - DVT Ppx: SCDs, SQH  - GI Ppx: Protonix     RESTRAINTS:  I agree with nursing assessment of the patient’s need for restraints to protect the patient from injury and facilitate healing. The patient is unable to cooperate with the plan of care and at risk for disrupting critical therapy (i.e., removing medical devices, lines, tubes and/or dressings). Please see order for specifics. Restraints can be removed when the patient is able to cooperate with plan of care and allow healing to occur, or the medical devices at risk are discontinued by the medical team.     YUNIOR Woodall-CNP  Neuroscience Intensive Care    Total critical care time of 60 minutes, with > 50% of time spent in direct contact with patient/family for education, counseling and coordination of care.

## 2024-09-29 ENCOUNTER — APPOINTMENT (OUTPATIENT)
Dept: RADIOLOGY | Facility: HOSPITAL | Age: 79
End: 2024-09-29
Payer: COMMERCIAL

## 2024-09-29 LAB
ALBUMIN SERPL BCP-MCNC: 2.8 G/DL (ref 3.4–5)
ANION GAP SERPL CALC-SCNC: 15 MMOL/L (ref 10–20)
BASOPHILS # BLD AUTO: 0.02 X10*3/UL (ref 0–0.1)
BASOPHILS NFR BLD AUTO: 0.2 %
BUN SERPL-MCNC: 51 MG/DL (ref 6–23)
CA-I BLD-SCNC: 1.26 MMOL/L (ref 1.1–1.33)
CALCIUM SERPL-MCNC: 8.8 MG/DL (ref 8.6–10.6)
CHLORIDE SERPL-SCNC: 113 MMOL/L (ref 98–107)
CO2 SERPL-SCNC: 19 MMOL/L (ref 21–32)
CREAT SERPL-MCNC: 2.94 MG/DL (ref 0.5–1.05)
EGFRCR SERPLBLD CKD-EPI 2021: 16 ML/MIN/1.73M*2
EOSINOPHIL # BLD AUTO: 0.35 X10*3/UL (ref 0–0.4)
EOSINOPHIL NFR BLD AUTO: 4.2 %
ERYTHROCYTE [DISTWIDTH] IN BLOOD BY AUTOMATED COUNT: 15.7 % (ref 11.5–14.5)
GLUCOSE BLD MANUAL STRIP-MCNC: 113 MG/DL (ref 74–99)
GLUCOSE BLD MANUAL STRIP-MCNC: 113 MG/DL (ref 74–99)
GLUCOSE BLD MANUAL STRIP-MCNC: 122 MG/DL (ref 74–99)
GLUCOSE BLD MANUAL STRIP-MCNC: 123 MG/DL (ref 74–99)
GLUCOSE BLD MANUAL STRIP-MCNC: 131 MG/DL (ref 74–99)
GLUCOSE BLD MANUAL STRIP-MCNC: 132 MG/DL (ref 74–99)
GLUCOSE SERPL-MCNC: 129 MG/DL (ref 74–99)
HCT VFR BLD AUTO: 27.8 % (ref 36–46)
HGB BLD-MCNC: 9.2 G/DL (ref 12–16)
IMM GRANULOCYTES # BLD AUTO: 0.13 X10*3/UL (ref 0–0.5)
IMM GRANULOCYTES NFR BLD AUTO: 1.6 % (ref 0–0.9)
LYMPHOCYTES # BLD AUTO: 0.6 X10*3/UL (ref 0.8–3)
LYMPHOCYTES NFR BLD AUTO: 7.3 %
MAGNESIUM SERPL-MCNC: 2.18 MG/DL (ref 1.6–2.4)
MCH RBC QN AUTO: 29.9 PG (ref 26–34)
MCHC RBC AUTO-ENTMCNC: 33.1 G/DL (ref 32–36)
MCV RBC AUTO: 90 FL (ref 80–100)
MONOCYTES # BLD AUTO: 0.71 X10*3/UL (ref 0.05–0.8)
MONOCYTES NFR BLD AUTO: 8.6 %
NEUTROPHILS # BLD AUTO: 6.43 X10*3/UL (ref 1.6–5.5)
NEUTROPHILS NFR BLD AUTO: 78.1 %
NRBC BLD-RTO: 0 /100 WBCS (ref 0–0)
PHOSPHATE SERPL-MCNC: 3.1 MG/DL (ref 2.5–4.9)
PLATELET # BLD AUTO: 150 X10*3/UL (ref 150–450)
POTASSIUM SERPL-SCNC: 3.7 MMOL/L (ref 3.5–5.3)
RBC # BLD AUTO: 3.08 X10*6/UL (ref 4–5.2)
SODIUM SERPL-SCNC: 143 MMOL/L (ref 136–145)
UFH PPP CHRO-ACNC: <0.1 IU/ML
VANCOMYCIN SERPL-MCNC: 23.2 UG/ML (ref 5–20)
VANCOMYCIN TROUGH SERPL-MCNC: 23.6 UG/ML (ref 5–20)
WBC # BLD AUTO: 8.2 X10*3/UL (ref 4.4–11.3)

## 2024-09-29 PROCEDURE — 82947 ASSAY GLUCOSE BLOOD QUANT: CPT

## 2024-09-29 PROCEDURE — 2500000005 HC RX 250 GENERAL PHARMACY W/O HCPCS: Performed by: NEUROLOGICAL SURGERY

## 2024-09-29 PROCEDURE — 99291 CRITICAL CARE FIRST HOUR: CPT | Performed by: NURSE PRACTITIONER

## 2024-09-29 PROCEDURE — 94003 VENT MGMT INPAT SUBQ DAY: CPT

## 2024-09-29 PROCEDURE — 2500000002 HC RX 250 W HCPCS SELF ADMINISTERED DRUGS (ALT 637 FOR MEDICARE OP, ALT 636 FOR OP/ED): Performed by: NURSE PRACTITIONER

## 2024-09-29 PROCEDURE — 2500000001 HC RX 250 WO HCPCS SELF ADMINISTERED DRUGS (ALT 637 FOR MEDICARE OP): Performed by: NURSE PRACTITIONER

## 2024-09-29 PROCEDURE — 94640 AIRWAY INHALATION TREATMENT: CPT

## 2024-09-29 PROCEDURE — 37799 UNLISTED PX VASCULAR SURGERY: CPT | Performed by: NEUROLOGICAL SURGERY

## 2024-09-29 PROCEDURE — 2500000005 HC RX 250 GENERAL PHARMACY W/O HCPCS

## 2024-09-29 PROCEDURE — 85520 HEPARIN ASSAY: CPT

## 2024-09-29 PROCEDURE — 2500000004 HC RX 250 GENERAL PHARMACY W/ HCPCS (ALT 636 FOR OP/ED): Mod: JZ

## 2024-09-29 PROCEDURE — 80202 ASSAY OF VANCOMYCIN: CPT | Performed by: NEUROLOGICAL SURGERY

## 2024-09-29 PROCEDURE — 2500000004 HC RX 250 GENERAL PHARMACY W/ HCPCS (ALT 636 FOR OP/ED)

## 2024-09-29 PROCEDURE — 85025 COMPLETE CBC W/AUTO DIFF WBC: CPT

## 2024-09-29 PROCEDURE — 80069 RENAL FUNCTION PANEL: CPT

## 2024-09-29 PROCEDURE — 82330 ASSAY OF CALCIUM: CPT

## 2024-09-29 PROCEDURE — 2500000004 HC RX 250 GENERAL PHARMACY W/ HCPCS (ALT 636 FOR OP/ED): Performed by: NURSE PRACTITIONER

## 2024-09-29 PROCEDURE — 37799 UNLISTED PX VASCULAR SURGERY: CPT

## 2024-09-29 PROCEDURE — 71045 X-RAY EXAM CHEST 1 VIEW: CPT

## 2024-09-29 PROCEDURE — 2020000001 HC ICU ROOM DAILY

## 2024-09-29 PROCEDURE — 83735 ASSAY OF MAGNESIUM: CPT

## 2024-09-29 PROCEDURE — 94668 MNPJ CHEST WALL SBSQ: CPT

## 2024-09-29 ASSESSMENT — PAIN - FUNCTIONAL ASSESSMENT
PAIN_FUNCTIONAL_ASSESSMENT: CPOT (CRITICAL CARE PAIN OBSERVATION TOOL)
PAIN_FUNCTIONAL_ASSESSMENT: 0-10
PAIN_FUNCTIONAL_ASSESSMENT: CPOT (CRITICAL CARE PAIN OBSERVATION TOOL)
PAIN_FUNCTIONAL_ASSESSMENT: CPOT (CRITICAL CARE PAIN OBSERVATION TOOL)

## 2024-09-29 ASSESSMENT — PAIN SCALES - GENERAL: PAINLEVEL_OUTOF10: 0 - NO PAIN

## 2024-09-29 NOTE — CARE PLAN
Problem: Fall/Injury  Goal: Not fall by end of shift  Outcome: Progressing  Goal: Be free from injury by end of the shift  Outcome: Progressing  Goal: Verbalize understanding of personal risk factors for fall in the hospital  Outcome: Progressing  Goal: Verbalize understanding of risk factor reduction measures to prevent injury from fall in the home  Outcome: Progressing  Goal: Use assistive devices by end of the shift  Outcome: Progressing  Goal: Pace activities to prevent fatigue by end of the shift  Outcome: Progressing     Problem: Pain - Adult  Goal: Verbalizes/displays adequate comfort level or baseline comfort level  Outcome: Progressing     Problem: Safety - Adult  Goal: Free from fall injury  Outcome: Progressing     Problem: Chronic Conditions and Co-morbidities  Goal: Patient's chronic conditions and co-morbidity symptoms are monitored and maintained or improved  Outcome: Progressing     Problem: Pain  Goal: Takes deep breaths with improved pain control throughout the shift  Outcome: Progressing  Goal: Turns in bed with improved pain control throughout the shift  Outcome: Progressing  Goal: Performs ADL's with improved pain control throughout shift  Outcome: Progressing  Goal: Participates in PT with improved pain control throughout the shift  Outcome: Progressing  Goal: Free from opioid side effects throughout the shift  Outcome: Progressing  Goal: Free from acute confusion related to pain meds throughout the shift  Outcome: Progressing     Problem: General Stroke  Goal: Establish a mutual long term goal with patient by discharge  Outcome: Progressing  Goal: Demonstrate improvement in neurological exam throughout the shift  Outcome: Progressing  Goal: Maintain BP within ordered limits throughout shift  Outcome: Progressing  Goal: Participate in treatment (ie., meds, therapy) throughout shift  Outcome: Progressing  Goal: No symptoms of aspiration throughout shift  Outcome: Progressing  Goal: No symptoms  of hemorrhage throughout shift  Outcome: Progressing  Goal: Tolerate enteral feeding throughout shift  Outcome: Progressing  Goal: Decreased nausea/vomiting throughout shift  Outcome: Progressing  Goal: Controlled blood glucose throughout shift  Outcome: Progressing  Goal: Out of bed three times today  Outcome: Progressing     Problem: ICU Stroke  Goal: Maintain ICP within ordered limits throughout shift  Outcome: Progressing  Goal: Tolerate EVD clamping trial throughout shift  Outcome: Progressing  Goal: Tolerate ventilator weaning trial during shift  Outcome: Progressing  Goal: Maintain patent airway throughout shift  Outcome: Progressing  Goal: Achieve/maintain targeted sodium level throughout shift  Outcome: Progressing     Problem: Skin  Goal: Decreased wound size/increased tissue granulation at next dressing change  Outcome: Progressing  Goal: Participates in plan/prevention/treatment measures  Outcome: Progressing  Goal: Prevent/manage excess moisture  Outcome: Progressing  Flowsheets (Taken 9/29/2024 5438)  Prevent/manage excess moisture: Cleanse incontinence/protect with barrier cream  Goal: Prevent/minimize sheer/friction injuries  Outcome: Progressing  Goal: Promote/optimize nutrition  Outcome: Progressing  Goal: Promote skin healing  Outcome: Progressing     Problem: Safety - Medical Restraint  Goal: Remains free of injury from restraints (Restraint for Interference with Medical Device)  Outcome: Progressing  Goal: Free from restraint(s) (Restraint for Interference with Medical Device)  Outcome: Progressing   The patient's goals for the shift include      The clinical goals for the shift include pt to remain hemodynamically stable throughout shift.

## 2024-09-29 NOTE — PROGRESS NOTES
"Kelly Martinez is a 79 y.o. female on day 8 of admission presenting with Cerebellar hemorrhage (Multi).    Subjective   No acute events overnight       Objective     Physical Exam  Intubated  awake  Follows commands x4    Last Recorded Vitals  Blood pressure 111/59, pulse 63, temperature 35.6 °C (96.1 °F), resp. rate 20, height 1.702 m (5' 7.01\"), weight 116 kg (255 lb 15.3 oz), SpO2 95%.  Intake/Output last 3 Shifts:  I/O last 3 completed shifts:  In: 2440 (21 mL/kg) [NG/GT:790; IV Piggyback:1650]  Out: 2595 (22.4 mL/kg) [Urine:2595 (0.6 mL/kg/hr)]  Weight: 116.1 kg     Relevant Results            This patient currently has cardiac telemetry ordered; if you would like to modify or discontinue the telemetry order, click here to go to the orders activity to modify/discontinue the order.      Results for orders placed or performed during the hospital encounter of 09/21/24 (from the past 24 hour(s))   POCT GLUCOSE   Result Value Ref Range    POCT Glucose 130 (H) 74 - 99 mg/dL   POCT GLUCOSE   Result Value Ref Range    POCT Glucose 113 (H) 74 - 99 mg/dL   POCT GLUCOSE   Result Value Ref Range    POCT Glucose 115 (H) 74 - 99 mg/dL   POCT GLUCOSE   Result Value Ref Range    POCT Glucose 126 (H) 74 - 99 mg/dL   Calcium, Ionized   Result Value Ref Range    POCT Calcium, Ionized 1.26 1.1 - 1.33 mmol/L   CBC and Auto Differential   Result Value Ref Range    WBC 8.2 4.4 - 11.3 x10*3/uL    nRBC 0.0 0.0 - 0.0 /100 WBCs    RBC 3.08 (L) 4.00 - 5.20 x10*6/uL    Hemoglobin 9.2 (L) 12.0 - 16.0 g/dL    Hematocrit 27.8 (L) 36.0 - 46.0 %    MCV 90 80 - 100 fL    MCH 29.9 26.0 - 34.0 pg    MCHC 33.1 32.0 - 36.0 g/dL    RDW 15.7 (H) 11.5 - 14.5 %    Platelets 150 150 - 450 x10*3/uL    Neutrophils % 78.1 40.0 - 80.0 %    Immature Granulocytes %, Automated 1.6 (H) 0.0 - 0.9 %    Lymphocytes % 7.3 13.0 - 44.0 %    Monocytes % 8.6 2.0 - 10.0 %    Eosinophils % 4.2 0.0 - 6.0 %    Basophils % 0.2 0.0 - 2.0 %    Neutrophils Absolute 6.43 (H) " 1.60 - 5.50 x10*3/uL    Immature Granulocytes Absolute, Automated 0.13 0.00 - 0.50 x10*3/uL    Lymphocytes Absolute 0.60 (L) 0.80 - 3.00 x10*3/uL    Monocytes Absolute 0.71 0.05 - 0.80 x10*3/uL    Eosinophils Absolute 0.35 0.00 - 0.40 x10*3/uL    Basophils Absolute 0.02 0.00 - 0.10 x10*3/uL   Heparin Assay   Result Value Ref Range    Heparin Unfractionated <0.1 See Comment Below for Therapeutic Ranges IU/mL   Magnesium   Result Value Ref Range    Magnesium 2.18 1.60 - 2.40 mg/dL   Renal function panel   Result Value Ref Range    Glucose 129 (H) 74 - 99 mg/dL    Sodium 143 136 - 145 mmol/L    Potassium 3.7 3.5 - 5.3 mmol/L    Chloride 113 (H) 98 - 107 mmol/L    Bicarbonate 19 (L) 21 - 32 mmol/L    Anion Gap 15 10 - 20 mmol/L    Urea Nitrogen 51 (H) 6 - 23 mg/dL    Creatinine 2.94 (H) 0.50 - 1.05 mg/dL    eGFR 16 (L) >60 mL/min/1.73m*2    Calcium 8.8 8.6 - 10.6 mg/dL    Phosphorus 3.1 2.5 - 4.9 mg/dL    Albumin 2.8 (L) 3.4 - 5.0 g/dL   POCT GLUCOSE   Result Value Ref Range    POCT Glucose 113 (H) 74 - 99 mg/dL   POCT GLUCOSE   Result Value Ref Range    POCT Glucose 122 (H) 74 - 99 mg/dL                Assessment/Plan   Assessment & Plan  Cerebellar hemorrhage (Multi)    AVM (arteriovenous malformation) ([facility])    79 years old female with h/o parkinson's, HTN, HLD, HFpEF, 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 (s/p IVC filter removed 4/2016), JOY (CPAP at night), COPD, CKD stage 4, chronic tobacco use, schizophrenia, depression, p/w AMS, hypertensive 's, recent fall 2days ago, CTH R cerebellar and cerebellar vermis hemorrhage with 4th ventricular effacement, stable vents, CT C spine C5-7 ACDF, CTA c/f L PICA dAVM, s/p kcentra, TTE 74% EF, no wma, rCTH stable ICH, c/f L PICA dAVM, 9/22 MRI/V motion degraded, no obvious underlying mass or vascular lesion  9/23 BLE DVT US chronic changes, 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

## 2024-09-29 NOTE — PROGRESS NOTES
Kelly Martinez   79 y.o.     MRN/Room: [id number]/06/06-[id number]    Subjective:  Intubated     Objective:     Meds:   amLODIPine, 10 mg, Daily  atorvastatin, 40 mg, Daily  bumetanide, 2 mg, q12h  buPROPion, 75 mg, BID  [Held by provider] buPROPion XL, 150 mg, Daily  carvedilol, 25 mg, BID  cefepime, 1 g, q12h  [Held by provider] DULoxetine, 30 mg, Daily  folic acid, 1 mg, Daily  heparin (porcine), 5,000 Units, q8h  hydrALAZINE, 50 mg, q8h JAJA  insulin lispro, 0-5 Units, q4h  isosorbide dinitrate, 10 mg, TID  [Held by provider] lisinopril, 40 mg, Daily  [Held by provider] mometasone, 2 puff, BID  oxygen, , Continuous - Inhalation  pantoprazole, 40 mg, Daily  perflutren protein A microsphere, 0.5 mL, Once in imaging  polyethylene glycol, 17 g, BID  QUEtiapine, 300 mg, Nightly  sennosides-docusate sodium, 1 tablet, BID  sodium chloride, 3 mL, q6h JAJA  sulfur hexafluoride microsphr, 2 mL, Once in imaging  terazosin, 2 mg, Daily      niCARdipine, Last Rate: Stopped (09/27/24 1025)      acetaminophen, 650 mg, q6h PRN  albuterol, 2.5 mg, q4h PRN  dextrose, 12.5 g, q15 min PRN  dextrose, 25 g, q15 min PRN  glucagon, 1 mg, q15 min PRN  glucagon, 1 mg, q15 min PRN  hydrALAZINE, 10 mg, q20 min PRN  labetaloL, 10 mg, q10 min PRN  oxygen, , Continuous PRN - O2/gases  traZODone, 150 mg, Nightly PRN  vancomycin, , Daily PRN        Vitals:    09/29/24 1000   BP: 105/64   Pulse: 61   Resp: 15   Temp:    SpO2: 96%          Intake/Output Summary (Last 24 hours) at 9/29/2024 1110  Last data filed at 9/29/2024 0900  Gross per 24 hour   Intake 955 ml   Output 2445 ml   Net -1490 ml       General appearance: intubated  Eyes: non-icteric  Skin: no apparent rash  Heart: S1 S2 regular  Lungs: Diminished air entry bilaterally   Abdomen: soft, nt/nd  Extremities: Trace edema bilat  Cavazos  Neuro: unable to assess      Blood Labs:  Results for orders placed or performed during the hospital encounter of 09/21/24 (from the past 24 hour(s))   POCT GLUCOSE    Result Value Ref Range    POCT Glucose 130 (H) 74 - 99 mg/dL   POCT GLUCOSE   Result Value Ref Range    POCT Glucose 113 (H) 74 - 99 mg/dL   POCT GLUCOSE   Result Value Ref Range    POCT Glucose 115 (H) 74 - 99 mg/dL   POCT GLUCOSE   Result Value Ref Range    POCT Glucose 126 (H) 74 - 99 mg/dL   Calcium, Ionized   Result Value Ref Range    POCT Calcium, Ionized 1.26 1.1 - 1.33 mmol/L   CBC and Auto Differential   Result Value Ref Range    WBC 8.2 4.4 - 11.3 x10*3/uL    nRBC 0.0 0.0 - 0.0 /100 WBCs    RBC 3.08 (L) 4.00 - 5.20 x10*6/uL    Hemoglobin 9.2 (L) 12.0 - 16.0 g/dL    Hematocrit 27.8 (L) 36.0 - 46.0 %    MCV 90 80 - 100 fL    MCH 29.9 26.0 - 34.0 pg    MCHC 33.1 32.0 - 36.0 g/dL    RDW 15.7 (H) 11.5 - 14.5 %    Platelets 150 150 - 450 x10*3/uL    Neutrophils % 78.1 40.0 - 80.0 %    Immature Granulocytes %, Automated 1.6 (H) 0.0 - 0.9 %    Lymphocytes % 7.3 13.0 - 44.0 %    Monocytes % 8.6 2.0 - 10.0 %    Eosinophils % 4.2 0.0 - 6.0 %    Basophils % 0.2 0.0 - 2.0 %    Neutrophils Absolute 6.43 (H) 1.60 - 5.50 x10*3/uL    Immature Granulocytes Absolute, Automated 0.13 0.00 - 0.50 x10*3/uL    Lymphocytes Absolute 0.60 (L) 0.80 - 3.00 x10*3/uL    Monocytes Absolute 0.71 0.05 - 0.80 x10*3/uL    Eosinophils Absolute 0.35 0.00 - 0.40 x10*3/uL    Basophils Absolute 0.02 0.00 - 0.10 x10*3/uL   Heparin Assay   Result Value Ref Range    Heparin Unfractionated <0.1 See Comment Below for Therapeutic Ranges IU/mL   Magnesium   Result Value Ref Range    Magnesium 2.18 1.60 - 2.40 mg/dL   Renal function panel   Result Value Ref Range    Glucose 129 (H) 74 - 99 mg/dL    Sodium 143 136 - 145 mmol/L    Potassium 3.7 3.5 - 5.3 mmol/L    Chloride 113 (H) 98 - 107 mmol/L    Bicarbonate 19 (L) 21 - 32 mmol/L    Anion Gap 15 10 - 20 mmol/L    Urea Nitrogen 51 (H) 6 - 23 mg/dL    Creatinine 2.94 (H) 0.50 - 1.05 mg/dL    eGFR 16 (L) >60 mL/min/1.73m*2    Calcium 8.8 8.6 - 10.6 mg/dL    Phosphorus 3.1 2.5 - 4.9 mg/dL    Albumin 2.8  (L) 3.4 - 5.0 g/dL   POCT GLUCOSE   Result Value Ref Range    POCT Glucose 113 (H) 74 - 99 mg/dL   POCT GLUCOSE   Result Value Ref Range    POCT Glucose 122 (H) 74 - 99 mg/dL   Vancomycin, Trough   Result Value Ref Range    Vancomycin, Trough 23.6 (HH) 5.0 - 20.0 ug/mL      ASSESSMENT:  Kelly Martinez is a  79 y.o. F with PMH CKD IV (baseline Cr 2-2.3), history of PE, HFpEF, HTN, HLD, JOY, schizophrenia, COPD who is currently admitted to the [facility] after presenting with cerebellar hemorrhage on 09/21 after a fall. Nephrology consulted previously for recommendations re: contrast induced nephropathy prophylaxis. Now re-engaged for diuretic recommendations.     #NICOLÁS on CKD 4  -Serum Cr improved today, 2.9   -Likely due to contrast injury, hemodynamic mediated ATN   -urine output 2L with Bumex yesterday  -Oxygen requirements decreasing, 50% FiO2 today    RECOMMENDATIONS:  -Continue Bumex 2mg IV bid  -If BP low, consider decreasing dose of Hydralazine to allow for diuresis  -Strict I and O charting  -Avoid further contrast  -Dose medications for renal function. Can consider change of Cefepime.    Irina Berger MD  Nephrology Fellow   Nephrology pager [phone]

## 2024-09-29 NOTE — PROGRESS NOTES
[facility]  NEUROSCIENCE INTENSIVE CARE UNIT  DAILY PROGRESS NOTE       Patient Name: Kelly Martinez   MRN: [id number]     Admit Date: 2024     : 1945 AGE: 79 y.o. GENDER: female      Subjective    79 year-old female with past medical history of PE/recurrent DVT s/p IVCF, HFpEF, hypertension, hyperlipidemia, JOY, schizophrenia, COPD, and CKD IV.  Admitted 2024 with cerebellar hemorrhage (Multi) with surrounding SAH after presenting s/p fall and confusion. CTH demonstrated right vermian cerebellar ICH with right cerebellar hemisphere ICH and/or SAH with 4th ventricular effacement.     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  - : MRI/MRV motion degraded, no obvious underlying mass or vascular lesion   - 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   - : INR 2.4 s/p 30mg vitamin K  - : S/p 2units FFP, 40mg Lasix, diagnostic angiogram primary feeders left PICA, R>L SCA feeders, unable to extubate post-procedure, atelectasis on chest x-ray, diuresed with 20mg Lasix  - : sputum culture sent. + pseudomonas and staph aureus, nasal swab + MRSA  - : Vancomycin and cefepime started.  - : Bumex started  - : Sensitivities resulted, Cefepime changed to Ceftazidime    Interval Events: No acute events overnight.    Objective   VITALS:  Temp:  [35.5 °C (95.9 °F)-36.6 °C (97.9 °F)] 35.6 °C (96.1 °F)  Heart Rate:  [59-69] 63  Resp:  [13-23] 20  BP: ()/(56-89) 111/59  Arterial Line BP 1: ()/(57-81) 110/81  FiO2 (%):  [50 %-60 %] 50 %  INTAKE/OUTPUT:  Intake/Output Summary (Last 24 hours) at 2024 0913  Last data filed at 2024 0600  Gross per 24 hour   Intake 965 ml   Output 1910 ml   Net -945 ml     VENT:  Vent Mode: Volume control/assist control  FiO2 (%):  [50 %-60 %] 50 %  S RR:  [12] 12  S VT:  [370 mL] 370 mL  PEEP/CPAP (cm H2O):  [10 cm H20] 10 cm H20  MAP (cm  H2O):  [8.9-14] 14       PHYSICAL EXAM:  NEURO:  - Intubated, EOV, pupils 3mm/reactive bilaterally  - follow commands x4  - + c/c/g  CV:  - RRR on telemetry, NSR  - Right radial arterial line in place  RESP:  - Regular, unlabored, moderate secretions  - Oxygen: ventilator  :  - Indwelling catheter in place  GI:  - Abdomen NT/ND, soft  SKIN:  - Right groin site intact, no hematoma, pulses intact    MEDICATIONS:  Scheduled: PRN: Continuous:   amLODIPine, 10 mg, nasogastric tube, Daily  atorvastatin, 40 mg, nasogastric tube, Daily  bumetanide, 2 mg, intravenous, q12h  buPROPion, 75 mg, nasogastric tube, BID  [Held by provider] buPROPion XL, 150 mg, oral, Daily  carvedilol, 25 mg, nasogastric tube, BID  cefepime, 1 g, intravenous, q12h  [Held by provider] DULoxetine, 30 mg, oral, Daily  folic acid, 1 mg, nasogastric tube, Daily  heparin (porcine), 5,000 Units, subcutaneous, q8h  hydrALAZINE, 50 mg, nasogastric tube, q8h JAJA  insulin lispro, 0-5 Units, subcutaneous, q4h  isosorbide dinitrate, 10 mg, nasogastric tube, TID  [Held by provider] lisinopril, 40 mg, nasogastric tube, Daily  [Held by provider] mometasone, 2 puff, inhalation, BID  oxygen, , inhalation, Continuous - Inhalation  pantoprazole, 40 mg, intravenous, Daily  perflutren protein A microsphere, 0.5 mL, intravenous, Once in imaging  polyethylene glycol, 17 g, nasogastric tube, BID  QUEtiapine, 300 mg, nasogastric tube, Nightly  sennosides-docusate sodium, 1 tablet, nasogastric tube, BID  sodium chloride, 3 mL, nebulization, q6h JAJA  sulfur hexafluoride microsphr, 2 mL, intravenous, Once in imaging  terazosin, 2 mg, nasogastric tube, Daily     PRN medications: acetaminophen, albuterol, dextrose, dextrose, glucagon, glucagon, hydrALAZINE, labetaloL, oxygen, traZODone, vancomycin niCARdipine, 2.5-15 mg/hr, Last Rate: Stopped (09/27/24 1025)         LAB RESULTS:  Results from last 72 hours   Lab Units 09/29/24  0129 09/28/24  0108   GLUCOSE mg/dL 129* 115*    SODIUM mmol/L 143 143   POTASSIUM mmol/L 3.7 3.8   CHLORIDE mmol/L 113* 114*   CO2 mmol/L 19* 20*   ANION GAP mmol/L 15 13   BUN mg/dL 51* 53*   CREATININE mg/dL 2.94* 3.14*   EGFR mL/min/1.73m*2 16* 15*   CALCIUM mg/dL 8.8 8.6   PHOSPHORUS mg/dL 3.1 3.4   ALBUMIN g/dL 2.8* 2.8*   MAGNESIUM mg/dL 2.18 2.17   POCT CALCIUM IONIZED (MMOL/L) IN BLOOD mmol/L 1.26 1.30      Results from last 72 hours   Lab Units 09/29/24  0129 09/28/24  0108   WBC AUTO x10*3/uL 8.2 8.3   NRBC AUTO /100 WBCs 0.0 0.0   RBC AUTO x10*6/uL 3.08* 3.09*   HEMOGLOBIN g/dL 9.2* 9.2*   HEMATOCRIT % 27.8* 27.7*   MCV fL 90 90   MCH pg 29.9 29.8   MCHC g/dL 33.1 33.2   RDW % 15.7* 15.9*   PLATELETS AUTO x10*3/uL 150 148*      Results from last 72 hours   Lab Units 09/29/24  0129 09/28/24  0108   WBC AUTO x10*3/uL 8.2 8.3   NRBC AUTO /100 WBCs 0.0 0.0   RBC AUTO x10*6/uL 3.08* 3.09*   HEMOGLOBIN g/dL 9.2* 9.2*   HEMATOCRIT % 27.8* 27.7*   MCV fL 90 90   MCH pg 29.9 29.8   MCHC g/dL 33.1 33.2   RDW % 15.7* 15.9*   PLATELETS AUTO x10*3/uL 150 148*   NEUTROS PCT AUTO % 78.1 81.1   IG PCT AUTO % 1.6* 1.1*   LYMPHS PCT AUTO % 7.3 6.8   MONOS PCT AUTO % 8.6 8.6   EOS PCT AUTO % 4.2 2.3   BASOS PCT AUTO % 0.2 0.1   NEUTROS ABS x10*3/uL 6.43* 6.75*   IG AUTO x10*3/uL 0.13 0.09   LYMPHS ABS AUTO x10*3/uL 0.60* 0.57*   MONOS ABS AUTO x10*3/uL 0.71 0.72   EOS ABS AUTO x10*3/uL 0.35 0.19   BASOS ABS AUTO x10*3/uL 0.02 0.01             Results from last 72 hours   Lab Units 09/29/24  0129 09/28/24  0108   ALBUMIN g/dL 2.8* 2.8*     IMAGING RESULTS:  XR abdomen 1 view   Final Result   1.  Nonspecific non obstructive bowel gas pattern        MACRO:   None        Signed by: Nimisha Benjamin 9/27/2024 4:32 PM   Dictation workstation:   [id number]      XR chest 1 view   Final Result   1.  Slight interval worsening in perihilar edema and effusions and   correlate with cardiac and fluid status.             Signed by: Federico Mello 9/27/2024 7:56 AM   Dictation  workstation:   [id number]      XR abdomen 1 view   Final Result   1. Enteric tube tip and side port project in the gastric body.   2. Continued prominent small large bowel loops and correlate with a   component of postoperative ileus.        I personally reviewed the images/study and I agree with Dr. Jose Melchor findings as stated. This study was interpreted at [facility], [place], [place]        MACRO:   None        Signed by: Federico Mello 9/27/2024 7:31 AM   Dictation workstation:   [id number]      XR chest 1 view   Final Result   1.  Minimal interstitial edema. Right basilar atelectasis/edema                  MACRO:   None        Signed by: Nimisha Benjamin 9/26/2024 11:10 AM   Dictation workstation:   [id number]      XR chest 1 view   Final Result   1.  Mild interstitial edema. Right lower lobe atelectasis/edema.   2. ETT with the tip 7.9 cm above the christiano.                  MACRO:   None        Signed by: Nimisha Benjamin 9/25/2024 1:05 PM   Dictation workstation:   [id number]      IR intervention NEURO embolization   Final Result      XR chest 1 view   Final Result   1.  Minimal interstitial edema with bibasilar atelectasis/edema                  MACRO:   None        Signed by: Nimisha Benjamin 9/24/2024 3:57 PM   Dictation workstation:   [id number]      IR angiogram cerebral bilateral   Final Result   1. There is a superficial tangle of vessels in the cerebellum that   measures approximately 15 x 13 x 15 mm with superficial early venous   drainage. This tangle of blood vessels is fed by the left distal   posteroinferior cerebellar artery in the distal left superior   cerebellar artery.   2. 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        I was present for and/or performed the critical portions of the   procedure and immediately available throughout the entire procedure.   I personally reviewed the  image(s)/study and interpretation. I agree   with the findings as stated. Performed and dictated at [facility].        MACRO:   None        Signed by: Brian Evans 9/24/2024 5:42 PM   Dictation workstation:   [id number]      Vascular US lower extremity venous duplex bilateral   Final Result      MR brain w and wo IV contrast   Final Result   Postcontrast images somewhat limited due to patient motion artifact.   1. Intraparenchymal hematomas centered within the cerebellar vermis   and right cerebellar hemisphere, with scattered subarachnoid   hemorrhage throughout the right-greater-than-left cerebellar   hemispheres, similar to prior exams considering differences in   imaging technique. Additionally, there is surrounding vasogenic edema   throughout the cerebellum with minimal effacement of the 4th   ventricle. Otherwise no significant mass effect or midline shift.   Within the limitations of this exam, postcontrast images demonstrate   no abnormal areas of enhancement to suggest underlying mass or   evidence of vascular malformation.   2. Senescent changes of the brain, as detailed above.   3. No evidence of dural venous sinus thrombosis or deep venous   occlusion was identified within the major intracranial venous   structures.        I personally reviewed the images/study and I agree with the findings   as stated by Dr. Arnulfo Gresham M.D. This study was interpreted at   [facility], [place], [place].        MACRO:   None        Signed by: Noe Kenny 9/22/2024 8:12 AM   Dictation workstation:   [id number]      MR venography intracranial w and wo IV contrast   Final Result   Postcontrast images somewhat limited due to patient motion artifact.   1. Intraparenchymal hematomas centered within the cerebellar vermis   and right cerebellar hemisphere, with scattered subarachnoid   hemorrhage throughout the right-greater-than-left cerebellar    hemispheres, similar to prior exams considering differences in   imaging technique. Additionally, there is surrounding vasogenic edema   throughout the cerebellum with minimal effacement of the 4th   ventricle. Otherwise no significant mass effect or midline shift.   Within the limitations of this exam, postcontrast images demonstrate   no abnormal areas of enhancement to suggest underlying mass or   evidence of vascular malformation.   2. Senescent changes of the brain, as detailed above.   3. No evidence of dural venous sinus thrombosis or deep venous   occlusion was identified within the major intracranial venous   structures.        I personally reviewed the images/study and I agree with the findings   as stated by Dr. Arnulfo Gresham M.D. This study was interpreted at   [place], [place].        MACRO:   None        Signed by: Noe Kenny 9/22/2024 8:12 AM   Dictation workstation:   [id number]      Transthoracic Echo (TTE) Complete   Final Result      CT head wo IV contrast   Final Result   Unchanged vermian hematoma continues to measure a proximally 1.5 cm   in size        Unchanged right infratentorial subdural or subarachnoid hemorrhage   unchanged        Increased number of slightly increased caliber branches of left PICA   and associated venous structures within the vermis. Vascular   malformation, neoplasm or fistula not excluded        MACRO:   Critical Finding:  See findings. Notification was initiated on   9/21/2024 at 10:12 am by  Doe Chand.  (**-OCF-**)        Signed by: Doe Chand 9/21/2024 10:14 AM   Dictation workstation:   [id number]      CT angio head and neck w and wo IV contrast   Final Result   Addendum (preliminary) 1 of 1   Interpreted By:  Doe Chand,    ADDENDUM:   3D reconstructions were performed at the diagnostic workstation and   demonstrate increase in the size and number of distal PICA branches   on the left with  slight increased size and number of associated   venous structures along the lateral left cerebellar hemisphere.   Consider a dural venous fistula, parenchymal vascular malformation or   vascular neoplasm.        Signed by: Doe Chand 9/21/2024 10:15 AM        -------- ORIGINAL REPORT --------   Dictation workstation:   [id number]      Final   1. Redemonstrated intraparenchymal and subarachnoid hemorrhage within   the right cerebellar hemisphere.   2. No hemodynamically significant intracranial or extracranial   stenosis, occlusion, or aneurysm.        I personally reviewed the image(s)/study and resident interpretation.   I agree with the findings as stated by resident Ozzie Mattson.   Data analyzed and images interpreted at [place], [place].        MACRO:   None.        Signed by: Doe Chand 9/21/2024 7:10 AM   Dictation workstation:   [id number]      US extremity nonvascular real time w image documentation limited anatomic specific   Final Result   Very limited exam as color Doppler images were not obtained and the   exam was prematurely terminated as per request of the emergency team.   Within these limitations, the right femoral vein is only partially   compressible with echogenic material along the lumen raising   suspicion for partially occlusive thrombus, age-indeterminate.        The right calf veins and left lower extremity deep venous system were   not evaluated due to early termination of the examination.        I personally reviewed the images/study and I agree with the findings   as stated by Dr. Arnulfo Gresham M.D. This study was interpreted at   [place], [place].        MACRO:   None.        Signed by: Henry Taveras 9/21/2024 4:52 AM   Dictation workstation:   [id number]      CT angio chest for pulmonary embolism   Final Result   1. No evidence of acute pulmonary embolism.   2. Dilated  main pulmonary artery measuring 3.6 cm, nonspecific but   can be seen with pulmonary hypertension.   3. Scattered ground-glass opacities with areas of interlobular septal   thickening, suspicious for pulmonary edema. Atypical infectious or   inflammatory process are not excluded.        I personally reviewed the image(s)/study and resident interpretation.   I agree with the findings as stated by resident Ozzie Mattson.   Data analyzed and images interpreted at [place], [place].        MACRO:   None        Signed by: Henry Taveras 9/21/2024 4:59 AM   Dictation workstation:   [id number]      CT head wo IV contrast   Final Result   CT HEAD:   1. Focal areas of hyperdensity within the right cerebellar hemisphere   and of the cerebellar vermis suggestive of intraparenchymal hematoma.   No significant mass effect or midline shift. The ventricles and basal   cisterns are patent. Recommend follow-up CT in 48 hours to ensure   stability.   2. Serpiginous hyperdensity over the sulci of the right cerebellar   hemisphere representing subarachnoid hemorrhage.        CT CERVICAL SPINE:   1. No acute fracture or traumatic malalignment of the cervical spine.   2. Surgical changes and multilevel spondylotic changes of the   cervical spine as detailed above.             I personally reviewed the image(s)/study and resident interpretation.   I agree with the findings as stated by resident Ozzie Mattson.   Data analyzed and images interpreted at [facility], [place], [place].        MACRO:   Ozzie Mattson discussed the significance and urgency of this   critical finding by telephone with  Federico Patel on 9/21/2024 at   4:01 am.  (**-RCF-**) Findings:  See findings.        Signed by: Henry Taveras 9/21/2024 4:31 AM   Dictation workstation:   [id number]      CT cervical spine wo IV contrast   Final Result   CT HEAD:   1. Focal areas of  hyperdensity within the right cerebellar hemisphere   and of the cerebellar vermis suggestive of intraparenchymal hematoma.   No significant mass effect or midline shift. The ventricles and basal   cisterns are patent. Recommend follow-up CT in 48 hours to ensure   stability.   2. Serpiginous hyperdensity over the sulci of the right cerebellar   hemisphere representing subarachnoid hemorrhage.        CT CERVICAL SPINE:   1. No acute fracture or traumatic malalignment of the cervical spine.   2. Surgical changes and multilevel spondylotic changes of the   cervical spine as detailed above.             I personally reviewed the image(s)/study and resident interpretation.   I agree with the findings as stated by resident Ozzie Mattson.   Data analyzed and images interpreted at [place], [place].        MACRO:   Ozzie Mattson discussed the significance and urgency of this   critical finding by telephone with  Federico Patel on 9/21/2024 at   4:01 am.  (**-RCF-**) Findings:  See findings.        Signed by: Henry Taveras 9/21/2024 4:31 AM   Dictation workstation:   [id number]      XR chest 2 views   Final Result   Cardiomegaly with mild pulmonary edema.        I personally reviewed the image(s)/study and resident interpretation.   I agree with the findings as stated by resident Ozzie Mattson.   Data analyzed and images interpreted at [place], [place].        MACRO:   None        Signed by: Henry Taveras 9/21/2024 4:22 AM   Dictation workstation:   [id number]      Point of Care Ultrasound    (Results Pending)   Referral to Neurointervention    (Results Pending)   Referral to Neurointervention    (Results Pending)     Assessment/Plan    NEURO:  #Right vermian cerebellar ICH with right cerebellar hemisphere ICH with 4th ventricular effacement  #Cerebellar AVM  #Vascular parkinson   #Schizophrenia   Assessment:  -  Neurologically: see above  - S/p Kcentra  - See above significant events  Plan:  - NSU  - Neuro Checks: Q1H  - No neurosurgical intervention at this time, plan for radio-surgery as outpatient for AVM  - Neurology stroke signed off  - Warfarin/ASA re-start plan per neurosurgery  - Pain: acetaminophen PRN  - Bupropion 150mg daily, duloxetine 30mg daily, quetiapine 300mg nightly  - PT/OT/SLP    CARDIOVASCULAR:  #HTN, HLD  Assessment:  - Hypertensive on arrival to NSU requiring Cardene infusion  - 9/21/24 ECHO: ejection fraction 74%, mild concentric LVH, mildly dilated LA  Plan:  - Continue to monitor on telemetry  - Atorvastatin 40mg daily, carvedilol 25mg BID, Isordil 10 mg TID, hydralazine 50mg TID, terazosin 2mg daily   - Continue to hold Lisinopril d/t worsening kidney function   - BP goal: -150 mmHg (presenting SBP was 150-220 mmHg)    --> PRN: Labetalol, Hydralazine, and Cardene    RESPIRATORY:  #COPD  #Acute respiratory insufficiency secondary to procedural sedation causing prolonged intubation  Assessment:  - Intubated: PS trial this AM on 8/8, 40%. Has thick secrections   Plan:  - Continuous pulse oximetry   - O2 PRN to maintain SpO2 > 94%, wean as tolerated  - Ventilator bundle while intubated  - WTE as able   - Hypertonic saline & IPV started     RENAL/:  #NICOLÁS on CKD IV (baseline creatinine ~2)  #Hyperchloremic metabolic acidosis   Assessment:  Results from last 72 hours   Lab Units 09/29/24  0129 09/28/24  0108   BUN mg/dL 51* 53*   CREATININE mg/dL 2.94* 3.14*   Plan:  - Monitor with daily RFP   - IVF: Plasmalyte @ 75 ml/hr  - Maintain indwelling catheter for strict intake/output  - Renal following:    --> Bumex 2 mg Q12H    --> renally dose medications, caution with cefepime d/t risk of neurotoxicity    FEN/GI:  #KIARA  Assessment:  - Last BM Date: 09/28/24  Plan:  - Monitor and replace electrolytes per protocol  - IVF: none  - Diet: Enteral feeding with NPO Isosource 1.5; NG (nasogastric tube); 35  (Start Isosource 1.5 @ 10ml/hr, increase by 10mls every 12hrs to reach goal of 35ml/hr)  - Bowel regimen: Miralax BID, add margaret colace BID     ENDOCRINE:  #Hyperglycemia, resolved  Assessment:  Results from last 7 days   Lab Units 24  0755 24  0331 24  0129 24  2324 24  1828 24  1143 24  0331 24  0108   POCT GLUCOSE mg/dL 122* 113*  --  126* 115* 113* 130*   < >  --    GLUCOSE mg/dL  --   --  129*  --   --   --   --   --  115*    < > = values in this interval not displayed.   Plan:  - Accuchecks and ISS Q4H while NPO    HEMATOLOGY:  #Anemia (stable)  #Thrombocytopenia    #Increased INR  Assessment:  Results from last 7 days   Lab Units 24  0129 24  0108   HEMOGLOBIN g/dL 9.2* 9.2*   HEMATOCRIT % 27.8* 27.7*   PLATELETS AUTO x10*3/uL 150 148*   - Patient with history of recurrent DVTs  -  DVT ultrasound: Chronic changes, negative for DVTs  - : S/p vitamin K, INR 2.3-2.4  - : Transfused 2units FFP prior to angiogram  Plan:  - Continue to monitor with daily CBC and Coag panel  - Consulted vascular medicine for IVC filter indication due to increased risk/history of DVT/PE   - No indication for IVC filter placement   - Continue subcutaneous heparin and SCDs application  - Documented Zosyn allergy(blood dyscrasia), Cefepime started today for c/f PNA. Monitor CBC     INFECTIOUS DISEASE:  #MRSA and Pseudomonas Pneumonia  Assessment:  Results from last 7 days   Lab Units 24  0129 24  0108   WBC AUTO x10*3/uL 8.2 8.3   - Temp (24hrs), Av.9 °C (96.6 °F), Min:35.5 °C (95.9 °F), Max:36.6 °C (97.9 °F)    Results from last 7 days   Lab Units 24  1351   GRAM STAIN  Gram stain indicates specimen consists of lower respiratory tract secretions.  No predominant organism   RESPIRATORY CULTURE/SMEAR  (4+) Abundant Methicillin Resistant Staphylococcus aureus (MRSA)*  (4+) Abundant Pseudomonas aeruginosa*   - (+) MRSA swab  -  ABX:    --> Vanc (9/27-)    --> Cefepime (9/27-29)    --> Ceftazidime (9/29-)  Plan:  - Continue to monitor for s/sx of infection  - Pan culture for temperature > 38.4 C  - Continue Vanc and Ceftazidime 2 g QD    MUSCULOSKELETAL:  - No acute issues    SKIN:  - No acute issues  - Turns and skin care per NSU protocol    ACCESS:  - PIVs  - Right radial arterial line    PROPHYLAXIS:  - DVT Ppx: SCDs, SQH  - GI Ppx: Protonix     RESTRAINTS:  I agree with nursing assessment of the patient’s need for restraints to protect the patient from injury and facilitate healing. The patient is unable to cooperate with the plan of care and at risk for disrupting critical therapy (i.e., removing medical devices, lines, tubes and/or dressings). Please see order for specifics. Restraints can be removed when the patient is able to cooperate with plan of care and allow healing to occur, or the medical devices at risk are discontinued by the medical team.     YUNIOR Woodall-CNP  Neuroscience Intensive Care    Total critical care time of 60 minutes, with > 50% of time spent in direct contact with patient/family for education, counseling and coordination of care.

## 2024-09-30 ENCOUNTER — APPOINTMENT (OUTPATIENT)
Dept: VASCULAR MEDICINE | Facility: HOSPITAL | Age: 79
End: 2024-09-30
Payer: COMMERCIAL

## 2024-09-30 LAB
ALBUMIN SERPL BCP-MCNC: 2.8 G/DL (ref 3.4–5)
ANION GAP SERPL CALC-SCNC: 12 MMOL/L (ref 10–20)
BASOPHILS # BLD AUTO: 0.02 X10*3/UL (ref 0–0.1)
BASOPHILS NFR BLD AUTO: 0.3 %
BUN SERPL-MCNC: 50 MG/DL (ref 6–23)
CA-I BLD-SCNC: 1.27 MMOL/L (ref 1.1–1.33)
CALCIUM SERPL-MCNC: 8.7 MG/DL (ref 8.6–10.6)
CHLORIDE SERPL-SCNC: 112 MMOL/L (ref 98–107)
CO2 SERPL-SCNC: 22 MMOL/L (ref 21–32)
CREAT SERPL-MCNC: 3.32 MG/DL (ref 0.5–1.05)
EGFRCR SERPLBLD CKD-EPI 2021: 14 ML/MIN/1.73M*2
EOSINOPHIL # BLD AUTO: 0.42 X10*3/UL (ref 0–0.4)
EOSINOPHIL NFR BLD AUTO: 6 %
ERYTHROCYTE [DISTWIDTH] IN BLOOD BY AUTOMATED COUNT: 14.9 % (ref 11.5–14.5)
GLUCOSE BLD MANUAL STRIP-MCNC: 114 MG/DL (ref 74–99)
GLUCOSE BLD MANUAL STRIP-MCNC: 127 MG/DL (ref 74–99)
GLUCOSE BLD MANUAL STRIP-MCNC: 131 MG/DL (ref 74–99)
GLUCOSE BLD MANUAL STRIP-MCNC: 137 MG/DL (ref 74–99)
GLUCOSE BLD MANUAL STRIP-MCNC: 145 MG/DL (ref 74–99)
GLUCOSE BLD MANUAL STRIP-MCNC: 99 MG/DL (ref 74–99)
GLUCOSE SERPL-MCNC: 132 MG/DL (ref 74–99)
HCT VFR BLD AUTO: 26.1 % (ref 36–46)
HGB BLD-MCNC: 9.4 G/DL (ref 12–16)
IMM GRANULOCYTES # BLD AUTO: 0.23 X10*3/UL (ref 0–0.5)
IMM GRANULOCYTES NFR BLD AUTO: 3.3 % (ref 0–0.9)
LYMPHOCYTES # BLD AUTO: 0.53 X10*3/UL (ref 0.8–3)
LYMPHOCYTES NFR BLD AUTO: 7.6 %
MAGNESIUM SERPL-MCNC: 2.02 MG/DL (ref 1.6–2.4)
MCH RBC QN AUTO: 30.5 PG (ref 26–34)
MCHC RBC AUTO-ENTMCNC: 36 G/DL (ref 32–36)
MCV RBC AUTO: 85 FL (ref 80–100)
MONOCYTES # BLD AUTO: 0.69 X10*3/UL (ref 0.05–0.8)
MONOCYTES NFR BLD AUTO: 9.9 %
NEUTROPHILS # BLD AUTO: 5.09 X10*3/UL (ref 1.6–5.5)
NEUTROPHILS NFR BLD AUTO: 72.9 %
NRBC BLD-RTO: 0 /100 WBCS (ref 0–0)
PHOSPHATE SERPL-MCNC: 2.7 MG/DL (ref 2.5–4.9)
PLATELET # BLD AUTO: 161 X10*3/UL (ref 150–450)
POTASSIUM SERPL-SCNC: 3.6 MMOL/L (ref 3.5–5.3)
RBC # BLD AUTO: 3.08 X10*6/UL (ref 4–5.2)
SODIUM SERPL-SCNC: 142 MMOL/L (ref 136–145)
UFH PPP CHRO-ACNC: <0.1 IU/ML
WBC # BLD AUTO: 7 X10*3/UL (ref 4.4–11.3)

## 2024-09-30 PROCEDURE — 2500000005 HC RX 250 GENERAL PHARMACY W/O HCPCS

## 2024-09-30 PROCEDURE — 37799 UNLISTED PX VASCULAR SURGERY: CPT | Performed by: NURSE PRACTITIONER

## 2024-09-30 PROCEDURE — 94668 MNPJ CHEST WALL SBSQ: CPT

## 2024-09-30 PROCEDURE — 82330 ASSAY OF CALCIUM: CPT | Performed by: NURSE PRACTITIONER

## 2024-09-30 PROCEDURE — 97535 SELF CARE MNGMENT TRAINING: CPT | Mod: GO

## 2024-09-30 PROCEDURE — 2500000005 HC RX 250 GENERAL PHARMACY W/O HCPCS: Performed by: NEUROLOGICAL SURGERY

## 2024-09-30 PROCEDURE — 82947 ASSAY GLUCOSE BLOOD QUANT: CPT

## 2024-09-30 PROCEDURE — 85520 HEPARIN ASSAY: CPT | Performed by: NURSE PRACTITIONER

## 2024-09-30 PROCEDURE — 80069 RENAL FUNCTION PANEL: CPT | Performed by: NURSE PRACTITIONER

## 2024-09-30 PROCEDURE — 2020000001 HC ICU ROOM DAILY

## 2024-09-30 PROCEDURE — 93970 EXTREMITY STUDY: CPT | Performed by: SURGERY

## 2024-09-30 PROCEDURE — 97530 THERAPEUTIC ACTIVITIES: CPT | Mod: GO

## 2024-09-30 PROCEDURE — 93970 EXTREMITY STUDY: CPT

## 2024-09-30 PROCEDURE — 83735 ASSAY OF MAGNESIUM: CPT | Performed by: NURSE PRACTITIONER

## 2024-09-30 PROCEDURE — 2500000001 HC RX 250 WO HCPCS SELF ADMINISTERED DRUGS (ALT 637 FOR MEDICARE OP): Performed by: NURSE PRACTITIONER

## 2024-09-30 PROCEDURE — 85025 COMPLETE CBC W/AUTO DIFF WBC: CPT | Performed by: NURSE PRACTITIONER

## 2024-09-30 PROCEDURE — 94640 AIRWAY INHALATION TREATMENT: CPT

## 2024-09-30 PROCEDURE — 99291 CRITICAL CARE FIRST HOUR: CPT | Performed by: PSYCHIATRY & NEUROLOGY

## 2024-09-30 PROCEDURE — 2500000004 HC RX 250 GENERAL PHARMACY W/ HCPCS (ALT 636 FOR OP/ED): Performed by: NURSE PRACTITIONER

## 2024-09-30 PROCEDURE — 2500000004 HC RX 250 GENERAL PHARMACY W/ HCPCS (ALT 636 FOR OP/ED)

## 2024-09-30 PROCEDURE — 2500000001 HC RX 250 WO HCPCS SELF ADMINISTERED DRUGS (ALT 637 FOR MEDICARE OP)

## 2024-09-30 PROCEDURE — 99232 SBSQ HOSP IP/OBS MODERATE 35: CPT

## 2024-09-30 PROCEDURE — 2500000002 HC RX 250 W HCPCS SELF ADMINISTERED DRUGS (ALT 637 FOR MEDICARE OP, ALT 636 FOR OP/ED): Performed by: NURSE PRACTITIONER

## 2024-09-30 PROCEDURE — 99291 CRITICAL CARE FIRST HOUR: CPT

## 2024-09-30 PROCEDURE — 2500000001 HC RX 250 WO HCPCS SELF ADMINISTERED DRUGS (ALT 637 FOR MEDICARE OP): Performed by: REGISTERED NURSE

## 2024-09-30 PROCEDURE — 94003 VENT MGMT INPAT SUBQ DAY: CPT

## 2024-09-30 RX ORDER — LANOLIN ALCOHOL/MO/W.PET/CERES
100 CREAM (GRAM) TOPICAL DAILY
Status: DISCONTINUED | OUTPATIENT
Start: 2024-09-30 | End: 2024-10-02

## 2024-09-30 RX ORDER — POTASSIUM CHLORIDE 1.5 G/1.58G
40 POWDER, FOR SOLUTION ORAL ONCE
Status: COMPLETED | OUTPATIENT
Start: 2024-09-30 | End: 2024-09-30

## 2024-09-30 RX ORDER — SODIUM CHLORIDE FOR INHALATION 3 %
3 VIAL, NEBULIZER (ML) INHALATION
Status: DISCONTINUED | OUTPATIENT
Start: 2024-09-30 | End: 2024-10-03

## 2024-09-30 RX ORDER — BUMETANIDE 0.25 MG/ML
1 INJECTION, SOLUTION INTRAMUSCULAR; INTRAVENOUS EVERY 12 HOURS
Status: DISCONTINUED | OUTPATIENT
Start: 2024-09-30 | End: 2024-10-01

## 2024-09-30 RX ORDER — HYDROMORPHONE HYDROCHLORIDE 1 MG/ML
0.2 INJECTION, SOLUTION INTRAMUSCULAR; INTRAVENOUS; SUBCUTANEOUS ONCE
Status: COMPLETED | OUTPATIENT
Start: 2024-09-30 | End: 2024-09-30

## 2024-09-30 ASSESSMENT — PAIN - FUNCTIONAL ASSESSMENT
PAIN_FUNCTIONAL_ASSESSMENT: 0-10
PAIN_FUNCTIONAL_ASSESSMENT: CPOT (CRITICAL CARE PAIN OBSERVATION TOOL)
PAIN_FUNCTIONAL_ASSESSMENT: CPOT (CRITICAL CARE PAIN OBSERVATION TOOL)
PAIN_FUNCTIONAL_ASSESSMENT: 0-10
PAIN_FUNCTIONAL_ASSESSMENT: CPOT (CRITICAL CARE PAIN OBSERVATION TOOL)
PAIN_FUNCTIONAL_ASSESSMENT: CPOT (CRITICAL CARE PAIN OBSERVATION TOOL)

## 2024-09-30 ASSESSMENT — COGNITIVE AND FUNCTIONAL STATUS - GENERAL
MOVING FROM LYING ON BACK TO SITTING ON SIDE OF FLAT BED WITH BEDRAILS: TOTAL
EATING MEALS: TOTAL
CLIMB 3 TO 5 STEPS WITH RAILING: TOTAL
TURNING FROM BACK TO SIDE WHILE IN FLAT BAD: TOTAL
DRESSING REGULAR LOWER BODY CLOTHING: TOTAL
HELP NEEDED FOR BATHING: TOTAL
HELP NEEDED FOR BATHING: A LOT
DRESSING REGULAR UPPER BODY CLOTHING: A LOT
EATING MEALS: TOTAL
DAILY ACTIVITIY SCORE: 9
PERSONAL GROOMING: TOTAL
WALKING IN HOSPITAL ROOM: TOTAL
TOILETING: TOTAL
DRESSING REGULAR UPPER BODY CLOTHING: TOTAL
PERSONAL GROOMING: A LOT
MOBILITY SCORE: 6
DRESSING REGULAR LOWER BODY CLOTHING: TOTAL
STANDING UP FROM CHAIR USING ARMS: TOTAL
TOILETING: TOTAL
DAILY ACTIVITIY SCORE: 6
MOVING TO AND FROM BED TO CHAIR: TOTAL

## 2024-09-30 ASSESSMENT — PAIN DESCRIPTION - LOCATION: LOCATION: GENERALIZED

## 2024-09-30 ASSESSMENT — PAIN SCALES - GENERAL
PAINLEVEL_OUTOF10: 7
PAINLEVEL_OUTOF10: 0 - NO PAIN

## 2024-09-30 ASSESSMENT — ACTIVITIES OF DAILY LIVING (ADL): HOME_MANAGEMENT_TIME_ENTRY: 13

## 2024-09-30 NOTE — PROGRESS NOTES
[facility]  NEUROSCIENCE INTENSIVE CARE UNIT  DAILY PROGRESS NOTE       Patient Name: Kelly Martinez   MRN: [id number]     Admit Date: 2024     : 1945 AGE: 79 y.o. GENDER: female      Subjective    79 year-old female with past medical history of PE/recurrent DVT s/p IVCF, HFpEF, hypertension, hyperlipidemia, JOY, schizophrenia, COPD, and CKD IV. Admitted 2024 with cerebellar hemorrhage (Multi) with surrounding SAH after presenting s/p fall and confusion. CTH demonstrated right vermian cerebellar ICH with right cerebellar hemisphere ICH and/or SAH with 4th ventricular effacement.     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  - : MRI/MRV motion degraded, no obvious underlying mass or vascular lesion   - 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   - : INR 2.4 s/p 30mg vitamin K  - : S/p 2units FFP, 40mg Lasix, diagnostic angiogram primary feeders left PICA, R>L SCA feeders, unable to extubate post-procedure, atelectasis on chest x-ray, diuresed with 20mg Lasix  - : Sputum culture + pseudomonas/staphylococcus aureus, nasal swab + MRSA  - : Vancomycin and cefepime started  - : Bumex started  - : Sensitivities resulted, Cefepime changed to Ceftazidime    Interval Events: NAEON.    Objective   VITALS:  Temp:  [35.6 °C (96.1 °F)-36.4 °C (97.5 °F)] 36.4 °C (97.5 °F)  Heart Rate:  [61-70] 67  Resp:  [13-24] 16  BP: (102-152)/(62-69) 115/62  Arterial Line BP 1: (134-157)/(61-72) 144/61  FiO2 (%):  [40 %-60 %] 60 %  INTAKE/OUTPUT:  Intake/Output Summary (Last 24 hours) at 2024 0708  Last data filed at 2024 0600  Gross per 24 hour   Intake 1055 ml   Output 3905 ml   Net -2850 ml     VENTILATOR:  Vent Mode: Volume control/assist control  FiO2 (%):  [40 %-60 %] 60 %  S RR:  [12] 12  S VT:  [370 mL] 370 mL  PEEP/CPAP (cm H2O):  [8 cm H20-10 cm H20] 8 cm H20  MAP (cm H2O):   [8-14] 12       PHYSICAL EXAM:  NEURO:  - EOV, intubated, pupils 2mm/reactive bilaterally  - Follows commands x4  - Brain stem reflexes intact  CV:  - RRR on telemetry, NSR  - Right radial arterial line in place  RESP:  - Regular, unlabored, moderate secretions  - Oxygen: ventilator  :  - Indwelling catheter in place  GI:  - Abdomen NT/ND, soft  SKIN:  - Right groin site intact, no hematoma, pulses intact    MEDICATIONS:  Scheduled: PRN:   amLODIPine, 10 mg, nasogastric tube, Daily  atorvastatin, 40 mg, nasogastric tube, Daily  bumetanide, 2 mg, intravenous, q12h  buPROPion, 75 mg, nasogastric tube, BID  [Held by provider] buPROPion XL, 150 mg, oral, Daily  carvedilol, 25 mg, nasogastric tube, BID  cefTAZidime, 2 g, intravenous, Daily  [Held by provider] DULoxetine, 30 mg, oral, Daily  folic acid, 1 mg, nasogastric tube, Daily  heparin (porcine), 5,000 Units, subcutaneous, q8h  hydrALAZINE, 50 mg, nasogastric tube, q8h JAJA  insulin lispro, 0-5 Units, subcutaneous, q4h  isosorbide dinitrate, 10 mg, nasogastric tube, TID  [Held by provider] lisinopril, 40 mg, nasogastric tube, Daily  [Held by provider] mometasone, 2 puff, inhalation, BID  oxygen, , inhalation, Continuous - Inhalation  pantoprazole, 40 mg, intravenous, Daily  perflutren protein A microsphere, 0.5 mL, intravenous, Once in imaging  polyethylene glycol, 17 g, nasogastric tube, BID  QUEtiapine, 300 mg, nasogastric tube, Nightly  sennosides-docusate sodium, 1 tablet, nasogastric tube, BID  sodium chloride, 3 mL, nebulization, q6h JAJA  sulfur hexafluoride microsphr, 2 mL, intravenous, Once in imaging  terazosin, 2 mg, nasogastric tube, Daily     PRN medications: acetaminophen, albuterol, dextrose, dextrose, glucagon, glucagon, hydrALAZINE, labetaloL, oxygen, traZODone, vancomycin     LAB RESULTS:  Results from last 72 hours   Lab Units 09/30/24  0043 09/29/24  0129   GLUCOSE mg/dL 132* 129*   SODIUM mmol/L 142 143   POTASSIUM mmol/L 3.6 3.7   CHLORIDE  mmol/L 112* 113*   CO2 mmol/L 22 19*   ANION GAP mmol/L 12 15   BUN mg/dL 50* 51*   CREATININE mg/dL 3.32* 2.94*   EGFR mL/min/1.73m*2 14* 16*   CALCIUM mg/dL 8.7 8.8   PHOSPHORUS mg/dL 2.7 3.1   ALBUMIN g/dL 2.8* 2.8*   MAGNESIUM mg/dL 2.02 2.18   POCT CALCIUM IONIZED (MMOL/L) IN BLOOD mmol/L 1.27 1.26      Results from last 72 hours   Lab Units 09/30/24  0043 09/29/24  0129   WBC AUTO x10*3/uL 7.0 8.2   NRBC AUTO /100 WBCs 0.0 0.0   RBC AUTO x10*6/uL 3.08* 3.08*   HEMOGLOBIN g/dL 9.4* 9.2*   HEMATOCRIT % 26.1* 27.8*   MCV fL 85 90   MCH pg 30.5 29.9   MCHC g/dL 36.0 33.1   RDW % 14.9* 15.7*   PLATELETS AUTO x10*3/uL 161 150      Results from last 72 hours   Lab Units 09/30/24  0043 09/29/24  0129   WBC AUTO x10*3/uL 7.0 8.2   NRBC AUTO /100 WBCs 0.0 0.0   RBC AUTO x10*6/uL 3.08* 3.08*   HEMOGLOBIN g/dL 9.4* 9.2*   HEMATOCRIT % 26.1* 27.8*   MCV fL 85 90   MCH pg 30.5 29.9   MCHC g/dL 36.0 33.1   RDW % 14.9* 15.7*   PLATELETS AUTO x10*3/uL 161 150   NEUTROS PCT AUTO % 72.9 78.1   IG PCT AUTO % 3.3* 1.6*   LYMPHS PCT AUTO % 7.6 7.3   MONOS PCT AUTO % 9.9 8.6   EOS PCT AUTO % 6.0 4.2   BASOS PCT AUTO % 0.3 0.2   NEUTROS ABS x10*3/uL 5.09 6.43*   IG AUTO x10*3/uL 0.23 0.13   LYMPHS ABS AUTO x10*3/uL 0.53* 0.60*   MONOS ABS AUTO x10*3/uL 0.69 0.71   EOS ABS AUTO x10*3/uL 0.42* 0.35   BASOS ABS AUTO x10*3/uL 0.02 0.02      Results from last 72 hours   Lab Units 09/30/24  0043 09/29/24  0129   ALBUMIN g/dL 2.8* 2.8*     IMAGING RESULTS:  XR chest 1 view   Final Result   1. Stable to mild worsening of bibasilar, right-greater-than-left   ill-defined airspace opacities which may represent pulmonary edema   however a superimposed infectious component is not excluded.   2. Persistent small bilateral pleural effusions.   3. Additional medical devices as detailed above.             I personally reviewed the images/study and I agree with the findings   as stated by Dr. Gilmar Turner. This study was interpreted at [facility]    [place], [place].        MACRO:   None        Signed by: Surya Ohara 9/29/2024 1:32 PM   Dictation workstation:   [id number]      XR abdomen 1 view   Final Result   1.  Nonspecific non obstructive bowel gas pattern        MACRO:   None        Signed by: Nimisha Benjamin 9/27/2024 4:32 PM   Dictation workstation:   [id number]      XR chest 1 view   Final Result   1.  Slight interval worsening in perihilar edema and effusions and   correlate with cardiac and fluid status.             Signed by: Federico Mello 9/27/2024 7:56 AM   Dictation workstation:   [id number]      XR abdomen 1 view   Final Result   1. Enteric tube tip and side port project in the gastric body.   2. Continued prominent small large bowel loops and correlate with a   component of postoperative ileus.        I personally reviewed the images/study and I agree with Dr. Jose Melchor findings as stated. This study was interpreted at [place], [place]        MACRO:   None        Signed by: Federico Mello 9/27/2024 7:31 AM   Dictation workstation:   [id number]      XR chest 1 view   Final Result   1.  Minimal interstitial edema. Right basilar atelectasis/edema                  MACRO:   None        Signed by: Nimisha Benjamin 9/26/2024 11:10 AM   Dictation workstation:   [id number]      XR chest 1 view   Final Result   1.  Mild interstitial edema. Right lower lobe atelectasis/edema.   2. ETT with the tip 7.9 cm above the christiano.                  MACRO:   None        Signed by: Nimisha Benjamin 9/25/2024 1:05 PM   Dictation workstation:   [id number]      IR intervention NEURO embolization   Final Result      XR chest 1 view   Final Result   1.  Minimal interstitial edema with bibasilar atelectasis/edema                  MACRO:   None        Signed by: Nimisha Benjamin 9/24/2024 3:57 PM   Dictation workstation:   [id number]      IR angiogram cerebral bilateral   Final Result    1. There is a superficial tangle of vessels in the cerebellum that   measures approximately 15 x 13 x 15 mm with superficial early venous   drainage. This tangle of blood vessels is fed by the left distal   posteroinferior cerebellar artery in the distal left superior   cerebellar artery.   2. 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        I was present for and/or performed the critical portions of the   procedure and immediately available throughout the entire procedure.   I personally reviewed the image(s)/study and interpretation. I agree   with the findings as stated. Performed and dictated at [facility].        MACRO:   None        Signed by: Brian Evans 9/24/2024 5:42 PM   Dictation workstation:   [id number]      Vascular US lower extremity venous duplex bilateral   Final Result      MR brain w and wo IV contrast   Final Result   Postcontrast images somewhat limited due to patient motion artifact.   1. Intraparenchymal hematomas centered within the cerebellar vermis   and right cerebellar hemisphere, with scattered subarachnoid   hemorrhage throughout the right-greater-than-left cerebellar   hemispheres, similar to prior exams considering differences in   imaging technique. Additionally, there is surrounding vasogenic edema   throughout the cerebellum with minimal effacement of the 4th   ventricle. Otherwise no significant mass effect or midline shift.   Within the limitations of this exam, postcontrast images demonstrate   no abnormal areas of enhancement to suggest underlying mass or   evidence of vascular malformation.   2. Senescent changes of the brain, as detailed above.   3. No evidence of dural venous sinus thrombosis or deep venous   occlusion was identified within the major intracranial venous   structures.        I personally reviewed the images/study and I agree with the findings   as  stated by Dr. Arnulfo Gresham M.D. This study was interpreted at   [place], [place].        MACRO:   None        Signed by: Noe Kenny 9/22/2024 8:12 AM   Dictation workstation:   [id number]      MR venography intracranial w and wo IV contrast   Final Result   Postcontrast images somewhat limited due to patient motion artifact.   1. Intraparenchymal hematomas centered within the cerebellar vermis   and right cerebellar hemisphere, with scattered subarachnoid   hemorrhage throughout the right-greater-than-left cerebellar   hemispheres, similar to prior exams considering differences in   imaging technique. Additionally, there is surrounding vasogenic edema   throughout the cerebellum with minimal effacement of the 4th   ventricle. Otherwise no significant mass effect or midline shift.   Within the limitations of this exam, postcontrast images demonstrate   no abnormal areas of enhancement to suggest underlying mass or   evidence of vascular malformation.   2. Senescent changes of the brain, as detailed above.   3. No evidence of dural venous sinus thrombosis or deep venous   occlusion was identified within the major intracranial venous   structures.        I personally reviewed the images/study and I agree with the findings   as stated by Dr. Arnulfo Gresham M.D. This study was interpreted at   [place], [place].        MACRO:   None        Signed by: Noe Kenny 9/22/2024 8:12 AM   Dictation workstation:   [id number]      Transthoracic Echo (TTE) Complete   Final Result      CT head wo IV contrast   Final Result   Unchanged vermian hematoma continues to measure a proximally 1.5 cm   in size        Unchanged right infratentorial subdural or subarachnoid hemorrhage   unchanged        Increased number of slightly increased caliber branches of left PICA   and associated venous structures within the vermis. Vascular   malformation,  neoplasm or fistula not excluded        MACRO:   Critical Finding:  See findings. Notification was initiated on   9/21/2024 at 10:12 am by  Doe Chand.  (**-OCF-**)        Signed by: Doe Chand 9/21/2024 10:14 AM   Dictation workstation:   [id number]      CT angio head and neck w and wo IV contrast   Final Result   Addendum (preliminary) 1 of 1   Interpreted By:  Doe Chand,    ADDENDUM:   3D reconstructions were performed at the diagnostic workstation and   demonstrate increase in the size and number of distal PICA branches   on the left with slight increased size and number of associated   venous structures along the lateral left cerebellar hemisphere.   Consider a dural venous fistula, parenchymal vascular malformation or   vascular neoplasm.        Signed by: Doe Chand 9/21/2024 10:15 AM        -------- ORIGINAL REPORT --------   Dictation workstation:   [id number]      Final   1. Redemonstrated intraparenchymal and subarachnoid hemorrhage within   the right cerebellar hemisphere.   2. No hemodynamically significant intracranial or extracranial   stenosis, occlusion, or aneurysm.        I personally reviewed the image(s)/study and resident interpretation.   I agree with the findings as stated by resident Ozzie Mattson.   Data analyzed and images interpreted at [facility], [place], [place].        MACRO:   None.        Signed by: Doe Chand 9/21/2024 7:10 AM   Dictation workstation:   [id number]      US extremity nonvascular real time w image documentation limited anatomic specific   Final Result   Very limited exam as color Doppler images were not obtained and the   exam was prematurely terminated as per request of the emergency team.   Within these limitations, the right femoral vein is only partially   compressible with echogenic material along the lumen raising   suspicion for partially occlusive thrombus, age-indeterminate.         The right calf veins and left lower extremity deep venous system were   not evaluated due to early termination of the examination.        I personally reviewed the images/study and I agree with the findings   as stated by Dr. Arnulfo Gresham M.D. This study was interpreted at   [place], [place].        MACRO:   None.        Signed by: Henry Taveras 9/21/2024 4:52 AM   Dictation workstation:   [id number]      CT angio chest for pulmonary embolism   Final Result   1. No evidence of acute pulmonary embolism.   2. Dilated main pulmonary artery measuring 3.6 cm, nonspecific but   can be seen with pulmonary hypertension.   3. Scattered ground-glass opacities with areas of interlobular septal   thickening, suspicious for pulmonary edema. Atypical infectious or   inflammatory process are not excluded.        I personally reviewed the image(s)/study and resident interpretation.   I agree with the findings as stated by resident Ozzie Mattson.   Data analyzed and images interpreted at [place], [place].        MACRO:   None        Signed by: Henry Taveras 9/21/2024 4:59 AM   Dictation workstation:   [id number]      CT head wo IV contrast   Final Result   CT HEAD:   1. Focal areas of hyperdensity within the right cerebellar hemisphere   and of the cerebellar vermis suggestive of intraparenchymal hematoma.   No significant mass effect or midline shift. The ventricles and basal   cisterns are patent. Recommend follow-up CT in 48 hours to ensure   stability.   2. Serpiginous hyperdensity over the sulci of the right cerebellar   hemisphere representing subarachnoid hemorrhage.        CT CERVICAL SPINE:   1. No acute fracture or traumatic malalignment of the cervical spine.   2. Surgical changes and multilevel spondylotic changes of the   cervical spine as detailed above.             I personally reviewed the image(s)/study and  resident interpretation.   I agree with the findings as stated by resident Ozzie Mattson.   Data analyzed and images interpreted at [place], [place].        MACRO:   Ozzie Mattson discussed the significance and urgency of this   critical finding by telephone with  Federico Patel on 9/21/2024 at   4:01 am.  (**-RCF-**) Findings:  See findings.        Signed by: Henry Taveras 9/21/2024 4:31 AM   Dictation workstation:   [id number]      CT cervical spine wo IV contrast   Final Result   CT HEAD:   1. Focal areas of hyperdensity within the right cerebellar hemisphere   and of the cerebellar vermis suggestive of intraparenchymal hematoma.   No significant mass effect or midline shift. The ventricles and basal   cisterns are patent. Recommend follow-up CT in 48 hours to ensure   stability.   2. Serpiginous hyperdensity over the sulci of the right cerebellar   hemisphere representing subarachnoid hemorrhage.        CT CERVICAL SPINE:   1. No acute fracture or traumatic malalignment of the cervical spine.   2. Surgical changes and multilevel spondylotic changes of the   cervical spine as detailed above.             I personally reviewed the image(s)/study and resident interpretation.   I agree with the findings as stated by resident Ozzie Mattson.   Data analyzed and images interpreted at [facility], [place], [place].        MACRO:   Ozzie Mattson discussed the significance and urgency of this   critical finding by telephone with  Federico Patel on 9/21/2024 at   4:01 am.  (**-RCF-**) Findings:  See findings.        Signed by: Henry Taveras 9/21/2024 4:31 AM   Dictation workstation:   [id number]      XR chest 2 views   Final Result   Cardiomegaly with mild pulmonary edema.        I personally reviewed the image(s)/study and resident interpretation.   I agree with the findings as stated by resident Ozzie Mattson.   Data  analyzed and images interpreted at [facility], [place], [place].        MACRO:   None        Signed by: Henry Taveras 9/21/2024 4:22 AM   Dictation workstation:   [id number]      Point of Care Ultrasound    (Results Pending)   Referral to Neurointervention    (Results Pending)   Referral to Neurointervention    (Results Pending)   Vascular US lower extremity venous duplex bilateral    (Results Pending)     Assessment/Plan    NEURO:  #Right vermian cerebellar ICH with right cerebellar hemisphere ICH with 4th ventricular effacement  #Cerebellar AVM  #Vascular parkinson   #Schizophrenia   Assessment:  - Neurologically: see above  - S/p Kcentra  - See above significant events  Plan:  - NSU  - Neuro Checks: Q1H  - No neurosurgical intervention at this time, plan for radio-surgery as outpatient for AVM  - Neurology stroke signed off  - Warfarin/ASA re-start plan per neurosurgery  - Pain: acetaminophen PRN  - Bupropion 75mg BID, quetiapine 300mg nightly  - PT/OT/SLP    CARDIOVASCULAR:  #HTN, HLD  Assessment:  - Hypertensive on arrival to NSU requiring Cardene infusion  - 9/21/24 ECHO: ejection fraction 74%, mild concentric LVH, mildly dilated LA  Plan:  - Continue to monitor on telemetry  - Amlodipine 10mg daily, atorvastatin 40mg daily, carvedilol 25mg BID, isosorbide dinitrate 10mg TID, hydralazine 50mg Q8H, terazosin 2mg daily, bumetanide 2mg Q12H  - Continue to hold lisinopril due to worsening kidney function   - BP goal: -150 mmHg (presenting SBP was 150-220 mmHg)    --> PRN: Labetalol and Hydralazine    RESPIRATORY:  #COPD  #Acute respiratory insufficiency secondary to procedural sedation causing prolonged intubation  Assessment:  - Intubated  Plan:  - Continuous pulse oximetry   - O2 PRN to maintain SpO2 > 94%, wean as tolerated  - Ventilator bundle while intubated  - WTE as able   - Hypertonic nebulization and IPV started     RENAL/:  #NICOLÁS on CKD IV (baseline creatinine  ~2)  #Hyperchloremic metabolic acidosis   Assessment:  Results from last 72 hours   Lab Units 24  0043 24  0129   BUN mg/dL 50* 51*   CREATININE mg/dL 3.32* 2.94*   Plan:  - Monitor with daily RFP   - Maintain indwelling catheter for strict intake/output  - Renal consulted:    --> Bumex 2mg Q12H    FEN/GI:  #No active issues  Assessment:  - Last BM Date: 24  Plan:  - Monitor and replace electrolytes per protocol  - Diet: tube feeding  - Bowel regimen: Miralax, Roro-Colace    ENDOCRINE:  #Hyperglycemia  Assessment:  Results from last 7 days   Lab Units 24  0417 24  0043 24  2314 24  1957 24  1620 24  1149 24  0755 24  0331 24  0129   POCT GLUCOSE mg/dL 145*  --  123* 113* 132* 131* 122*   < >  --    GLUCOSE mg/dL  --  132*  --   --   --   --   --   --  129*    < > = values in this interval not displayed.   Plan:  - Accuchecks and ISS Q4H    HEMATOLOGY:  #Anemia (stable)  #Thrombocytopenia, resolved  #Increased INR, resolved  Assessment:  Results from last 7 days   Lab Units 24  0043 24  0129   HEMOGLOBIN g/dL 9.4* 9.2*   HEMATOCRIT % 26.1* 27.8*   PLATELETS AUTO x10*3/uL 161 150   - Patient with history of recurrent DVTs  -  DVT ultrasound: Chronic changes, negative for DVTs  - : S/p vitamin K, INR 2.3-2.4  - : Transfused 2units FFP prior to angiogram  Plan:  - Continue to monitor with daily CBC and Coag panel  - Consulted vascular medicine for IVC filter indication due to increased risk/history of DVT/PE   - No indication for IVC filter placement   - Continue subcutaneous heparin and SCDs application  - Bilateral lower extremity DVT ultrasound    INFECTIOUS DISEASE:  #MRSA, Pseudomonas PNA  Assessment:  Results from last 7 days   Lab Units 24  0043 24  0129   WBC AUTO x10*3/uL 7.0 8.2   - Temp (24hrs), Av.1 °C (96.9 °F), Min:35.6 °C (96.1 °F), Max:36.4 °C (97.5 °F)    Results from last 7 days   Lab Units  09/26/24  1351   GRAM STAIN  Gram stain indicates specimen consists of lower respiratory tract secretions.  No predominant organism   RESPIRATORY CULTURE/SMEAR  (4+) Abundant Methicillin Resistant Staphylococcus aureus (MRSA)*  (4+) Abundant Pseudomonas aeruginosa*   - (+) MRSA swab  - Antibiotics:    --> Vancomycin (9/27-)    --> Cefepime (9/27-29)    --> Ceftazidime (9/29-)  Plan:  - Continue to monitor for s/sx of infection  - Pan culture for temperature > 38.4 C  - Continue Vancomycin and Ceftazidime 2g daily    MUSCULOSKELETAL:  - No acute issues    SKIN:  - No acute issues  - Turns and skin care per NSU protocol    ACCESS:  - PIVs  - Right radial arterial line    PROPHYLAXIS:  - DVT Ppx: SCDs, SQH  - GI Ppx: PPI    RESTRAINTS:  I agree with nursing assessment of the patient’s need for restraints to protect the patient from injury and facilitate healing. The patient is unable to cooperate with the plan of care and at risk for disrupting critical therapy (i.e., removing medical devices, lines, tubes and/or dressings). Please see order for specifics. Restraints can be removed when the patient is able to cooperate with plan of care and allow healing to occur, or the medical devices at risk are discontinued by the medical team.     YUNIOR Ramos-CNP  Neuroscience Intensive Care    Total critical care time of 60 minutes, with > 50% of time spent in direct contact with patient/family for education, counseling and coordination of care.

## 2024-09-30 NOTE — PROGRESS NOTES
Kelly Martinez is a 79 y.o. female on day 9 of admission presenting with Cerebellar hemorrhage (Multi).    78 yo woman with small cerebellar ICH.      Neuro - Drowsy, moves all llmbs. NIHSS score 3. Plan for gamma knife as outpatient       Cardiac - -150 mm Hg, Continue home antihypertensive meds     Pulm - Acute resp insufficiency, wean as tolerated.     Renal - stable     Heme - Hgb 9.4 g/dL     ID - Afebrile     Vasc- SCDs, subcutaneous heparin          I spent 30 minutes in the professional and critical care of this patient.      Chilango Scott MD

## 2024-09-30 NOTE — PROGRESS NOTES
Occupational Therapy    Occupational Therapy Treatment    Name: Kelly Martinez  MRN: [id number]  : 1945  Date: 24  Room: -A      Time Calculation  Start Time: 1600  Stop Time: 1638  Time Calculation (min): 38 min    Assessment:  Evaluation/Treatment Tolerance: Patient limited by fatigue  Medical Staff Made Aware: Yes  End of Session Communication: Bedside nurse  End of Session Patient Position: Bed, 3 rail up, Alarm off, not on at start of session  Plan:  Treatment Interventions: ADL retraining, Visual perceptual retraining, Functional transfer training, UE strengthening/ROM, Endurance training, Cognitive reorientation, Patient/family training, Equipment evaluation/education, Neuromuscular reeducation, Compensatory technique education  OT Frequency: 3 times per week  OT Discharge Recommendations: Moderate intensity level of continued care  OT - OK to Discharge: Yes    Subjective   General:  OT Last Visit  OT Received On: 24  Reason for Referral: Pt presented s/p fall and confusion. Found to have cerebellar ICH with surrounding SAH. CTH demonstrated right vermian cerebellar ICH with right cerebellar hemisphere ICH and/or SAH with 4th ventricular effacement. S/p Kcentra. CTA head/neck without aneurysm or vascular malformation. Admitted to NSU for neurological monitoring and hypertension management. : GCS 14, NIHSS 2. rCTH stable. 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   - : INR 2.4 s/p 30mg vitamin K  - : S/p 2units FFP, 40mg Lasix, diagnostic angiogram primary feeders left PICA, R>L SCA feeders, unable to extubate post-procedure, atelectasis on chest x-ray, diuresed with Lasix  - : Sputum culture + pseudomonas/staphylococcus aureus, nasal swab + MRSA   Extubated   Past Medical History Relevant to Rehab: PE/recurrent DVT s/p IVCF, HFpEF, hypertension, hyperlipidemia, JOY, schizophrenia, COPD, and CKD  IV  Prior to Session Communication: Bedside nurse  Patient Position Received: Bed, 3 rail up, Alarm off, not on at start of session  Family/Caregiver Present: No  General Comment: Pt pleasant and agreeable to therapy. (Pt on 4L O2 via NC, luoie, A-line, PIV.)   Precautions:  Hearing/Visual Limitations: hearing appears grossly WFL, vision WFL  Medical Precautions: Fall precautions, Oxygen therapy device and L/min  Precautions Comment: -150  Vitals:    Vital Signs     Vitals Session Pre OT During OT Post OT   Heart Rate 68 60s HR 66   Resp  19  17   SpO2 95  96   /54 -140s 139/55   MAP 79  80   ICP           Lines/Tubes/Drains:  Arterial Line 09/21/24 Right Radial (Active)   Number of days: 9       Urethral Catheter (Active)   Number of days: 6       NG/OG/Feeding Tube  Left nostril (Active)   Number of days: 3       Cognition:  Overall Cognitive Status: Impaired  Arousal/Alertness: Delayed responses to stimuli  Orientation Level: Disoriented to situation, Disoriented to time  Following Commands:  (Followed ~50-75% commands with repetition and increased time)  Cognition Comments: pt dysarthric with decreased attention/arousal, cues for sequencingand initiation  Impulsive: Within functional limits  Processing Speed: Delayed    Pain Assessment:        Objective   Activities of Daily Living:        Grooming  Grooming Level of Assistance: Minimum assistance  Grooming Where Assessed: Edge of bed  Grooming Comments: washing face and brushing teeth with yankauer toothbrush while seated EOB, cues needed for thoroughness and sequencing.                   LE Dressing  LE Dressing: Yes  Sock Level of Assistance: Dependent  LE Dressing Comments: donning/doffing socks         Functional Standing Tolerance:     Bed Mobility/Transfers:   Bed Mobility  Bed Mobility: Yes  Bed Mobility 1  Bed Mobility 1: Supine to sitting, Sitting to supine  Level of Assistance 1: Maximum assistance (x 2 assist)  Bed Mobility  Comments 1: HOB elevated, drawsheet utilized  Bed Mobility 2  Bed Mobility  2: Rolling left  Level of Assistance 2: Maximum assistance  Bed Mobility Comments 2: drawsheet                           Balance:  Dynamic Sitting Balance  Dynamic Sitting-Level of Assistance: Moderate assistance  Static Sitting Balance  Static Sitting-Level of Assistance: Minimum assistance, Moderate assistance  Communication:     Splinting:     Therapy/Activity:      Therapeutic Activity  Therapeutic Activity Performed: Yes  Therapeutic Activity 1: Pt sat EOB x 15 min with Min A primarily, with brief periods of CGA with cues for maintaining midline positioning. Pt with R lateral lean, but able to initially correct with cueing. As session progressed, pt's R lateral lean progressed to Mod A with fatigue.     Sensory:     Cognitive Skill Development:     Vision:     Strength:     Other Activity:         Outcome Measures:  [facility] Daily Activity  Putting on and taking off regular lower body clothing: Total  Bathing (including washing, rinsing, drying): A lot  Putting on and taking off regular upper body clothing: A lot  Toileting, which includes using toilet, bedpan or urinal: Total  Taking care of personal grooming such as brushing teeth: A lot  Eating Meals: Total  Daily Activity - Total Score: 9  Confusion Assessment Method-ICU (CAM-ICU)  Feature 1: Acute Onset or Fluctuating Course: Positive  Feature 2: Inattention: Positive  Feature 4: Disorganized Thinking: Positive  Overall CAM-ICU: Positive  ICU Mobility Screen  Early Mobility/Exercise Safety Screen: Proceed with mobilization - No exclusion criteria met  ICU Mobility Scale: Sitting over edge of bed     Education Documentation  Body Mechanics, taught by Miesha Ann, OT at 9/30/2024  5:04 PM.  Learner: Patient  Readiness: Acceptance  Method: Explanation, Demonstration  Response: Needs Reinforcement, Verbalizes Understanding    Precautions, taught by Miesha Ann OT at 9/30/2024   5:04 PM.  Learner: Patient  Readiness: Acceptance  Method: Explanation, Demonstration  Response: Needs Reinforcement, Verbalizes Understanding    ADL Training, taught by Miesha Ann OT at 9/30/2024  5:04 PM.  Learner: Patient  Readiness: Acceptance  Method: Explanation, Demonstration  Response: Needs Reinforcement, Verbalizes Understanding    Education Comments  No comments found.        Goals:  Encounter Problems       Encounter Problems (Active)       ADLs       Patient with complete upper body dressing with stand by assist level of assistance donning and doffing all UE clothes with PRN adaptive equipment. (Progressing)       Start:  09/22/24    Expected End:  10/06/24            Patient with complete lower body dressing with minimal assist  level of assistance donning and doffing all LE clothes  with PRN adaptive equipment. (Progressing)       Start:  09/22/24    Expected End:  10/06/24            Patient will feed self with independent level of assistance using PRN adaptive equipment. (Progressing)       Start:  09/22/24    Expected End:  10/06/24            Patient will complete daily grooming tasks with supervision level of assistance and PRN adaptive equipment. (Progressing)       Start:  09/22/24    Expected End:  10/06/24            Patient will complete toileting including hygiene clothing management/hygiene with minimal assist  level of assistance. (Progressing)       Start:  09/22/24    Expected End:  10/06/24               BALANCE       Pt will maintain dynamic sitting balance during ADL task with stand by assist level of assistance in order to demonstrate decreased risk of falling and improved postural control. (Progressing)       Start:  09/22/24    Expected End:  10/06/24               COGNITION/SAFETY       Patient will follow 100% Simple commands to allow improved ADL performance. (Progressing)       Start:  09/22/24    Expected End:  10/06/24            Patient will demonstrated  orientation x 3 and CAM (-). (Progressing)       Start:  09/22/24    Expected End:  10/06/24       ORIENTATION            EXERCISE/STRENGTHENING       Patient will complete BUE exercises in order to improve strength and activity tolerance for ADL performance.  (Progressing)       Start:  09/22/24    Expected End:  10/06/24               MOBILITY       Patient will perform Functional mobility Household distances/Community Distances with contact guard assist level of assistance and least restrictive device in order to improve safety and functional mobility. (Progressing)       Start:  09/22/24    Expected End:  10/06/24               TRANSFERS       Patient will perform bed mobility stand by assist level of assistance in order to improve safety and independence with mobility (Progressing)       Start:  09/22/24    Expected End:  10/06/24            Patient will complete functional transfers with least restrictive device with contact guard assist level of assistance. (Progressing)       Start:  09/22/24    Expected End:  10/06/24               VISION       Patient will visually attend to Right side of Tray, Room, Paper, and Body using compensatory strategies and  supervision level of assistance.  (Progressing)       Start:  09/22/24    Expected End:  10/06/24                     09/30/24 at 5:05 PM   Miesha Ann, OT   [phone]

## 2024-09-30 NOTE — PROGRESS NOTES
"Kelly Martinez is a 79 y.o. female on day 9 of admission presenting with Cerebellar hemorrhage (Multi).    Subjective   No acute events overnight    Objective     Physical Exam  Intubated  Awake, EOV  Follows commands x4    Last Recorded Vitals  Blood pressure 112/65, pulse 62, temperature 36.4 °C (97.5 °F), resp. rate 16, height 1.702 m (5' 7.01\"), weight 116 kg (255 lb 15.3 oz), SpO2 94%.  Intake/Output last 3 Shifts:  I/O last 3 completed shifts:  In: 1760 (15.2 mL/kg) [NG/GT:1010; IV Piggyback:750]  Out: 4010 (34.5 mL/kg) [Urine:4010 (1 mL/kg/hr)]  Weight: 116.1 kg     Relevant Results            This patient currently has cardiac telemetry ordered; if you would like to modify or discontinue the telemetry order, click here to go to the orders activity to modify/discontinue the order.      Results for orders placed or performed during the hospital encounter of 09/21/24 (from the past 24 hour(s))   POCT GLUCOSE   Result Value Ref Range    POCT Glucose 122 (H) 74 - 99 mg/dL   Vancomycin, Trough   Result Value Ref Range    Vancomycin, Trough 23.6 (HH) 5.0 - 20.0 ug/mL   POCT GLUCOSE   Result Value Ref Range    POCT Glucose 131 (H) 74 - 99 mg/dL   POCT GLUCOSE   Result Value Ref Range    POCT Glucose 132 (H) 74 - 99 mg/dL   POCT GLUCOSE   Result Value Ref Range    POCT Glucose 113 (H) 74 - 99 mg/dL   Vancomycin   Result Value Ref Range    Vancomycin 23.2 (H) 5.0 - 20.0 ug/mL   POCT GLUCOSE   Result Value Ref Range    POCT Glucose 123 (H) 74 - 99 mg/dL   Heparin Assay   Result Value Ref Range    Heparin Unfractionated <0.1 See Comment Below for Therapeutic Ranges IU/mL   Calcium, Ionized   Result Value Ref Range    POCT Calcium, Ionized 1.27 1.1 - 1.33 mmol/L   CBC and Auto Differential   Result Value Ref Range    WBC 7.0 4.4 - 11.3 x10*3/uL    nRBC 0.0 0.0 - 0.0 /100 WBCs    RBC 3.08 (L) 4.00 - 5.20 x10*6/uL    Hemoglobin 9.4 (L) 12.0 - 16.0 g/dL    Hematocrit 26.1 (L) 36.0 - 46.0 %    MCV 85 80 - 100 fL    MCH 30.5 " 26.0 - 34.0 pg    MCHC 36.0 32.0 - 36.0 g/dL    RDW 14.9 (H) 11.5 - 14.5 %    Platelets 161 150 - 450 x10*3/uL    Neutrophils % 72.9 40.0 - 80.0 %    Immature Granulocytes %, Automated 3.3 (H) 0.0 - 0.9 %    Lymphocytes % 7.6 13.0 - 44.0 %    Monocytes % 9.9 2.0 - 10.0 %    Eosinophils % 6.0 0.0 - 6.0 %    Basophils % 0.3 0.0 - 2.0 %    Neutrophils Absolute 5.09 1.60 - 5.50 x10*3/uL    Immature Granulocytes Absolute, Automated 0.23 0.00 - 0.50 x10*3/uL    Lymphocytes Absolute 0.53 (L) 0.80 - 3.00 x10*3/uL    Monocytes Absolute 0.69 0.05 - 0.80 x10*3/uL    Eosinophils Absolute 0.42 (H) 0.00 - 0.40 x10*3/uL    Basophils Absolute 0.02 0.00 - 0.10 x10*3/uL   Renal Function Panel   Result Value Ref Range    Glucose 132 (H) 74 - 99 mg/dL    Sodium 142 136 - 145 mmol/L    Potassium 3.6 3.5 - 5.3 mmol/L    Chloride 112 (H) 98 - 107 mmol/L    Bicarbonate 22 21 - 32 mmol/L    Anion Gap 12 10 - 20 mmol/L    Urea Nitrogen 50 (H) 6 - 23 mg/dL    Creatinine 3.32 (H) 0.50 - 1.05 mg/dL    eGFR 14 (L) >60 mL/min/1.73m*2    Calcium 8.7 8.6 - 10.6 mg/dL    Phosphorus 2.7 2.5 - 4.9 mg/dL    Albumin 2.8 (L) 3.4 - 5.0 g/dL   Magnesium   Result Value Ref Range    Magnesium 2.02 1.60 - 2.40 mg/dL   POCT GLUCOSE   Result Value Ref Range    POCT Glucose 145 (H) 74 - 99 mg/dL                Assessment/Plan   Assessment & Plan  Cerebellar hemorrhage (Multi)    AVM (arteriovenous malformation) ([facility])    79 years old female with h/o parkinson's, HTN, HLD, HFpEF, 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 (s/p IVC filter removed 4/2016), JOY (CPAP at night), COPD, CKD stage 4, chronic tobacco use, schizophrenia, depression, p/w AMS, hypertensive 's, recent fall 2days ago, CTH R cerebellar and cerebellar vermis hemorrhage with 4th ventricular effacement, stable vents, CT C spine C5-7 ACDF, CTA c/f L PICA dAVM, s/p kcentra, TTE 74% EF, no wma, rCTH stable ICH, c/f L PICA dAVM, 9/22 MRI/V motion degraded, no obvious underlying mass or vascular  lesion  9/23 BLE DVT US chronic changes, 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

## 2024-09-30 NOTE — SIGNIFICANT EVENT
09/30/24 0857   Invasive Vent Information   Vent Mode PS   Settings   PEEP/CPAP (cm H2O) 0 cm H20   Pressure Support (cm H2O) 0 cm H20   Readings   Tidal Volume Observed (mL) 435 mL   Daily Screen   Total RSBI (S)  47   Weaning Parameters   Weaning Start Time 0821   Weaning Vital Capacity (S)  685 mL   Negative Inspiratory Force (NIF) (S)  -33   Respiratory Depth/Rhythm Regular   Respiratory Effort Unlabored   Weaning Tolerance Good   Extubation   Leak Test Positive

## 2024-09-30 NOTE — CARE PLAN
Problem: Fall/Injury  Goal: Not fall by end of shift  Outcome: Progressing  Goal: Be free from injury by end of the shift  Outcome: Progressing     Problem: Pain - Adult  Goal: Verbalizes/displays adequate comfort level or baseline comfort level  Outcome: Progressing     Problem: Pain  Goal: Turns in bed with improved pain control throughout the shift  Outcome: Progressing     Problem: General Stroke  Goal: Maintain BP within ordered limits throughout shift  Outcome: Progressing  Goal: Participate in treatment (ie., meds, therapy) throughout shift  Outcome: Progressing  Goal: No symptoms of aspiration throughout shift  Outcome: Progressing  Goal: No symptoms of hemorrhage throughout shift  Outcome: Progressing  Goal: Controlled blood glucose throughout shift  Outcome: Progressing     Problem: ICU Stroke  Goal: Tolerate ventilator weaning trial during shift  Outcome: Progressing  Goal: Maintain patent airway throughout shift  Outcome: Progressing  Goal: Achieve/maintain targeted sodium level throughout shift  Outcome: Progressing     Problem: Skin  Goal: Decreased wound size/increased tissue granulation at next dressing change  Outcome: Progressing  Flowsheets (Taken 9/23/2024 0747 by Leola Dias, RN)  Decreased wound size/increased tissue granulation at next dressing change: Utilize specialty bed per algorithm  Goal: Participates in plan/prevention/treatment measures  Outcome: Progressing  Flowsheets (Taken 9/30/2024 1042)  Participates in plan/prevention/treatment measures:   Elevate heels   Discuss with provider PT/OT consult  Goal: Prevent/manage excess moisture  Outcome: Progressing  Flowsheets (Taken 9/29/2024 1348 by Rekha Bernard RN)  Prevent/manage excess moisture: Cleanse incontinence/protect with barrier cream  Goal: Prevent/minimize sheer/friction injuries  Outcome: Progressing  Flowsheets (Taken 9/22/2024 0632 by Karen Esteban, RN)  Prevent/minimize sheer/friction injuries:   Complete micro-shifts  as needed if patient unable. Adjust patient position to relieve pressure points, not a full turn   Use pull sheet   HOB 30 degrees or less   Turn/reposition every 2 hours/use positioning/transfer devices  Goal: Promote/optimize nutrition  Outcome: Progressing  Flowsheets (Taken 9/30/2024 1042)  Promote/optimize nutrition: Monitor/record intake including meals  Goal: Promote skin healing  Outcome: Progressing  Flowsheets (Taken 9/22/2024 0632 by Karen Esteban RN)  Promote skin healing:   Assess skin/pad under line(s)/device(s)   Protective dressings over bony prominences   Turn/reposition every 2 hours/use positioning/transfer devices   Ensure correct size (line/device) and apply per  instructions   Rotate device position/do not position patient on device     Problem: Safety - Medical Restraint  Goal: Remains free of injury from restraints (Restraint for Interference with Medical Device)  Outcome: Progressing  Goal: Free from restraint(s) (Restraint for Interference with Medical Device)  Outcome: Progressing     Problem: Knowledge Deficit  Goal: Patient/family/caregiver demonstrates understanding of disease process, treatment plan, medications, and discharge instructions  Outcome: Progressing     Problem: Mechanical Ventilation  Goal: ET tube will be managed safely  Outcome: Progressing  Goal: Mobility/activity is maintained at optimum level for patient  Outcome: Progressing

## 2024-09-30 NOTE — PROGRESS NOTES
Kelly Martinez   79 y.o.     MRN/Room: [id number]/06/06-A    Subjective:  Intubated     Objective:     Meds:   amLODIPine, 10 mg, Daily  atorvastatin, 40 mg, Daily  bumetanide, 2 mg, q12h  buPROPion, 75 mg, BID  [Held by provider] buPROPion XL, 150 mg, Daily  carvedilol, 25 mg, BID  cefTAZidime, 2 g, Daily  [Held by provider] DULoxetine, 30 mg, Daily  folic acid, 1 mg, Daily  heparin (porcine), 5,000 Units, q8h  hydrALAZINE, 50 mg, q8h JAJA  insulin lispro, 0-5 Units, q4h  isosorbide dinitrate, 10 mg, TID  [Held by provider] lisinopril, 40 mg, Daily  [Held by provider] mometasone, 2 puff, BID  oxygen, , Continuous - Inhalation  pantoprazole, 40 mg, Daily  perflutren protein A microsphere, 0.5 mL, Once in imaging  polyethylene glycol, 17 g, BID  QUEtiapine, 300 mg, Nightly  sennosides-docusate sodium, 1 tablet, BID  sodium chloride, 3 mL, q6h JAJA  sulfur hexafluoride microsphr, 2 mL, Once in imaging  terazosin, 2 mg, Daily           acetaminophen, 650 mg, q6h PRN  albuterol, 2.5 mg, q4h PRN  dextrose, 12.5 g, q15 min PRN  dextrose, 25 g, q15 min PRN  glucagon, 1 mg, q15 min PRN  glucagon, 1 mg, q15 min PRN  hydrALAZINE, 10 mg, q20 min PRN  labetaloL, 10 mg, q10 min PRN  oxygen, , Continuous PRN - O2/gases  traZODone, 150 mg, Nightly PRN  vancomycin, , Daily PRN        Vitals:    09/30/24 1200   BP: 110/61   Pulse: 67   Resp: 15   Temp:    SpO2: 95%          Intake/Output Summary (Last 24 hours) at 9/30/2024 1245  Last data filed at 9/30/2024 1100  Gross per 24 hour   Intake 1210 ml   Output 3875 ml   Net -2665 ml       General appearance: intubated  Eyes: non-icteric  Skin: no apparent rash  Heart: S1 S2 regular  Lungs: Diminished air entry bilaterally   Abdomen: soft, nt/nd  Extremities: Trace edema bilat  Cavazos  Neuro: unable to assess      Blood Labs:  Results for orders placed or performed during the hospital encounter of 09/21/24 (from the past 24 hour(s))   POCT GLUCOSE   Result Value Ref Range    POCT Glucose 132  (H) 74 - 99 mg/dL   POCT GLUCOSE   Result Value Ref Range    POCT Glucose 113 (H) 74 - 99 mg/dL   Vancomycin   Result Value Ref Range    Vancomycin 23.2 (H) 5.0 - 20.0 ug/mL   POCT GLUCOSE   Result Value Ref Range    POCT Glucose 123 (H) 74 - 99 mg/dL   Heparin Assay   Result Value Ref Range    Heparin Unfractionated <0.1 See Comment Below for Therapeutic Ranges IU/mL   Calcium, Ionized   Result Value Ref Range    POCT Calcium, Ionized 1.27 1.1 - 1.33 mmol/L   CBC and Auto Differential   Result Value Ref Range    WBC 7.0 4.4 - 11.3 x10*3/uL    nRBC 0.0 0.0 - 0.0 /100 WBCs    RBC 3.08 (L) 4.00 - 5.20 x10*6/uL    Hemoglobin 9.4 (L) 12.0 - 16.0 g/dL    Hematocrit 26.1 (L) 36.0 - 46.0 %    MCV 85 80 - 100 fL    MCH 30.5 26.0 - 34.0 pg    MCHC 36.0 32.0 - 36.0 g/dL    RDW 14.9 (H) 11.5 - 14.5 %    Platelets 161 150 - 450 x10*3/uL    Neutrophils % 72.9 40.0 - 80.0 %    Immature Granulocytes %, Automated 3.3 (H) 0.0 - 0.9 %    Lymphocytes % 7.6 13.0 - 44.0 %    Monocytes % 9.9 2.0 - 10.0 %    Eosinophils % 6.0 0.0 - 6.0 %    Basophils % 0.3 0.0 - 2.0 %    Neutrophils Absolute 5.09 1.60 - 5.50 x10*3/uL    Immature Granulocytes Absolute, Automated 0.23 0.00 - 0.50 x10*3/uL    Lymphocytes Absolute 0.53 (L) 0.80 - 3.00 x10*3/uL    Monocytes Absolute 0.69 0.05 - 0.80 x10*3/uL    Eosinophils Absolute 0.42 (H) 0.00 - 0.40 x10*3/uL    Basophils Absolute 0.02 0.00 - 0.10 x10*3/uL   Renal Function Panel   Result Value Ref Range    Glucose 132 (H) 74 - 99 mg/dL    Sodium 142 136 - 145 mmol/L    Potassium 3.6 3.5 - 5.3 mmol/L    Chloride 112 (H) 98 - 107 mmol/L    Bicarbonate 22 21 - 32 mmol/L    Anion Gap 12 10 - 20 mmol/L    Urea Nitrogen 50 (H) 6 - 23 mg/dL    Creatinine 3.32 (H) 0.50 - 1.05 mg/dL    eGFR 14 (L) >60 mL/min/1.73m*2    Calcium 8.7 8.6 - 10.6 mg/dL    Phosphorus 2.7 2.5 - 4.9 mg/dL    Albumin 2.8 (L) 3.4 - 5.0 g/dL   Magnesium   Result Value Ref Range    Magnesium 2.02 1.60 - 2.40 mg/dL   POCT GLUCOSE   Result Value  Ref Range    POCT Glucose 145 (H) 74 - 99 mg/dL   POCT GLUCOSE   Result Value Ref Range    POCT Glucose 131 (H) 74 - 99 mg/dL   POCT GLUCOSE   Result Value Ref Range    POCT Glucose 114 (H) 74 - 99 mg/dL      ASSESSMENT:  Kelly Martinez is a  79 y.o. F with PMH CKD IV (baseline Cr 2-2.3), history of PE, HFpEF, HTN, HLD, JOY, schizophrenia, COPD who is currently admitted to the NSU after presenting with cerebellar hemorrhage on 09/21 after a fall. Nephrology consulted previously for recommendations re: contrast induced nephropathy prophylaxis. Now re-engaged for diuretic recommendations.     #NICOLÁS on CKD 4  -Serum Cr increased today to 3.32  -Likely due to contrast injury, hemodynamic mediated ATN   -urine output 4L with Bumex yesterday  -Oxygen requirements decreasing    RECOMMENDATIONS:  -Decrease Bumex 1mg IV bid   -Strict I and O charting  -Avoid further contrast  -Dose medications for renal function    Irina Berger MD  Nephrology Fellow   Nephrology pager [phone]

## 2024-09-30 NOTE — PROGRESS NOTES
Communication Note    Patient Name: Kelly Martinez  MRN: [id number]  Today's Date: 9/30/2024   Room: 06/06A    Discipline: Physical Therapy      Missed Visit: Yes  Missed Visit Reason:  (Pt extubated ~20 min ago per RN. Plan to hold PT at this time.)      09/30/24 at 2:20 PM   Melvi Galan, PT   Rehab Office: [phone]

## 2024-09-30 NOTE — PROGRESS NOTES
Kelly Martinez   79 y.o.     MRN/Room: [id number]/06/06-A    Subjective:  Says breathing feels hard and she feels swollen. Denies chest pain specifically but does feel like her whole body hurts    Objective:     Meds:   amLODIPine, 10 mg, Daily  atorvastatin, 40 mg, Daily  bumetanide, 1 mg, q12h  buPROPion, 75 mg, BID  [Held by provider] buPROPion XL, 150 mg, Daily  carvedilol, 25 mg, BID  cefTAZidime, 2 g, Daily  [Held by provider] DULoxetine, 30 mg, Daily  folic acid, 1 mg, Daily  heparin (porcine), 5,000 Units, q8h  hydrALAZINE, 50 mg, q8h JAJA  insulin lispro, 0-5 Units, q4h  isosorbide dinitrate, 10 mg, TID  [Held by provider] lisinopril, 40 mg, Daily  [Held by provider] mometasone, 2 puff, BID  oxygen, , Continuous - Inhalation  perflutren protein A microsphere, 0.5 mL, Once in imaging  polyethylene glycol, 17 g, BID  QUEtiapine, 300 mg, Nightly  sennosides-docusate sodium, 1 tablet, BID  sodium chloride, 3 mL, TID  sulfur hexafluoride microsphr, 2 mL, Once in imaging  terazosin, 2 mg, Daily  thiamine, 100 mg, Daily           acetaminophen, 650 mg, q6h PRN  albuterol, 2.5 mg, q4h PRN  dextrose, 12.5 g, q15 min PRN  dextrose, 25 g, q15 min PRN  glucagon, 1 mg, q15 min PRN  glucagon, 1 mg, q15 min PRN  oxygen, , Continuous PRN - O2/gases  traZODone, 150 mg, Nightly PRN  vancomycin, , Daily PRN      Temp:  [35.4 °C (95.7 °F)-36.6 °C (97.9 °F)] 35.7 °C (96.3 °F)  Heart Rate:  [58-72] 70  Resp:  [13-21] 17  BP: ()/(57-70) 119/66  Arterial Line BP 1: (128-152)/(52-77) 146/57         Intake/Output Summary (Last 24 hours) at 10/1/2024 1350  Last data filed at 10/1/2024 1200  Gross per 24 hour   Intake 1525 ml   Output 4205 ml   Net -2680 ml     Physical Exam:  General appearance: eyes still closed but not extubated to NC and responding appropriately  Eyes: closed  Skin: no apparent rash, warm, dry  Heart: S1 S2 regular  Lungs: Diminished air entry bilaterally, minimal crackles  Abdomen: soft, nt/nd  Extremities: 1+  edema of upper right extremity, trace bilat lower extremity edema  Cavazos draining clear light yellow urine  Neuro: eyes closed, able to respond to questions but sometimes required question repeated, full exam not completed    Blood Labs:  Results for orders placed or performed during the hospital encounter of 09/21/24 (from the past 24 hour(s))   POCT GLUCOSE   Result Value Ref Range    POCT Glucose 99 74 - 99 mg/dL   POCT GLUCOSE   Result Value Ref Range    POCT Glucose 137 (H) 74 - 99 mg/dL   POCT GLUCOSE   Result Value Ref Range    POCT Glucose 127 (H) 74 - 99 mg/dL   Calcium, Ionized   Result Value Ref Range    POCT Calcium, Ionized 1.21 1.1 - 1.33 mmol/L   CBC and Auto Differential   Result Value Ref Range    WBC 8.5 4.4 - 11.3 x10*3/uL    nRBC 0.0 0.0 - 0.0 /100 WBCs    RBC 3.15 (L) 4.00 - 5.20 x10*6/uL    Hemoglobin 9.3 (L) 12.0 - 16.0 g/dL    Hematocrit 28.2 (L) 36.0 - 46.0 %    MCV 90 80 - 100 fL    MCH 29.5 26.0 - 34.0 pg    MCHC 33.0 32.0 - 36.0 g/dL    RDW 15.9 (H) 11.5 - 14.5 %    Platelets 150 150 - 450 x10*3/uL    Neutrophils % 75.3 40.0 - 80.0 %    Immature Granulocytes %, Automated 3.3 (H) 0.0 - 0.9 %    Lymphocytes % 8.3 13.0 - 44.0 %    Monocytes % 8.1 2.0 - 10.0 %    Eosinophils % 4.6 0.0 - 6.0 %    Basophils % 0.4 0.0 - 2.0 %    Neutrophils Absolute 6.38 (H) 1.60 - 5.50 x10*3/uL    Immature Granulocytes Absolute, Automated 0.28 0.00 - 0.50 x10*3/uL    Lymphocytes Absolute 0.70 (L) 0.80 - 3.00 x10*3/uL    Monocytes Absolute 0.69 0.05 - 0.80 x10*3/uL    Eosinophils Absolute 0.39 0.00 - 0.40 x10*3/uL    Basophils Absolute 0.03 0.00 - 0.10 x10*3/uL   Renal Function Panel   Result Value Ref Range    Glucose 111 (H) 74 - 99 mg/dL    Sodium 144 136 - 145 mmol/L    Potassium 3.8 3.5 - 5.3 mmol/L    Chloride 112 (H) 98 - 107 mmol/L    Bicarbonate 22 21 - 32 mmol/L    Anion Gap 14 10 - 20 mmol/L    Urea Nitrogen 50 (H) 6 - 23 mg/dL    Creatinine 3.40 (H) 0.50 - 1.05 mg/dL    eGFR 13 (L) >60 mL/min/1.73m*2     Calcium 8.5 (L) 8.6 - 10.6 mg/dL    Phosphorus 2.5 2.5 - 4.9 mg/dL    Albumin 2.6 (L) 3.4 - 5.0 g/dL   Magnesium   Result Value Ref Range    Magnesium 1.77 1.60 - 2.40 mg/dL   Vancomycin   Result Value Ref Range    Vancomycin 15.8 5.0 - 20.0 ug/mL   POCT GLUCOSE   Result Value Ref Range    POCT Glucose 130 (H) 74 - 99 mg/dL   Heparin Assay   Result Value Ref Range    Heparin Unfractionated 0.1 See Comment Below for Therapeutic Ranges IU/mL   POCT GLUCOSE   Result Value Ref Range    POCT Glucose 118 (H) 74 - 99 mg/dL   POCT GLUCOSE   Result Value Ref Range    POCT Glucose 134 (H) 74 - 99 mg/dL      ASSESSMENT:  Kelly Martinez is a  79 y.o. F with PMH CKD IV (baseline Cr 2-2.3), history of PE, HFpEF, HTN, HLD, JOY, schizophrenia, COPD who is currently admitted to the [facility] after presenting with cerebellar hemorrhage on 09/21 after a fall. Nephrology consulted previously for recommendations re: contrast induced nephropathy prophylaxis. Now re-engaged for diuretic recommendations.     #NICOLÁS on CKD4  -Serum Cr re-worsened to 3.4 from 2.9, previously 3.3   -Likely due to contrast injury, hemodynamic mediated ATN   -urine output 4L with Bumex yesterday  -Oxygen requirements decreasing, extubated    RECOMMENDATIONS:  -Hold Bumex in setting of worsening kidney function and improve respiratory status  -Strict I and O charting  -Avoid further contrast  -Dose medications for renal function    Heavenly Beaulieu MD  Nephrology Fellow   Nephrology pager [phone]

## 2024-09-30 NOTE — CARE PLAN
Problem: Fall/Injury  Goal: Not fall by end of shift  Outcome: Progressing  Goal: Be free from injury by end of the shift  Outcome: Progressing  Goal: Verbalize understanding of personal risk factors for fall in the hospital  Outcome: Progressing  Goal: Verbalize understanding of risk factor reduction measures to prevent injury from fall in the home  Outcome: Progressing  Goal: Use assistive devices by end of the shift  Outcome: Progressing     Problem: Pain - Adult  Goal: Verbalizes/displays adequate comfort level or baseline comfort level  Outcome: Progressing     Problem: Safety - Adult  Goal: Free from fall injury  Outcome: Progressing     Problem: Chronic Conditions and Co-morbidities  Goal: Patient's chronic conditions and co-morbidity symptoms are monitored and maintained or improved  Outcome: Progressing     Problem: Pain  Goal: Takes deep breaths with improved pain control throughout the shift  Outcome: Progressing  Goal: Turns in bed with improved pain control throughout the shift  Outcome: Progressing  Goal: Performs ADL's with improved pain control throughout shift  Outcome: Progressing  Goal: Participates in PT with improved pain control throughout the shift  Outcome: Progressing  Goal: Free from opioid side effects throughout the shift  Outcome: Progressing     Problem: General Stroke  Goal: Establish a mutual long term goal with patient by discharge  Outcome: Progressing  Goal: Demonstrate improvement in neurological exam throughout the shift  Outcome: Progressing  Goal: Maintain BP within ordered limits throughout shift  Outcome: Progressing  Goal: Participate in treatment (ie., meds, therapy) throughout shift  Outcome: Progressing     Problem: ICU Stroke  Goal: Maintain ICP within ordered limits throughout shift  Outcome: Progressing  Goal: Tolerate ventilator weaning trial during shift  Outcome: Progressing  Goal: Maintain patent airway throughout shift  Outcome: Progressing     Problem: Safety -  Medical Restraint  Goal: Remains free of injury from restraints (Restraint for Interference with Medical Device)  Outcome: Progressing  Goal: Free from restraint(s) (Restraint for Interference with Medical Device)  Outcome: Progressing

## 2024-10-01 LAB
ALBUMIN SERPL BCP-MCNC: 2.6 G/DL (ref 3.4–5)
ANION GAP SERPL CALC-SCNC: 14 MMOL/L (ref 10–20)
BASOPHILS # BLD AUTO: 0.03 X10*3/UL (ref 0–0.1)
BASOPHILS NFR BLD AUTO: 0.4 %
BUN SERPL-MCNC: 50 MG/DL (ref 6–23)
CA-I BLD-SCNC: 1.21 MMOL/L (ref 1.1–1.33)
CALCIUM SERPL-MCNC: 8.5 MG/DL (ref 8.6–10.6)
CHLORIDE SERPL-SCNC: 112 MMOL/L (ref 98–107)
CO2 SERPL-SCNC: 22 MMOL/L (ref 21–32)
CREAT SERPL-MCNC: 3.4 MG/DL (ref 0.5–1.05)
EGFRCR SERPLBLD CKD-EPI 2021: 13 ML/MIN/1.73M*2
EOSINOPHIL # BLD AUTO: 0.39 X10*3/UL (ref 0–0.4)
EOSINOPHIL NFR BLD AUTO: 4.6 %
ERYTHROCYTE [DISTWIDTH] IN BLOOD BY AUTOMATED COUNT: 15.9 % (ref 11.5–14.5)
GLUCOSE BLD MANUAL STRIP-MCNC: 118 MG/DL (ref 74–99)
GLUCOSE BLD MANUAL STRIP-MCNC: 122 MG/DL (ref 74–99)
GLUCOSE BLD MANUAL STRIP-MCNC: 130 MG/DL (ref 74–99)
GLUCOSE BLD MANUAL STRIP-MCNC: 134 MG/DL (ref 74–99)
GLUCOSE BLD MANUAL STRIP-MCNC: 134 MG/DL (ref 74–99)
GLUCOSE SERPL-MCNC: 111 MG/DL (ref 74–99)
HCT VFR BLD AUTO: 28.2 % (ref 36–46)
HGB BLD-MCNC: 9.3 G/DL (ref 12–16)
IMM GRANULOCYTES # BLD AUTO: 0.28 X10*3/UL (ref 0–0.5)
IMM GRANULOCYTES NFR BLD AUTO: 3.3 % (ref 0–0.9)
LYMPHOCYTES # BLD AUTO: 0.7 X10*3/UL (ref 0.8–3)
LYMPHOCYTES NFR BLD AUTO: 8.3 %
MAGNESIUM SERPL-MCNC: 1.77 MG/DL (ref 1.6–2.4)
MCH RBC QN AUTO: 29.5 PG (ref 26–34)
MCHC RBC AUTO-ENTMCNC: 33 G/DL (ref 32–36)
MCV RBC AUTO: 90 FL (ref 80–100)
MONOCYTES # BLD AUTO: 0.69 X10*3/UL (ref 0.05–0.8)
MONOCYTES NFR BLD AUTO: 8.1 %
NEUTROPHILS # BLD AUTO: 6.38 X10*3/UL (ref 1.6–5.5)
NEUTROPHILS NFR BLD AUTO: 75.3 %
NRBC BLD-RTO: 0 /100 WBCS (ref 0–0)
PHOSPHATE SERPL-MCNC: 2.5 MG/DL (ref 2.5–4.9)
PLATELET # BLD AUTO: 150 X10*3/UL (ref 150–450)
POTASSIUM SERPL-SCNC: 3.8 MMOL/L (ref 3.5–5.3)
RBC # BLD AUTO: 3.15 X10*6/UL (ref 4–5.2)
SODIUM SERPL-SCNC: 144 MMOL/L (ref 136–145)
UFH PPP CHRO-ACNC: 0.1 IU/ML
VANCOMYCIN SERPL-MCNC: 15.8 UG/ML (ref 5–20)
WBC # BLD AUTO: 8.5 X10*3/UL (ref 4.4–11.3)

## 2024-10-01 PROCEDURE — 31720 CLEARANCE OF AIRWAYS: CPT

## 2024-10-01 PROCEDURE — 94640 AIRWAY INHALATION TREATMENT: CPT

## 2024-10-01 PROCEDURE — 2500000005 HC RX 250 GENERAL PHARMACY W/O HCPCS: Performed by: NURSE PRACTITIONER

## 2024-10-01 PROCEDURE — 37799 UNLISTED PX VASCULAR SURGERY: CPT | Performed by: NURSE PRACTITIONER

## 2024-10-01 PROCEDURE — 2500000001 HC RX 250 WO HCPCS SELF ADMINISTERED DRUGS (ALT 637 FOR MEDICARE OP): Performed by: NURSE PRACTITIONER

## 2024-10-01 PROCEDURE — 94669 MECHANICAL CHEST WALL OSCILL: CPT

## 2024-10-01 PROCEDURE — 2500000004 HC RX 250 GENERAL PHARMACY W/ HCPCS (ALT 636 FOR OP/ED): Performed by: NURSE PRACTITIONER

## 2024-10-01 PROCEDURE — 2500000004 HC RX 250 GENERAL PHARMACY W/ HCPCS (ALT 636 FOR OP/ED)

## 2024-10-01 PROCEDURE — 82330 ASSAY OF CALCIUM: CPT | Performed by: NURSE PRACTITIONER

## 2024-10-01 PROCEDURE — 2500000005 HC RX 250 GENERAL PHARMACY W/O HCPCS

## 2024-10-01 PROCEDURE — 99232 SBSQ HOSP IP/OBS MODERATE 35: CPT

## 2024-10-01 PROCEDURE — 83735 ASSAY OF MAGNESIUM: CPT | Performed by: NURSE PRACTITIONER

## 2024-10-01 PROCEDURE — 85520 HEPARIN ASSAY: CPT | Performed by: NEUROLOGICAL SURGERY

## 2024-10-01 PROCEDURE — 80069 RENAL FUNCTION PANEL: CPT | Performed by: NURSE PRACTITIONER

## 2024-10-01 PROCEDURE — 82947 ASSAY GLUCOSE BLOOD QUANT: CPT

## 2024-10-01 PROCEDURE — 2500000002 HC RX 250 W HCPCS SELF ADMINISTERED DRUGS (ALT 637 FOR MEDICARE OP, ALT 636 FOR OP/ED): Performed by: NURSE PRACTITIONER

## 2024-10-01 PROCEDURE — 85025 COMPLETE CBC W/AUTO DIFF WBC: CPT | Performed by: NURSE PRACTITIONER

## 2024-10-01 PROCEDURE — 37799 UNLISTED PX VASCULAR SURGERY: CPT | Performed by: NEUROLOGICAL SURGERY

## 2024-10-01 PROCEDURE — 80202 ASSAY OF VANCOMYCIN: CPT | Performed by: NEUROLOGICAL SURGERY

## 2024-10-01 PROCEDURE — 2500000001 HC RX 250 WO HCPCS SELF ADMINISTERED DRUGS (ALT 637 FOR MEDICARE OP)

## 2024-10-01 PROCEDURE — 94668 MNPJ CHEST WALL SBSQ: CPT

## 2024-10-01 PROCEDURE — 2060000001 HC INTERMEDIATE ICU ROOM DAILY

## 2024-10-01 RX ORDER — SODIUM CHLORIDE FOR INHALATION 3 %
VIAL, NEBULIZER (ML) INHALATION
Status: COMPLETED
Start: 2024-10-01 | End: 2024-10-01

## 2024-10-01 RX ORDER — VANCOMYCIN HYDROCHLORIDE 500 MG/100ML
500 INJECTION, SOLUTION INTRAVENOUS ONCE
Status: COMPLETED | OUTPATIENT
Start: 2024-10-01 | End: 2024-10-01

## 2024-10-01 ASSESSMENT — PAIN SCALES - GENERAL
PAINLEVEL_OUTOF10: 0 - NO PAIN
PAINLEVEL_OUTOF10: 0 - NO PAIN
PAINLEVEL_OUTOF10: 4

## 2024-10-01 ASSESSMENT — PAIN - FUNCTIONAL ASSESSMENT
PAIN_FUNCTIONAL_ASSESSMENT: 0-10

## 2024-10-01 NOTE — CARE PLAN
Problem: Fall/Injury  Goal: Not fall by end of shift  Outcome: Progressing  Goal: Be free from injury by end of the shift  Outcome: Progressing  Goal: Verbalize understanding of personal risk factors for fall in the hospital  Outcome: Progressing  Goal: Verbalize understanding of risk factor reduction measures to prevent injury from fall in the home  Outcome: Progressing     Problem: Safety - Adult  Goal: Free from fall injury  Outcome: Progressing     Problem: Chronic Conditions and Co-morbidities  Goal: Patient's chronic conditions and co-morbidity symptoms are monitored and maintained or improved  Outcome: Progressing     Problem: Pain  Goal: Takes deep breaths with improved pain control throughout the shift  Outcome: Progressing  Goal: Turns in bed with improved pain control throughout the shift  Outcome: Progressing  Goal: Performs ADL's with improved pain control throughout shift  Outcome: Progressing  Goal: Participates in PT with improved pain control throughout the shift  Outcome: Progressing  Goal: Free from opioid side effects throughout the shift  Outcome: Progressing  Goal: Free from acute confusion related to pain meds throughout the shift  Outcome: Progressing     Problem: Knowledge Deficit  Goal: Patient/family/caregiver demonstrates understanding of disease process, treatment plan, medications, and discharge instructions  Outcome: Progressing

## 2024-10-01 NOTE — PROGRESS NOTES
Social Work Discharge Planning note:    -Patient discussed during interdisciplinary rounds.   -Team members present: NP and SW  -Plan per medical team: Pt is not medically ready for discharge. Pt will need ST for swallow eval.   -Payer: [employer]  -Status: Inpatient  -Discharge disposition: Pt continues to be with PT and OT recommendations for Mod intensity. SW called Pt's daughter, Paola, and she gave 5 facility choices and [facility]. SW will continue to follow to assist with discharge planning.   -Potential Barriers: none  -Anticipated Date of Discharge:  10/5/24    Jovan Farooq, MSW, LSW

## 2024-10-01 NOTE — CARE PLAN
Problem: Fall/Injury  Goal: Not fall by end of shift  Outcome: Progressing  Goal: Be free from injury by end of the shift  Outcome: Progressing  Goal: Verbalize understanding of personal risk factors for fall in the hospital  Outcome: Progressing  Goal: Verbalize understanding of risk factor reduction measures to prevent injury from fall in the home  Outcome: Progressing  Goal: Use assistive devices by end of the shift  Outcome: Progressing  Goal: Pace activities to prevent fatigue by end of the shift  Outcome: Progressing     Problem: Pain - Adult  Goal: Verbalizes/displays adequate comfort level or baseline comfort level  Outcome: Progressing     Problem: Safety - Adult  Goal: Free from fall injury  Outcome: Progressing     Problem: Chronic Conditions and Co-morbidities  Goal: Patient's chronic conditions and co-morbidity symptoms are monitored and maintained or improved  Outcome: Progressing     Problem: Pain  Goal: Takes deep breaths with improved pain control throughout the shift  Outcome: Progressing  Goal: Turns in bed with improved pain control throughout the shift  Outcome: Progressing  Goal: Performs ADL's with improved pain control throughout shift  Outcome: Progressing  Goal: Participates in PT with improved pain control throughout the shift  Outcome: Progressing  Goal: Free from opioid side effects throughout the shift  Outcome: Progressing  Goal: Free from acute confusion related to pain meds throughout the shift  Outcome: Progressing     Problem: General Stroke  Goal: Establish a mutual long term goal with patient by discharge  Outcome: Progressing  Goal: Demonstrate improvement in neurological exam throughout the shift  Outcome: Progressing  Goal: Maintain BP within ordered limits throughout shift  Outcome: Progressing  Goal: Participate in treatment (ie., meds, therapy) throughout shift  Outcome: Progressing  Goal: No symptoms of aspiration throughout shift  Outcome: Progressing  Goal: No symptoms  of hemorrhage throughout shift  Outcome: Progressing  Goal: Tolerate enteral feeding throughout shift  Outcome: Progressing  Goal: Decreased nausea/vomiting throughout shift  Outcome: Progressing  Goal: Controlled blood glucose throughout shift  Outcome: Progressing  Goal: Out of bed three times today  Outcome: Progressing     Problem: ICU Stroke  Goal: Maintain ICP within ordered limits throughout shift  Outcome: Progressing  Goal: Tolerate EVD clamping trial throughout shift  Outcome: Progressing  Goal: Tolerate ventilator weaning trial during shift  Outcome: Progressing  Goal: Maintain patent airway throughout shift  Outcome: Progressing  Goal: Achieve/maintain targeted sodium level throughout shift  Outcome: Progressing     Problem: Skin  Goal: Decreased wound size/increased tissue granulation at next dressing change  Outcome: Progressing  Goal: Participates in plan/prevention/treatment measures  Outcome: Progressing  Goal: Prevent/manage excess moisture  Outcome: Progressing  Goal: Prevent/minimize sheer/friction injuries  Outcome: Progressing  Goal: Promote/optimize nutrition  Outcome: Progressing  Goal: Promote skin healing  Outcome: Progressing    Problem: Safety - Medical Restraint  Goal: Remains free of injury from restraints (Restraint for Interference with Medical Device)  Outcome: Met  Goal: Free from restraint(s) (Restraint for Interference with Medical Device)  Outcome: Met     Problem: Knowledge Deficit  Goal: Patient/family/caregiver demonstrates understanding of disease process, treatment plan, medications, and discharge instructions  Outcome: Met     Problem: Mechanical Ventilation  Goal: Patient Will Maintain Patent Airway  Outcome: Met  Goal: Oral health is maintained or improved  Outcome: Met  Goal: Tracheostomy will be managed safely  Outcome: Met  Goal: ET tube will be managed safely  Outcome: Met  Goal: Ability to express needs and understand communication  Outcome: Met  Goal:  Mobility/activity is maintained at optimum level for patient  Outcome: Met

## 2024-10-01 NOTE — PROGRESS NOTES
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 (s/p IVC filter removed 4/2016), JOY (CPAP at night), COPD, CKD stage 4, chronic tobacco use, schizophrenia, depression, p/w AMS, hypertensive 's, recent fall 2days ago, CTH R cerebellar and cerebellar vermis hemorrhage with 4th ventricular effacement, stable vents, CT C spine C5-7 ACDF, CTA c/f L PICA dAVM, s/p kcentra, TTE 74% EF, no wma, rCTH stable ICH, c/f L PICA dAVM, 9/22 MRI/V motion degraded, no obvious underlying mass or vascular lesion  9/23 BLE DVT US chronic changes, 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 Pt positioned upright in bed, A&O x2, to setting and self. Pt oral motor exam reveals pt is edentulous. Pt produced a weak volitional cough. Dobbhoff in place.     Pt given ice chips x2, straw sips x3, and 3 oz water protocol. Pt coughing with ice chips and throat clearing with subsequent straw sips. Pt completed 3 oz water protocol without coughing, visible increase work of breathing. No further trials given due to risk of aspiration.     SLP recommends NPO with 5-7 ice chips an hour after frequent aggressive oral care for oral stimulation and reduction of bacteria in the oropharynx. A MBSS is recommended prior to initiation of PO diet due to pt hx of Parkinsons, CVA, intubation and risk of aspiration. MD made aware.     Recommendations:  NPO with 5-7 ice chips an hour after frequent aggressive oral care for oral  stimulation and reduction of bacteria in the oropharynx  MBSS recommended prior to initiation of PO diet    Goal:   Pt will tolerate least restrictive diet with no overt clinical s/s aspiration 100% of the time.   Start Date: 10/1/24  End Date: 10/15/24  Status: Goal Initiated this date       Plan:  SLP Services Indicated: Yes  Frequency: 2x week  Discussed POC with patient  SLP - OK to Discharge    Pain:   0-10  0 = No pain.     Inpatient Education:  Extensive education provided to patient regarding current swallow function, recommendations/results, and POC.      Consultations/Referrals/Coordination of Services:   N/A

## 2024-10-01 NOTE — PROGRESS NOTES
Vancomycin Dosing by Pharmacy  FOLLOW UP    Kelly Martinez is a 79 y.o. female who Pharmacy is consulted to dose vancomycin for pneumonia.     Based on the patient's indication and renal status, this patient is being dosed based on a goal vancomycin level of 15-20.     Last vancomycin dose: 2000 mg on 9/28 @ 0926    Most recent vancomycin level: 15.8 mcg/mL (41 hour level)    Renal function is currently declining.    Estimated Creatinine Clearance: 18.2 mL/min (A) (by C-G formula based on SCr of 3.4 mg/dL (H)).    Results from last 7 days   Lab Units 10/01/24  0319 09/30/24  0043 09/29/24  0129 09/28/24  0108   CREATININE mg/dL 3.40* 3.32* 2.94* 3.14*   BUN mg/dL 50* 50* 51* 53*   WBC AUTO x10*3/uL 8.5 7.0 8.2 8.3        Visit Vitals  /60   Pulse 72   Temp 36.1 °C (97 °F)   Resp 17       Collected Specimen Info Organism Vancomycin   09/26/24 Fluid from [facility] Pseudomonas aeruginosa      Methicillin Resistant Staphylococcus aureus (MRSA)  S       Assessment/Plan    Vancomycin level is within goal range of 15-20. Will re-dose vancomycin with one time order of 500 mg.  The next vancomycin level will be ordered for 10/2 AM.  Will continue to monitor renal function daily while on vancomycin and order serum creatinine at least every 48 hours if not already ordered.  Will follow for continued vancomycin needs, clinical response, and signs/symptoms of toxicity.       Cristiana Campos, MalathiD

## 2024-10-01 NOTE — PROGRESS NOTES
"Kelly Martinez is a 79 y.o. female on day 10 of admission presenting with Cerebellar hemorrhage (Multi).    Subjective   No acute events overnight    Objective     Physical Exam  Awake, EOV  Ox1-2  Follows commands x4    Last Recorded Vitals  Blood pressure 110/60, pulse 72, temperature 36.1 °C (97 °F), resp. rate 17, height 1.702 m (5' 7.01\"), weight 116 kg (255 lb 15.3 oz), SpO2 96%.  Intake/Output last 3 Shifts:  I/O last 3 completed shifts:  In: 2005 (17.3 mL/kg) [I.V.:240 (2.1 mL/kg); NG/GT:1715; IV Piggyback:50]  Out: 6330 (54.5 mL/kg) [Urine:6330 (1.5 mL/kg/hr)]  Weight: 116.1 kg     Relevant Results    Results for orders placed or performed during the hospital encounter of 09/21/24 (from the past 24 hour(s))   POCT GLUCOSE   Result Value Ref Range    POCT Glucose 114 (H) 74 - 99 mg/dL   POCT GLUCOSE   Result Value Ref Range    POCT Glucose 99 74 - 99 mg/dL   POCT GLUCOSE   Result Value Ref Range    POCT Glucose 137 (H) 74 - 99 mg/dL   POCT GLUCOSE   Result Value Ref Range    POCT Glucose 127 (H) 74 - 99 mg/dL   Calcium, Ionized   Result Value Ref Range    POCT Calcium, Ionized 1.21 1.1 - 1.33 mmol/L   CBC and Auto Differential   Result Value Ref Range    WBC 8.5 4.4 - 11.3 x10*3/uL    nRBC 0.0 0.0 - 0.0 /100 WBCs    RBC 3.15 (L) 4.00 - 5.20 x10*6/uL    Hemoglobin 9.3 (L) 12.0 - 16.0 g/dL    Hematocrit 28.2 (L) 36.0 - 46.0 %    MCV 90 80 - 100 fL    MCH 29.5 26.0 - 34.0 pg    MCHC 33.0 32.0 - 36.0 g/dL    RDW 15.9 (H) 11.5 - 14.5 %    Platelets 150 150 - 450 x10*3/uL    Neutrophils % 75.3 40.0 - 80.0 %    Immature Granulocytes %, Automated 3.3 (H) 0.0 - 0.9 %    Lymphocytes % 8.3 13.0 - 44.0 %    Monocytes % 8.1 2.0 - 10.0 %    Eosinophils % 4.6 0.0 - 6.0 %    Basophils % 0.4 0.0 - 2.0 %    Neutrophils Absolute 6.38 (H) 1.60 - 5.50 x10*3/uL    Immature Granulocytes Absolute, Automated 0.28 0.00 - 0.50 x10*3/uL    Lymphocytes Absolute 0.70 (L) 0.80 - 3.00 x10*3/uL    Monocytes Absolute 0.69 0.05 - 0.80 " x10*3/uL    Eosinophils Absolute 0.39 0.00 - 0.40 x10*3/uL    Basophils Absolute 0.03 0.00 - 0.10 x10*3/uL   Renal Function Panel   Result Value Ref Range    Glucose 111 (H) 74 - 99 mg/dL    Sodium 144 136 - 145 mmol/L    Potassium 3.8 3.5 - 5.3 mmol/L    Chloride 112 (H) 98 - 107 mmol/L    Bicarbonate 22 21 - 32 mmol/L    Anion Gap 14 10 - 20 mmol/L    Urea Nitrogen 50 (H) 6 - 23 mg/dL    Creatinine 3.40 (H) 0.50 - 1.05 mg/dL    eGFR 13 (L) >60 mL/min/1.73m*2    Calcium 8.5 (L) 8.6 - 10.6 mg/dL    Phosphorus 2.5 2.5 - 4.9 mg/dL    Albumin 2.6 (L) 3.4 - 5.0 g/dL   Magnesium   Result Value Ref Range    Magnesium 1.77 1.60 - 2.40 mg/dL   Vancomycin   Result Value Ref Range    Vancomycin 15.8 5.0 - 20.0 ug/mL   POCT GLUCOSE   Result Value Ref Range    POCT Glucose 130 (H) 74 - 99 mg/dL   Heparin Assay   Result Value Ref Range    Heparin Unfractionated 0.1 See Comment Below for Therapeutic Ranges IU/mL   POCT GLUCOSE   Result Value Ref Range    POCT Glucose 118 (H) 74 - 99 mg/dL                Assessment/Plan   Assessment & Plan  Cerebellar hemorrhage (Multi)    AVM (arteriovenous malformation) ([facility])    79 years old female with h/o parkinson's, HTN, HLD, HFpEF, 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 (s/p IVC filter removed 4/2016), JOY (CPAP at night), COPD, CKD stage 4, chronic tobacco use, schizophrenia, depression, p/w AMS, hypertensive 's, recent fall 2days ago, CTH R cerebellar and cerebellar vermis hemorrhage with 4th ventricular effacement, stable vents, CT C spine C5-7 ACDF, CTA c/f L PICA dAVM, s/p kcentra, TTE 74% EF, no wma, rCTH stable ICH, c/f L PICA dAVM, 9/22 MRI/V motion degraded, no obvious underlying mass or vascular lesion  9/23 BLE DVT US chronic changes, 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

## 2024-10-02 ENCOUNTER — APPOINTMENT (OUTPATIENT)
Dept: RADIOLOGY | Facility: HOSPITAL | Age: 79
End: 2024-10-02
Payer: COMMERCIAL

## 2024-10-02 LAB
ALBUMIN SERPL BCP-MCNC: 3.1 G/DL (ref 3.4–5)
ANION GAP SERPL CALC-SCNC: 12 MMOL/L (ref 10–20)
BASOPHILS # BLD AUTO: 0.03 X10*3/UL (ref 0–0.1)
BASOPHILS NFR BLD AUTO: 0.3 %
BUN SERPL-MCNC: 46 MG/DL (ref 6–23)
CALCIUM SERPL-MCNC: 9.4 MG/DL (ref 8.6–10.6)
CHLORIDE SERPL-SCNC: 107 MMOL/L (ref 98–107)
CO2 SERPL-SCNC: 27 MMOL/L (ref 21–32)
CREAT SERPL-MCNC: 3.21 MG/DL (ref 0.5–1.05)
EGFRCR SERPLBLD CKD-EPI 2021: 14 ML/MIN/1.73M*2
EOSINOPHIL # BLD AUTO: 0.42 X10*3/UL (ref 0–0.4)
EOSINOPHIL NFR BLD AUTO: 4.2 %
ERYTHROCYTE [DISTWIDTH] IN BLOOD BY AUTOMATED COUNT: 15.9 % (ref 11.5–14.5)
GLUCOSE BLD MANUAL STRIP-MCNC: 102 MG/DL (ref 74–99)
GLUCOSE BLD MANUAL STRIP-MCNC: 105 MG/DL (ref 74–99)
GLUCOSE BLD MANUAL STRIP-MCNC: 105 MG/DL (ref 74–99)
GLUCOSE BLD MANUAL STRIP-MCNC: 126 MG/DL (ref 74–99)
GLUCOSE BLD MANUAL STRIP-MCNC: 92 MG/DL (ref 74–99)
GLUCOSE SERPL-MCNC: 107 MG/DL (ref 74–99)
HCT VFR BLD AUTO: 30.9 % (ref 36–46)
HGB BLD-MCNC: 10.1 G/DL (ref 12–16)
IMM GRANULOCYTES # BLD AUTO: 0.27 X10*3/UL (ref 0–0.5)
IMM GRANULOCYTES NFR BLD AUTO: 2.7 % (ref 0–0.9)
LYMPHOCYTES # BLD AUTO: 0.83 X10*3/UL (ref 0.8–3)
LYMPHOCYTES NFR BLD AUTO: 8.4 %
MAGNESIUM SERPL-MCNC: 1.8 MG/DL (ref 1.6–2.4)
MCH RBC QN AUTO: 29.6 PG (ref 26–34)
MCHC RBC AUTO-ENTMCNC: 32.7 G/DL (ref 32–36)
MCV RBC AUTO: 91 FL (ref 80–100)
MONOCYTES # BLD AUTO: 0.76 X10*3/UL (ref 0.05–0.8)
MONOCYTES NFR BLD AUTO: 7.7 %
NEUTROPHILS # BLD AUTO: 7.59 X10*3/UL (ref 1.6–5.5)
NEUTROPHILS NFR BLD AUTO: 76.7 %
NRBC BLD-RTO: 0 /100 WBCS (ref 0–0)
PHOSPHATE SERPL-MCNC: 2.2 MG/DL (ref 2.5–4.9)
PLATELET # BLD AUTO: 161 X10*3/UL (ref 150–450)
POTASSIUM SERPL-SCNC: 3.6 MMOL/L (ref 3.5–5.3)
RBC # BLD AUTO: 3.41 X10*6/UL (ref 4–5.2)
SODIUM SERPL-SCNC: 142 MMOL/L (ref 136–145)
UFH PPP CHRO-ACNC: 0.1 IU/ML
VANCOMYCIN SERPL-MCNC: 18.9 UG/ML (ref 5–20)
WBC # BLD AUTO: 9.9 X10*3/UL (ref 4.4–11.3)

## 2024-10-02 PROCEDURE — 2500000001 HC RX 250 WO HCPCS SELF ADMINISTERED DRUGS (ALT 637 FOR MEDICARE OP): Performed by: NURSE PRACTITIONER

## 2024-10-02 PROCEDURE — 2500000005 HC RX 250 GENERAL PHARMACY W/O HCPCS: Performed by: NEUROLOGICAL SURGERY

## 2024-10-02 PROCEDURE — 92611 MOTION FLUOROSCOPY/SWALLOW: CPT | Mod: GN | Performed by: SPEECH-LANGUAGE PATHOLOGIST

## 2024-10-02 PROCEDURE — 36415 COLL VENOUS BLD VENIPUNCTURE: CPT | Performed by: NURSE PRACTITIONER

## 2024-10-02 PROCEDURE — 92526 ORAL FUNCTION THERAPY: CPT | Mod: GN | Performed by: SPEECH-LANGUAGE PATHOLOGIST

## 2024-10-02 PROCEDURE — 94640 AIRWAY INHALATION TREATMENT: CPT

## 2024-10-02 PROCEDURE — 74230 X-RAY XM SWLNG FUNCJ C+: CPT | Performed by: RADIOLOGY

## 2024-10-02 PROCEDURE — 85520 HEPARIN ASSAY: CPT | Performed by: NURSE PRACTITIONER

## 2024-10-02 PROCEDURE — 2500000004 HC RX 250 GENERAL PHARMACY W/ HCPCS (ALT 636 FOR OP/ED): Performed by: NURSE PRACTITIONER

## 2024-10-02 PROCEDURE — 99232 SBSQ HOSP IP/OBS MODERATE 35: CPT

## 2024-10-02 PROCEDURE — 71045 X-RAY EXAM CHEST 1 VIEW: CPT | Performed by: RADIOLOGY

## 2024-10-02 PROCEDURE — 2500000005 HC RX 250 GENERAL PHARMACY W/O HCPCS: Performed by: NURSE PRACTITIONER

## 2024-10-02 PROCEDURE — 74230 X-RAY XM SWLNG FUNCJ C+: CPT

## 2024-10-02 PROCEDURE — 2780000003 HC OR 278 NO HCPCS

## 2024-10-02 PROCEDURE — 2500000001 HC RX 250 WO HCPCS SELF ADMINISTERED DRUGS (ALT 637 FOR MEDICARE OP): Performed by: PHYSICIAN ASSISTANT

## 2024-10-02 PROCEDURE — 85025 COMPLETE CBC W/AUTO DIFF WBC: CPT | Performed by: NURSE PRACTITIONER

## 2024-10-02 PROCEDURE — 2500000002 HC RX 250 W HCPCS SELF ADMINISTERED DRUGS (ALT 637 FOR MEDICARE OP, ALT 636 FOR OP/ED): Performed by: NURSE PRACTITIONER

## 2024-10-02 PROCEDURE — 83735 ASSAY OF MAGNESIUM: CPT | Performed by: NURSE PRACTITIONER

## 2024-10-02 PROCEDURE — 97112 NEUROMUSCULAR REEDUCATION: CPT | Mod: GP

## 2024-10-02 PROCEDURE — C1751 CATH, INF, PER/CENT/MIDLINE: HCPCS

## 2024-10-02 PROCEDURE — 1200000002 HC GENERAL ROOM WITH TELEMETRY DAILY

## 2024-10-02 PROCEDURE — 2500000002 HC RX 250 W HCPCS SELF ADMINISTERED DRUGS (ALT 637 FOR MEDICARE OP, ALT 636 FOR OP/ED): Performed by: PHYSICIAN ASSISTANT

## 2024-10-02 PROCEDURE — 36410 VNPNXR 3YR/> PHY/QHP DX/THER: CPT

## 2024-10-02 PROCEDURE — 97530 THERAPEUTIC ACTIVITIES: CPT | Mod: GP

## 2024-10-02 PROCEDURE — 80069 RENAL FUNCTION PANEL: CPT | Performed by: NURSE PRACTITIONER

## 2024-10-02 PROCEDURE — 2500000004 HC RX 250 GENERAL PHARMACY W/ HCPCS (ALT 636 FOR OP/ED): Performed by: PHYSICIAN ASSISTANT

## 2024-10-02 PROCEDURE — 71045 X-RAY EXAM CHEST 1 VIEW: CPT

## 2024-10-02 PROCEDURE — 94668 MNPJ CHEST WALL SBSQ: CPT

## 2024-10-02 PROCEDURE — 80202 ASSAY OF VANCOMYCIN: CPT | Performed by: NURSE PRACTITIONER

## 2024-10-02 PROCEDURE — 82947 ASSAY GLUCOSE BLOOD QUANT: CPT

## 2024-10-02 RX ORDER — AMLODIPINE BESYLATE 10 MG/1
10 TABLET ORAL DAILY
Status: DISCONTINUED | OUTPATIENT
Start: 2024-10-03 | End: 2024-10-04 | Stop reason: HOSPADM

## 2024-10-02 RX ORDER — TRAZODONE HYDROCHLORIDE 50 MG/1
150 TABLET ORAL NIGHTLY PRN
Status: DISCONTINUED | OUTPATIENT
Start: 2024-10-02 | End: 2024-10-04 | Stop reason: HOSPADM

## 2024-10-02 RX ORDER — ACETAMINOPHEN 325 MG/1
650 TABLET ORAL EVERY 6 HOURS PRN
Status: DISCONTINUED | OUTPATIENT
Start: 2024-10-02 | End: 2024-10-04 | Stop reason: HOSPADM

## 2024-10-02 RX ORDER — FOLIC ACID 1 MG/1
1 TABLET ORAL DAILY
Status: DISCONTINUED | OUTPATIENT
Start: 2024-10-03 | End: 2024-10-04 | Stop reason: HOSPADM

## 2024-10-02 RX ORDER — POLYETHYLENE GLYCOL 3350 17 G/17G
17 POWDER, FOR SOLUTION ORAL 2 TIMES DAILY
Status: CANCELLED
Start: 2024-10-02

## 2024-10-02 RX ORDER — ACETAMINOPHEN 325 MG/1
650 TABLET ORAL EVERY 6 HOURS PRN
Status: CANCELLED
Start: 2024-10-02

## 2024-10-02 RX ORDER — ATORVASTATIN CALCIUM 40 MG/1
40 TABLET, FILM COATED ORAL DAILY
Status: DISCONTINUED | OUTPATIENT
Start: 2024-10-03 | End: 2024-10-04 | Stop reason: HOSPADM

## 2024-10-02 RX ORDER — AMOXICILLIN 250 MG
1 CAPSULE ORAL 2 TIMES DAILY
Status: DISCONTINUED | OUTPATIENT
Start: 2024-10-02 | End: 2024-10-04 | Stop reason: HOSPADM

## 2024-10-02 RX ORDER — HYDRALAZINE HYDROCHLORIDE 50 MG/1
50 TABLET, FILM COATED ORAL EVERY 8 HOURS SCHEDULED
Status: DISCONTINUED | OUTPATIENT
Start: 2024-10-02 | End: 2024-10-02

## 2024-10-02 RX ORDER — HYDRALAZINE HYDROCHLORIDE 25 MG/1
25 TABLET, FILM COATED ORAL EVERY 8 HOURS SCHEDULED
Status: CANCELLED
Start: 2024-10-02

## 2024-10-02 RX ORDER — LISINOPRIL 20 MG/1
40 TABLET ORAL DAILY
Status: DISCONTINUED | OUTPATIENT
Start: 2024-10-03 | End: 2024-10-04 | Stop reason: HOSPADM

## 2024-10-02 RX ORDER — CARVEDILOL 25 MG/1
25 TABLET ORAL
Status: DISCONTINUED | OUTPATIENT
Start: 2024-10-02 | End: 2024-10-04 | Stop reason: HOSPADM

## 2024-10-02 RX ORDER — AMOXICILLIN 250 MG
1 CAPSULE ORAL 2 TIMES DAILY
Status: CANCELLED
Start: 2024-10-02

## 2024-10-02 RX ORDER — LIDOCAINE HYDROCHLORIDE 10 MG/ML
5 INJECTION, SOLUTION INFILTRATION; PERINEURAL ONCE
Status: DISCONTINUED | OUTPATIENT
Start: 2024-10-02 | End: 2024-10-04

## 2024-10-02 RX ORDER — POLYETHYLENE GLYCOL 3350 17 G/17G
17 POWDER, FOR SOLUTION ORAL 2 TIMES DAILY
Status: DISCONTINUED | OUTPATIENT
Start: 2024-10-02 | End: 2024-10-04 | Stop reason: HOSPADM

## 2024-10-02 RX ORDER — TERAZOSIN 2 MG/1
2 CAPSULE ORAL DAILY
Status: DISCONTINUED | OUTPATIENT
Start: 2024-10-03 | End: 2024-10-04 | Stop reason: HOSPADM

## 2024-10-02 RX ORDER — HYDRALAZINE HYDROCHLORIDE 25 MG/1
25 TABLET, FILM COATED ORAL EVERY 8 HOURS SCHEDULED
Status: DISCONTINUED | OUTPATIENT
Start: 2024-10-02 | End: 2024-10-03

## 2024-10-02 RX ORDER — LANOLIN ALCOHOL/MO/W.PET/CERES
100 CREAM (GRAM) TOPICAL DAILY
Status: DISCONTINUED | OUTPATIENT
Start: 2024-10-03 | End: 2024-10-04 | Stop reason: HOSPADM

## 2024-10-02 RX ORDER — ISOSORBIDE DINITRATE 10 MG/1
10 TABLET ORAL
Status: DISCONTINUED | OUTPATIENT
Start: 2024-10-02 | End: 2024-10-04 | Stop reason: HOSPADM

## 2024-10-02 RX ORDER — QUETIAPINE FUMARATE 300 MG/1
300 TABLET, FILM COATED ORAL NIGHTLY
Status: DISCONTINUED | OUTPATIENT
Start: 2024-10-02 | End: 2024-10-04 | Stop reason: HOSPADM

## 2024-10-02 ASSESSMENT — PAIN - FUNCTIONAL ASSESSMENT
PAIN_FUNCTIONAL_ASSESSMENT: 0-10

## 2024-10-02 ASSESSMENT — COGNITIVE AND FUNCTIONAL STATUS - GENERAL
HELP NEEDED FOR BATHING: TOTAL
WALKING IN HOSPITAL ROOM: TOTAL
MOBILITY SCORE: 6
CLIMB 3 TO 5 STEPS WITH RAILING: TOTAL
MOVING FROM LYING ON BACK TO SITTING ON SIDE OF FLAT BED WITH BEDRAILS: TOTAL
STANDING UP FROM CHAIR USING ARMS: A LOT
TOILETING: TOTAL
MOVING TO AND FROM BED TO CHAIR: TOTAL
MOBILITY SCORE: 9
DRESSING REGULAR LOWER BODY CLOTHING: TOTAL
MOVING FROM LYING ON BACK TO SITTING ON SIDE OF FLAT BED WITH BEDRAILS: A LOT
CLIMB 3 TO 5 STEPS WITH RAILING: TOTAL
WALKING IN HOSPITAL ROOM: TOTAL
STANDING UP FROM CHAIR USING ARMS: TOTAL
DRESSING REGULAR UPPER BODY CLOTHING: TOTAL
EATING MEALS: TOTAL
MOVING TO AND FROM BED TO CHAIR: TOTAL
TURNING FROM BACK TO SIDE WHILE IN FLAT BAD: A LOT
DAILY ACTIVITIY SCORE: 6
TURNING FROM BACK TO SIDE WHILE IN FLAT BAD: TOTAL
PERSONAL GROOMING: TOTAL

## 2024-10-02 ASSESSMENT — PAIN SCALES - GENERAL
PAINLEVEL_OUTOF10: 0 - NO PAIN

## 2024-10-02 NOTE — PROGRESS NOTES
Physical Therapy    Physical Therapy Treatment    Patient Name: Kelly Martinez  MRN: [id number]  Department: [place] [id number]  Room: [address] [place] [address]  Today's Date: 10/2/2024  Time Calculation  Start Time: 1435  Stop Time: 1459  Time Calculation (min): 24 min    Assessment/Plan   PT Assessment  Evaluation/Treatment Tolerance: Other (Comment) (Pt with drop in pressure after standing and reporting needing to lay down, so returned to supine.)  Medical Staff Made Aware: Yes (RN present during session)  End of Session Communication: Bedside nurse  End of Session Patient Position: Bed, 3 rail up, Alarm off, not on at start of session     PT Plan  Treatment/Interventions: Bed mobility, Transfer training, Gait training, Balance training, Neuromuscular re-education, Strengthening, Endurance training, Therapeutic exercise, Therapeutic activity, Home exercise program, Positioning, Postural re-education  PT Plan: Ongoing PT  PT Frequency: 5 times per week  PT Discharge Recommendations: Moderate intensity level of continued care  PT Recommended Transfer Status: Assist x2  PT - OK to Discharge: Yes (When medically ready)    General Visit Information:   PT  Visit  PT Received On: 10/02/24  General  Missed Visit: No  Missed Visit Reason:  (-)  Family/Caregiver Present: No  Prior to Session Communication: Bedside nurse  Patient Position Received: Bed, 3 rail up, Alarm off, not on at start of session  General Comment: Supine.  Pt with flat affect but agreeable to mobilize.  RN hoping to get orthostatic readings per MD request.    Subjective   Precautions:  Precautions  Medical Precautions: Fall precautions, Oxygen therapy device and L/min    Vital Signs (Past 2hrs)        Date/Time Vitals Session Patient Position Pulse Resp SpO2 BP MAP (mmHg)    10/02/24 1400 --  --  70  20  95 %  125/69  86     10/02/24 1445 --  --  --  --  --  --  --     10/02/24 1458 --  --  --  --  --  94/61  --                     Objective   Pain:  Pain Assessment  Pain  Assessment: 0-10  0-10 (Numeric) Pain Score:  (Pt reporting pain, joanna after return to supine.  RN aware.  Pt reporting not needing anything to address it)  Pain Interventions: Ambulation/increased activity, Repositioned  Cognition:  Cognition  Arousal/Alertness: Delayed responses to stimuli  Orientation Level: Disoriented to time, Disoriented to situation     Activity Tolerance:  Activity Tolerance  Endurance: Tolerates 10 - 20 min exercise with multiple rests  Treatments:    Balance/Neuromuscular Re-Education  Balance/Neuromuscular Re-Education Activity Performed: Yes  Balance/Neuromuscular Re-Education Activity 1: Sitting EOB15 minutes.  Pt initially Min A for static sitting but progressing to SBA with 0 UE support and good midline positioning.  Min A for dynamic activities.  Standing with Mod Ax2 and able to maintain with Min Ax2 for 1 minute.    Bed Mobility 1  Bed Mobility 1: Supine to sitting, Sitting to supine  Level of Assistance 1: Moderate assistance, Moderate verbal cues, Moderate tactile cues  Bed Mobility Comments 1: HOB elevated, after pt boosted to HOB  Bed Mobility 2  Bed Mobility  2: Rolling right, Rolling left  Level of Assistance 2: Maximum assistance, Moderate verbal cues, Moderate tactile cues  Bed Mobility Comments 2: Performed after other mobility, so pt may have been fatigued, and could likely roll with less assist when not fatigued       Transfers  Transfer: Yes  Transfer 1  Transfer From 1: Sit to, Stand to  Transfer to 1: Sit  Transfer Device 1:  (B arm in arm assist)    Outcome Measures:    [facility] Basic Mobility  Turning from your back to your side while in a flat bed without using bedrails: A lot  Moving from lying on your back to sitting on the side of a flat bed without using bedrails: A lot  Moving to and from bed to chair (including a wheelchair): Total  Standing up from a chair using your arms (e.g. wheelchair or bedside chair): A lot  To walk in hospital room: Total  Climbing 3-5  steps with railing: Total  Basic Mobility - Total Score: 9    Education Documentation  Body Mechanics, taught by Chilango Rogers, PT at 10/2/2024  3:46 PM.  Learner: Patient  Readiness: Acceptance  Method: Explanation  Response: Verbalizes Understanding    Mobility Training, taught by Chilango Rogers, PT at 10/2/2024  3:46 PM.  Learner: Patient  Readiness: Acceptance  Method: Explanation  Response: Verbalizes Understanding    Education Comments  No comments found.        OP EDUCATION:       Encounter Problems       Encounter Problems (Active)       PT Problem       Patient will perform bed mobility with </= MAX Ax 1 to reduce risk of developing decubitus ulcers.  (Progressing)       Start:  09/22/24    Expected End:  10/06/24            Patient will perform sit to stand and stand to sit transfers with </= MAX A x1 and LRD to increase functional strength.  (Progressing)       Start:  09/22/24    Expected End:  10/06/24            Patient will ambulate at least 15 ft. with </= MOD A x2 and LRD to improve tolerance of household distances.  (Progressing)       Start:  09/22/24    Expected End:  10/06/24            Patient will participate in therapeutic exercise program with VSS and cues as needed.    (Progressing)       Start:  09/22/24    Expected End:  10/06/24            Patient will score at least 42/56 on the Function in Sitting Test to improve sitting balance required for functional tasks.   (Progressing)       Start:  09/22/24    Expected End:  10/06/24               Pain - Adult

## 2024-10-02 NOTE — PROGRESS NOTES
Vancomycin Dosing by Pharmacy- Cessation of Therapy    Consult to pharmacy for vancomycin dosing has been discontinued by the prescriber, pharmacy will sign off at this time.    Please call pharmacy if there are further questions or re-enter a consult if vancomycin is resumed.     Esperanza Collins, MalathiD

## 2024-10-02 NOTE — PROGRESS NOTES
Speech-Language Pathology  Inpatient Modified Barium Swallow Study    Patient Name: Kelly Martinez  MRN: [id number]  : 1945  Today's Date: 10/02/24  Start Time: 0840  Stop Time: 910  Time Calculation (min): 30    Modified Barium Swallow Study completed. Informed verbal consent obtained prior to completion of exam. Trials of thin liquids, mildly thick liquids, moderately thick liquids and puree consistencies were given.     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 (s/p IVC filter removed 2016), JOY (CPAP at night), COPD, CKD stage 4, chronic tobacco use, schizophrenia, depression, p/w AMS, hypertensive 's, recent fall 2days ago, CTH R cerebellar and cerebellar vermis hemorrhage with 4th ventricular effacement, stable vents, CT C spine C5-7 ACDF, CTA c/f L PICA dAVM, s/p kcentra, TTE 74% EF, no wma, rCTH stable ICH, c/f L PICA dAVM,  MRI/V motion degraded, no obvious underlying mass or vascular lesion   BLE DVT US chronic changes, 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   INR 2.3 s/p 10mg Vit K (3 doses),  s/p 2u FFP, s/p 40IV lasix,  angio SM1 cerebellar AVM, primary feeders L PICA, R>L SCA feeders   s/p extubated     Respiratory Status: nasal cannula  Current diet: NPO w/ alternate means nutrition/hydration    Pain:  Pain Scale: No pain reported      DIET RECOMMENDATIONS:   Puree (IDDSI level 4) with Mildly thick liquids  -upright at 90 degrees for all PO intake  -maintain upright position for at least 20-30 minutes post PO intake  -small bites/single sips  -slow rate of oral intake  -straws ok  -alternate liquids/solids  -pills via puree textures  -if pt demonstrates any change/decline in medical/mental/respiratory status please make NPO and alert SLP     SLP PLAN:  Skilled SLP  Services: Skilled SLP intervention for dysphagia is warranted.  SLP Frequency: 2x per week  Duration: 2-3 weeks  Treatment/Interventions:   - Compensatory strategy training  - Diet tolerance/advancement    Discussed POC: patient  Discussed Risks/Benefits: Yes  Patient/Caregiver Agreeable: Yes    Short term goals established:  Pt will tolerate least restrictive diet with no overt clinical s/s aspiration 100% of the time.   Start Date: 10/1/24  End Date: 10/15/24  Status: Progressing    Pt will recall/utilize safe swallow guidelines as provided by %   Start Date: 10/1/24  End Date: 10/15/24  Status: Progressing    Education Provided: Results and recommendations per MBSS, with video review; recommendations and POC at this time. Verbal understanding and agreement given on all accounts.     Treatment Provided Today: SLP provided extensive education and training to pt/pt family regarding anatomy/physiology of swallow function, risk factors of aspiration/aspiration pna & how to mitigate factors, diet modifications, and the use of compensatory swallow strategies to promote pt safety upon PO intake.    Additional Medical Consults Suggested:   Clinical Dietician    Repeat Study: Tentatively in 1-2 weeks to determine improvement in swallow function/readiness for diet/liquid advancement    Mechanics of the Swallow Summary:  ORAL PHASE:  Lip Closure - Intact  Bolus prep/mastication - Intact  Bolus transport/lingual motion - Impaired  Oral residue - Min amounts present across tested consistencies    PHARYNGEAL PHASE:  Initiation of pharyngeal swallow - Delayed  Soft palate elevation - Intact  Laryngeal elevation - Reduced  Anterior hyoid excursion - Reduced  Epiglottic movement - Reduced  Laryngeal vestibule closure - Impaired  Pharyngeal stripping wave - Impaired  Pharyngoesophageal segment opening - Impaired  Tongue base retraction - Impaired  Pharyngeal residue - Min amounts with liquid consistencies; Moderate amounts  with puree consistencies    ESOPHAGEAL PHASE:  Esophageal clearance - Not evaluated     SLP Impressions with Severity Rating:   Pt present with moderate oropharyngeal dysphagia. Decreased bolus formation, control and A-P transit resulting in premature spillage into the pharynx across tested consistencies. Delayed swallow onset with reduced laryngeal elevation and poor pharyngeal squeeze resulting in mistiming of laryngeal vestibular closure and deep laryngeal penetration with thin liquids. Penetrated material remains in the laryngeal vestibule and is visualized draining down to the apex of vocal cords; pt unable to eject material despite max cues/attempt 2/2 weak volitional cough. Pt is at a high risk of aspiration with thin liquids over the course of PO intake. No additional penetration or aspiration viewed with mildly thick liquids, moderately thick liquids or puree consistencies. Moderate amounts of pharyngeal residue viewed with puree consistencies (2/2 to above listed pharyngeal deficits). Majority of pharyngeal residue clears with mildly thick liquid wash. SLP recommends initiation of Puree (level 4) diet/Mildly thick liquids. See additional PO intake guidelines outlined above. If pt demonstrates any change/decline in medical/mental/respiratory status please make NPO and alert SLP. Question pt's ability to maintain nutrition/hydration orally at this time; defer to medical team/clinical dietician for removal of DHT. Will continue to follow while in acute care setting to ensure diet tolerance and use of safe swallow guidelines.     OUTCOME MEASURES:      Rosenbek's Penetration Aspiration Scale  Thin Liquids: 5. DEEP PENETRATION with HIGH ASPIRATION risk - contrast contacts vocal cords, visible residue  Nectar Thick Liquids: 1. NO ASPIRATION & NO PENETRATION - no aspiration, contrast does not enter airway  Honey Thick Liquids: 1. NO ASPIRATION & NO PENETRATION - no aspiration, contrast does not enter  airway  Puree: 1. NO ASPIRATION & NO PENETRATION - no aspiration, contrast does not enter airway

## 2024-10-02 NOTE — HOSPITAL COURSE
Kelly Martinez is a 79 y.o. 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 Workup revealed CTH R cerebellar and cerebellar vermis hemorrhage with 4th ventricular effacement, CT C spine with prior C5-7 ACDF, CTA concerning for L PICA dAVM. Patient reversed with kcentra. Patient admitted to neurosurgery service for further management.     9/21 TTE 74% EF, no wma. Repeat CTH with stable ICH.   9/22 MRI/V motion degraded, no obvious underlying mass or vascular lesion.  9/23 BLE DVT US chronic changes. S/p angio SM2 L cerebellar AVM with L SCA/PICA feeders.  9/24 INR 2.3 s/p 10mg Vit K (3 doses).   9/25 s/p 2u FFP, s/p 40IV lasix, 9/25 angio SM1 cerebellar AVM, primary feeders L PICA, R>L SCA feeders.  9/30 DVT BLE US chronic changes in R proximal fem, L common fem otherwise negative. S/p extubation.   10/2 Repeat SLP evaluation/MBS, patient passed for pureed diet and thickened liquids.   10/3 Midline placed to continue IV ABX for PNA at SNF through 10/6.   10/4 Down trend in sCre, nephrology signed off, outpatient referral placed for follow up.     PT/OT evaluated patient and recommended SNF placement.     Patient discharged in stable condition with detailed instructions and scheduled outpatient follow up.

## 2024-10-02 NOTE — SIGNIFICANT EVENT
RADAR Note     10/02/24 1551   Onset Documentation   Rapid Response Initiated By Radar auto page   [place] [facility] [place]ager Time [phone]   Arrival Time 1624   Event End Time 1636   Level II Called No   Primary Reason for Call Radar auto page     RADAR of 7 received for VS of 36.8, 69, 22, 94/61, 95%.  Reviewed VS with bedside SUE Ramirez.  No acute changes in patient condition.  RN encouraged to page Rapid Response if further concerns in patient condition arise.

## 2024-10-02 NOTE — PROGRESS NOTES
"Kelly Martinez is a 79 y.o. female on day 11 of admission presenting with Cerebellar hemorrhage (Multi).    Subjective   No acute events overnight, desires discharge    Objective     Physical Exam  Awake, EOV  Ox2  Follows commands x4    Last Recorded Vitals  Blood pressure 134/73, pulse 83, temperature 36.3 °C (97.3 °F), temperature source Temporal, resp. rate 24, height 1.702 m (5' 7.01\"), weight 116 kg (255 lb 15.3 oz), SpO2 98%.  Intake/Output last 3 Shifts:  I/O last 3 completed shifts:  In: 2070 (17.8 mL/kg) [I.V.:250 (2.2 mL/kg); NG/GT:1620; IV Piggyback:200]  Out: 5955 (51.3 mL/kg) [Urine:5955 (1.4 mL/kg/hr)]  Weight: 116.1 kg     Relevant Results    Results for orders placed or performed during the hospital encounter of 09/21/24 (from the past 24 hour(s))   Heparin Assay   Result Value Ref Range    Heparin Unfractionated 0.1 See Comment Below for Therapeutic Ranges IU/mL   POCT GLUCOSE   Result Value Ref Range    POCT Glucose 118 (H) 74 - 99 mg/dL   POCT GLUCOSE   Result Value Ref Range    POCT Glucose 134 (H) 74 - 99 mg/dL   POCT GLUCOSE   Result Value Ref Range    POCT Glucose 134 (H) 74 - 99 mg/dL   POCT GLUCOSE   Result Value Ref Range    POCT Glucose 122 (H) 74 - 99 mg/dL   POCT GLUCOSE   Result Value Ref Range    POCT Glucose 102 (H) 74 - 99 mg/dL   POCT GLUCOSE   Result Value Ref Range    POCT Glucose 92 74 - 99 mg/dL                Assessment/Plan   Assessment & Plan  Cerebellar hemorrhage (Multi)    AVM (arteriovenous malformation) (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 (s/p IVC filter removed 4/2016), JOY (CPAP at night), COPD, CKD stage 4, chronic tobacco use, schizophrenia, depression, p/w AMS, hypertensive 's, recent fall 2days ago, CTH R cerebellar and cerebellar vermis hemorrhage with 4th ventricular effacement, stable vents, CT C spine C5-7 ACDF, CTA c/f L PICA dAVM, s/p kcentra, TTE 74% EF, no wma, rCTH stable ICH, c/f L " PICA dAVM, 9/22 MRI/V motion degraded, no obvious underlying mass or vascular lesion  9/23 BLE DVT US chronic changes, 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

## 2024-10-02 NOTE — PROGRESS NOTES
Care Transitions Progress Note: TCC reached out to patient's daughter Paola who has decided on [facility].  TCC requested facility to initiate precert.

## 2024-10-02 NOTE — PROGRESS NOTES
Physical Therapy                 Therapy Communication Note    Patient Name: Kelly Martinez  MRN: [id number]  Department: [facility]  Room: [facility]4035-A  Today's Date: 10/2/2024     Discipline: Physical Therapy    Missed Visit Reason: Other (Comment) (Off division)    Missed Time: 840  Attempt

## 2024-10-02 NOTE — PROGRESS NOTES
Kelly Martinez   79 y.o.     MRN/Room: [id number]/[id number]/[id number]    Subjective:  Breathing feels okay and belly feels okay. Denies chest pain.  Per nursing, having multiple small volume watery bowel movements for the last few days.   Pt failed swallow study and is NPO with ice chips, not on fluids.    Objective:     Meds:   [START ON 10/3/2024] amLODIPine, 10 mg, Daily  [START ON 10/3/2024] atorvastatin, 40 mg, Daily  buPROPion XL, 150 mg, Daily  carvedilol, 25 mg, BID  cefTAZidime, 2 g, Daily  [Held by provider] DULoxetine, 30 mg, Daily  [START ON 10/3/2024] folic acid, 1 mg, Daily  heparin (porcine), 5,000 Units, q8h  hydrALAZINE, 50 mg, q8h JAJA  insulin lispro, 0-5 Units, q4h  isosorbide dinitrate, 10 mg, TID  [Held by provider] lisinopril, 40 mg, Daily  mometasone, 2 puff, BID  oxygen, , Continuous - Inhalation  polyethylene glycol, 17 g, BID  QUEtiapine, 300 mg, Nightly  sennosides-docusate sodium, 1 tablet, BID  sodium chloride, 3 mL, TID  [START ON 10/3/2024] terazosin, 2 mg, Daily  [START ON 10/3/2024] thiamine, 100 mg, Daily           acetaminophen, 650 mg, q6h PRN  albuterol, 2.5 mg, q4h PRN  dextrose, 12.5 g, q15 min PRN  dextrose, 25 g, q15 min PRN  glucagon, 1 mg, q15 min PRN  glucagon, 1 mg, q15 min PRN  oxygen, , Continuous PRN - O2/gases  traZODone, 150 mg, Nightly PRN      Temp:  [36.1 °C (97 °F)-36.8 °C (98.2 °F)] 36.8 °C (98.2 °F)  Heart Rate:  [64-83] 65  Resp:  [16-24] 17  BP: (103-138)/(59-95) 103/59  Arterial Line BP 1: (140)/(57) 140/57         Intake/Output Summary (Last 24 hours) at 10/2/2024 1315  Last data filed at 10/2/2024 1200  Gross per 24 hour   Intake 405 ml   Output 1125 ml   Net -720 ml     Physical Exam:  General appearance: eyes still closed but responding appropriately  Eyes: closed  Skin: no apparent rash, warm, dry  Heart: S1 S2 regular  Lungs: Diminished air entry bilaterally, minimal crackles now at bases only  Abdomen: soft, nt/nd  Extremities: trace bilat lower extremity  edema  Cavazos draining clear light yellow urine  Neuro: eyes closed, able to respond to questions but sometimes required question repeated, full exam not completed    Blood Labs:  Results for orders placed or performed during the hospital encounter of 09/21/24 (from the past 24 hour(s))   POCT GLUCOSE   Result Value Ref Range    POCT Glucose 134 (H) 74 - 99 mg/dL   POCT GLUCOSE   Result Value Ref Range    POCT Glucose 122 (H) 74 - 99 mg/dL   POCT GLUCOSE   Result Value Ref Range    POCT Glucose 102 (H) 74 - 99 mg/dL   POCT GLUCOSE   Result Value Ref Range    POCT Glucose 92 74 - 99 mg/dL   Heparin Assay   Result Value Ref Range    Heparin Unfractionated 0.1 See Comment Below for Therapeutic Ranges IU/mL   CBC and Auto Differential   Result Value Ref Range    WBC 9.9 4.4 - 11.3 x10*3/uL    nRBC 0.0 0.0 - 0.0 /100 WBCs    RBC 3.41 (L) 4.00 - 5.20 x10*6/uL    Hemoglobin 10.1 (L) 12.0 - 16.0 g/dL    Hematocrit 30.9 (L) 36.0 - 46.0 %    MCV 91 80 - 100 fL    MCH 29.6 26.0 - 34.0 pg    MCHC 32.7 32.0 - 36.0 g/dL    RDW 15.9 (H) 11.5 - 14.5 %    Platelets 161 150 - 450 x10*3/uL    Neutrophils % 76.7 40.0 - 80.0 %    Immature Granulocytes %, Automated 2.7 (H) 0.0 - 0.9 %    Lymphocytes % 8.4 13.0 - 44.0 %    Monocytes % 7.7 2.0 - 10.0 %    Eosinophils % 4.2 0.0 - 6.0 %    Basophils % 0.3 0.0 - 2.0 %    Neutrophils Absolute 7.59 (H) 1.60 - 5.50 x10*3/uL    Immature Granulocytes Absolute, Automated 0.27 0.00 - 0.50 x10*3/uL    Lymphocytes Absolute 0.83 0.80 - 3.00 x10*3/uL    Monocytes Absolute 0.76 0.05 - 0.80 x10*3/uL    Eosinophils Absolute 0.42 (H) 0.00 - 0.40 x10*3/uL    Basophils Absolute 0.03 0.00 - 0.10 x10*3/uL   Renal Function Panel   Result Value Ref Range    Glucose 107 (H) 74 - 99 mg/dL    Sodium 142 136 - 145 mmol/L    Potassium 3.6 3.5 - 5.3 mmol/L    Chloride 107 98 - 107 mmol/L    Bicarbonate 27 21 - 32 mmol/L    Anion Gap 12 10 - 20 mmol/L    Urea Nitrogen 46 (H) 6 - 23 mg/dL    Creatinine 3.21 (H) 0.50 -  1.05 mg/dL    eGFR 14 (L) >60 mL/min/1.73m*2    Calcium 9.4 8.6 - 10.6 mg/dL    Phosphorus 2.2 (L) 2.5 - 4.9 mg/dL    Albumin 3.1 (L) 3.4 - 5.0 g/dL   Magnesium   Result Value Ref Range    Magnesium 1.80 1.60 - 2.40 mg/dL   POCT GLUCOSE   Result Value Ref Range    POCT Glucose 105 (H) 74 - 99 mg/dL   Vancomycin   Result Value Ref Range    Vancomycin 18.9 5.0 - 20.0 ug/mL      ASSESSMENT:  Kelly Martinez is a  79 y.o. F with PMH CKD IV (baseline Cr 2-2.3), history of PE, HFpEF, HTN, HLD, JOY, schizophrenia, COPD who is currently admitted to the [facility] after presenting with cerebellar hemorrhage on 09/21 after a fall. Nephrology consulted previously for recommendations re: contrast induced nephropathy prophylaxis. Now re-engaged for diuretic recommendations, NICOLÁS on CKD.     #NICOLÁS on CKD4  -baseline Cr ~2, inconsistent outpatient results  -s/p bumex diuresis, now NPO with tube feeds  -Serum Cr stably worse 3.4   -Etiology: Likely due to contrast injury, hemodynamic mediated ATN, possible prerenal in setting of recent NPO status and decreased IVF from prior  -Oxygen requirements decreasing, extubated    RECOMMENDATIONS:  -Consider reassessing volume status with IVC ultrasound or orthostatic vitals, may now be intravascularly depleted  -Consider trial of 500cc normal saline to assess prerenal contribution to NICOLÁS (hopefully stabilizing), would expect improvement in Cr   -Strict I and O charting  -Avoid further contrast  -Dose medications for renal function    Heavenly Beaulieu MD  Nephrology Fellow   Nephrology pager [phone]

## 2024-10-03 LAB
ALBUMIN SERPL BCP-MCNC: 2.9 G/DL (ref 3.4–5)
ANION GAP SERPL CALC-SCNC: 13 MMOL/L (ref 10–20)
BASOPHILS # BLD AUTO: 0.04 X10*3/UL (ref 0–0.1)
BASOPHILS NFR BLD AUTO: 0.4 %
BUN SERPL-MCNC: 41 MG/DL (ref 6–23)
CALCIUM SERPL-MCNC: 9.1 MG/DL (ref 8.6–10.6)
CHLORIDE SERPL-SCNC: 110 MMOL/L (ref 98–107)
CO2 SERPL-SCNC: 25 MMOL/L (ref 21–32)
CREAT SERPL-MCNC: 3.1 MG/DL (ref 0.5–1.05)
EGFRCR SERPLBLD CKD-EPI 2021: 15 ML/MIN/1.73M*2
EOSINOPHIL # BLD AUTO: 0.3 X10*3/UL (ref 0–0.4)
EOSINOPHIL NFR BLD AUTO: 3.3 %
ERYTHROCYTE [DISTWIDTH] IN BLOOD BY AUTOMATED COUNT: 15.9 % (ref 11.5–14.5)
GLUCOSE BLD MANUAL STRIP-MCNC: 146 MG/DL (ref 74–99)
GLUCOSE BLD MANUAL STRIP-MCNC: 95 MG/DL (ref 74–99)
GLUCOSE SERPL-MCNC: 85 MG/DL (ref 74–99)
HCT VFR BLD AUTO: 30.5 % (ref 36–46)
HGB BLD-MCNC: 10 G/DL (ref 12–16)
IMM GRANULOCYTES # BLD AUTO: 0.19 X10*3/UL (ref 0–0.5)
IMM GRANULOCYTES NFR BLD AUTO: 2.1 % (ref 0–0.9)
LYMPHOCYTES # BLD AUTO: 0.88 X10*3/UL (ref 0.8–3)
LYMPHOCYTES NFR BLD AUTO: 9.8 %
MAGNESIUM SERPL-MCNC: 1.89 MG/DL (ref 1.6–2.4)
MCH RBC QN AUTO: 29.7 PG (ref 26–34)
MCHC RBC AUTO-ENTMCNC: 32.8 G/DL (ref 32–36)
MCV RBC AUTO: 91 FL (ref 80–100)
MONOCYTES # BLD AUTO: 0.77 X10*3/UL (ref 0.05–0.8)
MONOCYTES NFR BLD AUTO: 8.6 %
NEUTROPHILS # BLD AUTO: 6.79 X10*3/UL (ref 1.6–5.5)
NEUTROPHILS NFR BLD AUTO: 75.8 %
NRBC BLD-RTO: 0 /100 WBCS (ref 0–0)
PHOSPHATE SERPL-MCNC: 2.7 MG/DL (ref 2.5–4.9)
PLATELET # BLD AUTO: 150 X10*3/UL (ref 150–450)
POTASSIUM SERPL-SCNC: 3.8 MMOL/L (ref 3.5–5.3)
RBC # BLD AUTO: 3.37 X10*6/UL (ref 4–5.2)
SODIUM SERPL-SCNC: 144 MMOL/L (ref 136–145)
WBC # BLD AUTO: 9 X10*3/UL (ref 4.4–11.3)

## 2024-10-03 PROCEDURE — 83735 ASSAY OF MAGNESIUM: CPT | Performed by: NURSE PRACTITIONER

## 2024-10-03 PROCEDURE — 2500000004 HC RX 250 GENERAL PHARMACY W/ HCPCS (ALT 636 FOR OP/ED): Performed by: NURSE PRACTITIONER

## 2024-10-03 PROCEDURE — 2500000002 HC RX 250 W HCPCS SELF ADMINISTERED DRUGS (ALT 637 FOR MEDICARE OP, ALT 636 FOR OP/ED): Performed by: PHYSICIAN ASSISTANT

## 2024-10-03 PROCEDURE — 94667 MNPJ CHEST WALL 1ST: CPT

## 2024-10-03 PROCEDURE — 2500000001 HC RX 250 WO HCPCS SELF ADMINISTERED DRUGS (ALT 637 FOR MEDICARE OP): Performed by: PHYSICIAN ASSISTANT

## 2024-10-03 PROCEDURE — 36415 COLL VENOUS BLD VENIPUNCTURE: CPT | Performed by: NURSE PRACTITIONER

## 2024-10-03 PROCEDURE — 2500000004 HC RX 250 GENERAL PHARMACY W/ HCPCS (ALT 636 FOR OP/ED)

## 2024-10-03 PROCEDURE — 94668 MNPJ CHEST WALL SBSQ: CPT

## 2024-10-03 PROCEDURE — 80069 RENAL FUNCTION PANEL: CPT | Performed by: NURSE PRACTITIONER

## 2024-10-03 PROCEDURE — 82947 ASSAY GLUCOSE BLOOD QUANT: CPT

## 2024-10-03 PROCEDURE — 2500000005 HC RX 250 GENERAL PHARMACY W/O HCPCS: Performed by: NURSE PRACTITIONER

## 2024-10-03 PROCEDURE — 85025 COMPLETE CBC W/AUTO DIFF WBC: CPT | Performed by: NURSE PRACTITIONER

## 2024-10-03 PROCEDURE — 1200000002 HC GENERAL ROOM WITH TELEMETRY DAILY

## 2024-10-03 PROCEDURE — 94640 AIRWAY INHALATION TREATMENT: CPT

## 2024-10-03 RX ORDER — WARFARIN 3 MG/1
TABLET ORAL
Start: 2024-10-03 | End: 2024-10-04 | Stop reason: HOSPADM

## 2024-10-03 RX ORDER — AMOXICILLIN 250 MG
1 CAPSULE ORAL 2 TIMES DAILY
Start: 2024-10-03

## 2024-10-03 RX ORDER — POLYETHYLENE GLYCOL 3350 17 G/17G
17 POWDER, FOR SOLUTION ORAL 2 TIMES DAILY
Start: 2024-10-03

## 2024-10-03 RX ORDER — AMLODIPINE BESYLATE 10 MG/1
10 TABLET ORAL DAILY
Start: 2024-10-03

## 2024-10-03 RX ORDER — SODIUM CHLORIDE FOR INHALATION 3 %
3 VIAL, NEBULIZER (ML) INHALATION EVERY 6 HOURS PRN
Status: DISCONTINUED | OUTPATIENT
Start: 2024-10-03 | End: 2024-10-04 | Stop reason: HOSPADM

## 2024-10-03 RX ORDER — HEPARIN SODIUM 5000 [USP'U]/ML
5000 INJECTION, SOLUTION INTRAVENOUS; SUBCUTANEOUS EVERY 8 HOURS
Start: 2024-10-03

## 2024-10-03 RX ORDER — WARFARIN 1 MG/1
TABLET ORAL
Start: 2024-10-03 | End: 2024-10-04

## 2024-10-03 RX ORDER — ACETAMINOPHEN 325 MG/1
650 TABLET ORAL EVERY 6 HOURS PRN
Start: 2024-10-03

## 2024-10-03 RX ORDER — DULOXETIN HYDROCHLORIDE 30 MG/1
30 CAPSULE, DELAYED RELEASE ORAL DAILY
Start: 2024-10-03

## 2024-10-03 RX ORDER — SODIUM CHLORIDE FOR INHALATION 3 %
3 VIAL, NEBULIZER (ML) INHALATION EVERY 6 HOURS PRN
Start: 2024-10-03

## 2024-10-03 RX ORDER — SODIUM CHLORIDE FOR INHALATION 3 %
3 VIAL, NEBULIZER (ML) INHALATION
Status: CANCELLED
Start: 2024-10-03

## 2024-10-03 RX ORDER — NAPROXEN SODIUM 220 MG/1
81 TABLET, FILM COATED ORAL DAILY
Start: 2024-10-03 | End: 2024-10-04

## 2024-10-03 ASSESSMENT — COGNITIVE AND FUNCTIONAL STATUS - GENERAL
CLIMB 3 TO 5 STEPS WITH RAILING: TOTAL
MOVING FROM LYING ON BACK TO SITTING ON SIDE OF FLAT BED WITH BEDRAILS: A LOT
TURNING FROM BACK TO SIDE WHILE IN FLAT BAD: TOTAL
MOBILITY SCORE: 7
PERSONAL GROOMING: TOTAL
PERSONAL GROOMING: TOTAL
DRESSING REGULAR UPPER BODY CLOTHING: TOTAL
DRESSING REGULAR LOWER BODY CLOTHING: TOTAL
WALKING IN HOSPITAL ROOM: TOTAL
DAILY ACTIVITIY SCORE: 7
EATING MEALS: A LOT
HELP NEEDED FOR BATHING: TOTAL
TOILETING: TOTAL
WALKING IN HOSPITAL ROOM: TOTAL
STANDING UP FROM CHAIR USING ARMS: TOTAL
DRESSING REGULAR UPPER BODY CLOTHING: TOTAL
TURNING FROM BACK TO SIDE WHILE IN FLAT BAD: TOTAL
DAILY ACTIVITIY SCORE: 7
MOVING TO AND FROM BED TO CHAIR: TOTAL
CLIMB 3 TO 5 STEPS WITH RAILING: TOTAL
HELP NEEDED FOR BATHING: TOTAL
MOVING TO AND FROM BED TO CHAIR: TOTAL
DRESSING REGULAR LOWER BODY CLOTHING: TOTAL
EATING MEALS: A LOT
TOILETING: TOTAL
STANDING UP FROM CHAIR USING ARMS: TOTAL
MOBILITY SCORE: 7
MOVING FROM LYING ON BACK TO SITTING ON SIDE OF FLAT BED WITH BEDRAILS: A LOT

## 2024-10-03 ASSESSMENT — PAIN SCALES - GENERAL
PAINLEVEL_OUTOF10: 0 - NO PAIN
PAINLEVEL_OUTOF10: 5 - MODERATE PAIN

## 2024-10-03 ASSESSMENT — PAIN - FUNCTIONAL ASSESSMENT
PAIN_FUNCTIONAL_ASSESSMENT: 0-10
PAIN_FUNCTIONAL_ASSESSMENT: 0-10

## 2024-10-03 NOTE — SIGNIFICANT EVENT
Nephrology consulted initially for renoprotective measures for CKD with angiogram, then re-consulted for fluid management. Pt with improving fluid status, extubated, and now with stable/downtrending Cr/BUN. Continue appropriate po intake and ensure hydration as NICOLÁS continues to resolve.  Pt had outpatient nephrology referral placed, should follow up.  Nephrology will sign off, please don't hesitate to reach out with questions.     ---  Heavenly Beaulieu MD (Anna)  PGY1, Internal Medicine  Available by Epic Chat

## 2024-10-03 NOTE — PROGRESS NOTES
"Kelly aMrtinez is a 79 y.o. female on day 12 of admission presenting with Cerebellar hemorrhage (Multi).    Subjective   Orthostatics performed. IV fluid bolus ordered.     Objective     Physical Exam  Awake, EOV  Ox2  Follows commands x4  BUE 5/5  BLE 4+/5    Last Recorded Vitals  Blood pressure 143/74, pulse 66, temperature 36.8 °C (98.2 °F), resp. rate 13, height 1.702 m (5' 7.01\"), weight 116 kg (255 lb 15.3 oz), SpO2 99%.  Intake/Output last 3 Shifts:  I/O last 3 completed shifts:  In: 1335 (11.5 mL/kg) [I.V.:10 (0.1 mL/kg); NG/GT:625; IV Piggyback:700]  Out: 2500 (21.5 mL/kg) [Urine:2500 (0.6 mL/kg/hr)]  Weight: 116.1 kg     Relevant Results    Results for orders placed or performed during the hospital encounter of 09/21/24 (from the past 24 hour(s))   Heparin Assay   Result Value Ref Range    Heparin Unfractionated 0.1 See Comment Below for Therapeutic Ranges IU/mL   CBC and Auto Differential   Result Value Ref Range    WBC 9.9 4.4 - 11.3 x10*3/uL    nRBC 0.0 0.0 - 0.0 /100 WBCs    RBC 3.41 (L) 4.00 - 5.20 x10*6/uL    Hemoglobin 10.1 (L) 12.0 - 16.0 g/dL    Hematocrit 30.9 (L) 36.0 - 46.0 %    MCV 91 80 - 100 fL    MCH 29.6 26.0 - 34.0 pg    MCHC 32.7 32.0 - 36.0 g/dL    RDW 15.9 (H) 11.5 - 14.5 %    Platelets 161 150 - 450 x10*3/uL    Neutrophils % 76.7 40.0 - 80.0 %    Immature Granulocytes %, Automated 2.7 (H) 0.0 - 0.9 %    Lymphocytes % 8.4 13.0 - 44.0 %    Monocytes % 7.7 2.0 - 10.0 %    Eosinophils % 4.2 0.0 - 6.0 %    Basophils % 0.3 0.0 - 2.0 %    Neutrophils Absolute 7.59 (H) 1.60 - 5.50 x10*3/uL    Immature Granulocytes Absolute, Automated 0.27 0.00 - 0.50 x10*3/uL    Lymphocytes Absolute 0.83 0.80 - 3.00 x10*3/uL    Monocytes Absolute 0.76 0.05 - 0.80 x10*3/uL    Eosinophils Absolute 0.42 (H) 0.00 - 0.40 x10*3/uL    Basophils Absolute 0.03 0.00 - 0.10 x10*3/uL   Renal Function Panel   Result Value Ref Range    Glucose 107 (H) 74 - 99 mg/dL    Sodium 142 136 - 145 mmol/L    Potassium 3.6 3.5 - 5.3 " mmol/L    Chloride 107 98 - 107 mmol/L    Bicarbonate 27 21 - 32 mmol/L    Anion Gap 12 10 - 20 mmol/L    Urea Nitrogen 46 (H) 6 - 23 mg/dL    Creatinine 3.21 (H) 0.50 - 1.05 mg/dL    eGFR 14 (L) >60 mL/min/1.73m*2    Calcium 9.4 8.6 - 10.6 mg/dL    Phosphorus 2.2 (L) 2.5 - 4.9 mg/dL    Albumin 3.1 (L) 3.4 - 5.0 g/dL   Magnesium   Result Value Ref Range    Magnesium 1.80 1.60 - 2.40 mg/dL   POCT GLUCOSE   Result Value Ref Range    POCT Glucose 105 (H) 74 - 99 mg/dL   Vancomycin   Result Value Ref Range    Vancomycin 18.9 5.0 - 20.0 ug/mL   POCT GLUCOSE   Result Value Ref Range    POCT Glucose 105 (H) 74 - 99 mg/dL   POCT GLUCOSE   Result Value Ref Range    POCT Glucose 126 (H) 74 - 99 mg/dL   POCT GLUCOSE   Result Value Ref Range    POCT Glucose 95 74 - 99 mg/dL                Assessment/Plan   Assessment & Plan  Cerebellar hemorrhage (Multi)    AVM (arteriovenous malformation) ([facility])    79 years old female with h/o parkinson's, HTN, HLD, HFpEF, 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 (s/p IVC filter removed 4/2016), JOY (CPAP at night), COPD, CKD stage 4, chronic tobacco use, schizophrenia, depression, p/w AMS, hypertensive 's, recent fall 2days ago, CTH R cerebellar and cerebellar vermis hemorrhage with 4th ventricular effacement, stable vents, CT C spine C5-7 ACDF, CTA c/f L PICA dAVM, s/p kcentra, TTE 74% EF, no wma, rCTH stable ICH, c/f L PICA dAVM, 9/22 MRI/V motion degraded, no obvious underlying mass or vascular lesion  9/23 BLE DVT US chronic changes, 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     9/24 INR 2.3 s/p 10mg Vit K (3 doses), 9/25 s/p 2u FFP, s/p 40IV lasix, 9/25 angio SM1 cerebellar AVM, primary feeders L PICA, R>L SCA feeders  9/30 s/p extubated  9/30 DVT BLE US chronic changes in R prox fem, L common fem o/w neg, s/p extubated  10/2 MBS passed for modified diet     Plan  tele  Con't vanc, ceftaz (10/6)  -150  Will follow up AM labs specifically creatinine  Hold ASA/warfarin,  will restart at follow up  [facility] med recs-AC restart when able, no IVCF, SO'd   Renal recs - 500cc bolus, orthostatics, follow up creatinine    medicine recs-RCRI class 4 risk   PTOT-SNF, precert pending  SCD, H    Tony Good MD

## 2024-10-03 NOTE — NURSING NOTE
Midline Insertion  Pre-Procedure Checklist:  Emergent Line Insertion: No  Type of Line to be Placed: Midline  Consent Obtained: Yes  Emergency Medication Necessary: No  Patient Identified with 2 Independent Identifiers: Yes  Review of Allergies, Anticoagulation, Relevant Labs, ECG/Telemetry: Yes  Risks/Benefits/Alternatives Discussed with Patient/POA/Legal Representative: Yes  Stop Sign on Door: Yes  Time Out Performed: Yes  Catheter Exchange: No    Positioning Checklist:  All People, Including Patient, in the Room with Cap and Mask: Yes  Fluoroscopy Used to Identify Vessel and Guide Insertion: No   Sterile Cover Used: Yes  Full Barrier Precautions Followed (Mask, Cap, Gown, Gloves): Yes  Hands Washed: Yes  Monitors Attached with Sound Alarms On: No  Full Body Sterile Drape (Head-to-Toe) Used to Cover Patient: Yes  Trendelenburg Position (For IJ and Subclavian): No  CHG Skin Prep Used and Allowed to Air Dry to Skin Procedure: Yes    Procedure Checklist:  Blood Aspirated From All Lumens, All Ports Subsequently Flushed: Yes  Catheter Caps Placed on All Lumens; Lumens Clamped: Yes  Maintain Guidewire Control Throughout, Ensuring Guidewire Removal: Yes  Maintain Sterile Field Throughout Insertion: Yes  Catheter Secured: Yes  Confirmatory Test of Venous Placement: Non-Pulsatile Blood    Post Procedure Checklist:  Date and Time Written on Dressing: Yes  Sharp and Wire Count and Safe Disposal of all Sharps/Wires: Yes  Sterile Dressing Applied Per Protocol: Yes  X-ray Ordered or ECG Image: N/A    Insertion Details:  Size (Fr): 3  Lumen Type: Single  Catheter to Vein Ratio Less Than 50%: Yes  Total Length (cm): 13  External Length (cm): 0  Orientation: Left  Location: Basilic  Site Prep: Chlorohexidine; Usual sterile procedure followed  Local Anesthetic: Injectable/Subcutaneous  Indication: Infusion  Insertion Team Members in the Room: Nurse, LPN  Initial Extremity Circumference (cm): 37  Insertion Attempts: 1  Patient  Tolerance: Tolerated Well, Age Appropriate  Comfort Measures: Subcutaneous anesthetic; Verbal  Procedure Location: Bedside  Safety Measures: Patient specific safety measures addressed with RN  Estimated Blood Loss (mL): 1  Vessel Fully Compressible Proximally and Distally to Insertion Site: Yes  Brisk Blood Return Obtained and Line Draws Easily: Yes  Tip Location: Left axilla  Line Confirmation: Venous return  Lot #: [id number]  : Bard  Line Exp Date: 10/31/2025  Securement: Stat Lock  Post Procedure Checklist: Handoff with RN; Obtain all new IV tubing prior to use; Bed at lowest level and wheels locked; Line discharge information at bedside.  Additional Details: Line was inserted using Modified Seldinger Technique.   Placed by: Austin Islas RN

## 2024-10-03 NOTE — SIGNIFICANT EVENT
Nephrology team contacted for midline clearance. Showing clinical signs of renal recovery, nephrology team okay with MIDLINE in upper arm in the setting of NICOLÁS and GFR of only 14.   ---  Heavenly Beaulieu MD (Anna)  PGY1, Internal Medicine  Available by Epic Chat

## 2024-10-04 ENCOUNTER — APPOINTMENT (OUTPATIENT)
Dept: CARDIOLOGY | Facility: HOSPITAL | Age: 79
End: 2024-10-04
Payer: COMMERCIAL

## 2024-10-04 VITALS
SYSTOLIC BLOOD PRESSURE: 128 MMHG | WEIGHT: 255.95 LBS | HEART RATE: 70 BPM | OXYGEN SATURATION: 100 % | BODY MASS INDEX: 40.17 KG/M2 | RESPIRATION RATE: 19 BRPM | HEIGHT: 67 IN | DIASTOLIC BLOOD PRESSURE: 70 MMHG | TEMPERATURE: 97.7 F

## 2024-10-04 LAB
ALBUMIN SERPL BCP-MCNC: 2.9 G/DL (ref 3.4–5)
ANION GAP SERPL CALC-SCNC: 11 MMOL/L (ref 10–20)
BASOPHILS # BLD MANUAL: 0.08 X10*3/UL (ref 0–0.1)
BASOPHILS NFR BLD MANUAL: 0.9 %
BUN SERPL-MCNC: 36 MG/DL (ref 6–23)
CALCIUM SERPL-MCNC: 9.5 MG/DL (ref 8.6–10.6)
CHLORIDE SERPL-SCNC: 111 MMOL/L (ref 98–107)
CO2 SERPL-SCNC: 27 MMOL/L (ref 21–32)
CREAT SERPL-MCNC: 2.87 MG/DL (ref 0.5–1.05)
EGFRCR SERPLBLD CKD-EPI 2021: 16 ML/MIN/1.73M*2
EOSINOPHIL # BLD MANUAL: 0.15 X10*3/UL (ref 0–0.4)
EOSINOPHIL NFR BLD MANUAL: 1.7 %
ERYTHROCYTE [DISTWIDTH] IN BLOOD BY AUTOMATED COUNT: 16 % (ref 11.5–14.5)
GLUCOSE BLD MANUAL STRIP-MCNC: 101 MG/DL (ref 74–99)
GLUCOSE BLD MANUAL STRIP-MCNC: 102 MG/DL (ref 74–99)
GLUCOSE BLD MANUAL STRIP-MCNC: 157 MG/DL (ref 74–99)
GLUCOSE BLD MANUAL STRIP-MCNC: 167 MG/DL (ref 74–99)
GLUCOSE SERPL-MCNC: 88 MG/DL (ref 74–99)
HCT VFR BLD AUTO: 29.4 % (ref 36–46)
HGB BLD-MCNC: 9.5 G/DL (ref 12–16)
IMM GRANULOCYTES # BLD AUTO: 0.16 X10*3/UL (ref 0–0.5)
IMM GRANULOCYTES NFR BLD AUTO: 1.9 % (ref 0–0.9)
LYMPHOCYTES # BLD MANUAL: 0.81 X10*3/UL (ref 0.8–3)
LYMPHOCYTES NFR BLD MANUAL: 9.4 %
MAGNESIUM SERPL-MCNC: 1.96 MG/DL (ref 1.6–2.4)
MCH RBC QN AUTO: 30 PG (ref 26–34)
MCHC RBC AUTO-ENTMCNC: 32.3 G/DL (ref 32–36)
MCV RBC AUTO: 93 FL (ref 80–100)
MONOCYTES # BLD MANUAL: 0.58 X10*3/UL (ref 0.05–0.8)
MONOCYTES NFR BLD MANUAL: 6.8 %
NEUTS SEG # BLD MANUAL: 6.98 X10*3/UL (ref 1.6–5)
NEUTS SEG NFR BLD MANUAL: 81.2 %
NRBC BLD-RTO: 0 /100 WBCS (ref 0–0)
PHOSPHATE SERPL-MCNC: 2.5 MG/DL (ref 2.5–4.9)
PLATELET # BLD AUTO: 134 X10*3/UL (ref 150–450)
POTASSIUM SERPL-SCNC: 3.8 MMOL/L (ref 3.5–5.3)
RBC # BLD AUTO: 3.17 X10*6/UL (ref 4–5.2)
RBC MORPH BLD: ABNORMAL
SODIUM SERPL-SCNC: 145 MMOL/L (ref 136–145)
TOTAL CELLS COUNTED BLD: 117
WBC # BLD AUTO: 8.6 X10*3/UL (ref 4.4–11.3)

## 2024-10-04 PROCEDURE — 94668 MNPJ CHEST WALL SBSQ: CPT

## 2024-10-04 PROCEDURE — 93005 ELECTROCARDIOGRAM TRACING: CPT

## 2024-10-04 PROCEDURE — 2500000004 HC RX 250 GENERAL PHARMACY W/ HCPCS (ALT 636 FOR OP/ED): Performed by: NURSE PRACTITIONER

## 2024-10-04 PROCEDURE — 93010 ELECTROCARDIOGRAM REPORT: CPT | Performed by: INTERNAL MEDICINE

## 2024-10-04 PROCEDURE — 2500000001 HC RX 250 WO HCPCS SELF ADMINISTERED DRUGS (ALT 637 FOR MEDICARE OP): Performed by: PHYSICIAN ASSISTANT

## 2024-10-04 PROCEDURE — 36416 COLLJ CAPILLARY BLOOD SPEC: CPT | Performed by: NURSE PRACTITIONER

## 2024-10-04 PROCEDURE — 85027 COMPLETE CBC AUTOMATED: CPT | Performed by: NURSE PRACTITIONER

## 2024-10-04 PROCEDURE — 2500000002 HC RX 250 W HCPCS SELF ADMINISTERED DRUGS (ALT 637 FOR MEDICARE OP, ALT 636 FOR OP/ED): Performed by: NURSE PRACTITIONER

## 2024-10-04 PROCEDURE — 97530 THERAPEUTIC ACTIVITIES: CPT | Mod: GP

## 2024-10-04 PROCEDURE — 2500000002 HC RX 250 W HCPCS SELF ADMINISTERED DRUGS (ALT 637 FOR MEDICARE OP, ALT 636 FOR OP/ED): Performed by: PHYSICIAN ASSISTANT

## 2024-10-04 PROCEDURE — 97110 THERAPEUTIC EXERCISES: CPT | Mod: GP

## 2024-10-04 PROCEDURE — 84100 ASSAY OF PHOSPHORUS: CPT | Performed by: NURSE PRACTITIONER

## 2024-10-04 PROCEDURE — 82947 ASSAY GLUCOSE BLOOD QUANT: CPT

## 2024-10-04 PROCEDURE — 83735 ASSAY OF MAGNESIUM: CPT | Performed by: NURSE PRACTITIONER

## 2024-10-04 PROCEDURE — 2500000005 HC RX 250 GENERAL PHARMACY W/O HCPCS: Performed by: NURSE PRACTITIONER

## 2024-10-04 PROCEDURE — 85007 BL SMEAR W/DIFF WBC COUNT: CPT | Performed by: NURSE PRACTITIONER

## 2024-10-04 RX ORDER — WARFARIN 1 MG/1
TABLET ORAL
Start: 2024-10-04 | End: 2024-10-04 | Stop reason: HOSPADM

## 2024-10-04 RX ORDER — NAPROXEN SODIUM 220 MG/1
81 TABLET, FILM COATED ORAL DAILY
Start: 2024-10-04

## 2024-10-04 ASSESSMENT — COGNITIVE AND FUNCTIONAL STATUS - GENERAL
MOVING FROM LYING ON BACK TO SITTING ON SIDE OF FLAT BED WITH BEDRAILS: A LOT
CLIMB 3 TO 5 STEPS WITH RAILING: TOTAL
STANDING UP FROM CHAIR USING ARMS: A LOT
MOBILITY SCORE: 11
MOVING TO AND FROM BED TO CHAIR: A LOT
TURNING FROM BACK TO SIDE WHILE IN FLAT BAD: A LOT
WALKING IN HOSPITAL ROOM: A LOT

## 2024-10-04 ASSESSMENT — PAIN SCALES - GENERAL: PAINLEVEL_OUTOF10: 0 - NO PAIN

## 2024-10-04 ASSESSMENT — PAIN - FUNCTIONAL ASSESSMENT: PAIN_FUNCTIONAL_ASSESSMENT: 0-10

## 2024-10-04 NOTE — PROGRESS NOTES
"Kelly Martinez is a 79 y.o. female on day 13 of admission presenting with Cerebellar hemorrhage (Multi).    Subjective   No acute events overnight     Objective     Physical Exam  Awake, EOV  Ox2  Follows commands x4  BUE 5/5  BLE 4+/5    Last Recorded Vitals  Blood pressure 139/77, pulse 75, temperature 36.3 °C (97.3 °F), temperature source Temporal, resp. rate 21, height 1.702 m (5' 7.01\"), weight 116 kg (255 lb 15.3 oz), SpO2 93%.  Intake/Output last 3 Shifts:  I/O last 3 completed shifts:  In: 1025 (8.8 mL/kg) [P.O.:240; NG/GT:235; IV Piggyback:550]  Out: - (0 mL/kg)   Weight: 116.1 kg     Relevant Results    Results for orders placed or performed during the hospital encounter of 09/21/24 (from the past 24 hour(s))   CBC and Auto Differential   Result Value Ref Range    WBC 9.0 4.4 - 11.3 x10*3/uL    nRBC 0.0 0.0 - 0.0 /100 WBCs    RBC 3.37 (L) 4.00 - 5.20 x10*6/uL    Hemoglobin 10.0 (L) 12.0 - 16.0 g/dL    Hematocrit 30.5 (L) 36.0 - 46.0 %    MCV 91 80 - 100 fL    MCH 29.7 26.0 - 34.0 pg    MCHC 32.8 32.0 - 36.0 g/dL    RDW 15.9 (H) 11.5 - 14.5 %    Platelets 150 150 - 450 x10*3/uL    Neutrophils % 75.8 40.0 - 80.0 %    Immature Granulocytes %, Automated 2.1 (H) 0.0 - 0.9 %    Lymphocytes % 9.8 13.0 - 44.0 %    Monocytes % 8.6 2.0 - 10.0 %    Eosinophils % 3.3 0.0 - 6.0 %    Basophils % 0.4 0.0 - 2.0 %    Neutrophils Absolute 6.79 (H) 1.60 - 5.50 x10*3/uL    Immature Granulocytes Absolute, Automated 0.19 0.00 - 0.50 x10*3/uL    Lymphocytes Absolute 0.88 0.80 - 3.00 x10*3/uL    Monocytes Absolute 0.77 0.05 - 0.80 x10*3/uL    Eosinophils Absolute 0.30 0.00 - 0.40 x10*3/uL    Basophils Absolute 0.04 0.00 - 0.10 x10*3/uL   Renal Function Panel   Result Value Ref Range    Glucose 85 74 - 99 mg/dL    Sodium 144 136 - 145 mmol/L    Potassium 3.8 3.5 - 5.3 mmol/L    Chloride 110 (H) 98 - 107 mmol/L    Bicarbonate 25 21 - 32 mmol/L    Anion Gap 13 10 - 20 mmol/L    Urea Nitrogen 41 (H) 6 - 23 mg/dL    Creatinine 3.10 " (H) 0.50 - 1.05 mg/dL    eGFR 15 (L) >60 mL/min/1.73m*2    Calcium 9.1 8.6 - 10.6 mg/dL    Phosphorus 2.7 2.5 - 4.9 mg/dL    Albumin 2.9 (L) 3.4 - 5.0 g/dL   Magnesium   Result Value Ref Range    Magnesium 1.89 1.60 - 2.40 mg/dL   POCT GLUCOSE   Result Value Ref Range    POCT Glucose 146 (H) 74 - 99 mg/dL   POCT GLUCOSE   Result Value Ref Range    POCT Glucose 167 (H) 74 - 99 mg/dL                Assessment/Plan   Assessment & Plan  Cerebellar hemorrhage (Multi)    AVM (arteriovenous malformation) ([facility])    79 years old female with h/o parkinson's, HTN, HLD, HFpEF, 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 (s/p IVC filter removed 4/2016), JOY (CPAP at night), COPD, CKD stage 4, chronic tobacco use, schizophrenia, depression, p/w AMS, hypertensive 's, recent fall 2days ago, CTH R cerebellar and cerebellar vermis hemorrhage with 4th ventricular effacement, stable vents, CT C spine C5-7 ACDF, CTA c/f L PICA dAVM, s/p kcentra, TTE 74% EF, no wma, rCTH stable ICH, c/f L PICA dAVM, 9/22 MRI/V motion degraded, no obvious underlying mass or vascular lesion  9/23 BLE DVT US chronic changes, 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     9/24 INR 2.3 s/p 10mg Vit K (3 doses), 9/25 s/p 2u FFP, s/p 40IV lasix, 9/25 angio SM1 cerebellar AVM, primary feeders L PICA, R>L SCA feeders  9/30 s/p extubated  9/30 DVT BLE US chronic changes in R prox fem, L common fem o/w neg, s/p extubated  10/2 MBS passed for modified diet     Plan  tele  Con't vanc, ceftaz (10/6)  AM labs  Hold ASA/warfarin, will restart at follow up  [facility] med recs-AC restart when able, no IVCF, So'd, has outpatient follow up arranged 10/22  Renal recs - signed off  medicine recs-RCRI class 4 risk   PTOT-SNF, precert pending  SCD, SQH    Julia Gallegos MD

## 2024-10-04 NOTE — PROGRESS NOTES
Physical Therapy    Physical Therapy Treatment    Patient Name: Kelly Martinez  MRN: [id number]  Department: [place] [id number]  Room: [address] [place] [address]  Today's Date: 10/4/2024  Time Calculation  Start Time: 1040  Stop Time: 1106  Time Calculation (min): 26 min    Assessment/Plan   PT Assessment  Evaluation/Treatment Tolerance: Patient tolerated treatment well  Medical Staff Made Aware: Yes  End of Session Patient Position: Bed, 3 rail up, Alarm off, not on at start of session     PT Plan  Treatment/Interventions: Bed mobility, Transfer training, Gait training, Balance training, Neuromuscular re-education, Strengthening, Endurance training, Therapeutic exercise, Therapeutic activity, Home exercise program, Positioning, Postural re-education  PT Plan: Ongoing PT  PT Frequency: 5 times per week  PT Discharge Recommendations: Moderate intensity level of continued care  PT Recommended Transfer Status: Assist x1, Assistive device (to chair/BSC, x2 to bathroom)  PT - OK to Discharge: Yes (When medically ready)    General Visit Information:   PT  Visit  PT Received On: 10/04/24  Response to Previous Treatment: Patient with no complaints from previous session.  General  Family/Caregiver Present: No  Prior to Session Communication: Bedside nurse  Patient Position Received: Bed, 3 rail up, Alarm off, not on at start of session  General Comment: Supine. Pt with wider range of affect today.  Pleasant, cooperative and agreeable to PT, and getting OOB to chair.  Able to take steps and transfer to chair, first time this admission.  Tolerated well with stable vitals    Subjective   Precautions:  Precautions  Medical Precautions: Fall precautions, Oxygen therapy device and L/min  Precautions Comment: -150    Objective   Pain:  Pain Assessment  Pain Assessment: 0-10  0-10 (Numeric) Pain Score: 0 - No pain (denies pain)  Cognition:  Cognition  Overall Cognitive Status: Impaired (however, more alert, engaged and wider range of affect compared to  previous visit)  Orientation Level: Disoriented to place, Disoriented to time, Disoriented to situation    Activity Tolerance:  Activity Tolerance  Endurance: Tolerates 10 - 20 min exercise with multiple rests  Treatments:  Therapeutic Exercise  Therapeutic Exercise Performed: Yes  Therapeutic Exercise Activity 1: Supine: AP, HS x10.  Sittin:  LAQ x10    Therapeutic Activity  Therapeutic Activity Performed: No    Bed Mobility 1  Bed Mobility 1: Supine to sitting  Level of Assistance 1: Moderate assistance, Moderate verbal cues, Moderate tactile cues  Bed Mobility Comments 1: HOB elevated, use of draw sheet    Ambulation/Gait Training  Ambulation/Gait Training Performed: Yes  Ambulation/Gait Training 1  Surface 1: Level tile  Device 1: Rolling walker  Assistance 1: Minimum assistance  Quality of Gait 1: Wide base of support, Forward flexed posture, Inconsistent stride length, Decreased step length, Soft knee(s)  Comments/Distance (ft) 1: bed>chair, 6 short steps  Transfer 1  Transfer From 1: Sit to, Stand to  Transfer to 1: Sit  Transfer Device 1: Walker  Transfer Level of Assistance 1: Minimum assistance, Minimal tactile cues, Moderate verbal cues  Transfers 2  Transfer From 2: Bed to  Transfer to 2: Chair with arms  Transfer Device 2: Walker  Transfer Level of Assistance 2: Minimum assistance, Moderate verbal cues, Minimal tactile cues    Outcome Measures:    [facility] Basic Mobility  Turning from your back to your side while in a flat bed without using bedrails: A lot  Moving from lying on your back to sitting on the side of a flat bed without using bedrails: A lot  Moving to and from bed to chair (including a wheelchair): A lot  Standing up from a chair using your arms (e.g. wheelchair or bedside chair): A lot  To walk in hospital room: A lot  Climbing 3-5 steps with railing: Total  Basic Mobility - Total Score: 11    Education Documentation  Body Mechanics, taught by Chilango Rogers, PT at 10/4/2024 12:40  PM.  Learner: Patient  Readiness: Acceptance  Method: Explanation  Response: Verbalizes Understanding    Mobility Training, taught by Chilango Rogers, PT at 10/4/2024 12:40 PM.  Learner: Patient  Readiness: Acceptance  Method: Explanation  Response: Verbalizes Understanding    Education Comments  No comments found.        OP EDUCATION:       Encounter Problems       Encounter Problems (Active)       PT Problem       Patient will perform bed mobility with </= MAX Ax 1 to reduce risk of developing decubitus ulcers.  (Progressing)       Start:  09/22/24    Expected End:  10/06/24            Patient will perform sit to stand and stand to sit transfers with </= MAX A x1 and LRD to increase functional strength.  (Progressing)       Start:  09/22/24    Expected End:  10/06/24            Patient will ambulate at least 15 ft. with </= MOD A x2 and LRD to improve tolerance of household distances.  (Progressing)       Start:  09/22/24    Expected End:  10/06/24            Patient will participate in therapeutic exercise program with VSS and cues as needed.    (Progressing)       Start:  09/22/24    Expected End:  10/06/24            Patient will score at least 42/56 on the Function in Sitting Test to improve sitting balance required for functional tasks.   (Progressing)       Start:  09/22/24    Expected End:  10/06/24                  Encounter Problems (Resolved)       Pain - Adult

## 2024-10-04 NOTE — SIGNIFICANT EVENT
Rapid Response Nurse Note: [] Rapid Response [x] RADAR alert: Score 6    Pager time: 413  Arrival time: 440  Event end time: 447  Location: [facility] 4035  [x] Phone triage     Rapid response initiated by:  [] Rapid Response RN [] Family [] Nursing Supervisor [] Physician   [x] RADAR auto-page [] Sepsis auto-page [] RN [] RT   [] NP/PA [] Other:     Primary reason for call:   [] BAT [] New CPAP/BiPAP [] Bleeding [] Change in mental status   [] Chest pain [] Code blue [] FiO2 >/= 50% [] HR </= 40 bpm   [] HR >/= 130 bpm [] Hyperglycemia [] Hypoglycemia [x] RADAR    [] RR </= 8 bpm [] RR >/= 30 bpm [] SBP </= 90 mmHg [] SpO2 < 90%   [] Seizure [] Sepsis [] Staff concern:     Initial VS and/or RADAR VS: T 36.3 °C; HR 75; RR 21; /77; SPO2 93%.  Providers present at bedside (if applicable): n/a  Interventions:  [x] None [] ABG [] Assist w/ICU transfer [] BAT paged    [] Bag mask [] Blood [] Cardioversion [] Code Blue   [] Code blue for intubation [] Code status changed [] Chest x-ray [] EKG   [] IV fluid/bolus [] KUB x-ray [] Labs/cultures [] Medication   [] Nebulizer treatment [] NIPPV (CPAP/BiPAP) [] Oxygen [] Oral airway   [] Peripheral IV [] Palliative care consult [] CT/MRI [] Sepsis protocol    [] Suctioned [] Other:     Name of ICU Provider contacted (if applicable): n/a  Outcome:  [] Coded and  [] Code blue for intubation [] Coded and transferred to ICU []  on division   [x] Remained on division (no change) [] Remained on division + additional monitoring [] Remained in ED [] Transferred to ED   [] Transferred to ICU [] Transferred to inpatient status [] Transferred for interventions (procedure) [] Transferred to ICU stepdown    [] Transferred to surgery [] Transferred to telemetry [] Sepsis protocol [] STEMI protocol   [] Stroke protocol [x] Bedside nurse instructed to page rapid response for any concerns or acute change in condition/VS     Additional Comments: Reviewed above VS with bedside RN  via phone.  Vital signs within patient's current trends.  Staff to page rapid response for any concerns or acute change in condition or VS.

## 2024-10-04 NOTE — DISCHARGE SUMMARY
Discharge Diagnosis  Cerebellar hemorrhage (Multi)    Test Results Pending At Discharge  Pending Labs       No current pending labs.          Hospital Course  Kelly Martinez is a 79 y.o. 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 ED on 9/20 with AMS, hypertensive 's, recent fall 2days ago. Workup revealed CTH R cerebellar and cerebellar vermis hemorrhage with 4th ventricular effacement, CT C spine with prior C5-7 ACDF, CTA concerning for L PICA dAVM. Patient reversed with kcentra. Patient admitted to neurosurgery service for further management.     9/21 TTE 74% EF, no wma. Repeat CTH with stable ICH.   9/22 MRI/V motion degraded, no obvious underlying mass or vascular lesion.  9/23 BLE DVT US chronic changes. S/p angio SM2 L cerebellar AVM with L SCA/PICA feeders.  9/24 INR 2.3 s/p 10mg Vit K (3 doses).   9/25 s/p 2u FFP, s/p 40IV lasix, 9/25 angio SM1 cerebellar AVM, primary feeders L PICA, R>L SCA feeders.  9/30 DVT BLE US chronic changes in R proximal fem, L common fem otherwise negative. S/p extubation.   10/2 Repeat SLP evaluation/MBS, patient passed for pureed diet and thickened liquids.   10/3 Midline placed to continue IV ABX for PNA at SNF through 10/6.   10/4 Down trend in sCre, nephrology signed off, outpatient referral placed for follow up.     PT/OT evaluated patient and recommended SNF placement.     Do not resume Warfarin until cleared by Neurosurgery    Patient discharged in stable condition with detailed instructions and requested outpatient follow up.    Pertinent Physical Exam At Time of Discharge  Constitutional: Awake. Eyes open to voice.   Eyes: PERRL, clear sclera.   ENMT: Moist mucous membranes, no apparent injuries or lesions.  Head/Neck: Neck supple, no JVD. NC/AT.    Cardiovascular: Normal rate and regular rhythm. 2+ equal pulses of the distal extremities.  Respiratory/Thorax: CTAB, regular respirations on 2L NC. Good symmetric chest expansion.   Gastrointestinal: Abdomen soft, non tender.   Extremities: BUE 5/5. BLE 4+/5.   Neurological: A&Ox2. Follows commands x4.   Psychological: Appropriate mood and behavior.   Skin: Warm and dry.     Home Medications     Medication List      START taking these medications     acetaminophen 325 mg tablet; Commonly known as: Tylenol; Take 2 tablets   (650 mg) by mouth every 6 hours if needed for mild pain (1 - 3).   amLODIPine 10 mg tablet; Commonly known as: Norvasc; Take 1 tablet (10   mg) by mouth once daily.   heparin (porcine) 5,000 unit/mL injection; Inject 1 mL (5,000 Units)   under the skin every 8 hours. For DVT prophylaxis until restart of home   Warfarin/ASA   oxygen gas therapy; Commonly known as: O2; Inhale 1 each every 12 hours.   Wean as tolerated   polyethylene glycol 17 gram packet; Commonly known as: Glycolax,   Miralax; Take 17 g by mouth 2 times a day.   sennosides-docusate sodium 8.6-50 mg tablet; Commonly known as:   Roro-Colace; Take 1 tablet by mouth 2 times a day.   sodium chloride 0.9 % piggyback 50 mL with cefTAZidime 1 gram recon soln   2 g; Infuse 2 g at 100 mL/hr over 30 minutes into a venous catheter once   daily for 3 days.   sodium chloride 3 % nebulizer solution; Take 3 mL by nebulization every   6 hours if needed for cough.     CHANGE how you take these medications     aspirin 81 mg chewable tablet; Chew 1 tablet (81 mg) once daily. DO NOT   RESUME UNTIL DISCUSSED AT NEUROSURGERY FOLLOW UP; What changed: additional   instructions   DULoxetine 30 mg DR capsule; Commonly known as: Cymbalta; Take 1 capsule   (30 mg) by mouth once daily. Do not crush or chew. Continue to hold until   improvement in sCre; What changed: additional instructions     CONTINUE taking these medications     atorvastatin 40 mg tablet;  Commonly known as: Lipitor   buPROPion  mg 24 hr tablet; Commonly known as: Wellbutrin XL   carvedilol 12.5 mg tablet; Commonly known as: Coreg   folic acid 1 mg tablet; Commonly known as: Folvite   isosorbide mononitrate ER 30 mg 24 hr tablet; Commonly known as: Imdur   lisinopril 40 mg tablet   Pulmicort Flexhaler 90 mcg/actuation inhaler; Generic drug: budesonide   QUEtiapine 300 mg tablet; Commonly known as: SEROquel   terazosin 2 mg capsule; Commonly known as: Hytrin   traZODone 150 mg tablet; Commonly known as: Desyrel; Take 1 tablet (150   mg) by mouth as needed at bedtime for sleep.     STOP taking these medications     Aleve 220 mg tablet; Generic drug: naproxen sodium   carbidopa-levodopa  mg tablet; Commonly known as: Sinemet   warfarin 1 mg tablet; Commonly known as: Coumadin   warfarin 3 mg tablet; Commonly known as: Coumadin       Outpatient Follow-Up  Future Appointments   Date Time Provider Department [place]   10/22/2024 10:00 AM Tiffani Haile MD, MS [id number]      A follow up appointment with neurosurgery has been requested for 1 month after discharge with a repeat CT head.     Stephania Gardner PA-C    Total face to face time spent with patient/family of >30 minutes, with >50% of the time spent discussing plan of care/management, counseling/educating on disease processes, explaining results of diagnostic testing.

## 2024-10-05 LAB
ATRIAL RATE: 71 BPM
P AXIS: 139 DEGREES
P OFFSET: 193 MS
P ONSET: 135 MS
PR INTERVAL: 178 MS
Q ONSET: 224 MS
QRS COUNT: 12 BEATS
QRS DURATION: 146 MS
QT INTERVAL: 432 MS
QTC CALCULATION(BAZETT): 469 MS
QTC FREDERICIA: 457 MS
R AXIS: 206 DEGREES
T AXIS: 196 DEGREES
T OFFSET: 440 MS
VENTRICULAR RATE: 71 BPM

## 2024-11-04 ENCOUNTER — TELEPHONE (OUTPATIENT)
Dept: OBSTETRICS AND GYNECOLOGY | Facility: CLINIC | Age: 79
End: 2024-11-04
Payer: COMMERCIAL

## 2024-11-07 ENCOUNTER — APPOINTMENT (OUTPATIENT)
Dept: OBSTETRICS AND GYNECOLOGY | Facility: CLINIC | Age: 79
End: 2024-11-07
Payer: COMMERCIAL

## 2024-12-18 ENCOUNTER — TELEPHONE (OUTPATIENT)
Dept: NEUROSURGERY | Facility: HOSPITAL | Age: 79
End: 2024-12-18
Payer: COMMERCIAL

## 2024-12-18 NOTE — TELEPHONE ENCOUNTER
Message left for patient that we had to cancel her appointment with Dr. Carranza on 12/26. She needs a CT Head prior to the appointment. Message also left for our  to help her schedule.    Stephanie Spence R.N.

## 2024-12-20 DIAGNOSIS — Q27.30 AVM (ARTERIOVENOUS MALFORMATION) (HHS-HCC): Primary | ICD-10-CM

## 2024-12-26 ENCOUNTER — APPOINTMENT (OUTPATIENT)
Dept: NEUROSURGERY | Facility: CLINIC | Age: 79
End: 2024-12-26
Payer: COMMERCIAL

## 2025-01-06 ENCOUNTER — HOSPITAL ENCOUNTER (OUTPATIENT)
Dept: RADIOLOGY | Facility: HOSPITAL | Age: 80
Discharge: HOME | End: 2025-01-06
Payer: COMMERCIAL

## 2025-01-06 DIAGNOSIS — Q27.30 AVM (ARTERIOVENOUS MALFORMATION) (HHS-HCC): ICD-10-CM

## 2025-01-06 PROCEDURE — 70450 CT HEAD/BRAIN W/O DYE: CPT | Performed by: RADIOLOGY

## 2025-01-06 PROCEDURE — 70450 CT HEAD/BRAIN W/O DYE: CPT

## 2025-01-07 ENCOUNTER — OFFICE VISIT (OUTPATIENT)
Dept: NEUROSURGERY | Facility: HOSPITAL | Age: 80
End: 2025-01-07
Payer: COMMERCIAL

## 2025-01-07 VITALS
HEART RATE: 100 BPM | HEIGHT: 67 IN | WEIGHT: 255 LBS | BODY MASS INDEX: 40.02 KG/M2 | DIASTOLIC BLOOD PRESSURE: 94 MMHG | SYSTOLIC BLOOD PRESSURE: 163 MMHG | RESPIRATION RATE: 18 BRPM

## 2025-01-07 DIAGNOSIS — I61.4 CEREBELLAR HEMORRHAGE (MULTI): ICD-10-CM

## 2025-01-07 DIAGNOSIS — Q27.30 AVM (ARTERIOVENOUS MALFORMATION) (HHS-HCC): Primary | ICD-10-CM

## 2025-01-07 PROCEDURE — 1159F MED LIST DOCD IN RCRD: CPT | Performed by: NEUROLOGICAL SURGERY

## 2025-01-07 PROCEDURE — 1125F AMNT PAIN NOTED PAIN PRSNT: CPT | Performed by: NEUROLOGICAL SURGERY

## 2025-01-07 PROCEDURE — 99215 OFFICE O/P EST HI 40 MIN: CPT | Performed by: NEUROLOGICAL SURGERY

## 2025-01-07 ASSESSMENT — PAIN SCALES - GENERAL: PAINLEVEL_OUTOF10: 10-WORST PAIN EVER

## 2025-01-07 NOTE — PROGRESS NOTES
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 Patient presented for evaluation with AMS after sustaining a fall two days prior. Upon arrival she was found to be hypertensive with systolic Bps in the 200s and Cth with cerebellar vermis hemorrhage with 4th ventricular effacement. CTA with L PICA dAVM. She was treated with Kcentra and neurosurgery was consulted. Repeat CTH with stable ICH.       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 Her hospital course was significant for elevated INR of 2.3 requiring treatment with vitamin K x 3 doses and 2 units of FFP. Repeat angiogram on 09/25 for consideration of embolization with cerebellar AVM with primary feeders being L PICA, R > L SCA feeders; but was felt not amenable at the time. DVT US with chronic changes. Patient passed her MBS with recommendations for pureed diet with thickened liquids. Vascular medicine recommended to continue with AC once cleared by neurosurgery. Sputum culture was positive for pseudomonas staphylococcus aureus and nasal swab MRSA positive. Midline was placed with antibiotics to continued until 10/06. Patient was discharged to SNF and recommended to continue holding warfarin until FUV.     She has completed her rehab stay and has been at home. She reports issues with balance and is ambulating at home with a walker. She has been off of Eliquis and has been taking ASA 81mg for her DVT/PE history. She also reports bilateral lower extremity swelling that she is supposed to see her PCP for.         Objective      Vitals:   BP (!) 163/94   Pulse 100   Resp 18   Ht 1.702 m (5' 7\")   Wt 116 kg (255 lb) "   BMI 39.94 kg/m²         Physical Exam:    Awake, alert, oriented times 3, appears frail   EOM intact  Pupils are equal round and reactive  Intact facial sensation  No facial asymmetry  Intact hearing bilateraly  Midline tongue protrusion  Symmetric shoulder elevation  Symmetric head turning    4/5 in all extremities symmetric.   No sensory deficits    Broad based slow gait with walker      Relevant Results:    Head Ct with resolution of prior hemorrhage        Assessment & Plan      Diagnosis:  Kelly was seen today for follow-up.  Diagnoses and all orders for this visit:  AVM (arteriovenous malformation) (HHS-HCC)  -     Creatinine; Future  -     MR IAC w and wo IV contrast; Future  Cerebellar hemorrhage (Multi)  -     Creatinine; Future  -     MR IAC w and wo IV contrast; Future          Provider Impression:     Mrs Martinez presents to clinic for follow-up evaluation after her hospitalization for ruptured vermian AVM 3months ago  She underwent an outpatient head CT that shows resolution of her hemorrhage, but she remains off of Eliquis given her untreated cerebellar AVM.     We discussed treatment options including surgery and radiosurgery vs. Surveillance. Her RCRI score is 4, which is very high rperiop risk for AVM resection surgery, and would include periop angiogram with renal risk due to contrast. Radiosurgery is an option however it would typically require cerebral angiography prior to treatment which requires iodinated contrast and would put her at risk for worsening renal dysfunction. We also discussed that radiosurgery usually takes 2-3 years to achieve AVM obliteration and she would therefore still not be a good candidate to resume her anticoagulation until then.    We will discuss with her PCP about the risk of withholding anticoagulation. We discussed that risk of AVM rebleed is typically highest in the first year -depending on risk/benefit, it could be considered to resume anti-coagulatoin after 12  months, even if AVM not treated.    We will schedule her to undergo MRI brain with and without contrast to see if this modality can visualize the AVM and we will reach out to radiation oncology thereafter to determine if there could possibly  be a plan for radiosurgery treatment based on MRI. We will also review again at vascular conference for any embolization options, although this would risk worsening of renal function    BP alos elevlted today, advised to seek follow-up with PCP asap.    Attempted to reach PCP to discuss risks/benefits of anticoagulation, Siena Garsia NP at [facility], [place], message left  Total time for this visit was 45 minutes      Medical History     Past Medical History:   Diagnosis Date    Other pulmonary embolism without acute cor pulmonale 07/21/2013    Pulmonary embolism    Personal history of other mental and behavioral disorders     History of depression    Personal history of other venous thrombosis and embolism     History of deep venous thrombosis     Past Surgical History:   Procedure Laterality Date    KNEE SURGERY  04/06/2016    Knee Surgery    NECK SURGERY  04/06/2016    Neck Surgery     Social History     Tobacco Use    Smoking status: Every Day     Current packs/day: 0.50     Types: Cigarettes     Passive exposure: Current    Smokeless tobacco: Never   Substance Use Topics    Alcohol use: Not Currently    Drug use: Never     No family history on file.  Allergies   Allergen Reactions    Meclizine Dizziness and Unknown    Naproxen Itching    Piperacillin-Tazobactam-Dextrs Other     Current Outpatient Medications   Medication Instructions    acetaminophen (TYLENOL) 650 mg, oral, Every 6 hours PRN    amLODIPine (NORVASC) 10 mg, oral, Daily    aspirin 81 mg, oral, Daily, DO NOT RESUME UNTIL DISCUSSED AT NEUROSURGERY FOLLOW UP    atorvastatin (LIPITOR) 40 mg, Daily    budesonide (Pulmicort Flexhaler) 90 mcg/actuation inhaler 2 puffs, 2 times daily RT    buPROPion XL (WELLBUTRIN XL) 150 mg,  Daily    carvedilol (COREG) 12.5 mg, 2 times daily (morning and late afternoon)    DULoxetine (CYMBALTA) 30 mg, oral, Daily, Do not crush or chew.  Continue to hold until improvement in sCre    folic acid (FOLVITE) 1 mg, Daily    heparin (porcine) 5,000 Units, subcutaneous, Every 8 hours, For DVT prophylaxis until restart of home Warfarin/ASA    isosorbide mononitrate ER (IMDUR) 30 mg, oral, Daily    lisinopril 40 mg, Daily    oxygen (O2) gas therapy 1 each, inhalation, Every 12 hours, Wean as tolerated    polyethylene glycol (GLYCOLAX, MIRALAX) 17 g, oral, 2 times daily    QUEtiapine (SEROQUEL) 300 mg, Nightly    sennosides-docusate sodium (Roro-Colace) 8.6-50 mg tablet 1 tablet, oral, 2 times daily    sodium chloride 3 % nebulizer solution 3 mL, nebulization, Every 6 hours PRN    terazosin (HYTRIN) 2 mg, Daily    traZODone (DESYREL) 150 mg, oral, Nightly PRN

## 2025-01-08 ENCOUNTER — MULTIDISCIPLINARY MEETING (OUTPATIENT)
Dept: NEUROSURGERY | Facility: HOSPITAL | Age: 80
End: 2025-01-08

## 2025-01-18 ENCOUNTER — HOSPITAL ENCOUNTER (OUTPATIENT)
Dept: RADIOLOGY | Facility: HOSPITAL | Age: 80
Discharge: HOME | End: 2025-01-18
Payer: COMMERCIAL

## 2025-01-18 DIAGNOSIS — Q27.30 AVM (ARTERIOVENOUS MALFORMATION) (HHS-HCC): ICD-10-CM

## 2025-01-18 DIAGNOSIS — I61.4 CEREBELLAR HEMORRHAGE (MULTI): ICD-10-CM

## 2025-01-18 PROCEDURE — 70553 MRI BRAIN STEM W/O & W/DYE: CPT

## 2025-01-18 PROCEDURE — 2550000001 HC RX 255 CONTRASTS: Performed by: NURSE PRACTITIONER

## 2025-01-18 PROCEDURE — 70553 MRI BRAIN STEM W/O & W/DYE: CPT | Performed by: RADIOLOGY

## 2025-01-18 PROCEDURE — A9575 INJ GADOTERATE MEGLUMI 0.1ML: HCPCS | Performed by: NURSE PRACTITIONER

## 2025-01-18 RX ORDER — GADOTERATE MEGLUMINE 376.9 MG/ML
20 INJECTION INTRAVENOUS
Status: COMPLETED | OUTPATIENT
Start: 2025-01-18 | End: 2025-01-18

## 2025-01-18 RX ADMIN — GADOTERATE MEGLUMINE 20 ML: 376.9 INJECTION INTRAVENOUS at 13:20

## 2025-03-10 ENCOUNTER — APPOINTMENT (OUTPATIENT)
Dept: RADIOLOGY | Facility: HOSPITAL | Age: 80
End: 2025-03-10
Payer: COMMERCIAL

## 2025-03-10 ENCOUNTER — CLINICAL SUPPORT (OUTPATIENT)
Dept: EMERGENCY MEDICINE | Facility: HOSPITAL | Age: 80
End: 2025-03-10
Payer: COMMERCIAL

## 2025-03-10 ENCOUNTER — HOSPITAL ENCOUNTER (INPATIENT)
Facility: HOSPITAL | Age: 80
End: 2025-03-10
Attending: EMERGENCY MEDICINE | Admitting: STUDENT IN AN ORGANIZED HEALTH CARE EDUCATION/TRAINING PROGRAM
Payer: COMMERCIAL

## 2025-03-10 DIAGNOSIS — R29.6 RECURRENT FALLS WHILE WALKING: ICD-10-CM

## 2025-03-10 DIAGNOSIS — N18.9 ACUTE KIDNEY INJURY SUPERIMPOSED ON CKD: ICD-10-CM

## 2025-03-10 DIAGNOSIS — M79.661 TENDERNESS OF RIGHT CALF: ICD-10-CM

## 2025-03-10 DIAGNOSIS — I50.33 ACUTE ON CHRONIC DIASTOLIC HEART FAILURE: ICD-10-CM

## 2025-03-10 DIAGNOSIS — F20.9 SCHIZOPHRENIA: ICD-10-CM

## 2025-03-10 DIAGNOSIS — N17.9 ACUTE KIDNEY INJURY SUPERIMPOSED ON CKD: ICD-10-CM

## 2025-03-10 DIAGNOSIS — I50.9 ACUTE ON CHRONIC CONGESTIVE HEART FAILURE, UNSPECIFIED HEART FAILURE TYPE: Primary | ICD-10-CM

## 2025-03-10 DIAGNOSIS — I73.9 PERIPHERAL VASCULAR DISEASE, UNSPECIFIED (CMS-HCC): ICD-10-CM

## 2025-03-10 DIAGNOSIS — R06.02 SOB (SHORTNESS OF BREATH): ICD-10-CM

## 2025-03-10 LAB
ALBUMIN SERPL BCP-MCNC: 3.8 G/DL (ref 3.4–5)
ALBUMIN SERPL BCP-MCNC: 3.9 G/DL (ref 3.4–5)
ANION GAP SERPL CALC-SCNC: 13 MMOL/L (ref 10–20)
ANION GAP SERPL CALC-SCNC: 14 MMOL/L (ref 10–20)
APTT PPP: 29 SECONDS (ref 26–36)
BASOPHILS # BLD AUTO: 0.03 X10*3/UL (ref 0–0.1)
BASOPHILS NFR BLD AUTO: 0.6 %
BNP SERPL-MCNC: 36 PG/ML (ref 0–99)
BUN SERPL-MCNC: 33 MG/DL (ref 6–23)
BUN SERPL-MCNC: 34 MG/DL (ref 6–23)
CALCIUM SERPL-MCNC: 9.4 MG/DL (ref 8.6–10.6)
CALCIUM SERPL-MCNC: 9.4 MG/DL (ref 8.6–10.6)
CARDIAC TROPONIN I PNL SERPL HS: 28 NG/L (ref 0–34)
CARDIAC TROPONIN I PNL SERPL HS: 38 NG/L (ref 0–34)
CHLORIDE SERPL-SCNC: 110 MMOL/L (ref 98–107)
CHLORIDE SERPL-SCNC: 111 MMOL/L (ref 98–107)
CHOLEST SERPL-MCNC: 127 MG/DL (ref 0–199)
CHOLESTEROL/HDL RATIO: 2.8
CO2 SERPL-SCNC: 21 MMOL/L (ref 21–32)
CO2 SERPL-SCNC: 21 MMOL/L (ref 21–32)
CREAT SERPL-MCNC: 2.73 MG/DL (ref 0.5–1.05)
CREAT SERPL-MCNC: 2.93 MG/DL (ref 0.5–1.05)
EGFRCR SERPLBLD CKD-EPI 2021: 16 ML/MIN/1.73M*2
EGFRCR SERPLBLD CKD-EPI 2021: 17 ML/MIN/1.73M*2
EOSINOPHIL # BLD AUTO: 0.16 X10*3/UL (ref 0–0.4)
EOSINOPHIL NFR BLD AUTO: 3.4 %
ERYTHROCYTE [DISTWIDTH] IN BLOOD BY AUTOMATED COUNT: 14.3 % (ref 11.5–14.5)
ERYTHROCYTE [DISTWIDTH] IN BLOOD BY AUTOMATED COUNT: 14.9 % (ref 11.5–14.5)
EST. AVERAGE GLUCOSE BLD GHB EST-MCNC: 117 MG/DL
GLUCOSE SERPL-MCNC: 102 MG/DL (ref 74–99)
GLUCOSE SERPL-MCNC: 90 MG/DL (ref 74–99)
HBA1C MFR BLD: 5.7 %
HCT VFR BLD AUTO: 31.6 % (ref 36–46)
HCT VFR BLD AUTO: 36.4 % (ref 36–46)
HDLC SERPL-MCNC: 45.3 MG/DL
HGB BLD-MCNC: 10.7 G/DL (ref 12–16)
HGB BLD-MCNC: 11.3 G/DL (ref 12–16)
IMM GRANULOCYTES # BLD AUTO: 0.04 X10*3/UL (ref 0–0.5)
IMM GRANULOCYTES NFR BLD AUTO: 0.8 % (ref 0–0.9)
INR PPP: 1 (ref 0.9–1.1)
LDLC SERPL CALC-MCNC: 62 MG/DL
LYMPHOCYTES # BLD AUTO: 1.22 X10*3/UL (ref 0.8–3)
LYMPHOCYTES NFR BLD AUTO: 25.6 %
MAGNESIUM SERPL-MCNC: 1.77 MG/DL (ref 1.6–2.4)
MAGNESIUM SERPL-MCNC: 1.81 MG/DL (ref 1.6–2.4)
MCH RBC QN AUTO: 29.3 PG (ref 26–34)
MCH RBC QN AUTO: 29.5 PG (ref 26–34)
MCHC RBC AUTO-ENTMCNC: 31 G/DL (ref 32–36)
MCHC RBC AUTO-ENTMCNC: 33.9 G/DL (ref 32–36)
MCV RBC AUTO: 87 FL (ref 80–100)
MCV RBC AUTO: 94 FL (ref 80–100)
MONOCYTES # BLD AUTO: 0.27 X10*3/UL (ref 0.05–0.8)
MONOCYTES NFR BLD AUTO: 5.7 %
NEUTROPHILS # BLD AUTO: 3.05 X10*3/UL (ref 1.6–5.5)
NEUTROPHILS NFR BLD AUTO: 63.9 %
NON HDL CHOLESTEROL: 82 MG/DL (ref 0–149)
NRBC BLD-RTO: 0 /100 WBCS (ref 0–0)
NRBC BLD-RTO: 0 /100 WBCS (ref 0–0)
PHOSPHATE SERPL-MCNC: 3.4 MG/DL (ref 2.5–4.9)
PHOSPHATE SERPL-MCNC: 4.1 MG/DL (ref 2.5–4.9)
PLATELET # BLD AUTO: 163 X10*3/UL (ref 150–450)
PLATELET # BLD AUTO: 167 X10*3/UL (ref 150–450)
POTASSIUM SERPL-SCNC: 4.4 MMOL/L (ref 3.5–5.3)
POTASSIUM SERPL-SCNC: 4.4 MMOL/L (ref 3.5–5.3)
PROTHROMBIN TIME: 11.3 SECONDS (ref 9.8–12.4)
RBC # BLD AUTO: 3.63 X10*6/UL (ref 4–5.2)
RBC # BLD AUTO: 3.86 X10*6/UL (ref 4–5.2)
SODIUM SERPL-SCNC: 141 MMOL/L (ref 136–145)
SODIUM SERPL-SCNC: 141 MMOL/L (ref 136–145)
TRIGL SERPL-MCNC: 101 MG/DL (ref 0–149)
TSH SERPL-ACNC: 0.98 MIU/L (ref 0.44–3.98)
VLDL: 20 MG/DL (ref 0–40)
WBC # BLD AUTO: 4.8 X10*3/UL (ref 4.4–11.3)
WBC # BLD AUTO: 4.9 X10*3/UL (ref 4.4–11.3)

## 2025-03-10 PROCEDURE — 2500000002 HC RX 250 W HCPCS SELF ADMINISTERED DRUGS (ALT 637 FOR MEDICARE OP, ALT 636 FOR OP/ED): Performed by: NURSE PRACTITIONER

## 2025-03-10 PROCEDURE — G0378 HOSPITAL OBSERVATION PER HR: HCPCS

## 2025-03-10 PROCEDURE — 72125 CT NECK SPINE W/O DYE: CPT | Performed by: RADIOLOGY

## 2025-03-10 PROCEDURE — 83036 HEMOGLOBIN GLYCOSYLATED A1C: CPT | Performed by: NURSE PRACTITIONER

## 2025-03-10 PROCEDURE — 72125 CT NECK SPINE W/O DYE: CPT

## 2025-03-10 PROCEDURE — 36415 COLL VENOUS BLD VENIPUNCTURE: CPT | Performed by: NURSE PRACTITIONER

## 2025-03-10 PROCEDURE — 93010 ELECTROCARDIOGRAM REPORT: CPT | Performed by: EMERGENCY MEDICINE

## 2025-03-10 PROCEDURE — 83735 ASSAY OF MAGNESIUM: CPT

## 2025-03-10 PROCEDURE — 85027 COMPLETE CBC AUTOMATED: CPT | Performed by: NURSE PRACTITIONER

## 2025-03-10 PROCEDURE — 83880 ASSAY OF NATRIURETIC PEPTIDE: CPT

## 2025-03-10 PROCEDURE — 83735 ASSAY OF MAGNESIUM: CPT | Performed by: NURSE PRACTITIONER

## 2025-03-10 PROCEDURE — 96376 TX/PRO/DX INJ SAME DRUG ADON: CPT

## 2025-03-10 PROCEDURE — 84443 ASSAY THYROID STIM HORMONE: CPT | Performed by: NURSE PRACTITIONER

## 2025-03-10 PROCEDURE — 70450 CT HEAD/BRAIN W/O DYE: CPT | Performed by: RADIOLOGY

## 2025-03-10 PROCEDURE — 93005 ELECTROCARDIOGRAM TRACING: CPT

## 2025-03-10 PROCEDURE — 2500000001 HC RX 250 WO HCPCS SELF ADMINISTERED DRUGS (ALT 637 FOR MEDICARE OP): Performed by: NURSE PRACTITIONER

## 2025-03-10 PROCEDURE — 2500000001 HC RX 250 WO HCPCS SELF ADMINISTERED DRUGS (ALT 637 FOR MEDICARE OP)

## 2025-03-10 PROCEDURE — 99222 1ST HOSP IP/OBS MODERATE 55: CPT | Performed by: STUDENT IN AN ORGANIZED HEALTH CARE EDUCATION/TRAINING PROGRAM

## 2025-03-10 PROCEDURE — 71046 X-RAY EXAM CHEST 2 VIEWS: CPT

## 2025-03-10 PROCEDURE — 2500000004 HC RX 250 GENERAL PHARMACY W/ HCPCS (ALT 636 FOR OP/ED): Performed by: NURSE PRACTITIONER

## 2025-03-10 PROCEDURE — 99285 EMERGENCY DEPT VISIT HI MDM: CPT | Performed by: EMERGENCY MEDICINE

## 2025-03-10 PROCEDURE — 71046 X-RAY EXAM CHEST 2 VIEWS: CPT | Mod: FOREIGN READ | Performed by: RADIOLOGY

## 2025-03-10 PROCEDURE — 80069 RENAL FUNCTION PANEL: CPT

## 2025-03-10 PROCEDURE — 85025 COMPLETE CBC W/AUTO DIFF WBC: CPT

## 2025-03-10 PROCEDURE — 99285 EMERGENCY DEPT VISIT HI MDM: CPT | Mod: 25 | Performed by: EMERGENCY MEDICINE

## 2025-03-10 PROCEDURE — 70450 CT HEAD/BRAIN W/O DYE: CPT

## 2025-03-10 PROCEDURE — 80061 LIPID PANEL: CPT | Performed by: NURSE PRACTITIONER

## 2025-03-10 PROCEDURE — 84484 ASSAY OF TROPONIN QUANT: CPT

## 2025-03-10 PROCEDURE — S4991 NICOTINE PATCH NONLEGEND: HCPCS | Performed by: NURSE PRACTITIONER

## 2025-03-10 PROCEDURE — 5A09357 ASSISTANCE WITH RESPIRATORY VENTILATION, LESS THAN 24 CONSECUTIVE HOURS, CONTINUOUS POSITIVE AIRWAY PRESSURE: ICD-10-PCS | Performed by: STUDENT IN AN ORGANIZED HEALTH CARE EDUCATION/TRAINING PROGRAM

## 2025-03-10 PROCEDURE — 85610 PROTHROMBIN TIME: CPT

## 2025-03-10 PROCEDURE — 2500000004 HC RX 250 GENERAL PHARMACY W/ HCPCS (ALT 636 FOR OP/ED)

## 2025-03-10 PROCEDURE — 96374 THER/PROPH/DIAG INJ IV PUSH: CPT

## 2025-03-10 PROCEDURE — 80069 RENAL FUNCTION PANEL: CPT | Performed by: NURSE PRACTITIONER

## 2025-03-10 RX ORDER — TRAZODONE HYDROCHLORIDE 50 MG/1
150 TABLET ORAL NIGHTLY PRN
Status: DISCONTINUED | OUTPATIENT
Start: 2025-03-10 | End: 2025-03-12

## 2025-03-10 RX ORDER — CARBIDOPA AND LEVODOPA 25; 100 MG/1; MG/1
1 TABLET ORAL 3 TIMES DAILY
Status: DISCONTINUED | OUTPATIENT
Start: 2025-03-10 | End: 2025-03-19 | Stop reason: HOSPADM

## 2025-03-10 RX ORDER — LISINOPRIL 20 MG/1
40 TABLET ORAL DAILY
Status: DISCONTINUED | OUTPATIENT
Start: 2025-03-10 | End: 2025-03-16

## 2025-03-10 RX ORDER — HYDRALAZINE HYDROCHLORIDE 20 MG/ML
10 INJECTION INTRAMUSCULAR; INTRAVENOUS ONCE
Status: COMPLETED | OUTPATIENT
Start: 2025-03-10 | End: 2025-03-10

## 2025-03-10 RX ORDER — ATORVASTATIN CALCIUM 40 MG/1
40 TABLET, FILM COATED ORAL NIGHTLY
Status: DISCONTINUED | OUTPATIENT
Start: 2025-03-10 | End: 2025-03-19 | Stop reason: HOSPADM

## 2025-03-10 RX ORDER — FOLIC ACID 1 MG/1
1 TABLET ORAL DAILY
Status: DISCONTINUED | OUTPATIENT
Start: 2025-03-10 | End: 2025-03-19 | Stop reason: HOSPADM

## 2025-03-10 RX ORDER — AMLODIPINE BESYLATE 10 MG/1
10 TABLET ORAL DAILY
Status: DISCONTINUED | OUTPATIENT
Start: 2025-03-11 | End: 2025-03-15

## 2025-03-10 RX ORDER — HYDRALAZINE HYDROCHLORIDE 20 MG/ML
5 INJECTION INTRAMUSCULAR; INTRAVENOUS EVERY 6 HOURS PRN
Status: DISCONTINUED | OUTPATIENT
Start: 2025-03-10 | End: 2025-03-11

## 2025-03-10 RX ORDER — FUROSEMIDE 10 MG/ML
20 INJECTION INTRAMUSCULAR; INTRAVENOUS ONCE
Status: DISCONTINUED | OUTPATIENT
Start: 2025-03-10 | End: 2025-03-10

## 2025-03-10 RX ORDER — ACETAMINOPHEN 325 MG/1
650 TABLET ORAL EVERY 6 HOURS PRN
Status: DISCONTINUED | OUTPATIENT
Start: 2025-03-10 | End: 2025-03-19 | Stop reason: HOSPADM

## 2025-03-10 RX ORDER — CARVEDILOL 12.5 MG/1
12.5 TABLET ORAL 2 TIMES DAILY
Status: DISCONTINUED | OUTPATIENT
Start: 2025-03-10 | End: 2025-03-10

## 2025-03-10 RX ORDER — FUROSEMIDE 10 MG/ML
40 INJECTION INTRAMUSCULAR; INTRAVENOUS ONCE
Status: COMPLETED | OUTPATIENT
Start: 2025-03-10 | End: 2025-03-10

## 2025-03-10 RX ORDER — SENNOSIDES 8.6 MG/1
2 TABLET ORAL 2 TIMES DAILY
Status: DISCONTINUED | OUTPATIENT
Start: 2025-03-10 | End: 2025-03-19 | Stop reason: HOSPADM

## 2025-03-10 RX ORDER — TERAZOSIN 2 MG/1
2 CAPSULE ORAL DAILY
Status: DISCONTINUED | OUTPATIENT
Start: 2025-03-11 | End: 2025-03-19 | Stop reason: HOSPADM

## 2025-03-10 RX ORDER — AMLODIPINE BESYLATE 10 MG/1
10 TABLET ORAL DAILY
Status: DISCONTINUED | OUTPATIENT
Start: 2025-03-10 | End: 2025-03-10

## 2025-03-10 RX ORDER — ISOSORBIDE MONONITRATE 30 MG/1
30 TABLET, EXTENDED RELEASE ORAL DAILY
Status: DISCONTINUED | OUTPATIENT
Start: 2025-03-10 | End: 2025-03-11

## 2025-03-10 RX ORDER — DULOXETIN HYDROCHLORIDE 30 MG/1
30 CAPSULE, DELAYED RELEASE ORAL DAILY
Status: DISCONTINUED | OUTPATIENT
Start: 2025-03-11 | End: 2025-03-15

## 2025-03-10 RX ORDER — CARVEDILOL 12.5 MG/1
12.5 TABLET ORAL 2 TIMES DAILY
Status: DISCONTINUED | OUTPATIENT
Start: 2025-03-10 | End: 2025-03-11

## 2025-03-10 RX ORDER — QUETIAPINE FUMARATE 300 MG/1
300 TABLET, FILM COATED ORAL NIGHTLY
Status: DISCONTINUED | OUTPATIENT
Start: 2025-03-10 | End: 2025-03-14

## 2025-03-10 RX ORDER — IBUPROFEN 200 MG
1 TABLET ORAL DAILY
Status: DISCONTINUED | OUTPATIENT
Start: 2025-03-10 | End: 2025-03-19 | Stop reason: HOSPADM

## 2025-03-10 RX ORDER — NAPROXEN SODIUM 220 MG/1
81 TABLET, FILM COATED ORAL DAILY
Status: DISCONTINUED | OUTPATIENT
Start: 2025-03-10 | End: 2025-03-19 | Stop reason: HOSPADM

## 2025-03-10 RX ORDER — BUPROPION HYDROCHLORIDE 150 MG/1
150 TABLET ORAL DAILY
Status: DISCONTINUED | OUTPATIENT
Start: 2025-03-10 | End: 2025-03-19 | Stop reason: HOSPADM

## 2025-03-10 RX ADMIN — NICOTINE 1 PATCH: 14 PATCH, EXTENDED RELEASE TRANSDERMAL at 17:19

## 2025-03-10 RX ADMIN — FOLIC ACID 1 MG: 1 TABLET ORAL at 17:19

## 2025-03-10 RX ADMIN — HYDRALAZINE HYDROCHLORIDE 10 MG: 20 INJECTION INTRAMUSCULAR; INTRAVENOUS at 11:59

## 2025-03-10 RX ADMIN — ATORVASTATIN CALCIUM 40 MG: 40 TABLET, FILM COATED ORAL at 20:00

## 2025-03-10 RX ADMIN — SENNOSIDES 17.2 MG: 8.6 TABLET ORAL at 20:00

## 2025-03-10 RX ADMIN — FUROSEMIDE 40 MG: 10 INJECTION INTRAMUSCULAR; INTRAVENOUS at 17:19

## 2025-03-10 RX ADMIN — LISINOPRIL 40 MG: 20 TABLET ORAL at 17:19

## 2025-03-10 RX ADMIN — CARVEDILOL 12.5 MG: 12.5 TABLET, FILM COATED ORAL at 20:00

## 2025-03-10 RX ADMIN — ISOSORBIDE MONONITRATE 30 MG: 30 TABLET, EXTENDED RELEASE ORAL at 17:19

## 2025-03-10 RX ADMIN — ASPIRIN 81 MG CHEWABLE TABLET 81 MG: 81 TABLET CHEWABLE at 17:19

## 2025-03-10 RX ADMIN — CARBIDOPA AND LEVODOPA 1 TABLET: 25; 100 TABLET ORAL at 20:00

## 2025-03-10 RX ADMIN — AMLODIPINE BESYLATE 10 MG: 5 TABLET ORAL at 11:59

## 2025-03-10 RX ADMIN — HYDRALAZINE HYDROCHLORIDE 10 MG: 20 INJECTION INTRAMUSCULAR; INTRAVENOUS at 13:59

## 2025-03-10 RX ADMIN — CARVEDILOL 12.5 MG: 12.5 TABLET, FILM COATED ORAL at 13:59

## 2025-03-10 RX ADMIN — QUETIAPINE FUMARATE 300 MG: 300 TABLET ORAL at 20:00

## 2025-03-10 SDOH — SOCIAL STABILITY: SOCIAL INSECURITY: DO YOU FEEL UNSAFE GOING BACK TO THE PLACE WHERE YOU ARE LIVING?: NO

## 2025-03-10 SDOH — SOCIAL STABILITY: SOCIAL INSECURITY
WITHIN THE LAST YEAR, HAVE YOU BEEN RAPED OR FORCED TO HAVE ANY KIND OF SEXUAL ACTIVITY BY YOUR PARTNER OR EX-PARTNER?: NO

## 2025-03-10 SDOH — SOCIAL STABILITY: SOCIAL INSECURITY: DOES ANYONE TRY TO KEEP YOU FROM HAVING/CONTACTING OTHER FRIENDS OR DOING THINGS OUTSIDE YOUR HOME?: NO

## 2025-03-10 SDOH — SOCIAL STABILITY: SOCIAL INSECURITY: HAS ANYONE EVER THREATENED TO HURT YOUR FAMILY OR YOUR PETS?: NO

## 2025-03-10 SDOH — ECONOMIC STABILITY: FOOD INSECURITY: WITHIN THE PAST 12 MONTHS, YOU WORRIED THAT YOUR FOOD WOULD RUN OUT BEFORE YOU GOT THE MONEY TO BUY MORE.: NEVER TRUE

## 2025-03-10 SDOH — ECONOMIC STABILITY: INCOME INSECURITY: IN THE PAST 12 MONTHS HAS THE ELECTRIC, GAS, OIL, OR WATER COMPANY THREATENED TO SHUT OFF SERVICES IN YOUR HOME?: NO

## 2025-03-10 SDOH — SOCIAL STABILITY: SOCIAL INSECURITY: HAVE YOU HAD ANY THOUGHTS OF HARMING ANYONE ELSE?: NO

## 2025-03-10 SDOH — ECONOMIC STABILITY: HOUSING INSECURITY: IN THE LAST 12 MONTHS, WAS THERE A TIME WHEN YOU WERE NOT ABLE TO PAY THE MORTGAGE OR RENT ON TIME?: NO

## 2025-03-10 SDOH — ECONOMIC STABILITY: FOOD INSECURITY: HOW HARD IS IT FOR YOU TO PAY FOR THE VERY BASICS LIKE FOOD, HOUSING, MEDICAL CARE, AND HEATING?: NOT HARD AT ALL

## 2025-03-10 SDOH — ECONOMIC STABILITY: FOOD INSECURITY: WITHIN THE PAST 12 MONTHS, THE FOOD YOU BOUGHT JUST DIDN'T LAST AND YOU DIDN'T HAVE MONEY TO GET MORE.: NEVER TRUE

## 2025-03-10 SDOH — SOCIAL STABILITY: SOCIAL INSECURITY: WITHIN THE LAST YEAR, HAVE YOU BEEN HUMILIATED OR EMOTIONALLY ABUSED IN OTHER WAYS BY YOUR PARTNER OR EX-PARTNER?: NO

## 2025-03-10 SDOH — SOCIAL STABILITY: SOCIAL INSECURITY: WITHIN THE LAST YEAR, HAVE YOU BEEN AFRAID OF YOUR PARTNER OR EX-PARTNER?: NO

## 2025-03-10 SDOH — SOCIAL STABILITY: SOCIAL INSECURITY: HAVE YOU HAD THOUGHTS OF HARMING ANYONE ELSE?: NO

## 2025-03-10 SDOH — SOCIAL STABILITY: SOCIAL INSECURITY: ARE YOU OR HAVE YOU BEEN THREATENED OR ABUSED PHYSICALLY, EMOTIONALLY, OR SEXUALLY BY ANYONE?: NO

## 2025-03-10 SDOH — SOCIAL STABILITY: SOCIAL INSECURITY: WERE YOU ABLE TO COMPLETE ALL THE BEHAVIORAL HEALTH SCREENINGS?: YES

## 2025-03-10 SDOH — SOCIAL STABILITY: SOCIAL INSECURITY: DO YOU FEEL ANYONE HAS EXPLOITED OR TAKEN ADVANTAGE OF YOU FINANCIALLY OR OF YOUR PERSONAL PROPERTY?: NO

## 2025-03-10 SDOH — SOCIAL STABILITY: SOCIAL INSECURITY: ARE THERE ANY APPARENT SIGNS OF INJURIES/BEHAVIORS THAT COULD BE RELATED TO ABUSE/NEGLECT?: NO

## 2025-03-10 SDOH — SOCIAL STABILITY: SOCIAL INSECURITY: ABUSE: ADULT

## 2025-03-10 ASSESSMENT — ENCOUNTER SYMPTOMS
SPUTUM PRODUCTION: 0
HEARTBURN: 0
COUGH: 0
DYSURIA: 0
DIZZINESS: 0
PHOTOPHOBIA: 0
HEADACHES: 1
HEMATURIA: 0
EYE PAIN: 0
HEMOPTYSIS: 0
SORE THROAT: 0
SYNCOPE: 0
THOUGHT CONTENT - THOUGHTS OF VIOLENCE: 0
NERVOUS/ANXIOUS: 0
BOWEL INCONTINENCE: 0
ORTHOPNEA: 1
LIGHT-HEADEDNESS: 0
WEAKNESS: 1
NAUSEA: 0
NUMBNESS: 0
CHILLS: 0
WHEEZING: 0
DIARRHEA: 0
BACK PAIN: 0
HEMATOCHEZIA: 0
POLYPHAGIA: 0
FLANK PAIN: 0
VOMITING: 0
DIAPHORESIS: 0
EXCESSIVE DAYTIME SLEEPINESS: 0
SUSPICIOUS LESIONS: 1
ALTERED MENTAL STATUS: 0
TREMORS: 0
FEVER: 0
ABDOMINAL PAIN: 0
HOARSE VOICE: 0
HEMATEMESIS: 0
POLYDIPSIA: 0
FREQUENCY: 0
DECREASED APPETITE: 0
BLURRED VISION: 0
DYSPNEA ON EXERTION: 1
FALLS: 1
SNORING: 0
SHORTNESS OF BREATH: 0

## 2025-03-10 ASSESSMENT — COGNITIVE AND FUNCTIONAL STATUS - GENERAL
DRESSING REGULAR UPPER BODY CLOTHING: A LITTLE
EATING MEALS: A LITTLE
PERSONAL GROOMING: A LITTLE
HELP NEEDED FOR BATHING: A LITTLE
DRESSING REGULAR UPPER BODY CLOTHING: A LITTLE
TOILETING: A LITTLE
MOVING TO AND FROM BED TO CHAIR: A LITTLE
DAILY ACTIVITIY SCORE: 17
DRESSING REGULAR LOWER BODY CLOTHING: A LOT
EATING MEALS: A LITTLE
TOILETING: A LITTLE
DAILY ACTIVITIY SCORE: 17
TURNING FROM BACK TO SIDE WHILE IN FLAT BAD: A LITTLE
DRESSING REGULAR LOWER BODY CLOTHING: A LOT
MOVING FROM LYING ON BACK TO SITTING ON SIDE OF FLAT BED WITH BEDRAILS: A LITTLE
CLIMB 3 TO 5 STEPS WITH RAILING: A LITTLE
PERSONAL GROOMING: A LITTLE
TURNING FROM BACK TO SIDE WHILE IN FLAT BAD: A LITTLE
STANDING UP FROM CHAIR USING ARMS: A LITTLE
MOVING TO AND FROM BED TO CHAIR: A LITTLE
MOBILITY SCORE: 18
WALKING IN HOSPITAL ROOM: A LITTLE
CLIMB 3 TO 5 STEPS WITH RAILING: A LITTLE
MOBILITY SCORE: 18
HELP NEEDED FOR BATHING: A LITTLE
PATIENT BASELINE BEDBOUND: NO
WALKING IN HOSPITAL ROOM: A LITTLE
STANDING UP FROM CHAIR USING ARMS: A LITTLE
MOVING FROM LYING ON BACK TO SITTING ON SIDE OF FLAT BED WITH BEDRAILS: A LITTLE

## 2025-03-10 ASSESSMENT — ACTIVITIES OF DAILY LIVING (ADL)
HEARING - RIGHT EAR: FUNCTIONAL
GROOMING: NEEDS ASSISTANCE
BATHING: NEEDS ASSISTANCE
ADEQUATE_TO_COMPLETE_ADL: YES
PATIENT'S MEMORY ADEQUATE TO SAFELY COMPLETE DAILY ACTIVITIES?: YES
TOILETING: NEEDS ASSISTANCE
WALKS IN HOME: NEEDS ASSISTANCE
LACK_OF_TRANSPORTATION: NO
HEARING - LEFT EAR: FUNCTIONAL
FEEDING YOURSELF: NEEDS ASSISTANCE
JUDGMENT_ADEQUATE_SAFELY_COMPLETE_DAILY_ACTIVITIES: YES
DRESSING YOURSELF: NEEDS ASSISTANCE

## 2025-03-10 ASSESSMENT — PAIN - FUNCTIONAL ASSESSMENT
PAIN_FUNCTIONAL_ASSESSMENT: 0-10
PAIN_FUNCTIONAL_ASSESSMENT: 0-10

## 2025-03-10 ASSESSMENT — PAIN SCALES - GENERAL
PAINLEVEL_OUTOF10: 4
PAINLEVEL_OUTOF10: 0 - NO PAIN
PAINLEVEL_OUTOF10: 3

## 2025-03-10 ASSESSMENT — LIFESTYLE VARIABLES
HOW OFTEN DO YOU HAVE A DRINK CONTAINING ALCOHOL: NEVER
HOW OFTEN DO YOU HAVE 6 OR MORE DRINKS ON ONE OCCASION: NEVER
SKIP TO QUESTIONS 9-10: 1
AUDIT-C TOTAL SCORE: 0
AUDIT-C TOTAL SCORE: 0
HOW MANY STANDARD DRINKS CONTAINING ALCOHOL DO YOU HAVE ON A TYPICAL DAY: PATIENT DOES NOT DRINK

## 2025-03-10 ASSESSMENT — COLUMBIA-SUICIDE SEVERITY RATING SCALE - C-SSRS
6. HAVE YOU EVER DONE ANYTHING, STARTED TO DO ANYTHING, OR PREPARED TO DO ANYTHING TO END YOUR LIFE?: NO
2. HAVE YOU ACTUALLY HAD ANY THOUGHTS OF KILLING YOURSELF?: NO
1. IN THE PAST MONTH, HAVE YOU WISHED YOU WERE DEAD OR WISHED YOU COULD GO TO SLEEP AND NOT WAKE UP?: NO

## 2025-03-10 ASSESSMENT — PATIENT HEALTH QUESTIONNAIRE - PHQ9
SUM OF ALL RESPONSES TO PHQ9 QUESTIONS 1 & 2: 0
1. LITTLE INTEREST OR PLEASURE IN DOING THINGS: NOT AT ALL
2. FEELING DOWN, DEPRESSED OR HOPELESS: NOT AT ALL

## 2025-03-10 ASSESSMENT — PAIN DESCRIPTION - DESCRIPTORS: DESCRIPTORS: THROBBING

## 2025-03-10 NOTE — ED PROVIDER NOTES
Emergency Department Provider Note        History of Present Illness     History provided by: Patient and Family Member  Limitations to History: None  External Records Reviewed with Brief Summary: Outpatient progress note from neurosurgery 1/7/2025 which showed patient's past medical history as well as recurrent falls and uncontrolled hypertension with a prior cerebellar vermis hemorrhage as well as a AVM.  She continues to have balance issues she is currently living at home ambulating with a walker.    HPI:  Kelly Martinez is a 80 y.o. female with past medical history significant for cerebellar/verminan AVM uncontrolled hypertension, HLD, HFpEF (LINDA LVEF 74% 09/2024), JOY, COPD, recurrent DVT/PE not on anticoagulation due to recurrent falls, CKD stage IV, tobacco use, schizophrenia, Parkinson's disease, depression presenting from home due to a mechanical fall.  She denies any loss of consciousness or nausea/vomiting.  Patient reports not taking warfarin because of these recurrent falls.  Denies any preceding symptoms, no chest pain, shortness of breath, abdominal pain, dysuria, constipation as diarrhea, nausea/vomiting.  Does have some pain where her large hematoma is over her left forehead but denies current headache.    Physical Exam   Triage vitals:  T 36.4 °C (97.6 °F)  HR 69  BP (!) 208/112  RR 16  O2 94 % None (Room air)    Physical Exam  Constitutional:       Appearance: She is obese.   HENT:      Head: Normocephalic.      Comments: Large hematoma over left eyebrow, no laceration present     Nose: Nose normal.      Mouth/Throat:      Mouth: Mucous membranes are moist.   Eyes:      Extraocular Movements: Extraocular movements intact.      Conjunctiva/sclera: Conjunctivae normal.      Pupils: Pupils are equal, round, and reactive to light.   Cardiovascular:      Rate and Rhythm: Normal rate and regular rhythm.      Pulses: Normal pulses.      Heart sounds: Normal heart sounds.   Pulmonary:      Breath  sounds: Rhonchi and rales present.      Comments: Diffuse  Abdominal:      General: Abdomen is protuberant. Bowel sounds are normal.      Tenderness: There is no abdominal tenderness.   Musculoskeletal:      Cervical back: Normal range of motion. No rigidity. No spinous process tenderness.   Neurological:      Mental Status: She is alert.          Medical Decision Making & ED Course   Medical Decision Makin y.o. female past medical history significant for cerebellar/verminan AVM uncontrolled hypertension, HLD, HFpEF (LINDA LVEF 74% 2024), JOY, COPD, recurrent DVT/PE not on anticoagulation due to recurrent falls, CKD stage IV, tobacco use, schizophrenia, Parkinson's disease, depression presenting from home due to a mechanical fall.  Patient was seen to be hypertensive on arrival, will treat with IV hydralazine and home blood pressure medication of amlodipine.  Physical exam concerning for CHF as well as hematoma on the left forehead.  Will get CT head, C-spine, as well as cardiac workup including chest x-ray, CBC, RFP, mag, BNP and troponins. CT head and CT C-spine showing no acute intracranial abnormalities or no traumatic fracture or subluxation of the C-spine.  Chest x-ray showing cardiomegaly with pulmonary vascular congestion and mild interstitial  edema. BNP within normal limits at 36.  Renal function appears at baseline with creatinine of 2.73.  Patient have a stable, improving normocytic anemia to 10.7.  Initial troponin elevated at 38, 1 hour repeat at 28.  Mag within normal limits.  Notably hypertensive here with systolics in the 200s, initially treated with hydralazine and home amlodipine.  Continued to be significantly hypertensive, another 10 mg hydralazine as well as home Coreg given.  Gently diuresed with 20 mg IV Lasix.  Given clinical findings of CHF.  Patient and daughter agreeable for admission/observation as she has had frequent mechanical falls.  Patient says she be willing to try acute  "rehab or SNF but does not want to be in a nursing home for the rest of her life.  Patient to be admitted for medical management of clinical CHF exacerbation as well as PT OT evaluation and placement.  ----    Differential diagnoses considered include but are not limited to: ICH, CHF, recurrent falls in the setting of Parkinson's, ACS, worsening AVM, DVTs       Social Determinants of Health which Significantly Impact Care: None identified The following actions were taken to address these social determinants: N/A    EKG Independent Interpretation: Please see ED course for interpretation    Independent Result Review and Interpretation: Chest X-Ray as interpreted by me revealed increased pulmonary infiltrates bilaterally compared to prior.  No obvious pneumothorax or consolidation    Chronic conditions affecting the patient's care: As documented above in [facility]    The patient was discussed with the following consultants/services: None    Care Considerations: As documented above in [facility]    ED Course:  ED Course as of 03/10/25 1518   Mon Mar 10, 2025   1019 EKG interpreted by me: Regular rate, regular rhythm.  UT interval prolonged at 214 consistent with first-degree AV block.  QRS notably prolonged at 144 consistent with right bundle branch block.  QTc within normal limits.  Questionable ST elevation seen in lead a 1 and aVL, both seen previously and likely secondary to artifact.  T wave inversions seen in the anterolateral leads, all unchanged from previous.  New T wave inversion seen in lead III today. [AS]   1146 Senior staff note: Patient is a 80-year-old female with history of cerebellar AVM, uncontrolled HTN, DLD, HFpEF, JOY, COPD, recurrent DVT/PE not currently on anticoagulation, presented to the emergency department today for falls.  Brought in today with her daughter.  Reports she was walking around her kitchen earlier today when suddenly she \"went down\".  Does not remember the events.  Positive head strike.  Does " have notable hematoma to the left forehead region.  Unknown loss of consciousness.  Has been mentating well after the fall.  Also reports for the past couple weeks has been having worsening shortness of breath, chest pain, and bilateral lower extremity edema that be getting progressively worse.  Has been intermittently noncompliant with her blood pressure medications at home.  Notably hypertensive here to the 200s.  Started on her home meds as well as IV hydralazine for blood pressure control.  Will get CT scans of the head for intracranial rule out. Will also get basic labs including EKG, troponin, BNP for cardiac assessment. [AS]   1511 On reassessment patient unable to ambulate. Has obvious fluid overload to the LE. Think she would benefit from diuresis and PT/OT evaluation inpatient. [AS]      ED Course User Index  [AS] Marcos Guillen MD         Diagnoses as of 03/10/25 1518   Recurrent falls while walking   Acute on chronic congestive heart failure, unspecified heart failure type     Disposition   Admit    Procedures   Procedures    Patient seen and discussed with ED attending physician.    John Casanova MD  Emergency Medicine     John Casanova MD  Resident  03/10/25 1518

## 2025-03-10 NOTE — H&P
"History Of Present Illness:    Kelly Martinez is a 80 y.o. female with PMH significant for 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 and recurrent falls, s/p IVC filter removed 4/2016), CKD stage IV, chronic tobacco use, schizophrenia, Parkinson's disease, & depression presenting to ED from home s/p mechanical fall. Typically receives all her care at [facility]. Being admitted under HHVI Service for further management of HTN urgency and suspected ADHF.    Patient lives alone, daughter at bedside. Patient denies any loss of consciousness or nausea/vomiting, states she was ambulating to the restroom when she lost her balance; unwitnessed. Patient had called her daughter and when she arrived, patient was sitting on her bottom on the ground with a bump on the left side of her head. Patient notices her breathing is more short and she is more \"winded\" with little activity, reports not taking warfarin because of these recurrent falls. Denies any preceding symptoms, also denies CP/SOB at rest, abdominal pain, dysuria, constipation, diarrhea, nausea, or vomiting. Reports mild pain where her large hematoma is (over her left forehead) and an associated headache. Cannot recall the last time she did NOT have a headache, per patient.     In ED: L Forehead hematoma noted; L scalp hematoma noted on CT imaging without evidence of acute changes/fracture (head and C-spine). /112 on admit, went down to 187/100 s/p PO amlodipine 10mg x1, PO coreg 12.5mg x1, & IV hydral 10mg x2. CXR w/pulm. Vasc. Congestion, BNP WNL (36, prev. 170 in 9/2024), HS trop 38>28, Cr 2.73 (>3.00 in 10/2024, Bl Cr appears to be worsening ~2-3 this past year). ED ordered IVP Lasix 20mg x1 (NOT received by time of interview w/primary team).     Today's Plan:  - V/Q scan  - IVP Lasix 40mg x1  - " "complete TTE, further GDMT pending results  - nicotine patch  - PT/OT  - RT consult for nocturnal CPAP    Last Recorded Vitals:  Vitals:    03/10/25 1010 03/10/25 1013 03/10/25 1244 03/10/25 1434   BP: (!) 208/112 (!) 212/116 (!) 199/108 (!) 187/100   Patient Position: Lying      Pulse: 69 66 65    Resp: 16 16 16    Temp: 36.4 °C (97.6 °F) 36.9 °C (98.4 °F)     TempSrc: Oral      SpO2: 94% 95% 95%    Weight:  113 kg (250 lb)     Height:  1.676 m (5' 6\")       Last Labs:  CBC - 3/10/2025: 11:03 AM  4.8 10.7 167    31.6      CMP - 3/10/2025: 11:03 AM  9.4 7.9 14 --- 0.7   4.1 3.8 9 99      PTT - 3/10/2025:  2:00 PM  1.0   11.3 29     Troponin I, High Sensitivity   Date/Time Value Ref Range Status   03/30/2024 03:19 PM 19 0 - 34 ng/L Final   03/30/2024 02:09 PM 18 0 - 34 ng/L Final   12/28/2023 02:33 AM 19 0 - 34 ng/L Final     Troponin I, High Sensitivity (CMC)   Date/Time Value Ref Range Status   03/10/2025 12:27 PM 28 0 - 34 ng/L Final   03/10/2025 11:03 AM 38 (H) 0 - 34 ng/L Final   09/21/2024 04:29 AM 35 (H) 0 - 34 ng/L Final     BNP   Date/Time Value Ref Range Status   03/10/2025 11:03 AM 36 0 - 99 pg/mL Final   09/21/2024 04:29  (H) 0 - 99 pg/mL Final     Hemoglobin A1C   Date/Time Value Ref Range Status   09/21/2024 04:29 AM 5.9 (H) See comment % Final   02/17/2023 02:56 PM 5.3 % Final     Comment:          Diagnosis of Diabetes-Adults   Non-Diabetic: < or = 5.6%   Increased risk for developing diabetes: 5.7-6.4%   Diagnostic of diabetes: > or = 6.5%  .       Monitoring of Diabetes                Age (y)     Therapeutic Goal (%)   Adults:          >18           <7.0   Pediatrics:    13-18           <7.5                   7-12           <8.0                   0- 6            7.5-8.5   American Diabetes Association. Diabetes Care 33(S1), Jan 2010.       VLDL   Date/Time Value Ref Range Status   12/07/2022 02:20 PM 16 0 - 40 mg/dL Final      Last I/O:  No intake/output data recorded.    Past Cardiology Tests " (Last 3 Years):  EKG:  ECG 12 Lead 10/04/2024    Echo:  Transthoracic Echo (TTE) Complete 09/21/2024:  1. Left ventricular ejection fraction is normal, calculated by Regan's biplane at 74%.  2. Spectral Doppler shows an impaired relaxation pattern of left ventricular diastolic filling.  3. There is mild concentric left ventricular hypertrophy.  4. There is normal right ventricular global systolic function.  5. The left atrium is mildly dilated.  6. Mildly elevated right ventricular systolic pressure.  7. There is no evidence of a patent foramen ovale.  8. Compared with study dated 12/29/2023, no significant change.     Transthoracic Echo (TTE) Limited 12/29/2023:  1. Left ventricular systolic function is normal with a 55-60% estimated ejection fraction.  2. Poorly visualized anatomical structures due to suboptimal image quality.  3. Spectral Doppler shows an impaired relaxation pattern of left ventricular diastolic filling.     Ejection Fractions:  EF   Date/Time Value Ref Range Status   09/21/2024 11:50 AM 74 %    12/29/2023 10:48 AM 54       Cath:  No results found for this or any previous visit from the past 1095 days.  Stress Test:  No results found for this or any previous visit from the past 1095 days.  Cardiac Imaging:  No results found for this or any previous visit from the past 1095 days.    Past Medical History:  She has a past medical history of Other pulmonary embolism without acute cor pulmonale (07/21/2013), Personal history of other mental and behavioral disorders, and Personal history of other venous thrombosis and embolism.    Past Surgical History:  She has a past surgical history that includes Knee surgery (04/06/2016) and Neck surgery (04/06/2016).      Social History:  She reports that she has been smoking cigarettes. She has been exposed to tobacco smoke. She has never used smokeless tobacco. She reports that she does not currently use alcohol. She reports that she does not use drugs.  Reports smoked 1/2 PPD since age of 16.    Family History:  No family history on file. Mother  from blood clots at age 17.     Allergies:  Meclizine, Naproxen, and Piperacillin-tazobactam-dextrs    Inpatient Medications:  Scheduled medications   Medication Dose Route Frequency    amLODIPine  10 mg oral Daily    carvedilol  12.5 mg oral BID    furosemide  20 mg intravenous Once     PRN medications   Medication     Continuous Medications   Medication Dose Last Rate     Outpatient Medications:  Current Outpatient Medications   Medication Instructions    acetaminophen (TYLENOL) 650 mg, oral, Every 6 hours PRN    amLODIPine (NORVASC) 10 mg, oral, Daily    aspirin 81 mg, oral, Daily, DO NOT RESUME UNTIL DISCUSSED AT NEUROSURGERY FOLLOW UP    atorvastatin (LIPITOR) 40 mg, Daily    budesonide (Pulmicort Flexhaler) 90 mcg/actuation inhaler 2 puffs, 2 times daily RT    buPROPion XL (WELLBUTRIN XL) 150 mg, Daily    carvedilol (COREG) 12.5 mg, 2 times daily (morning and late afternoon)    DULoxetine (CYMBALTA) 30 mg, oral, Daily, Do not crush or chew.  Continue to hold until improvement in sCre    folic acid (FOLVITE) 1 mg, Daily    heparin (porcine) 5,000 Units, subcutaneous, Every 8 hours, For DVT prophylaxis until restart of home Warfarin/ASA    isosorbide mononitrate ER (IMDUR) 30 mg, oral, Daily    lisinopril 40 mg, Daily    oxygen (O2) gas therapy 1 each, inhalation, Every 12 hours, Wean as tolerated    polyethylene glycol (GLYCOLAX, MIRALAX) 17 g, oral, 2 times daily    QUEtiapine (SEROQUEL) 300 mg, Nightly    sennosides-docusate sodium (Roro-Colace) 8.6-50 mg tablet 1 tablet, oral, 2 times daily    sodium chloride 3 % nebulizer solution 3 mL, nebulization, Every 6 hours PRN    terazosin (HYTRIN) 2 mg, Daily    traZODone (DESYREL) 150 mg, oral, Nightly PRN     Review of Systems   Constitutional: Negative for chills, decreased appetite, diaphoresis, fever and malaise/fatigue.   HENT:  Negative for ear pain, hearing  "loss, hoarse voice, nosebleeds and sore throat.    Eyes:  Negative for blurred vision, pain, photophobia and visual disturbance.   Cardiovascular:  Positive for dyspnea on exertion, leg swelling and orthopnea. Negative for chest pain and syncope.   Respiratory:  Negative for cough, hemoptysis, shortness of breath, snoring, sputum production and wheezing.    Endocrine: Negative for polydipsia, polyphagia and polyuria.   Skin:  Positive for suspicious lesions (L forehead). Negative for dry skin.   Musculoskeletal:  Positive for falls. Negative for back pain and muscle weakness.   Gastrointestinal:  Negative for abdominal pain, bowel incontinence, diarrhea, heartburn, hematemesis, hematochezia, hemorrhoids, melena, nausea and vomiting.   Genitourinary:  Negative for dysuria, flank pain, frequency, hematuria, pelvic pain and urgency.   Neurological:  Positive for headaches (\"constant\" at baseline, per patient) and weakness. Negative for excessive daytime sleepiness, dizziness, light-headedness, numbness and tremors.   Psychiatric/Behavioral:  Negative for altered mental status and thoughts of violence. The patient is not nervous/anxious.    Allergic/Immunologic: Negative for environmental allergies.      Physical Exam:  General: NAD, lying in bed  Skin: warm and dry throughout, L forehead hematoma w/ecchymosis, +tenderness  Head/ neck: +JVD at chin level, edentulous   Cardiac: RRR, S1, S2, NSR HR 80s on tele   Pulm: anteriorly diminished, on RA, conversationally dyspneic   GI: soft, nontender, non-distended, obese  Extremities: +1 edema in BLE   Neuro: no focal neuro deficits, a+ox4, hard of hearing  Psych: appropriate mood and behavior, very pleasant        Assessment/Plan   Kelly Martinez is a 80 y.o. female with PMH significant for 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 On coumadin (no longer d/t " hemorrhagic CVA and recurrent falls, s/p IVC filter removed 4/2016), CKD stage IV, chronic tobacco use, schizophrenia, Parkinson's disease, & depression presenting to ED from home s/p mechanical fall. Typically receives all her care at [facility]. Being admitted under HHVI Service for further management of HTN urgency and suspected ADHF.    H/o recurrent DVT/Pes  H/o R hemorrhagic CVA (9/2024)  Recurrent Mechanical Falls   L Forehead Hematoma, stable  - PE's dating back to 2008; prev. On coumadin  - s/p IVC filter removed 4/2016 (filter eroded thru her aorta)  - off coumadin since (9/2024) per primary [facility]ist [facility] (Dr. 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 Home baby ASA and statin  - PRN tylenol for pain    Hypertensive Urgency, improving  HLD  - BP on on admit: 208/112  - in ED, BP improved to 187/100 s/p PO amlodipine 10mg x1, PO coreg 12.5mg x1, & IV hydral 10mg x2  - cont. BP meds as above & home amlodipine 10mg daily   - IV hydral 5mg q6hrs PRN for  or above  - lipid panel/A1C/TSH pending   - cont. Home statin    Acute on chronic diastolic heart failure  -  followed by ALMA ROSA JESUS Ortiz @[facility]  - etiology: unclear, no recent ischemic eval  - TTE OSH (9/2024): LVEF 74%, no WMAs, DD, negative bubble study  - TTE pending  - admit BNP: 36 (prev. 170 in 9/2024)  - admit CXR: pulm edema, vasc. congestion  - admit wt: 98.4kg  - no home diuretics, give IVP Lasix 40mg x1  - cont. Home lisinopril 40mg daily, imdur 30mg daily, coreg 12.5mg BID  - further GDMT pending TTE results  - Daily standing weights, strict I&O's, 2g sodium diet, 2L fluid restriction     CKD stage 4, stable  - Bl Cr worsening over past year ~2-3  - Cr 2.87 upon last discharge, Cr 2.73 today  - diuresis plan as above  - Renally dose meds, avoid nephrotoxins      Parkinson's Disease  Schizophrenia  Depression  - diagnosed ~3-4 years ago  - new start of carvidopa/levodopa 25/200mg TID (on 3/5/2025), cont. In-house  - admit ECG q/Qtc of 455ms  - cont. Home seroquel, wellbutrin, trazodone, duloxetine  - cont. OP f/up w/primary neurologist, behavioral health, & PCP    JOY  COPD  Chronic Tobacco Use  - encourage compliance to nocturnal CPAP, RT consulted  - nicotine patch   - encourage cessation    DVT ppx: SCDs  DISPO: PT/OT to evaluate, resides at home alone  - discharge pending further medical optimization    All labs, vital signs, tests & imaging results, and medications were reviewed.    To be seen and discussed with AM staff tomorrow.    Code Status:  Full Code    I spent 60 minutes in the professional and overall care of this patient.    Danica Kay, APRN-CNP

## 2025-03-10 NOTE — ED TRIAGE NOTES
Presents via [facility] after mechanical fall while ambulating with a walker.  Hit head, no thinners no LOC

## 2025-03-11 ENCOUNTER — APPOINTMENT (OUTPATIENT)
Dept: VASCULAR MEDICINE | Facility: HOSPITAL | Age: 80
DRG: 291 | End: 2025-03-11
Payer: COMMERCIAL

## 2025-03-11 ENCOUNTER — APPOINTMENT (OUTPATIENT)
Dept: CARDIOLOGY | Facility: HOSPITAL | Age: 80
DRG: 291 | End: 2025-03-11
Payer: COMMERCIAL

## 2025-03-11 ENCOUNTER — APPOINTMENT (OUTPATIENT)
Dept: RADIOLOGY | Facility: HOSPITAL | Age: 80
End: 2025-03-11
Payer: COMMERCIAL

## 2025-03-11 LAB
ALBUMIN SERPL BCP-MCNC: 3.5 G/DL (ref 3.4–5)
ANION GAP SERPL CALC-SCNC: 11 MMOL/L (ref 10–20)
AORTIC VALVE PEAK VELOCITY: 1.33 M/S
ATRIAL RATE: 68 BPM
AV PEAK GRADIENT: 7 MMHG
AVA (PEAK VEL): 1.82 CM2
BUN SERPL-MCNC: 33 MG/DL (ref 6–23)
CALCIUM SERPL-MCNC: 9.3 MG/DL (ref 8.6–10.6)
CHLORIDE SERPL-SCNC: 107 MMOL/L (ref 98–107)
CO2 SERPL-SCNC: 25 MMOL/L (ref 21–32)
CREAT SERPL-MCNC: 2.95 MG/DL (ref 0.5–1.05)
D DIMER PPP FEU-MCNC: 2675 NG/ML FEU
EGFRCR SERPLBLD CKD-EPI 2021: 16 ML/MIN/1.73M*2
EJECTION FRACTION: 68 %
ERYTHROCYTE [DISTWIDTH] IN BLOOD BY AUTOMATED COUNT: 14.7 % (ref 11.5–14.5)
GLUCOSE SERPL-MCNC: 137 MG/DL (ref 74–99)
HCT VFR BLD AUTO: 30.5 % (ref 36–46)
HGB BLD-MCNC: 9.8 G/DL (ref 12–16)
LEFT ATRIUM VOLUME AREA LENGTH INDEX BSA: 33.2 ML/M2
LEFT VENTRICLE INTERNAL DIMENSION DIASTOLE: 4.5 CM (ref 3.5–6)
LEFT VENTRICULAR OUTFLOW TRACT DIAMETER: 1.8 CM
MAGNESIUM SERPL-MCNC: 1.66 MG/DL (ref 1.6–2.4)
MCH RBC QN AUTO: 28.7 PG (ref 26–34)
MCHC RBC AUTO-ENTMCNC: 32.1 G/DL (ref 32–36)
MCV RBC AUTO: 89 FL (ref 80–100)
MITRAL VALVE E/A RATIO: 0.67
NRBC BLD-RTO: 0 /100 WBCS (ref 0–0)
P AXIS: 58 DEGREES
P OFFSET: 178 MS
P ONSET: 119 MS
PHOSPHATE SERPL-MCNC: 3.2 MG/DL (ref 2.5–4.9)
PLATELET # BLD AUTO: 165 X10*3/UL (ref 150–450)
POTASSIUM SERPL-SCNC: 3.9 MMOL/L (ref 3.5–5.3)
PR INTERVAL: 214 MS
Q ONSET: 226 MS
QRS COUNT: 11 BEATS
QRS DURATION: 144 MS
QT INTERVAL: 428 MS
QTC CALCULATION(BAZETT): 455 MS
QTC FREDERICIA: 446 MS
R AXIS: -11 DEGREES
RBC # BLD AUTO: 3.42 X10*6/UL (ref 4–5.2)
RIGHT VENTRICLE FREE WALL PEAK S': 11.4 CM/S
SODIUM SERPL-SCNC: 139 MMOL/L (ref 136–145)
T AXIS: -16 DEGREES
T OFFSET: 440 MS
TRICUSPID ANNULAR PLANE SYSTOLIC EXCURSION: 2.2 CM
VENTRICULAR RATE: 68 BPM
WBC # BLD AUTO: 4 X10*3/UL (ref 4.4–11.3)

## 2025-03-11 PROCEDURE — 2500000001 HC RX 250 WO HCPCS SELF ADMINISTERED DRUGS (ALT 637 FOR MEDICARE OP): Performed by: NURSE PRACTITIONER

## 2025-03-11 PROCEDURE — 78580 LUNG PERFUSION IMAGING: CPT | Performed by: STUDENT IN AN ORGANIZED HEALTH CARE EDUCATION/TRAINING PROGRAM

## 2025-03-11 PROCEDURE — 36415 COLL VENOUS BLD VENIPUNCTURE: CPT | Performed by: NURSE PRACTITIONER

## 2025-03-11 PROCEDURE — 0932T N-INVS DET HRT FAIL AUG ECHO: CPT

## 2025-03-11 PROCEDURE — 2500000004 HC RX 250 GENERAL PHARMACY W/ HCPCS (ALT 636 FOR OP/ED): Performed by: NURSE PRACTITIONER

## 2025-03-11 PROCEDURE — S4991 NICOTINE PATCH NONLEGEND: HCPCS | Performed by: NURSE PRACTITIONER

## 2025-03-11 PROCEDURE — 80069 RENAL FUNCTION PANEL: CPT | Performed by: NURSE PRACTITIONER

## 2025-03-11 PROCEDURE — 85027 COMPLETE CBC AUTOMATED: CPT | Performed by: NURSE PRACTITIONER

## 2025-03-11 PROCEDURE — 97165 OT EVAL LOW COMPLEX 30 MIN: CPT | Mod: GO

## 2025-03-11 PROCEDURE — 99232 SBSQ HOSP IP/OBS MODERATE 35: CPT | Performed by: STUDENT IN AN ORGANIZED HEALTH CARE EDUCATION/TRAINING PROGRAM

## 2025-03-11 PROCEDURE — XXE2X19 MEASUREMENT OF CARDIAC OUTPUT, COMPUTER-AIDED ASSESSMENT, NEW TECHNOLOGY GROUP 9: ICD-10-PCS | Performed by: INTERNAL MEDICINE

## 2025-03-11 PROCEDURE — 85379 FIBRIN DEGRADATION QUANT: CPT | Performed by: NURSE PRACTITIONER

## 2025-03-11 PROCEDURE — A9540 TC99M MAA: HCPCS | Performed by: STUDENT IN AN ORGANIZED HEALTH CARE EDUCATION/TRAINING PROGRAM

## 2025-03-11 PROCEDURE — 2500000002 HC RX 250 W HCPCS SELF ADMINISTERED DRUGS (ALT 637 FOR MEDICARE OP, ALT 636 FOR OP/ED): Performed by: NURSE PRACTITIONER

## 2025-03-11 PROCEDURE — 93306 TTE W/DOPPLER COMPLETE: CPT | Performed by: INTERNAL MEDICINE

## 2025-03-11 PROCEDURE — 93970 EXTREMITY STUDY: CPT | Performed by: SURGERY

## 2025-03-11 PROCEDURE — 97162 PT EVAL MOD COMPLEX 30 MIN: CPT | Mod: GP

## 2025-03-11 PROCEDURE — 1200000002 HC GENERAL ROOM WITH TELEMETRY DAILY

## 2025-03-11 PROCEDURE — 83735 ASSAY OF MAGNESIUM: CPT | Performed by: NURSE PRACTITIONER

## 2025-03-11 PROCEDURE — 3430000001 HC RX 343 DIAGNOSTIC RADIOPHARMACEUTICALS: Performed by: STUDENT IN AN ORGANIZED HEALTH CARE EDUCATION/TRAINING PROGRAM

## 2025-03-11 PROCEDURE — 78803 RP LOCLZJ TUM SPECT 1 AREA: CPT

## 2025-03-11 PROCEDURE — 78830 RP LOCLZJ TUM SPECT W/CT 1: CPT | Performed by: STUDENT IN AN ORGANIZED HEALTH CARE EDUCATION/TRAINING PROGRAM

## 2025-03-11 PROCEDURE — 93970 EXTREMITY STUDY: CPT

## 2025-03-11 RX ORDER — CARVEDILOL 25 MG/1
25 TABLET ORAL 2 TIMES DAILY
Status: DISCONTINUED | OUTPATIENT
Start: 2025-03-11 | End: 2025-03-15

## 2025-03-11 RX ORDER — ISOSORBIDE MONONITRATE 60 MG/1
60 TABLET, EXTENDED RELEASE ORAL DAILY
Status: DISCONTINUED | OUTPATIENT
Start: 2025-03-11 | End: 2025-03-19 | Stop reason: HOSPADM

## 2025-03-11 RX ORDER — CARBIDOPA AND LEVODOPA 25; 100 MG/1; MG/1
1 TABLET ORAL 3 TIMES DAILY
COMMUNITY
Start: 2025-03-05

## 2025-03-11 RX ORDER — FUROSEMIDE 10 MG/ML
40 INJECTION INTRAMUSCULAR; INTRAVENOUS ONCE
Status: COMPLETED | OUTPATIENT
Start: 2025-03-11 | End: 2025-03-11

## 2025-03-11 RX ORDER — LIDOCAINE HYDROCHLORIDE 10 MG/ML
5 INJECTION, SOLUTION INFILTRATION; PERINEURAL ONCE
Status: DISCONTINUED | OUTPATIENT
Start: 2025-03-11 | End: 2025-03-14

## 2025-03-11 RX ORDER — LANOLIN ALCOHOL/MO/W.PET/CERES
400 CREAM (GRAM) TOPICAL ONCE
Status: COMPLETED | OUTPATIENT
Start: 2025-03-11 | End: 2025-03-11

## 2025-03-11 RX ADMIN — ISOSORBIDE MONONITRATE 60 MG: 60 TABLET, EXTENDED RELEASE ORAL at 08:05

## 2025-03-11 RX ADMIN — DULOXETINE HYDROCHLORIDE 30 MG: 30 CAPSULE, DELAYED RELEASE ORAL at 08:12

## 2025-03-11 RX ADMIN — CARBIDOPA AND LEVODOPA 1 TABLET: 25; 100 TABLET ORAL at 20:14

## 2025-03-11 RX ADMIN — LISINOPRIL 40 MG: 20 TABLET ORAL at 08:05

## 2025-03-11 RX ADMIN — ACETAMINOPHEN 650 MG: 325 TABLET, FILM COATED ORAL at 08:07

## 2025-03-11 RX ADMIN — FOLIC ACID 1 MG: 1 TABLET ORAL at 08:05

## 2025-03-11 RX ADMIN — AMLODIPINE BESYLATE 10 MG: 10 TABLET ORAL at 08:05

## 2025-03-11 RX ADMIN — CARVEDILOL 25 MG: 25 TABLET, FILM COATED ORAL at 20:14

## 2025-03-11 RX ADMIN — CARBIDOPA AND LEVODOPA 1 TABLET: 25; 100 TABLET ORAL at 08:05

## 2025-03-11 RX ADMIN — SENNOSIDES 17.2 MG: 8.6 TABLET ORAL at 20:13

## 2025-03-11 RX ADMIN — ASPIRIN 81 MG CHEWABLE TABLET 81 MG: 81 TABLET CHEWABLE at 08:04

## 2025-03-11 RX ADMIN — KIT FOR THE PREPARATION OF TECHNETIUM TC 99M ALBUMIN AGGREGATED 4.2 MILLICURIE: 2.5 INJECTION, POWDER, FOR SOLUTION INTRAVENOUS at 11:55

## 2025-03-11 RX ADMIN — TRAZODONE HYDROCHLORIDE 150 MG: 50 TABLET ORAL at 20:14

## 2025-03-11 RX ADMIN — MAGNESIUM OXIDE TAB 400 MG (241.3 MG ELEMENTAL MG) 400 MG: 400 (241.3 MG) TAB at 13:10

## 2025-03-11 RX ADMIN — CARBIDOPA AND LEVODOPA 1 TABLET: 25; 100 TABLET ORAL at 15:57

## 2025-03-11 RX ADMIN — QUETIAPINE FUMARATE 300 MG: 300 TABLET ORAL at 20:14

## 2025-03-11 RX ADMIN — FUROSEMIDE 40 MG: 10 INJECTION INTRAMUSCULAR; INTRAVENOUS at 13:09

## 2025-03-11 RX ADMIN — SENNOSIDES 17.2 MG: 8.6 TABLET ORAL at 08:12

## 2025-03-11 RX ADMIN — TERAZOSIN HYDROCHLORIDE 2 MG: 2 CAPSULE ORAL at 13:09

## 2025-03-11 RX ADMIN — NICOTINE 1 PATCH: 14 PATCH, EXTENDED RELEASE TRANSDERMAL at 08:04

## 2025-03-11 RX ADMIN — CARVEDILOL 25 MG: 25 TABLET, FILM COATED ORAL at 08:05

## 2025-03-11 RX ADMIN — ATORVASTATIN CALCIUM 40 MG: 40 TABLET, FILM COATED ORAL at 20:14

## 2025-03-11 RX ADMIN — BUPROPION HYDROCHLORIDE 150 MG: 150 TABLET, EXTENDED RELEASE ORAL at 08:12

## 2025-03-11 RX ADMIN — HYDRALAZINE HYDROCHLORIDE 5 MG: 20 INJECTION INTRAMUSCULAR; INTRAVENOUS at 07:46

## 2025-03-11 ASSESSMENT — COGNITIVE AND FUNCTIONAL STATUS - GENERAL
MOBILITY SCORE: 10
PERSONAL GROOMING: A LITTLE
MOVING FROM LYING ON BACK TO SITTING ON SIDE OF FLAT BED WITH BEDRAILS: A LITTLE
PERSONAL GROOMING: A LITTLE
CLIMB 3 TO 5 STEPS WITH RAILING: A LOT
TOILETING: A LITTLE
WALKING IN HOSPITAL ROOM: TOTAL
DRESSING REGULAR LOWER BODY CLOTHING: A LOT
TOILETING: A LOT
DAILY ACTIVITIY SCORE: 15
TURNING FROM BACK TO SIDE WHILE IN FLAT BAD: A LITTLE
HELP NEEDED FOR BATHING: A LOT
TURNING FROM BACK TO SIDE WHILE IN FLAT BAD: A LITTLE
DRESSING REGULAR LOWER BODY CLOTHING: A LOT
MOBILITY SCORE: 17
MOVING TO AND FROM BED TO CHAIR: A LITTLE
TOILETING: A LITTLE
MOVING FROM LYING ON BACK TO SITTING ON SIDE OF FLAT BED WITH BEDRAILS: A LITTLE
WALKING IN HOSPITAL ROOM: A LITTLE
MOVING TO AND FROM BED TO CHAIR: A LITTLE
DRESSING REGULAR UPPER BODY CLOTHING: A LITTLE
STANDING UP FROM CHAIR USING ARMS: A LITTLE
MOBILITY SCORE: 17
DRESSING REGULAR UPPER BODY CLOTHING: A LITTLE
EATING MEALS: A LITTLE
DRESSING REGULAR LOWER BODY CLOTHING: A LOT
DRESSING REGULAR UPPER BODY CLOTHING: A LOT
DAILY ACTIVITIY SCORE: 17
DAILY ACTIVITIY SCORE: 17
STANDING UP FROM CHAIR USING ARMS: TOTAL
HELP NEEDED FOR BATHING: A LITTLE
PERSONAL GROOMING: A LITTLE
CLIMB 3 TO 5 STEPS WITH RAILING: TOTAL
STANDING UP FROM CHAIR USING ARMS: A LITTLE
CLIMB 3 TO 5 STEPS WITH RAILING: A LOT
WALKING IN HOSPITAL ROOM: A LITTLE
TURNING FROM BACK TO SIDE WHILE IN FLAT BAD: A LITTLE
EATING MEALS: A LITTLE
MOVING TO AND FROM BED TO CHAIR: TOTAL
MOVING FROM LYING ON BACK TO SITTING ON SIDE OF FLAT BED WITH BEDRAILS: A LITTLE
HELP NEEDED FOR BATHING: A LITTLE

## 2025-03-11 ASSESSMENT — ACTIVITIES OF DAILY LIVING (ADL)
LACK_OF_TRANSPORTATION: NO
BATHING_ASSISTANCE: MODERATE

## 2025-03-11 ASSESSMENT — PAIN SCALES - GENERAL
PAINLEVEL_OUTOF10: 3
PAINLEVEL_OUTOF10: 0 - NO PAIN
PAINLEVEL_OUTOF10: 0 - NO PAIN

## 2025-03-11 ASSESSMENT — PAIN - FUNCTIONAL ASSESSMENT
PAIN_FUNCTIONAL_ASSESSMENT: 0-10

## 2025-03-11 NOTE — PROGRESS NOTES
Physical Therapy    Physical Therapy Evaluation    Patient Name: Kelly Martinez  MRN: [id number]  Department: [place] [address]  Room: [address] [place], [place] [address]  Today's Date: 3/11/2025   Time Calculation  Start Time: 1324  Stop Time: 1342  Time Calculation (min): 18 min    Assessment/Plan   PT Assessment  PT Assessment Results: Decreased strength, Decreased endurance, Impaired balance, Decreased mobility, Decreased cognition, Decreased safety awareness, Pain  Rehab Prognosis: Good  Barriers to Discharge Home: Caregiver assistance, Cognition needs, Physical needs  Caregiver Assistance: Patient lives alone and/or does not have reliable caregiver assistance  Cognition Needs: Insight of patient limited regarding functional ability/needs  Physical Needs: Ambulating household distances limited by function/safety, High falls risk due to function or environment  Evaluation/Treatment Tolerance: Patient limited by fatigue  Medical Staff Made Aware: Yes  End of Session Communication: Bedside nurse  Assessment Comment: 79yo female presents s/p fall at home.  Pt demo dec strength, balance, endurance, inc pain, & imp cognition limiting functional mobility.  Pt would benefit from continued therapy to address these deficits and improve safety and indep  End of Session Patient Position: Bed, 3 rail up, Alarm on  IP OR SWING BED PT PLAN  Inpatient or Swing Bed: Inpatient  PT Plan  Treatment/Interventions: Bed mobility, Transfer training, Gait training, Balance training, Strengthening, Endurance training, Therapeutic exercise, Therapeutic activity  PT Plan: Ongoing PT  PT Frequency: 3 times per week  PT Discharge Recommendations: Moderate intensity level of continued care  Equipment Recommended upon Discharge:  (tbd)  PT Recommended Transfer Status: Assist x1  PT - OK to Discharge: Yes (PT eval completed & recs made)    Subjective   General Visit Information:  General  Reason for Referral: presenting to ED from home s/p mechanical fall  Past Medical History  "Relevant to Rehab: 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 and recurrent falls, s/p IVC filter removed 4/2016), CKD stage IV, chronic tobacco use, schizophrenia, Parkinson's disease, & depression  Family/Caregiver Present: No  Prior to Session Communication: Bedside nurse (NP Danica- medically appropriate for therapy)  Patient Position Received: Bed, 3 rail up, Alarm off, not on at start of session  Preferred Learning Style: visual, verbal, auditory  General Comment: Pt supine in bed upon arrival, cooperative, but questionable historian  Home Living:  Home Living  Type of Home: Apartment  Lives With: Alone  Home Adaptive Equipment: Walker rolling or standard (rollator)  Home Layout: One level  Home Access: Elevator  Home Living Comments: +life alert button  Prior Level of Function:  Prior Function Per Pt/Caregiver Report  Level of [place]: Independent with ADLs and functional transfers  Receives Help From: Family (pt states has a HHA 7 days/week for 3 hrs/day)  ADL Assistance:  (Initially states indep, then states dtr assists; per pt, sponge bathes)  Homemaking Assistance:  (assist with cooking, cleaning by HHA and dtr)  Ambulatory Assistance:  (amb indep with WW)  Hand Dominance: Right  Prior Function Comments: endorses multiple falls  Precautions:  Precautions  Hearing/Visual Limitations: L eye edema - ? visual changes - pt with difficulty answering  Medical Precautions: Fall precautions      Date/Time Vitals Session Patient Position Pulse Resp SpO2 BP MAP (mmHg)    03/11/25 1242 --  --  67  19  97 %  130/84  --           Objective   Pain:  Pain Assessment  Pain Assessment: 0-10  0-10 (Numeric) Pain Score:  (\"not too much\")  Pain Type: Acute pain  Pain Location: Generalized  Pain Orientation: Left  Pain Interventions: Repositioned, Rest  Response to " "Interventions: No change in pain  Cognition:  Cognition  Overall Cognitive Status: Impaired (dec insight into deficits; intermittent confusion; questionable historian)  Orientation Level: Disoriented to time  Insight: Moderate  Processing Speed: Delayed    General Assessments:        Activity Tolerance  Endurance: Decreased tolerance for upright activites    Sensation  Sensation Comment: Pt states + numbness/tingling in B feet    Strength  Strength Comments: generalized deconditioning    Perception  Inattention/Neglect: Appears intact  Initiation: Cues to initiate tasks  Motor Planning: Appears intact  Perseveration: Not present    Coordination  Movements are Fluid and Coordinated: Yes    Postural Control  Postural Control: Within Functional Limits    Static Sitting Balance  Static Sitting-Balance Support: Feet supported, Bilateral upper extremity supported  Static Sitting-Level of Assistance: Close supervision       Functional Assessments:  Bed Mobility  Bed Mobility: Yes  Bed Mobility 1  Bed Mobility 1: Supine to sitting, Sitting to supine  Level of Assistance 1: Minimum assistance, Moderate verbal cues  Bed Mobility Comments 1: HOB elevated, use of rails; inc time to complete; cueing for task sequencing    Transfers  Transfer: No (pt refusing further OOB mobility \"I'm not getting up again\")    Ambulation/Gait Training  Ambulation/Gait Training Performed: No    Stairs  Stairs: No  Extremity/Trunk Assessments:  Cervical Spine   Cervical Spine:  (FHP)  RUE   RUE :  (R shoulder with RCT (per pt) with limited AROM <45 degrees; distally WFL)  LUE   LUE:  (shoulder to ~90, distally WFL)  RLE   RLE : Exceptions to WFL  Strength RLE  RLE Overall Strength: Greater than or equal to 3/5 as evidenced by functional mobility  LLE   LLE : Exceptions to WFL  Strength LLE  LLE Overall Strength: Greater than or equal to 3/5 as evidenced by functional mobility  Outcome Measures:  [facility] Basic Mobility  Turning from your back to your " side while in a flat bed without using bedrails: A little  Moving from lying on your back to sitting on the side of a flat bed without using bedrails: A little  Moving to and from bed to chair (including a wheelchair): Total  Standing up from a chair using your arms (e.g. wheelchair or bedside chair): Total  To walk in hospital room: Total  Climbing 3-5 steps with railing: Total  Basic Mobility - Total Score: 10    Encounter Problems       Encounter Problems (Active)       Balance       Pt will score >23 on Tinetti assessment to indicate low falls risk       Start:  03/11/25    Expected End:  03/25/25               Mobility       STG - Patient will ambulate >100' with WW and SBA without LOB or path deviation       Start:  03/11/25    Expected End:  03/25/25               PT Transfers       STG - Patient to transfer to and from sit to supine indep from flat bed, no rails        Start:  03/11/25    Expected End:  03/25/25            STG - Patient will transfer sit to and from stand with SBA and WW       Start:  03/11/25    Expected End:  03/25/25                   Education Documentation  Precautions, taught by Magalys Mann, PT at 3/11/2025  2:08 PM.  Learner: Patient  Readiness: Acceptance  Method: Explanation  Response: Needs Reinforcement  Comment: safety with mobility, falls prec, role of PT    Mobility Training, taught by Magalys Mann PT at 3/11/2025  2:08 PM.  Learner: Patient  Readiness: Acceptance  Method: Explanation  Response: Needs Reinforcement  Comment: safety with mobility, falls prec, role of PT    Education Comments  No comments found.        03/11/25 at 2:10 PM - Magalys Mann PT

## 2025-03-11 NOTE — PROGRESS NOTES
Pharmacy Medication History Review    Kelly Martinez is a 80 y.o. female admitted for Acute on chronic congestive heart failure, unspecified heart failure type. Pharmacy reviewed the patient's tebbi-qj-ujerlsbnd medications and allergies for accuracy.    Medications ADDED:  Carbidopa-levodopa  mg  Medications CHANGED:  N/A  Medications REMOVED:   Amlodipine, symbicort, heparin, mirlax, senna, sodium chloride 3% neb    The list below reflects the updated PTA list.   Prior to Admission Medications   Prescriptions Last Dose Informant   DULoxetine (Cymbalta) 30 mg DR capsule  Child   Sig: Take 1 capsule (30 mg) by mouth once daily. Do not crush or chew.  Continue to hold until improvement in sCre   QUEtiapine (SEROquel) 300 mg tablet  Child   Sig: Take 1 tablet (300 mg) by mouth once daily at bedtime.   acetaminophen (Tylenol) 325 mg tablet  Child   Sig: Take 2 tablets (650 mg) by mouth every 6 hours if needed for mild pain (1 - 3).   aspirin 81 mg chewable tablet  Child   Sig: Chew 1 tablet (81 mg) once daily. DO NOT RESUME UNTIL DISCUSSED AT NEUROSURGERY FOLLOW UP   atorvastatin (Lipitor) 40 mg tablet  Child   Sig: Take 1 tablet (40 mg) by mouth once daily.   buPROPion XL (Wellbutrin XL) 150 mg 24 hr tablet  Child   Sig: Take 1 tablet (150 mg) by mouth once daily. Do not crush, chew, or split.   carbidopa-levodopa (Sinemet)  mg tablet     Sig: Take 1 tablet by mouth 3 times a day.   carvedilol (Coreg) 12.5 mg tablet  Child   Sig: Take 1 tablet (12.5 mg) by mouth 2 times daily (morning and late afternoon).   folic acid (Folvite) 1 mg tablet  Child   Sig: Take 1 tablet (1 mg) by mouth once daily.   isosorbide mononitrate ER (Imdur) 30 mg 24 hr tablet  Child   Sig: Take 1 tablet (30 mg) by mouth once daily.   lisinopril 40 mg tablet  Child   Sig: Take 1 tablet (40 mg) by mouth once daily.   oxygen (O2) gas therapy  Child   Sig: Inhale 1 each every 12 hours. Wean as tolerated   terazosin (Hytrin) 2 mg capsule   "Child   Sig: Take 1 capsule (2 mg) by mouth once daily.   traZODone (Desyrel) 150 mg tablet  Child   Sig: Take 1 tablet (150 mg) by mouth as needed at bedtime for sleep.      Facility-Administered Medications: None        The list below reflects the updated allergy list. Please review each documented allergy for additional clarification and justification.  Allergies  Reviewed by Sha Greco on 3/11/2025        Severity Reactions Comments    Meclizine Not Specified Dizziness, Unknown     Naproxen Not Specified Itching     Piperacillin-tazobactam-dextrs Not Specified Other             Patient accepts M2B at discharge.     Sources:   [facility]  Pharmacy dispense history  Patient interview Poor historian  Spoke with patient's daughter Noris Lowry via phone #[phone] to verify current medication list; Reliable/Good historian  Chart Review  Care Everywhere  Spoke with Pharmacist at [facility] to verify instructions for Carbidopa-Levodopa  mg.    Additional Comments:  N/A      SHA GRECO  Pharmacy Technician  03/11/25     Secure Chat preferred   If no response call [phone] or [employer] \"Med Rec\"    "

## 2025-03-11 NOTE — PROGRESS NOTES
03/11/25 1151   Rapid Rounds   Attendance Provider;Care Transitions;Nurse   Expected Discharge Disposition SNF   Today we still await: Clinical stability;Diagnostic workup   Review at Escalation Rounds Clinically complex     Plan Diuresis  adod 1-2 days

## 2025-03-11 NOTE — CARE PLAN
The patient's goals for the shift include patient will verbalize less SOB this shift.    The clinical goals for the shift include patient will remain safe free from falls and injury this shift.    Over the shift, the patient reports a headache 3/10 throbbing, awaiting orders, and also reports improved SOB.  Lasix IVP given upon arrival to floor.  Patient reports 0/10 chest pain and remained safe free from falls and injury this shift.  Note:  Bed Alarm patient as she is forgetful.      Problem: Fall/Injury  Goal: Not fall by end of shift  Outcome: Progressing  Goal: Be free from injury by end of the shift  Outcome: Progressing  Goal: Verbalize understanding of personal risk factors for fall in the hospital  Outcome: Progressing  Goal: Verbalize understanding of risk factor reduction measures to prevent injury from fall in the home  Outcome: Progressing  Goal: Use assistive devices by end of the shift  Outcome: Progressing  Goal: Pace activities to prevent fatigue by end of the shift  Outcome: Progressing     Problem: Pain  Goal: Takes deep breaths with improved pain control throughout the shift  Outcome: Progressing  Goal: Turns in bed with improved pain control throughout the shift  Outcome: Progressing  Goal: Walks with improved pain control throughout the shift  Outcome: Progressing  Goal: Performs ADL's with improved pain control throughout shift  Outcome: Progressing  Goal: Participates in PT with improved pain control throughout the shift  Outcome: Progressing  Goal: Free from opioid side effects throughout the shift  Outcome: Progressing  Goal: Free from acute confusion related to pain meds throughout the shift  Outcome: Progressing     Problem: Respiratory  Goal: Clear secretions with interventions this shift  Outcome: Progressing  Goal: Minimize anxiety/maximize coping throughout shift  Outcome: Progressing  Goal: Minimal/no exertional discomfort or dyspnea this shift  Outcome: Progressing  Goal: No signs of  respiratory distress (eg. Use of accessory muscles. Peds grunting)  Outcome: Progressing  Goal: Patent airway maintained this shift  Outcome: Progressing  Goal: Tolerate mechanical ventilation evidenced by VS/agitation level this shift  Outcome: Progressing  Goal: Tolerate pulmonary toileting this shift  Outcome: Progressing  Goal: Verbalize decreased shortness of breath this shift  Outcome: Progressing  Goal: Wean oxygen to maintain O2 saturation per order/standard this shift  Outcome: Progressing  Goal: Increase self care and/or family involvement in next 24 hours  Outcome: Progressing     Problem: Pain - Adult  Goal: Verbalizes/displays adequate comfort level or baseline comfort level  Outcome: Progressing     Problem: Safety - Adult  Goal: Free from fall injury  Outcome: Progressing     Problem: Discharge Planning  Goal: Discharge to home or other facility with appropriate resources  Outcome: Progressing     Problem: Chronic Conditions and Co-morbidities  Goal: Patient's chronic conditions and co-morbidity symptoms are monitored and maintained or improved  Outcome: Progressing     Problem: Nutrition  Goal: Nutrient intake appropriate for maintaining nutritional needs  Outcome: Progressing

## 2025-03-11 NOTE — PROGRESS NOTES
"Subjective Data:  Patient's  this AM; down to 180 after AM meds. Patient reporting \"constant\" headache; difficult to discern if etiology is 2/2 HTN or L forehead hematoma. Reports a slight improvement in her breathing after IVP lasix. Reports increased L calf pain/swelling this AM. Lost PIV access this AM (#24G placed in ED), pending US-guided IV placement w/RN.    - TTE pending  - IVP Lasix 40mg x1  - increase coreg 25mg BID, increase imdur 60mg daily, ?consider adding hydral  - V/Q scan without PE  - US-guided PIV pending  - d-dimer pending  - BLE US pending  - PT/OT    Overnight Events:    No acute events overnight.      Objective Data:  Last Recorded Vitals:  Vitals:    03/11/25 0005 03/11/25 0355 03/11/25 0734 03/11/25 0807   BP: 152/79 (!) 178/95 (!) 200/104 (!) 180/97   BP Location: Right arm Right arm Left arm    Patient Position: Lying Lying Lying    Pulse: 77 76 76    Resp: 20 20 18    Temp: 36.4 °C (97.5 °F) 36.9 °C (98.4 °F) 36.6 °C (97.9 °F)    TempSrc: Temporal Temporal Temporal    SpO2: 93% 94% 95%    Weight:  99 kg (218 lb 4.1 oz)     Height:         Last Labs:  CBC - 3/10/2025: 10:49 PM  4.9 11.3 163    36.4      CMP - 3/10/2025: 10:49 PM  9.4 7.9 14 --- 0.7   3.4 3.9 9 99      PTT - 3/10/2025:  2:00 PM  1.0   11.3 29     TROPHS   Date/Time Value Ref Range Status   03/10/2025 12:27 PM 28 0 - 34 ng/L Final   03/10/2025 11:03 AM 38 0 - 34 ng/L Final   09/21/2024 04:29 AM 35 0 - 34 ng/L Final   03/30/2024 03:19 PM 19 0 - 34 ng/L Final   03/30/2024 02:09 PM 18 0 - 34 ng/L Final   12/28/2023 02:33 AM 19 0 - 34 ng/L Final     BNP   Date/Time Value Ref Range Status   03/10/2025 11:03 AM 36 0 - 99 pg/mL Final   09/21/2024 04:29  0 - 99 pg/mL Final     HGBA1C   Date/Time Value Ref Range Status   03/10/2025 10:49 PM 5.7 See comment % Final   09/21/2024 04:29 AM 5.9 See comment % Final     LDLCALC   Date/Time Value Ref Range Status   03/10/2025 11:03 AM 62 <=99 mg/dL Final     Comment:             "                     Near   Borderline      AGE      Desirable  Optimal    High     High     Very High     0-19 Y     0 - 109     ---    110-129   >/= 130     ----    20-24 Y     0 - 119     ---    120-159   >/= 160     ----      >24 Y     0 -  99   100-129  130-159   160-189     >/=190       VLDL   Date/Time Value Ref Range Status   03/10/2025 11:03 AM 20 0 - 40 mg/dL Final   12/07/2022 02:20 PM 16 0 - 40 mg/dL Final      Last I/O:  I/O last 3 completed shifts:  In: 480 (4.8 mL/kg) [P.O.:480]  Out: 2600 (26.3 mL/kg) [Urine:2600 (0.7 mL/kg/hr)]  Weight: 99 kg     Past Cardiology Tests (Last 3 Years):  EKG:  ECG 12 lead 03/10/2025    Echo:  Transthoracic Echo (TTE) Complete 09/21/2024:  1. Left ventricular ejection fraction is normal, calculated by Regan's biplane at 74%.  2. Spectral Doppler shows an impaired relaxation pattern of left ventricular diastolic filling.  3. There is mild concentric left ventricular hypertrophy.  4. There is normal right ventricular global systolic function.  5. The left atrium is mildly dilated.  6. Mildly elevated right ventricular systolic pressure.  7. There is no evidence of a patent foramen ovale.  8. Compared with study dated 12/29/2023, no significant change.     Transthoracic Echo (TTE) Limited 12/29/2023:  1. Left ventricular systolic function is normal with a 55-60% estimated ejection fraction.  2. Poorly visualized anatomical structures due to suboptimal image quality.  3. Spectral Doppler shows an impaired relaxation pattern of left ventricular diastolic filling.     Ejection Fractions:  EF   Date/Time Value Ref Range Status   09/21/2024 11:50 AM 74 %    12/29/2023 10:48 AM 54       Cath:  No results found for this or any previous visit from the past 1095 days.  Stress Test:  No results found for this or any previous visit from the past 1095 days.  Cardiac Imaging:  No results found for this or any previous visit from the past 1095 days.    Inpatient Medications:  Scheduled  medications   Medication Dose Route Frequency    amLODIPine  10 mg oral Daily    aspirin  81 mg oral Daily    atorvastatin  40 mg oral Nightly    buPROPion XL  150 mg oral Daily    carbidopa-levodopa  1 tablet oral TID    carvedilol  25 mg oral BID    DULoxetine  30 mg oral Daily    folic acid  1 mg oral Daily    isosorbide mononitrate ER  60 mg oral Daily    lidocaine  5 mL infiltration Once    lisinopril  40 mg oral Daily    nicotine  1 patch transdermal Daily    perflutren lipid microspheres  0.5-10 mL of dilution intravenous Once in imaging    QUEtiapine  300 mg oral Nightly    sennosides  2 tablet oral BID    terazosin  2 mg oral Daily     PRN medications   Medication    acetaminophen    hydrALAZINE    traZODone     Continuous Medications   Medication Dose Last Rate     Physical Exam:  General: NAD, lying in bed, ill-appearing  Skin: slightly cooler in BLE up to knees, otherwise warm and dry throughout, L forehead hematoma w/ecchymosis, +tenderness  Head/ neck: +JVD at chin level, edentulous   Cardiac: RRR, S1, S2, NSR HR 80s on tele   Pulm: anteriorly diminished, on RA, conversationally dyspneic (improving)  GI: soft, nontender, non-distended, obese  Extremities: +1 edema in BLE, L>R w/L-calf tenderness  Neuro: no focal neuro deficits, a+ox4, hard of hearing  Psych: appropriate mood and behavior, very pleasant        Assessment/Plan   Kelly Martinez is a 80 y.o. female with PMH significant for 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 Typically receives all her [facility] at [facility]. Under [facility] for further management of HTN urgency and ADHF.     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 On coumadin  - s/p IVC filter removed 4/2016 d/t erosion  - (9/2024) presented w/AMS, CTH +R cerebellar-vermis hemorrhage, reversed with kcentra, no residual effects  - off coumadin since (9/2024) per primary neurologist @[facility], [place] (Dr. Renee); OP office visit (3/5/2025):              -- hold OFF on AC for 1 year (9/2024-9/2025) given risk of cerebral bleeding, new start of Parkinson's meds (see below)  - no evidence for PE on CTA chest during last admit at  (9/2024)  - currently satting >90% on RA, reported feeling more SOB than usual  - V/Q scan today without evidence of perfusion defects/PE  - d-dimer pending (no prior level for comparison)  - BLE duplex US in vas lab pending to r/o DVT    -- if any acute findings, consult neuro for AC recs  - s/p mechanical fall at home upon admit, +trauma to L-side of head (w/L forehead hematoma)  - imaging on admit (CTH, CT C-spine): without acute changes/fractures  - no new neuro deficits  - lives at home alone, PT/OT to evaluate  - cont. Home baby ASA and statin  - PRN tylenol for pain     Hypertensive Urgency, stable  HLD  - BP on on admit: 208/112  - SBP past 24hrs: 180-200s, +headache  - increase imdur to 60mg, increase coreg 25mg BID, ?consider addition of hydral if indicated  - cont. home amlodipine 10mg daily & lisinopril 40mg daily  - hgb A1c 5.7%, TSH 0.98  - lipid panel: total cholesterol 127 HDL 45.3 LDL 62 Tgs 101  - cont. Home statin     Acute on chronic diastolic heart failure  - followed by ALMA ROSA Diana, @[facility], [place]  - etiology: unclear, no recent ischemic eval  - TTE OSH (9/2024): LVEF 74%, no WMAs, DD, negative bubble study  - TTE pending  - admit BNP: 36 (prev. 170 in 9/2024)  - admit CXR: pulm edema, vasc. congestion  - admit wt: 98.4kg, today's wt (bed): 99kg  - no home diuretics, s/p IVP Lasix 40mg x1  (3/10)-->give IVP Lasix 40mg x1  - cont. Home lisinopril 40mg daily, Increased doses of imdur and coreg as above  - further GDMT pending TTE results  - Daily standing weights, strict I&O's, 2g sodium diet, 2L fluid restriction     CKD stage 4, stable  - Bl Cr worsening over past year ~2-3  - Cr 2.87 upon last discharge  - Cr today: 2.93 (2.73)  - diuresis plan as above  - Renally dose meds, avoid nephrotoxins       Parkinson's Disease  Schizophrenia  Depression  - diagnosed ~3-4 years ago  - new start of carvidopa/levodopa 25/200mg TID (on 3/5/2025), cont. In-house  - admit ECG w/Qtc of 455ms  - cont. Home seroquel, wellbutrin, trazodone, duloxetine  - cont. OP f/up w/primary neurologist, behavioral health, & PCP     JOY  COPD  Chronic Tobacco Use  - encourage use of nocturnal CPAP, RT following  - nicotine patch   - cont. encouraging cessation    Lines: US-guided PIV access pending (3/11)  DVT ppx: SCDs  DISPO: PT/OT to evaluate, resides at home alone  - discharge pending DVT/PE work-up, further diuresis & medical optimization, dispo planning      All labs, vital signs, tests & imaging results, and medications were reviewed.     Seen and discussed with Dr. Mustafa    Code Status:  Full Code    Danica Kay, APRN-CNP

## 2025-03-11 NOTE — PROGRESS NOTES
Occupational Therapy    Communication Note    Patient Name: Kelly Martinez  MRN: [id number]  Today's Date: 3/11/2025   Room: [address] [place] [address]    Discipline: Occupational Therapy      Missed Visit Reason: Other (Comment) (pt is off the floor for testing)      03/11/25 at 10:32 AM   Maki Garcia OT   Rehab Office: [phone]

## 2025-03-11 NOTE — PROGRESS NOTES
03/11/25 1027   Discharge Planning   Living Arrangements Alone   Support Systems Children   Type of Residence Private residence   Number of Stairs to Enter Residence 7   Number of Stairs Within Residence 12   Do you have animals or pets at home? No   Who is requesting discharge planning? Provider   Home or Post Acute Services None   Expected Discharge Disposition Home   Does the patient need discharge transport arranged? No   Financial Resource Strain   How hard is it for you to pay for the very basics like food, housing, medical care, and heating? Not very   Housing Stability   In the last 12 months, was there a time when you were not able to pay the mortgage or rent on time? N   At any time in the past 12 months, were you homeless or living in a shelter (including now)? N   Transportation Needs   In the past 12 months, has lack of transportation kept you from medical appointments or from getting medications? no   In the past 12 months, has lack of transportation kept you from meetings, work, or from getting things needed for daily living? No   Patient Choice   Provider Choice list and CMS website (https://medicare.gov/care-compare#search) for post-acute Quality and Resource Measure Data were provided and reviewed with: Patient   Patient / Family choosing to utilize agency / facility established prior to hospitalization No   Stroke Family Assessment   Stroke Family Assessment Needed No     Transitional Care Coordination Progress Note:  Patient discussed during interdisciplinary rounds.   Team members present: RN TCC MD   Plan per Medical/Surgical team  Diuresing Monitoring for  PE vs dvt    Payor: LISBETH LOCKHART [facility]   Discharge disposition: TBD  Potential Barriers: None   ADOD: 3-      Previous Home Care: None   DME: None   Pharmacy:  [facility]  Falls: None   PCP:  JULIO SUN   Dialysis: None

## 2025-03-11 NOTE — CARE PLAN
Problem: Fall/Injury  Goal: Not fall by end of shift  Outcome: Progressing  Goal: Be free from injury by end of the shift  Outcome: Progressing  Goal: Verbalize understanding of personal risk factors for fall in the hospital  Outcome: Progressing  Goal: Verbalize understanding of risk factor reduction measures to prevent injury from fall in the home  Outcome: Progressing  Goal: Use assistive devices by end of the shift  Outcome: Progressing  Goal: Pace activities to prevent fatigue by end of the shift  Outcome: Progressing     Problem: Pain  Goal: Takes deep breaths with improved pain control throughout the shift  Outcome: Progressing  Goal: Turns in bed with improved pain control throughout the shift  Outcome: Progressing  Goal: Walks with improved pain control throughout the shift  Outcome: Progressing  Goal: Performs ADL's with improved pain control throughout shift  Outcome: Progressing  Goal: Participates in PT with improved pain control throughout the shift  Outcome: Progressing  Goal: Free from opioid side effects throughout the shift  Outcome: Progressing  Goal: Free from acute confusion related to pain meds throughout the shift  Outcome: Progressing     Problem: Respiratory  Goal: Clear secretions with interventions this shift  Outcome: Progressing  Goal: Minimize anxiety/maximize coping throughout shift  Outcome: Progressing  Goal: Minimal/no exertional discomfort or dyspnea this shift  Outcome: Progressing  Goal: No signs of respiratory distress (eg. Use of accessory muscles. Peds grunting)  Outcome: Progressing  Goal: Patent airway maintained this shift  Outcome: Progressing  Goal: Tolerate mechanical ventilation evidenced by VS/agitation level this shift  Outcome: Progressing  Goal: Tolerate pulmonary toileting this shift  Outcome: Progressing  Goal: Verbalize decreased shortness of breath this shift  Outcome: Progressing  Goal: Wean oxygen to maintain O2 saturation per order/standard this  shift  Outcome: Progressing  Goal: Increase self care and/or family involvement in next 24 hours  Outcome: Progressing   The patient's goals for the shift include      The clinical goals for the shift include pt will remain HDS and safe this shift    Over the shift, the patient did not make progress toward the following goals. Pt BP is still elevated, routine medications and bed alarm on. Will continue to monitor.

## 2025-03-11 NOTE — PROGRESS NOTES
Occupational Therapy      Evaluation    Patient Name: Kelly Martinez  MRN: [id number]  Today's Date: 3/11/2025  Room: [address] [place] [address]A  Time Calculation  Start Time: 1414  Stop Time: 1428  Time Calculation (min): 14 min    Assessment  IP OT Assessment  Prognosis: Good  Barriers to Discharge Home: Physical needs, Caregiver assistance  Caregiver Assistance: Caregiver assistance needed per identified barriers - however, level of patient's required assistance exceeds assistance available at home  Physical Needs: Intermittent mobility assistance needed, Intermittent ADL assistance needed, High falls risk due to function or environment  End of Session Communication: Bedside nurse  End of Session Patient Position: Bed, 3 rail up, Alarm on (IV team at bedside)  Plan:  Inpatient Plan  Treatment Interventions: ADL retraining, Functional transfer training, Compensatory technique education, Patient/family training, Equipment evaluation/education  OT Frequency: 3 times per week  OT Discharge Recommendations: Moderate intensity level of continued care  OT Recommended Transfer Status: Assist of 1  OT - OK to Discharge: Yes  OT Assessment  OT Assessment Results: Decreased ADL status, Decreased safe judgment during ADL, Decreased functional mobility, Decreased IADLs  Prognosis: Good        General:  Reason for Referral: presenting to ED from home s/p mechanical fall  Past Medical History Relevant to Rehab: 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 and recurrent falls, s/p IVC filter removed 4/2016), CKD stage IV, chronic tobacco use, schizophrenia, Parkinson's disease, & depression  Prior to Session Communication: Bedside nurse  Patient Position Received: Bed, 3 rail up, Alarm on  General Comment: RN cleared pt for therapy, pt in supine, pleasantly confused, willing to participate.  "  Precautions:  Medical Precautions: Fall precautions  Vital Signs:    Pain:  Pain Assessment  Pain Assessment: 0-10  0-10 (Numeric) Pain Score:  (denied pain)  Lines/Tubes/Drains:         Objective   Cognition:  Overall Cognitive Status: Impaired  Orientation Level: Disoriented to time (stating \"2nd or 3rd\" when asked about month)  Insight: Moderate  Impulsive: Moderately  Processing Speed: Delayed           Home Living:  Type of Home: Apartment  Lives With: Alone  Home Adaptive Equipment: Walker rolling or standard  Home Layout: One level  Home Access: Elevator  Home Living Comments: + life alert button   Prior Function:  Level of [place]: Independent with ADLs and functional transfers  Receives Help From:  (pt reporting she has HHA 7 days/week, did not state how many hours per day)  ADL Assistance:  (pt reporting HHA helps with ADLs)  Homemaking Assistance:  (has assistance with cooking and cleaning)  Ambulatory Assistance:  (using WW)  Prior Function Comments: pt reporting multiple falls  IADL History:     ADL:  Eating Assistance: Independent  Eating Deficit: Setup  Grooming Assistance: Stand by  Grooming Deficit: Setup  Bathing Assistance: Moderate  UE Dressing Assistance: Moderate  LE Dressing Assistance: Maximal  LE Dressing Deficit: Don/doff R sock, Don/doff L sock  Toileting Assistance with Device: Moderate  Activity Tolerance:  Endurance: Decreased tolerance for upright activites  Balance:     Bed Mobility/Transfers: Bed Mobility  Bed Mobility: Yes  Bed Mobility 1  Bed Mobility 1: Supine to sitting, Sitting to supine  Level of Assistance 1: Minimum assistance, Moderate verbal cues  Bed Mobility Comments 1: HOB elevated   and Transfers  Transfer: Yes  Transfer 1  Transfer From 1: Sit to, Stand to  Transfer to 1: Stand, Sit  Technique 1: Sit to stand, Stand to sit  Transfer Device 1: Walker  Transfer Level of Assistance 1: Moderate assistance  Trials/Comments 1: pt required verbal cues on safe hand " placement       Strength:  Strength Comments: B UE grossly WFL       Coordination:  Movements are Fluid and Coordinated: Yes   Hand Function:     Extremities: RUE   RUE : Exceptions to WFL (limited shoulder ROM, remainder WFL), LUE   LUE: Exceptions to WFL (limited shoulder ROM, remainder WFL), RLE   RLE : Exceptions to WFL  Strength RLE  RLE Overall Strength: Greater than or equal to 3/5 as evidenced by functional mobility, and LLE   LLE : Exceptions to WFL  Strength LLE  LLE Overall Strength: Greater than or equal to 3/5 as evidenced by functional mobility    Outcome Measures: [facility] Daily Activity  Putting on and taking off regular lower body clothing: A lot  Bathing (including washing, rinsing, drying): A lot  Putting on and taking off regular upper body clothing: A lot  Toileting, which includes using toilet, bedpan or urinal: A lot  Taking care of personal grooming such as brushing teeth: A little  Eating Meals: None  Daily Activity - Total Score: 15         ,     OT Adult Other Outcome Measures  4AT: positive, score 5, possible delirium and/or cognitive impairment    Education Documentation  Body Mechanics, taught by Maki Garcia OT at 3/11/2025  3:48 PM.  Learner: Patient  Readiness: Acceptance  Method: Explanation  Response: Needs Reinforcement    Precautions, taught by Maki Garcia OT at 3/11/2025  3:48 PM.  Learner: Patient  Readiness: Acceptance  Method: Explanation  Response: Needs Reinforcement    ADL Training, taught by Maki Garcia OT at 3/11/2025  3:48 PM.  Learner: Patient  Readiness: Acceptance  Method: Explanation  Response: Needs Reinforcement    Education Comments  No comments found.        Goals:     Encounter Problems       Encounter Problems (Active)       ADLs       Patient with complete upper body dressing with stand by assist level of assistance donning and doffing all UE clothes with no adaptive equipment while edge of bed  (Progressing)       Start:  03/11/25    Expected  End:  03/25/25            Patient will complete toileting including hygiene clothing management/hygiene with stand by assist level of assistance and grab bars. (Progressing)       Start:  03/11/25    Expected End:  03/25/25               COGNITION/SAFETY       Patient will score WFL on standardized cognitive assessment with min cues and within reasonable time frame (Progressing)       Start:  03/11/25    Expected End:  03/25/25               MOBILITY       Patient will perform Functional mobility min Household distances with stand by assist level of assistance and least restrictive device in order to improve safety and functional mobility. (Progressing)       Start:  03/11/25    Expected End:  03/25/25               TRANSFERS       Patient will perform bed mobility independent level of assistance and bed rails in order to improve safety and independence with mobility (Progressing)       Start:  03/11/25    Expected End:  03/25/25            Patient will complete sit to stand transfer with stand by assist level of assistance and least restrictive device in order to improve safety and prepare for out of bed mobility. (Progressing)       Start:  03/11/25    Expected End:  03/25/25 03/11/25 at 3:50 PM   Maki Garcia, OT   Rehab Office: [phone]

## 2025-03-11 NOTE — HOSPITAL COURSE
Kelly Martinez is a 80 y.o. female with PMH significant for 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 [facility] all [facility] urgency and ADHF.     In ED:   L Forehead hematoma noted; L scalp hematoma noted on CT imaging without evidence of acute changes/fracture (head and C-spine). /112 on admit, went down to 187/100 s/p PO amlodipine 10mg x1, PO coreg 12.5mg x1, & IV hydral 10mg x2. CXR w/pulm. Vasc. Congestion, BNP WNL (36, prev. 170 in 9/2024), HS trop 38>28, Cr 2.73 (>3.00 in 10/2024, Bl Cr appears to be worsening ~2-3 this past year).    Floor Course:  Gently diuresed w/IVP Lasix boluses, transitioned to oral. TTE EF 65-70%. D-dimer elevated >2K (no prior baseline level); V/Q scan without perfusion defects, BLE US duplex negative for acute DVTs. Remained on RA. HTN Urgency resolved w/BP med adjustment. Transient hypotension resolved ***. Current smoker, encouraged cessation, nicotine patch used in-house.    Psychiatry adjusted medications for management of hypoactive delirium.     NICOLÁS on CKD ***     PT/OT rec SNF. OT performed MOCA score, revealing mod-sev. Cognitive impairment (scored 11/30 on 3/13). Patient and daughter agreeable to SNF. Continued OP f/up with regular [facility] providers.     Discharge weight: *** kg    After all labs and VS were reviewed the decision was made that the patient was medically stable for discharge.  The patient was discharged in satisfactory condition.    More than 60 minutes were spent in coordinating patient discharge.    Patient voiced understanding. Daughter updated on plan. [facility] referral placed.     Continued OP f/up with regular [facility] providers.     Medications delivered via Meds to Beds.    Discharge weight: 97.8 kg    After all labs and VS were reviewed the decision was made that the patient was medically stable for discharge.  The patient was discharged in satisfactory condition.    More than 60 minutes were spent in coordinating patient discharge.

## 2025-03-12 LAB
ALBUMIN SERPL BCP-MCNC: 3.5 G/DL (ref 3.4–5)
ANION GAP SERPL CALC-SCNC: 13 MMOL/L (ref 10–20)
AORTIC VALVE PEAK VELOCITY: 1.33 M/S
AV PEAK GRADIENT: 7 MMHG
AVA (PEAK VEL): 1.82 CM2
BODY SURFACE AREA: 2.14 M2
BUN SERPL-MCNC: 38 MG/DL (ref 6–23)
CALCIUM SERPL-MCNC: 9 MG/DL (ref 8.6–10.6)
CHLORIDE SERPL-SCNC: 109 MMOL/L (ref 98–107)
CO2 SERPL-SCNC: 22 MMOL/L (ref 21–32)
CREAT SERPL-MCNC: 3.51 MG/DL (ref 0.5–1.05)
EGFRCR SERPLBLD CKD-EPI 2021: 13 ML/MIN/1.73M*2
EJECTION FRACTION: 68 %
ERYTHROCYTE [DISTWIDTH] IN BLOOD BY AUTOMATED COUNT: 15.1 % (ref 11.5–14.5)
GLUCOSE SERPL-MCNC: 152 MG/DL (ref 74–99)
HCT VFR BLD AUTO: 33.2 % (ref 36–46)
HGB BLD-MCNC: 10.4 G/DL (ref 12–16)
LEFT ATRIUM VOLUME AREA LENGTH INDEX BSA: 33.2 ML/M2
LEFT VENTRICLE INTERNAL DIMENSION DIASTOLE: 4.5 CM (ref 3.5–6)
LEFT VENTRICULAR OUTFLOW TRACT DIAMETER: 1.8 CM
MAGNESIUM SERPL-MCNC: 2.21 MG/DL (ref 1.6–2.4)
MCH RBC QN AUTO: 29.1 PG (ref 26–34)
MCHC RBC AUTO-ENTMCNC: 31.3 G/DL (ref 32–36)
MCV RBC AUTO: 93 FL (ref 80–100)
MITRAL VALVE E/A RATIO: 0.67
NRBC BLD-RTO: 0 /100 WBCS (ref 0–0)
PHOSPHATE SERPL-MCNC: 3.5 MG/DL (ref 2.5–4.9)
PLATELET # BLD AUTO: 176 X10*3/UL (ref 150–450)
POTASSIUM SERPL-SCNC: 4.2 MMOL/L (ref 3.5–5.3)
RBC # BLD AUTO: 3.58 X10*6/UL (ref 4–5.2)
RIGHT VENTRICLE FREE WALL PEAK S': 11.4 CM/S
SODIUM SERPL-SCNC: 140 MMOL/L (ref 136–145)
TRICUSPID ANNULAR PLANE SYSTOLIC EXCURSION: 2.2 CM
WBC # BLD AUTO: 4.3 X10*3/UL (ref 4.4–11.3)

## 2025-03-12 PROCEDURE — S4991 NICOTINE PATCH NONLEGEND: HCPCS | Performed by: NURSE PRACTITIONER

## 2025-03-12 PROCEDURE — 80069 RENAL FUNCTION PANEL: CPT | Performed by: NURSE PRACTITIONER

## 2025-03-12 PROCEDURE — 1200000002 HC GENERAL ROOM WITH TELEMETRY DAILY

## 2025-03-12 PROCEDURE — 94660 CPAP INITIATION&MGMT: CPT

## 2025-03-12 PROCEDURE — 36415 COLL VENOUS BLD VENIPUNCTURE: CPT | Performed by: NURSE PRACTITIONER

## 2025-03-12 PROCEDURE — 85027 COMPLETE CBC AUTOMATED: CPT | Performed by: NURSE PRACTITIONER

## 2025-03-12 PROCEDURE — 2500000002 HC RX 250 W HCPCS SELF ADMINISTERED DRUGS (ALT 637 FOR MEDICARE OP, ALT 636 FOR OP/ED): Performed by: NURSE PRACTITIONER

## 2025-03-12 PROCEDURE — 2500000001 HC RX 250 WO HCPCS SELF ADMINISTERED DRUGS (ALT 637 FOR MEDICARE OP): Performed by: NURSE PRACTITIONER

## 2025-03-12 PROCEDURE — 99232 SBSQ HOSP IP/OBS MODERATE 35: CPT | Performed by: STUDENT IN AN ORGANIZED HEALTH CARE EDUCATION/TRAINING PROGRAM

## 2025-03-12 PROCEDURE — 97530 THERAPEUTIC ACTIVITIES: CPT | Mod: GP

## 2025-03-12 PROCEDURE — 83735 ASSAY OF MAGNESIUM: CPT | Performed by: NURSE PRACTITIONER

## 2025-03-12 PROCEDURE — 97116 GAIT TRAINING THERAPY: CPT | Mod: GP

## 2025-03-12 PROCEDURE — 2500000004 HC RX 250 GENERAL PHARMACY W/ HCPCS (ALT 636 FOR OP/ED): Performed by: NURSE PRACTITIONER

## 2025-03-12 RX ORDER — MAGNESIUM SULFATE HEPTAHYDRATE 40 MG/ML
2 INJECTION, SOLUTION INTRAVENOUS ONCE
Status: COMPLETED | OUTPATIENT
Start: 2025-03-12 | End: 2025-03-12

## 2025-03-12 RX ORDER — FUROSEMIDE 10 MG/ML
40 INJECTION INTRAMUSCULAR; INTRAVENOUS ONCE
Status: COMPLETED | OUTPATIENT
Start: 2025-03-12 | End: 2025-03-12

## 2025-03-12 RX ORDER — TRAZODONE HYDROCHLORIDE 50 MG/1
50 TABLET ORAL NIGHTLY PRN
Status: DISCONTINUED | OUTPATIENT
Start: 2025-03-12 | End: 2025-03-12

## 2025-03-12 RX ORDER — POTASSIUM CHLORIDE 20 MEQ/1
20 TABLET, EXTENDED RELEASE ORAL 2 TIMES DAILY
Status: DISCONTINUED | OUTPATIENT
Start: 2025-03-12 | End: 2025-03-16

## 2025-03-12 RX ADMIN — ASPIRIN 81 MG CHEWABLE TABLET 81 MG: 81 TABLET CHEWABLE at 08:54

## 2025-03-12 RX ADMIN — FOLIC ACID 1 MG: 1 TABLET ORAL at 08:54

## 2025-03-12 RX ADMIN — QUETIAPINE FUMARATE 300 MG: 300 TABLET ORAL at 20:42

## 2025-03-12 RX ADMIN — FUROSEMIDE 40 MG: 10 INJECTION INTRAMUSCULAR; INTRAVENOUS at 10:28

## 2025-03-12 RX ADMIN — DULOXETINE HYDROCHLORIDE 30 MG: 30 CAPSULE, DELAYED RELEASE ORAL at 08:54

## 2025-03-12 RX ADMIN — LISINOPRIL 40 MG: 20 TABLET ORAL at 08:54

## 2025-03-12 RX ADMIN — POTASSIUM CHLORIDE 20 MEQ: 1500 TABLET, EXTENDED RELEASE ORAL at 11:51

## 2025-03-12 RX ADMIN — CARVEDILOL 25 MG: 25 TABLET, FILM COATED ORAL at 08:54

## 2025-03-12 RX ADMIN — MAGNESIUM SULFATE HEPTAHYDRATE 2 G: 40 INJECTION, SOLUTION INTRAVENOUS at 13:07

## 2025-03-12 RX ADMIN — POTASSIUM CHLORIDE 20 MEQ: 1500 TABLET, EXTENDED RELEASE ORAL at 20:42

## 2025-03-12 RX ADMIN — TERAZOSIN HYDROCHLORIDE 2 MG: 2 CAPSULE ORAL at 08:54

## 2025-03-12 RX ADMIN — CARVEDILOL 25 MG: 25 TABLET, FILM COATED ORAL at 20:42

## 2025-03-12 RX ADMIN — BUPROPION HYDROCHLORIDE 150 MG: 150 TABLET, EXTENDED RELEASE ORAL at 08:58

## 2025-03-12 RX ADMIN — ISOSORBIDE MONONITRATE 60 MG: 60 TABLET, EXTENDED RELEASE ORAL at 08:54

## 2025-03-12 RX ADMIN — CARBIDOPA AND LEVODOPA 1 TABLET: 25; 100 TABLET ORAL at 08:54

## 2025-03-12 RX ADMIN — AMLODIPINE BESYLATE 10 MG: 10 TABLET ORAL at 08:54

## 2025-03-12 RX ADMIN — CARBIDOPA AND LEVODOPA 1 TABLET: 25; 100 TABLET ORAL at 20:42

## 2025-03-12 RX ADMIN — SENNOSIDES 17.2 MG: 8.6 TABLET ORAL at 20:42

## 2025-03-12 RX ADMIN — NICOTINE 1 PATCH: 14 PATCH, EXTENDED RELEASE TRANSDERMAL at 08:54

## 2025-03-12 RX ADMIN — SENNOSIDES 17.2 MG: 8.6 TABLET ORAL at 08:54

## 2025-03-12 RX ADMIN — CARBIDOPA AND LEVODOPA 1 TABLET: 25; 100 TABLET ORAL at 15:18

## 2025-03-12 RX ADMIN — ATORVASTATIN CALCIUM 40 MG: 40 TABLET, FILM COATED ORAL at 20:42

## 2025-03-12 ASSESSMENT — COGNITIVE AND FUNCTIONAL STATUS - GENERAL
WALKING IN HOSPITAL ROOM: A LITTLE
CLIMB 3 TO 5 STEPS WITH RAILING: A LOT
HELP NEEDED FOR BATHING: A LITTLE
MOVING TO AND FROM BED TO CHAIR: A LITTLE
EATING MEALS: A LITTLE
CLIMB 3 TO 5 STEPS WITH RAILING: A LOT
MOVING FROM LYING ON BACK TO SITTING ON SIDE OF FLAT BED WITH BEDRAILS: A LITTLE
DRESSING REGULAR LOWER BODY CLOTHING: A LOT
MOVING TO AND FROM BED TO CHAIR: A LITTLE
STANDING UP FROM CHAIR USING ARMS: A LITTLE
TOILETING: A LITTLE
TURNING FROM BACK TO SIDE WHILE IN FLAT BAD: A LITTLE
EATING MEALS: A LITTLE
MOBILITY SCORE: 16
MOVING FROM LYING ON BACK TO SITTING ON SIDE OF FLAT BED WITH BEDRAILS: A LITTLE
DRESSING REGULAR LOWER BODY CLOTHING: A LOT
TURNING FROM BACK TO SIDE WHILE IN FLAT BAD: A LITTLE
WALKING IN HOSPITAL ROOM: A LITTLE
HELP NEEDED FOR BATHING: A LITTLE
WALKING IN HOSPITAL ROOM: A LITTLE
MOVING TO AND FROM BED TO CHAIR: A LITTLE
PERSONAL GROOMING: A LITTLE
CLIMB 3 TO 5 STEPS WITH RAILING: TOTAL
MOBILITY SCORE: 18
TURNING FROM BACK TO SIDE WHILE IN FLAT BAD: A LITTLE
STANDING UP FROM CHAIR USING ARMS: A LITTLE
STANDING UP FROM CHAIR USING ARMS: A LITTLE
DRESSING REGULAR UPPER BODY CLOTHING: A LITTLE
MOBILITY SCORE: 17
DAILY ACTIVITIY SCORE: 17
DRESSING REGULAR UPPER BODY CLOTHING: A LITTLE
TOILETING: A LITTLE
PERSONAL GROOMING: A LITTLE
DAILY ACTIVITIY SCORE: 17

## 2025-03-12 ASSESSMENT — PAIN - FUNCTIONAL ASSESSMENT
PAIN_FUNCTIONAL_ASSESSMENT: 0-10
PAIN_FUNCTIONAL_ASSESSMENT: 0-10

## 2025-03-12 ASSESSMENT — PAIN SCALES - GENERAL
PAINLEVEL_OUTOF10: 0 - NO PAIN

## 2025-03-12 NOTE — PROGRESS NOTES
Subjective Data:  Patient lethargic, answers questions appropriately and follows commands. More alert as day progressed and with rounding with attending.  Does not want to go to SNF, but is unable to answer detailed questions about her home life. Complained of headache again this morning. RN says mentation waxes and wanes. Headaches are chronic according to patient.       Overnight Events:    Per RN received home trazadone 150mg last night.     Objective Data:  Last Recorded Vitals:  Vitals:    03/12/25 0457 03/12/25 0757 03/12/25 1124 03/12/25 1157   BP: 132/76 144/82 108/65 121/70   BP Location: Left arm Right arm Left arm    Patient Position: Lying Lying Sitting    Pulse:  66     Resp: 20 20 20    Temp: 36.6 °C (97.9 °F) 36.3 °C (97.3 °F) 36.4 °C (97.5 °F)    TempSrc: Temporal Temporal Temporal    SpO2: 98% 93% 95%    Weight:       Height:         Last Labs:  CBC - 3/11/2025:  7:02 PM  4.0 9.8 165    30.5      CMP - 3/11/2025:  7:02 PM  9.3 7.9 14 --- 0.7   3.2 3.5 9 99      PTT - 3/10/2025:  2:00 PM  1.0   11.3 29     TROPHS   Date/Time Value Ref Range Status   03/10/2025 12:27 PM 28 0 - 34 ng/L Final   03/10/2025 11:03 AM 38 0 - 34 ng/L Final   09/21/2024 04:29 AM 35 0 - 34 ng/L Final   03/30/2024 03:19 PM 19 0 - 34 ng/L Final   03/30/2024 02:09 PM 18 0 - 34 ng/L Final   12/28/2023 02:33 AM 19 0 - 34 ng/L Final     BNP   Date/Time Value Ref Range Status   03/10/2025 11:03 AM 36 0 - 99 pg/mL Final   09/21/2024 04:29  0 - 99 pg/mL Final     HGBA1C   Date/Time Value Ref Range Status   03/10/2025 10:49 PM 5.7 See comment % Final   09/21/2024 04:29 AM 5.9 See comment % Final     LDLCALC   Date/Time Value Ref Range Status   03/10/2025 11:03 AM 62 <=99 mg/dL Final     Comment:                                 Near   Borderline      AGE      Desirable  Optimal    High     High     Very High     0-19 Y     0 - 109     ---    110-129   >/= 130     ----    20-24 Y     0 - 119     ---    120-159   >/= 160     ----       >24 Y     0 -  99   100-129  130-159   160-189     >/=190       VLDL   Date/Time Value Ref Range Status   03/10/2025 11:03 AM 20 0 - 40 mg/dL Final   12/07/2022 02:20 PM 16 0 - 40 mg/dL Final      Last I/O:  I/O last 3 completed shifts:  In: 600 (6.1 mL/kg) [P.O.:600]  Out: 3300 (33.6 mL/kg) [Urine:3300 (0.9 mL/kg/hr)]  Weight: 98.2 kg     Past Cardiology Tests (Last 3 Years):  EKG:  ECG 12 lead 03/10/2025    Echo:  Transthoracic Echo (TTE) Complete 09/21/2024:  1. Left ventricular ejection fraction is normal, calculated by Regan's biplane at 74%.  2. Spectral Doppler shows an impaired relaxation pattern of left ventricular diastolic filling.  3. There is mild concentric left ventricular hypertrophy.  4. There is normal right ventricular global systolic function.  5. The left atrium is mildly dilated.  6. Mildly elevated right ventricular systolic pressure.  7. There is no evidence of a patent foramen ovale.  8. Compared with study dated 12/29/2023, no significant change.     Transthoracic Echo (TTE) Limited 12/29/2023:  1. Left ventricular systolic function is normal with a 55-60% estimated ejection fraction.  2. Poorly visualized anatomical structures due to suboptimal image quality.  3. Spectral Doppler shows an impaired relaxation pattern of left ventricular diastolic filling.     Ejection Fractions:  EF   Date/Time Value Ref Range Status   03/11/2025 11:00 AM 68 %    09/21/2024 11:50 AM 74 %    12/29/2023 10:48 AM 54       Cath:  No results found for this or any previous visit from the past 1095 days.  Stress Test:  No results found for this or any previous visit from the past 1095 days.  Cardiac Imaging:  No results found for this or any previous visit from the past 1095 days.    Inpatient Medications:  Scheduled medications   Medication Dose Route Frequency    amLODIPine  10 mg oral Daily    aspirin  81 mg oral Daily    atorvastatin  40 mg oral Nightly    buPROPion XL  150 mg oral Daily     carbidopa-levodopa  1 tablet oral TID    carvedilol  25 mg oral BID    DULoxetine  30 mg oral Daily    folic acid  1 mg oral Daily    isosorbide mononitrate ER  60 mg oral Daily    lidocaine  5 mL infiltration Once    lisinopril  40 mg oral Daily    magnesium sulfate  2 g intravenous Once    nicotine  1 patch transdermal Daily    perflutren lipid microspheres  0.5-10 mL of dilution intravenous Once in imaging    potassium chloride CR  20 mEq oral BID    QUEtiapine  300 mg oral Nightly    sennosides  2 tablet oral BID    terazosin  2 mg oral Daily     PRN medications   Medication    acetaminophen    traZODone     Continuous Medications   Medication Dose Last Rate     Physical Exam:  General: NAD, lying in bed  Skin: slightly cooler in BLE up to knees, otherwise warm and dry throughout, L forehead hematoma w/ecchymosis, +tenderness  Head/ neck: +JVD at chin level  Cardiac: RRR, S1, S2, NSR HR 80s on tele   Pulm: crackles right posterior lobe  GI: soft, nontender, non-distended, obese  Extremities: +1 edema in BLE, L>R w/L-calf tenderness  Neuro: no focal neuro deficits, a+ox4, hard of hearing, lethargic, trouble keeping eyes open. Bilateral eye orbits swollen  Psych: appropriate mood and behavior, very pleasant        Assessment/Plan   Kelly Martinez is a 80 y.o. female with PMH significant for 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 [facility]N urgency and ADHF.     H/o recurrent DVT/Pes  L calf tenderness, swelling  H/o R hemorrhagic CVA (9/2024)  Recurrent Mechanical Falls   L  Forehead Hematoma, stable  - PE's dating back to 2008; prev. On coumadin  - s/p IVC filter removed 4/2016 d/t erosion  - (9/2024) presented w/AMS, CTH +R cerebellar-vermis hemorrhage, reversed with kcentra, no residual effects  - off coumadin since (9/2024) per primary neurologist @[facility] (Dr. Renee); OP office visit (3/5/2025):              -- hold OFF on AC for 1 year (9/2024-9/2025) given risk of cerebral bleeding, new start of Parkinson's meds (see below)  - no evidence for PE on CTA chest during last admit at  (9/2024)  - currently satting >90% on RA, reported feeling more SOB than usual  - V/Q scan 3/11 without evidence of perfusion defects/PE  - d-dimer 2675 (no prior level for comparison)  - BLE duplex US in vas lab preliminary to r/o DVT neg    -- if any acute findings, consult neuro for AC recs  - s/p mechanical fall at home upon admit, +trauma to L-side of head (w/L forehead hematoma)  - imaging on admit (CTH, CT C-spine): without acute changes/fractures  - no new neuro deficits  - 3/12 lives at home alone, PT/OT to evaluate and to get MOCA score. Patient not able to answer questions regarding medication history. She does not want to go to SNF at discharge despite PT deeming her mod intensity. Had long discussion with patient regarding concern for safety as she seems lethargic and unclear if it is from meds, or deconditioning, or CVA or recent fall. DC to home not safe at this time. Will get OT to obtain MOCA, capacity score completed and patient deemed lack of capacity at this time. Daughter called but difficulty to understand during conversation so will meet her in person tonight after she gets home from work.    - cont. Home baby ASA and statin  - PRN tylenol for pain     Hypertensive Urgency, stable  HLD  - BP on on admit: 208/112  - 3/11 increase imdur to 60mg, increase coreg 25mg BID, ?consider addition of hydral if indicated  - cont. home amlodipine 10mg daily & lisinopril 40mg daily  - 3/12 BP  "improved, continue present medical therapy   - hgb A1c 5.7%, TSH 0.98  - lipid panel: total cholesterol 127 HDL 45.3 LDL 62 Tgs 101  - cont. Home statin     Acute on chronic diastolic heart failure  ? Flash Pulmonary edema   - followed by ALMA ROSA Diana, @[facility]  - etiology: unclear, no recent ischemic eval  - TTE OSH (9/2024): LVEF 74%, no WMAs, DD, negative bubble study  - TTE 3/11 EF 65-70, Grade I DD  - admit BNP: 36 (prev. 170 in 9/2024)  - admit CXR: pulm edema, vasc. congestion  - admit wt: 98.4kg, today's wt (bed): 99kg  - no home diuretics, s/p IVP Lasix 40mg x1 (3/10, 3/11, and 3/12)  - cont. Home lisinopril 40mg daily, Increased doses of imdur and coreg as above  - further GDMT pending TTE results  - Daily standing weights, strict I&O's, 2g sodium diet, 2L fluid restriction     CKD stage 4, stable  - Bl Cr worsening over past year ~2-3  - Cr 2.87 upon last discharge  - Cr today: 2.95 (2.93, 2.73)  - diuresis plan as above  - Renally dose meds, avoid nephrotoxins       Parkinson's Disease  Schizophrenia  Depression  - diagnosed ~3-4 years ago  - new start of carvidopa/levodopa 25/200mg TID (on 3/5/2025), cont. In-house  - admit ECG w/Qtc of 455ms  - cont. Home seroquel, wellbutrin, duloxetine  - cont. OP f/up w/primary neurologist, behavioral health, & PCP  - 3/12 DC trazadone as patient very lethargic this am and may be contributing to confusion and falls at home     JOY  COPD  Chronic Tobacco Use  - encourage use of nocturnal CPAP, RT following  - nicotine patch   - cont. encouraging cessation    Lines: US-guided PIV access pending (3/11)  DVT ppx: SCDs  DISPO: PT/OT to evaluate, resides at home alone  - discharge pending DVT/PE work-up, further diuresis & medical optimization, dispo planning, daughter updated via phone and will be at bedside 1730 for more detailed conversation regarding dispo planning     Capacity Assessment Tool    \"Capacity\" is the \"ability\" to make a decision.  The decision in question " "must be specific (one decision), relevant to a patient's current condition (appropriate), and timely (neither prospective nor retrospective).    Capacity varies based on knowledge base (explanation/understanding of clinical information), cognitive processing, acute psychiatric illness, and other clinical conditions.    In order to be deemed \"capacitated\" to make a single decision at one point in time, a patient must demonstrate all 4 of the following elements:    *Ability to consistently communicate a choice (consistent over time with adequate information)  *Ability to understand the relevant information (accurate knowledge of condition)  *Ability to appreciate the situation and its consequences (risks/benefits, pros/cons)  *Ability to reason about treatment options (without undue influence of a person or condition, eg. suicidality or acute psychosis)      Current Decision    Clinical issue:   Placement at discharge    Did the appropriate team address relevant information with the patient:  Yes    Date: 3/12/25    If \"NO\" is selected for appropriate team, then please discuss with the appropriate team.  The appropriate team should be encouraged to address relevant information with the patient AND reevaluate capacity when appropriate.    Capacity Evaluation    Patient demonstrates ability to consistently communicate choice:  Yes 3/12/25 patient consistently states she does not want to go to SNF at discharge    Patient demonstrates ability to understand the relevant information:  No 3/12/25 \"They gave me the medication, I don't need to take it.\" Patient unable to state who prescribed meds or how she gets them. She was unable to tell me how she gets to her physicians office. Patient unable to tell me meds she takes.     Patient demonstrates ability to appreciate the situation and its consequences:  No 3/12/25. Patient informed of safety concerns regarding confusion but was not even certain she ws confused. \"I don't want " "to get nasty. I don't want to go to nursing home. I was there months ago.\"    Patient demonstrates ability to reason about treatment options:  No 3/12/25. Patient unable to confirm frequency of falls or ways to prevent falls.     If ANY of the above items are answered \"NO,\" the patient LACKS CAPACITY for that specific decision at hand, at that specific time.  Further capacity evaluations can be done as needed.            All labs, vital signs, tests & imaging results, and medications were reviewed.     Seen and discussed with Dr. Mustafa    Code Status:  Full Code    Rajat Jones, APRN-CNP  "

## 2025-03-12 NOTE — CARE PLAN
Problem: Fall/Injury  Goal: Not fall by end of shift  Outcome: Progressing  Goal: Be free from injury by end of the shift  Outcome: Progressing  Goal: Verbalize understanding of personal risk factors for fall in the hospital  Outcome: Progressing  Goal: Verbalize understanding of risk factor reduction measures to prevent injury from fall in the home  Outcome: Progressing  Goal: Use assistive devices by end of the shift  Outcome: Progressing  Goal: Pace activities to prevent fatigue by end of the shift  Outcome: Progressing     Problem: Pain  Goal: Takes deep breaths with improved pain control throughout the shift  Outcome: Progressing  Goal: Turns in bed with improved pain control throughout the shift  Outcome: Progressing  Goal: Walks with improved pain control throughout the shift  Outcome: Progressing  Goal: Performs ADL's with improved pain control throughout shift  Outcome: Progressing  Goal: Participates in PT with improved pain control throughout the shift  Outcome: Progressing  Goal: Free from opioid side effects throughout the shift  Outcome: Progressing  Goal: Free from acute confusion related to pain meds throughout the shift  Outcome: Progressing     Problem: Respiratory  Goal: Clear secretions with interventions this shift  Outcome: Progressing  Goal: Minimize anxiety/maximize coping throughout shift  Outcome: Progressing  Goal: Minimal/no exertional discomfort or dyspnea this shift  Outcome: Progressing  Goal: No signs of respiratory distress (eg. Use of accessory muscles. Peds grunting)  Outcome: Progressing  Goal: Patent airway maintained this shift  Outcome: Progressing  Goal: Tolerate mechanical ventilation evidenced by VS/agitation level this shift  Outcome: Progressing  Goal: Tolerate pulmonary toileting this shift  Outcome: Progressing  Goal: Verbalize decreased shortness of breath this shift  Outcome: Progressing  Goal: Wean oxygen to maintain O2 saturation per order/standard this  shift  Outcome: Progressing  Goal: Increase self care and/or family involvement in next 24 hours  Outcome: Progressing     Problem: Safety - Adult  Goal: Free from fall injury  Outcome: Progressing   The patient's goals for the shift include      The clinical goals for the shift include remain HDS and safe this shift    Over the shift, the patient did make progress toward the following goals. Pt medicated with trazadone 150 mg po, tolerated cpap during the night, bed alarm is on. Will continue to monitor and call light within reach.

## 2025-03-12 NOTE — PROGRESS NOTES
Physical Therapy    Physical Therapy Treatment    Patient Name: Kelly Martinez  MRN: [id number]  Department: [place] [id number]  Room: [facility]5038-A  Today's Date: 3/12/2025  Time Calculation  Start Time: 1157  Stop Time: 1221  Time Calculation (min): 24 min    Assessment/Plan   PT Assessment  PT Assessment Results: Decreased strength, Decreased endurance, Impaired balance, Decreased mobility, Decreased cognition, Decreased safety awareness, Pain  Rehab Prognosis: Good  Barriers to Discharge Home: Caregiver assistance, Cognition needs, Physical needs  Caregiver Assistance: Patient lives alone and/or does not have reliable caregiver assistance  Cognition Needs: Insight of patient limited regarding functional ability/needs  Physical Needs: Ambulating household distances limited by function/safety, High falls risk due to function or environment  Evaluation/Treatment Tolerance: Patient limited by fatigue  Medical Staff Made Aware: Yes  End of Session Communication: Bedside nurse  Assessment Comment: Pt tolerating inc functional mobility this date, including short distance amb.  However, pt with inc dizziness during amb, limiting further mobility.  Pt remains limited by dec strength, balance, & endurance with dec insight into deficits.  Would benefit from continued therapy.  End of Session Patient Position: Bed, 3 rail up, Alarm on     PT Plan  Treatment/Interventions: Bed mobility, Transfer training, Gait training, Balance training, Strengthening, Endurance training, Therapeutic exercise, Therapeutic activity  PT Plan: Ongoing PT  PT Frequency: 3 times per week  PT Discharge Recommendations: Moderate intensity level of continued care  Equipment Recommended upon Discharge:  (tbd)  PT Recommended Transfer Status: Assist x1  PT - OK to Discharge: Yes (PT eval completed & recs made)    General Visit Information:   PT  Visit  PT Received On: 03/12/25  Response to Previous Treatment: Patient with no complaints from previous  session.  General  Reason for Referral: presenting to ED from home s/p mechanical fall  Past Medical History Relevant to Rehab: 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 and recurrent falls, s/p IVC filter removed 4/2016), CKD stage IV, chronic tobacco use, schizophrenia, Parkinson's disease, & depression  Family/Caregiver Present: No  Prior to Session Communication: Bedside nurse  Patient Position Received: Bed, 3 rail up, Alarm on  Preferred Learning Style: visual, verbal, auditory  General Comment: Pt supine in bed upon arrival, appears more alert than previous session.  Willing to participate in therapy.    Subjective   Precautions:  Precautions  Hearing/Visual Limitations: L eye edema - ? visual changes - pt with difficulty answering  Medical Precautions: Fall precautions     Date/Time Vitals Session Patient Position Pulse Resp SpO2 BP MAP (mmHg)               03/12/25 1157 During PT  --  --  --  --  121/70  86          Objective   Pain:  Pain Assessment  Pain Assessment: 0-10  0-10 (Numeric) Pain Score: 0 - No pain  Pain Type: Acute pain  Pain Location: Generalized  Pain Orientation: Left  Pain Interventions: Repositioned, Rest  Response to Interventions: No change in pain  Cognition:  Cognition  Overall Cognitive Status:  (grossly WFL, though intermittent confusion)  Orientation Level: Oriented X4  Insight: Moderate  Impulsive: Mildly  Processing Speed: Delayed  Coordination:  Movements are Fluid and Coordinated: Yes  Postural Control:  Postural Control  Postural Control: Within Functional Limits  Static Sitting Balance  Static Sitting-Balance Support: Feet supported, Bilateral upper extremity supported  Static Sitting-Level of Assistance: Close supervision  Static Standing Balance  Static Standing-Balance Support: Bilateral upper extremity  supported  Static Standing-Level of Assistance: Contact guard  Dynamic Standing Balance  Dynamic Standing-Balance Support: Bilateral upper extremity supported  Dynamic Standing-Level of Assistance: Minimum assistance  Dynamic Standing-Balance: Turning  Extremity/Trunk Assessments:  LLE   LLE : Exceptions to WFL  Strength LLE  LLE Overall Strength: Greater than or equal to 3/5 as evidenced by functional mobility  Activity Tolerance:  Activity Tolerance  Endurance: Tolerates 10 - 20 min exercise with multiple rests  Treatments:  Therapeutic Exercise  Therapeutic Exercise Performed: Yes  Therapeutic Exercise Activity 1: seated EOB x10 with cueing for technique - LAQ, DF/PF  Therapeutic Exercise Activity 2: standing mini marches x 10 with Gabriel at WW    Bed Mobility  Bed Mobility: Yes  Bed Mobility 1  Bed Mobility 1: Supine to sitting, Sitting to supine  Level of Assistance 1: Minimum assistance, Moderate verbal cues  Bed Mobility Comments 1: assist for LEs; HOB elevated, cueing for safety    Ambulation/Gait Training  Ambulation/Gait Training Performed: Yes  Ambulation/Gait Training 1  Surface 1: Level tile  Device 1: Rolling walker  Assistance 1: Minimum assistance, Moderate verbal cues  Quality of Gait 1: Decreased step length, Forward flexed posture, Soft knee(s)  Comments/Distance (ft) 1: 10' (further gait limited by inc dizziness)  Transfers  Transfer: Yes  Transfer 1  Technique 1: Sit to stand, Stand to sit  Transfer Device 1: Walker  Transfer Level of Assistance 1: Minimum assistance, Minimal verbal cues  Trials/Comments 1: cueing for safety    Stairs  Stairs: No    Outcome Measures:  [facility] Basic Mobility  Turning from your back to your side while in a flat bed without using bedrails: A little  Moving from lying on your back to sitting on the side of a flat bed without using bedrails: A little  Moving to and from bed to chair (including a wheelchair): A little  Standing up from a chair using your arms (e.g.  wheelchair or bedside chair): A little  To walk in hospital room: A little  Climbing 3-5 steps with railing: Total  Basic Mobility - Total Score: 16    Education Documentation  Precautions, taught by Magalys Mann, PT at 3/12/2025  1:11 PM.  Learner: Patient  Readiness: Acceptance  Method: Explanation  Response: Needs Reinforcement  Comment: safety with mobility, falls prec    Mobility Training, taught by Magalys Mann, PT at 3/12/2025  1:11 PM.  Learner: Patient  Readiness: Acceptance  Method: Explanation  Response: Needs Reinforcement  Comment: safety with mobility, falls prec    Education Comments  No comments found.        OP EDUCATION:       Encounter Problems       Encounter Problems (Active)       Balance       Pt will score >23 on Tinetti assessment to indicate low falls risk (Progressing)       Start:  03/11/25    Expected End:  03/25/25               Mobility       STG - Patient will ambulate >100' with WW and SBA without LOB or path deviation (Progressing)       Start:  03/11/25    Expected End:  03/25/25               PT Transfers       STG - Patient to transfer to and from sit to supine indep from flat bed, no rails  (Progressing)       Start:  03/11/25    Expected End:  03/25/25            STG - Patient will transfer sit to and from stand with SBA and WW (Progressing)       Start:  03/11/25    Expected End:  03/25/25 03/12/25 at 1:12 PM - Magalys Mann, PT

## 2025-03-13 LAB
ALBUMIN SERPL BCP-MCNC: 3.8 G/DL (ref 3.4–5)
ANION GAP SERPL CALC-SCNC: 15 MMOL/L (ref 10–20)
BUN SERPL-MCNC: 41 MG/DL (ref 6–23)
CALCIUM SERPL-MCNC: 9.6 MG/DL (ref 8.6–10.6)
CHLORIDE SERPL-SCNC: 109 MMOL/L (ref 98–107)
CO2 SERPL-SCNC: 23 MMOL/L (ref 21–32)
CREAT SERPL-MCNC: 4.17 MG/DL (ref 0.5–1.05)
EGFRCR SERPLBLD CKD-EPI 2021: 10 ML/MIN/1.73M*2
ERYTHROCYTE [DISTWIDTH] IN BLOOD BY AUTOMATED COUNT: 15.4 % (ref 11.5–14.5)
GLUCOSE SERPL-MCNC: 109 MG/DL (ref 74–99)
HCT VFR BLD AUTO: 32.4 % (ref 36–46)
HGB BLD-MCNC: 10.1 G/DL (ref 12–16)
MAGNESIUM SERPL-MCNC: 2.22 MG/DL (ref 1.6–2.4)
MCH RBC QN AUTO: 29.6 PG (ref 26–34)
MCHC RBC AUTO-ENTMCNC: 31.2 G/DL (ref 32–36)
MCV RBC AUTO: 95 FL (ref 80–100)
NRBC BLD-RTO: 0 /100 WBCS (ref 0–0)
PHOSPHATE SERPL-MCNC: 3.8 MG/DL (ref 2.5–4.9)
PLATELET # BLD AUTO: 181 X10*3/UL (ref 150–450)
POTASSIUM SERPL-SCNC: 4.6 MMOL/L (ref 3.5–5.3)
RBC # BLD AUTO: 3.41 X10*6/UL (ref 4–5.2)
SODIUM SERPL-SCNC: 142 MMOL/L (ref 136–145)
WBC # BLD AUTO: 5 X10*3/UL (ref 4.4–11.3)

## 2025-03-13 PROCEDURE — 80069 RENAL FUNCTION PANEL: CPT | Performed by: NURSE PRACTITIONER

## 2025-03-13 PROCEDURE — 1200000002 HC GENERAL ROOM WITH TELEMETRY DAILY

## 2025-03-13 PROCEDURE — 2500000004 HC RX 250 GENERAL PHARMACY W/ HCPCS (ALT 636 FOR OP/ED)

## 2025-03-13 PROCEDURE — 83735 ASSAY OF MAGNESIUM: CPT | Performed by: NURSE PRACTITIONER

## 2025-03-13 PROCEDURE — 97530 THERAPEUTIC ACTIVITIES: CPT | Mod: GO

## 2025-03-13 PROCEDURE — 85027 COMPLETE CBC AUTOMATED: CPT | Performed by: NURSE PRACTITIONER

## 2025-03-13 PROCEDURE — 2500000002 HC RX 250 W HCPCS SELF ADMINISTERED DRUGS (ALT 637 FOR MEDICARE OP, ALT 636 FOR OP/ED): Performed by: NURSE PRACTITIONER

## 2025-03-13 PROCEDURE — 2500000001 HC RX 250 WO HCPCS SELF ADMINISTERED DRUGS (ALT 637 FOR MEDICARE OP): Performed by: NURSE PRACTITIONER

## 2025-03-13 PROCEDURE — 97129 THER IVNTJ 1ST 15 MIN: CPT | Mod: GO

## 2025-03-13 PROCEDURE — 36415 COLL VENOUS BLD VENIPUNCTURE: CPT | Performed by: NURSE PRACTITIONER

## 2025-03-13 PROCEDURE — S4991 NICOTINE PATCH NONLEGEND: HCPCS | Performed by: NURSE PRACTITIONER

## 2025-03-13 PROCEDURE — 99232 SBSQ HOSP IP/OBS MODERATE 35: CPT | Performed by: STUDENT IN AN ORGANIZED HEALTH CARE EDUCATION/TRAINING PROGRAM

## 2025-03-13 RX ADMIN — CARBIDOPA AND LEVODOPA 1 TABLET: 25; 100 TABLET ORAL at 16:44

## 2025-03-13 RX ADMIN — CARBIDOPA AND LEVODOPA 1 TABLET: 25; 100 TABLET ORAL at 09:10

## 2025-03-13 RX ADMIN — POTASSIUM CHLORIDE 20 MEQ: 1500 TABLET, EXTENDED RELEASE ORAL at 09:10

## 2025-03-13 RX ADMIN — TERAZOSIN HYDROCHLORIDE 2 MG: 2 CAPSULE ORAL at 09:10

## 2025-03-13 RX ADMIN — FOLIC ACID 1 MG: 1 TABLET ORAL at 09:10

## 2025-03-13 RX ADMIN — ASPIRIN 81 MG CHEWABLE TABLET 81 MG: 81 TABLET CHEWABLE at 09:10

## 2025-03-13 RX ADMIN — CARBIDOPA AND LEVODOPA 1 TABLET: 25; 100 TABLET ORAL at 20:21

## 2025-03-13 RX ADMIN — BUPROPION HYDROCHLORIDE 150 MG: 150 TABLET, EXTENDED RELEASE ORAL at 09:10

## 2025-03-13 RX ADMIN — QUETIAPINE FUMARATE 300 MG: 300 TABLET ORAL at 20:21

## 2025-03-13 RX ADMIN — AMLODIPINE BESYLATE 10 MG: 10 TABLET ORAL at 09:10

## 2025-03-13 RX ADMIN — LISINOPRIL 40 MG: 20 TABLET ORAL at 09:10

## 2025-03-13 RX ADMIN — DULOXETINE HYDROCHLORIDE 30 MG: 30 CAPSULE, DELAYED RELEASE ORAL at 09:10

## 2025-03-13 RX ADMIN — CARVEDILOL 25 MG: 25 TABLET, FILM COATED ORAL at 09:10

## 2025-03-13 RX ADMIN — ATORVASTATIN CALCIUM 40 MG: 40 TABLET, FILM COATED ORAL at 20:21

## 2025-03-13 RX ADMIN — NICOTINE 1 PATCH: 14 PATCH, EXTENDED RELEASE TRANSDERMAL at 09:10

## 2025-03-13 RX ADMIN — POTASSIUM CHLORIDE 20 MEQ: 1500 TABLET, EXTENDED RELEASE ORAL at 20:21

## 2025-03-13 RX ADMIN — ISOSORBIDE MONONITRATE 60 MG: 60 TABLET, EXTENDED RELEASE ORAL at 09:10

## 2025-03-13 RX ADMIN — SODIUM CHLORIDE 500 ML: 9 INJECTION, SOLUTION INTRAVENOUS at 19:16

## 2025-03-13 ASSESSMENT — COGNITIVE AND FUNCTIONAL STATUS - GENERAL
DAILY ACTIVITIY SCORE: 15
DRESSING REGULAR UPPER BODY CLOTHING: A LITTLE
MOBILITY SCORE: 17
DRESSING REGULAR UPPER BODY CLOTHING: A LOT
HELP NEEDED FOR BATHING: A LOT
EATING MEALS: A LITTLE
TOILETING: A LOT
WALKING IN HOSPITAL ROOM: A LOT
HELP NEEDED FOR BATHING: A LOT
WALKING IN HOSPITAL ROOM: A LOT
CLIMB 3 TO 5 STEPS WITH RAILING: A LOT
HELP NEEDED FOR BATHING: A LOT
DAILY ACTIVITIY SCORE: 14
HELP NEEDED FOR BATHING: A LOT
PERSONAL GROOMING: A LITTLE
DRESSING REGULAR LOWER BODY CLOTHING: A LOT
EATING MEALS: A LITTLE
MOVING TO AND FROM BED TO CHAIR: A LITTLE
WALKING IN HOSPITAL ROOM: A LOT
MOVING TO AND FROM BED TO CHAIR: A LITTLE
MOBILITY SCORE: 17
TOILETING: A LOT
DAILY ACTIVITIY SCORE: 14
DAILY ACTIVITIY SCORE: 15
STANDING UP FROM CHAIR USING ARMS: A LITTLE
TOILETING: A LOT
TURNING FROM BACK TO SIDE WHILE IN FLAT BAD: A LITTLE
STANDING UP FROM CHAIR USING ARMS: A LITTLE
MOVING TO AND FROM BED TO CHAIR: A LITTLE
EATING MEALS: A LITTLE
STANDING UP FROM CHAIR USING ARMS: A LITTLE
DRESSING REGULAR LOWER BODY CLOTHING: A LOT
DRESSING REGULAR LOWER BODY CLOTHING: A LOT
PERSONAL GROOMING: A LOT
TOILETING: A LOT
MOBILITY SCORE: 17
CLIMB 3 TO 5 STEPS WITH RAILING: A LOT
DRESSING REGULAR LOWER BODY CLOTHING: A LOT
DRESSING REGULAR UPPER BODY CLOTHING: A LITTLE
TURNING FROM BACK TO SIDE WHILE IN FLAT BAD: A LITTLE
TURNING FROM BACK TO SIDE WHILE IN FLAT BAD: A LITTLE
CLIMB 3 TO 5 STEPS WITH RAILING: A LOT
DRESSING REGULAR UPPER BODY CLOTHING: A LITTLE
PERSONAL GROOMING: A LITTLE
PERSONAL GROOMING: A LOT

## 2025-03-13 ASSESSMENT — PAIN - FUNCTIONAL ASSESSMENT
PAIN_FUNCTIONAL_ASSESSMENT: 0-10

## 2025-03-13 ASSESSMENT — PAIN SCALES - GENERAL
PAINLEVEL_OUTOF10: 0 - NO PAIN

## 2025-03-13 NOTE — PROGRESS NOTES
Interval Events:  No acute events overnight.     Subjective Data:  Patient seen and assessed this morning, lying in bed, NAD. Denies any CP or SOB, dizziness or lightheadedness. Remains somnolent in morning, more awake/alert as day goes on. Patient/daughter now agreeable to SNF. Hypotensive this afternoon, asymptomatic.     Today's Plan/Updates:   - trazodone discontinued 3/12 due to day somnolence  - pending Psychiatry consultation for further medication adjustment given ongoing somnolence and recent falls  - hypotensive this afternoon to 74/47, asymptomatic, evening carvedilol held, 500 ml bolus   - patient/daughter now agreeable to SNF, TCC aware, pending acceptance    Objective Data:  Last Recorded Vitals:  Vitals:    03/12/25 2210 03/12/25 2331 03/13/25 0355 03/13/25 0759   BP: 116/67 120/73 126/73 137/84   BP Location:  Right arm Right arm Right arm   Patient Position:  Lying Lying Lying   Pulse: 77 77 75 68   Resp:  18 18 18   Temp:  36.2 °C (97.2 °F) 36.3 °C (97.3 °F) 36.6 °C (97.9 °F)   TempSrc:  Temporal Temporal Temporal   SpO2:  94% 93% 93%   Weight:   96.9 kg (213 lb 10 oz)    Height:         Last Labs:  Results for orders placed or performed during the hospital encounter of 03/10/25 (from the past 24 hours)   CBC   Result Value Ref Range    WBC 4.3 (L) 4.4 - 11.3 x10*3/uL    nRBC 0.0 0.0 - 0.0 /100 WBCs    RBC 3.58 (L) 4.00 - 5.20 x10*6/uL    Hemoglobin 10.4 (L) 12.0 - 16.0 g/dL    Hematocrit 33.2 (L) 36.0 - 46.0 %    MCV 93 80 - 100 fL    MCH 29.1 26.0 - 34.0 pg    MCHC 31.3 (L) 32.0 - 36.0 g/dL    RDW 15.1 (H) 11.5 - 14.5 %    Platelets 176 150 - 450 x10*3/uL   Renal function panel   Result Value Ref Range    Glucose 152 (H) 74 - 99 mg/dL    Sodium 140 136 - 145 mmol/L    Potassium 4.2 3.5 - 5.3 mmol/L    Chloride 109 (H) 98 - 107 mmol/L    Bicarbonate 22 21 - 32 mmol/L    Anion Gap 13 10 - 20 mmol/L    Urea Nitrogen 38 (H) 6 - 23 mg/dL    Creatinine 3.51 (H) 0.50 - 1.05 mg/dL    eGFR 13 (L) >60  mL/min/1.73m*2    Calcium 9.0 8.6 - 10.6 mg/dL    Phosphorus 3.5 2.5 - 4.9 mg/dL    Albumin 3.5 3.4 - 5.0 g/dL   Magnesium   Result Value Ref Range    Magnesium 2.21 1.60 - 2.40 mg/dL        TROPHS   Date/Time Value Ref Range Status   03/10/2025 12:27 PM 28 0 - 34 ng/L Final   03/10/2025 11:03 AM 38 0 - 34 ng/L Final   09/21/2024 04:29 AM 35 0 - 34 ng/L Final   03/30/2024 03:19 PM 19 0 - 34 ng/L Final   03/30/2024 02:09 PM 18 0 - 34 ng/L Final   12/28/2023 02:33 AM 19 0 - 34 ng/L Final     BNP   Date/Time Value Ref Range Status   03/10/2025 11:03 AM 36 0 - 99 pg/mL Final   09/21/2024 04:29  0 - 99 pg/mL Final     HGBA1C   Date/Time Value Ref Range Status   03/10/2025 10:49 PM 5.7 See comment % Final   09/21/2024 04:29 AM 5.9 See comment % Final     LDLCALC   Date/Time Value Ref Range Status   03/10/2025 11:03 AM 62 <=99 mg/dL Final     Comment:                                 Near   Borderline      AGE      Desirable  Optimal    High     High     Very High     0-19 Y     0 - 109     ---    110-129   >/= 130     ----    20-24 Y     0 - 119     ---    120-159   >/= 160     ----      >24 Y     0 -  99   100-129  130-159   160-189     >/=190       VLDL   Date/Time Value Ref Range Status   03/10/2025 11:03 AM 20 0 - 40 mg/dL Final   12/07/2022 02:20 PM 16 0 - 40 mg/dL Final      Last I/O:  I/O last 3 completed shifts:  In: 650 (6.7 mL/kg) [P.O.:600; I.V.:50 (0.5 mL/kg)]  Out: 500 (5.2 mL/kg) [Urine:500 (0.1 mL/kg/hr)]  Weight: 96.9 kg     Past Cardiology Tests (Last 3 Years):  EKG:  ECG 12 lead 03/10/2025    Echo:  Transthoracic Echo (TTE) Complete 09/21/2024:  1. Left ventricular ejection fraction is normal, calculated by Regan's biplane at 74%.  2. Spectral Doppler shows an impaired relaxation pattern of left ventricular diastolic filling.  3. There is mild concentric left ventricular hypertrophy.  4. There is normal right ventricular global systolic function.  5. The left atrium is mildly dilated.  6. Mildly  elevated right ventricular systolic pressure.  7. There is no evidence of a patent foramen ovale.  8. Compared with study dated 12/29/2023, no significant change.     Transthoracic Echo (TTE) Limited 12/29/2023:  1. Left ventricular systolic function is normal with a 55-60% estimated ejection fraction.  2. Poorly visualized anatomical structures due to suboptimal image quality.  3. Spectral Doppler shows an impaired relaxation pattern of left ventricular diastolic filling.     Ejection Fractions:  EF   Date/Time Value Ref Range Status   03/11/2025 11:00 AM 68 %    09/21/2024 11:50 AM 74 %    12/29/2023 10:48 AM 54       Cath:  No results found for this or any previous visit from the past 1095 days.  Stress Test:  No results found for this or any previous visit from the past 1095 days.  Cardiac Imaging:  No results found for this or any previous visit from the past 1095 days.    Inpatient Medications:  Scheduled medications   Medication Dose Route Frequency    amLODIPine  10 mg oral Daily    aspirin  81 mg oral Daily    atorvastatin  40 mg oral Nightly    buPROPion XL  150 mg oral Daily    carbidopa-levodopa  1 tablet oral TID    carvedilol  25 mg oral BID    DULoxetine  30 mg oral Daily    folic acid  1 mg oral Daily    isosorbide mononitrate ER  60 mg oral Daily    lidocaine  5 mL infiltration Once    lisinopril  40 mg oral Daily    nicotine  1 patch transdermal Daily    perflutren lipid microspheres  0.5-10 mL of dilution intravenous Once in imaging    potassium chloride CR  20 mEq oral BID    QUEtiapine  300 mg oral Nightly    sennosides  2 tablet oral BID    terazosin  2 mg oral Daily     PRN medications   Medication    acetaminophen     Continuous Medications   Medication Dose Last Rate     Physical Exam:  General: NAD, lying in bed  Skin: warm and dry throughout, L forehead hematoma w/ecchymosis, +tenderness  Head/ neck: no JVD   Cardiac: RRR, S1, S2, NSR HR 70's on telemetry  Pulm: CTA, on RA  GI: soft,  nontender, non-distended, obese  Extremities: trace edema in BLE, L>R w/L-calf tenderness  Neuro: no focal neuro deficits, a+ox4, hard of hearing, somnolent at times, trouble keeping eyes open. Bilateral eye orbits swollen  Psych: appropriate mood and behavior, very pleasant        Assessment/Plan   Kelly Martinez is a 80 y.o. female with PMH significant for R cerebellar/verminan AVM/hemorrhage (9/2024 reversed with kcentra), uncontrolled HTN, HLD, HFpEF (TTE LVEF 74% 09/2024), JOY, COPD, recurrent DVT/PE's prev. 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 [facility]. Under HHVI Service for further management of HTN urgency and ADHF.     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 On coumadin  - s/p IVC filter removal 4/2016 d/t erosion  - (9/2024) presented w/AMS, CTH +R cerebellar-vermis hemorrhage, reversed with kcentra, no residual effects  - off coumadin since (9/2024) per primary neurologist @[facility] (Dr. Renee); OP office visit (3/5/2025):  -- hold OFF on AC for 1 year (9/2024-9/2025) given risk of cerebral bleeding, new start of Parkinson's meds (see below)  - no evidence for PE on CTA chest during last admit at  (9/2024)  - currently satting >90% on RA, reported feeling more SOB than usual  - V/Q scan 3/11 without evidence of perfusion defects/PE  - d-dimer 2675 (no prior level for comparison)  - BLE duplex US 3/11: negative for DVT, BLE Venous insufficiency noted, tanisha hose ordered  - s/p mechanical fall at home upon admit, +trauma to L-side of head (w/L forehead hematoma)  - imaging on admit (CTH, CT C-spine): without acute changes/fractures  - no new  neuro deficits  - lives at home alone, PT/OT consulted, MOCA score 3/13: 11/30  - cont. Home baby ASA and statin  - PRN tylenol for pain     Hypertensive Urgency, resolved  HLD  - BP on on admit: 208/112  - trazodone discontinued 3/12 due to day somnolence  - hypotensive this afternoon to 74/47, asymptomatic, evening carvedilol held, 500 ml bolus   - cont imdur 60 mg daily, coreg 25mg BID on hold  - cont. home amlodipine 10mg daily & lisinopril 40mg daily  - hgb A1c 5.7%, TSH 0.98  - lipid panel: total cholesterol 127 HDL 45.3 LDL 62 Tgs 101  - cont. Home statin     Acute on chronic diastolic heart failure  Flash Pulmonary edema   - followed by ALMA ROSA Diana, @[facility]  - TTE OSH (9/2024): LVEF 74%, no WMAs, DD, negative bubble study  - TTE 3/11: EF 65-70%, Grade I DD  - admit BNP: 36 (prev. 170 in 9/2024)  - admit CXR: pulm edema, vasc. congestion  - admit wt: 98.4kg, today's wt (bed): 99kg  - no home diuretics, s/p IVP Lasix 40mg x1 (3/10, 3/11, and 3/12)  - appears euvolemic, hold off on any further diuresis given NICOLÁS  - cont. Home lisinopril 40mg daily, Increased doses of imdur and coreg as above  - Daily standing weights, strict I&O's, 2g sodium diet, 2L fluid restriction     NICOLÁS on CKD stage 4, stable  - Bl Cr worsening over past year ~2-3  - Cr 2.87 upon last discharge  - Cr today: 3.51 (2.95, 2.93, 2.73)  - diuresis plan as above  - Renally dose meds, avoid nephrotoxins       Parkinson's Disease  Schizophrenia  Depression  - diagnosed ~3-4 years ago  - new start of carvidopa/levodopa 25/200mg TID (on 3/5/2025), cont. In-house  - admit ECG w/Qtc of 455ms  - stopped home trazodone (3/12) d/t lethargy, may be contributing to confusion and falls at home  - pending Psychiatry consultation for further medication adjustment given ongoing somnolence and recent falls  - cont. Home seroquel, wellbutrin, duloxetine  - cont. OP f/up w/primary neurologist, behavioral health, & PCP     JOY  COPD  Chronic Tobacco Use  -  encourage use of nocturnal CPAP, RT following  - nicotine patch   - cont. encouraging cessation    DVT ppx: SCDs  DISPO: PT/OT recommend moderate intensity, resides at home alone, has HHA 2-4 hours daily, pending further BP stabilization/medical optimization  - 3/12 after meeting with daughter and patient they are now in agreement with SNF for short term rehab, TCC aware, referrals sent 3/13, pending acceptance/pre-cert    Code Status: Full Code   NOK: Paola Lowry, daughter: [phone]     All labs, vital signs, tests & imaging results, and medications were reviewed.     Seen and discussed with Dr. Moon Martin, APRN-CNP

## 2025-03-13 NOTE — PROGRESS NOTES
Sp[facility] with Patient and daughter [facility]  2 fiona santos 3 [facility]  4  [facility]

## 2025-03-13 NOTE — PROGRESS NOTES
Occupational Therapy    OT Treatment    Patient Name: Kelly Martinez  MRN: [id number]  Department: [facility] [address]  Room: [address] [place], [place] [id number]-  Today's Date: 3/13/2025  Time Calculation  Start Time: 1145  Stop Time: 1213  Time Calculation (min): 28 min        Assessment:  OT Assessment: Pt will benefit from continued skilled OT to increase independence in ADLs, functional mobility, activity tolerance, safety, strength, and cognition.  Prognosis: Good  Barriers to Discharge Home: Physical needs, Caregiver assistance  Caregiver Assistance: Caregiver assistance needed per identified barriers - however, level of patient's required assistance exceeds assistance available at home  Physical Needs: Intermittent mobility assistance needed, Intermittent ADL assistance needed, High falls risk due to function or environment  Evaluation/Treatment Tolerance: Patient tolerated treatment well  Medical Staff Made Aware: Yes  End of Session Communication: Bedside nurse  End of Session Patient Position: Up in chair, Alarm on  OT Assessment Results: Decreased ADL status, Decreased safe judgment during ADL, Decreased functional mobility, Decreased IADLs  Prognosis: Good  Evaluation/Treatment Tolerance: Patient tolerated treatment well  Medical Staff Made Aware: Yes  Strengths: Attitude of self  Barriers to Participation: Comorbidities  Plan:  Treatment Interventions: ADL retraining, Functional transfer training, Endurance training, Cognitive reorientation, Patient/family training, Compensatory technique education  OT Frequency: 3 times per week  OT Discharge Recommendations: Moderate intensity level of continued care  Equipment Recommended upon Discharge:  (TBD)  OT Recommended Transfer Status: Assist of 2  OT - OK to Discharge: Yes  Treatment Interventions: ADL retraining, Functional transfer training, Endurance training, Cognitive reorientation, Patient/family training, Compensatory technique education    Subjective   Previous Visit Info:  OT Last  Visit  OT Received On: 03/13/25  General:  General  Reason for Referral: presenting to ED from home s/p mechanical fall  Past Medical History Relevant to Rehab: 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 and recurrent falls, s/p IVC filter removed 4/2016), CKD stage IV, chronic tobacco use, schizophrenia, Parkinson's disease, & depression  Family/Caregiver Present: No  Prior to Session Communication: Bedside nurse  Patient Position Received:  (seated EOB with mobility aides)  General Comment: Pt seated EOB upon arrival, agreeable to OT  Precautions:  Medical Precautions: Fall precautions    Pain:  Pain Assessment  Pain Assessment: 0-10  0-10 (Numeric) Pain Score: 0 - No pain    Objective    Cognition:  Cognition  Orientation Level: Oriented X4  Coordination:  Movements are Fluid and Coordinated: Yes    Bed Mobility/Transfers:      Transfers  Transfer: Yes  Transfer 1  Transfer From 1: Sit to, Stand to  Transfer to 1: Stand, Sit  Technique 1: Sit to stand, Stand to sit  Transfer Device 1: Walker  Transfer Level of Assistance 1: Moderate assistance, Minimal verbal cues  Trials/Comments 1: Vcs for hand placement    Functional Mobility:  Functional Mobility  Functional Mobility Performed: Yes  Functional Mobility 1  Surface 1: Level tile  Device 1: Rolling walker  Assistance 1: Minimum assistance, Minimal verbal cues  Comments 1: bed > chair  Sitting Balance:  Static Sitting Balance  Static Sitting-Balance Support: Feet supported  Static Sitting-Level of Assistance: Independent  Dynamic Sitting Balance  Dynamic Sitting-Balance Support: Feet supported  Dynamic Sitting-Level of Assistance: Independent  Standing Balance:  Static Standing Balance  Static Standing-Balance Support: Bilateral upper extremity supported  Static Standing-Level of Assistance: Minimum  assistance  Dynamic Standing Balance  Dynamic Standing-Balance Support: Bilateral upper extremity supported  Dynamic Standing-Level of Assistance: Minimum assistance  Modalities:  Modalities Used: No    Therapy/Activity: Therapeutic Exercise  Therapeutic Exercise Performed: Yes  Therapeutic Exercise Activity 1: transfers, fxnl mob    Cognitive Skill Development:  Cognitive Skill Development Activity 1: Pt completed to MOCA, The MoCA assesses the following cognitive domains: attention and concentration, executive functions, memory, language, visuoconstructional skills, conceptual thinking, calculations, and orientation.  Pt had deficits in: attention and concentration, executive functions, memory, language, visuoconstructional skills, conceptual thinking, calculations  Pt scored 11/30 a score > or = 26/30 is considered “normal cognition”    Outcome Measures:[facility] Daily Activity  Putting on and taking off regular lower body clothing: A lot  Bathing (including washing, rinsing, drying): A lot  Putting on and taking off regular upper body clothing: A lot  Toileting, which includes using toilet, bedpan or urinal: A lot  Taking care of personal grooming such as brushing teeth: A little  Eating Meals: None  Daily Activity - Total Score: 15     and OT Adult Other Outcome Measures  MOCA Total Score: 11    Education Documentation  Handouts, taught by Luis Enrique Negrete OT at 3/13/2025  1:49 PM.  Learner: Patient  Readiness: Acceptance  Method: Explanation  Response: Verbalizes Understanding, Needs Reinforcement    Body Mechanics, taught by Luis Enrique Negrete OT at 3/13/2025  1:49 PM.  Learner: Patient  Readiness: Acceptance  Method: Explanation  Response: Verbalizes Understanding, Needs Reinforcement    Precautions, taught by Luis Enrique Negrete OT at 3/13/2025  1:49 PM.  Learner: Patient  Readiness: Acceptance  Method: Explanation  Response: Verbalizes Understanding, Needs Reinforcement    ADL Training, taught by Luis Enrique Negrete OT at  3/13/2025  1:49 PM.  Learner: Patient  Readiness: Acceptance  Method: Explanation  Response: Verbalizes Understanding, Needs Reinforcement    Education Comments  No comments found.      Goals:  Encounter Problems       Encounter Problems (Active)       ADLs       Patient with complete upper body dressing with stand by assist level of assistance donning and doffing all UE clothes with no adaptive equipment while edge of bed  (Progressing)       Start:  03/11/25    Expected End:  03/25/25            Patient will complete toileting including hygiene clothing management/hygiene with stand by assist level of assistance and grab bars. (Progressing)       Start:  03/11/25    Expected End:  03/25/25               COGNITION/SAFETY       Patient will score WFL on standardized cognitive assessment with min cues and within reasonable time frame (Progressing)       Start:  03/11/25    Expected End:  03/25/25               MOBILITY       Patient will perform Functional mobility min Household distances with stand by assist level of assistance and least restrictive device in order to improve safety and functional mobility. (Progressing)       Start:  03/11/25    Expected End:  03/25/25               TRANSFERS       Patient will perform bed mobility independent level of assistance and bed rails in order to improve safety and independence with mobility (Progressing)       Start:  03/11/25    Expected End:  03/25/25            Patient will complete sit to stand transfer with stand by assist level of assistance and least restrictive device in order to improve safety and prepare for out of bed mobility. (Progressing)       Start:  03/11/25    Expected End:  03/25/25                 DEANN Sylvester/ANGELITO

## 2025-03-13 NOTE — CARE PLAN
Problem: Fall/Injury  Goal: Not fall by end of shift  Outcome: Progressing     Problem: Fall/Injury  Goal: Be free from injury by end of the shift  Outcome: Progressing     Problem: Respiratory  Goal: Clear secretions with interventions this shift  Outcome: Progressing     Problem: Respiratory  Goal: Minimal/no exertional discomfort or dyspnea this shift  Outcome: Progressing     Problem: Respiratory  Goal: No signs of respiratory distress (eg. Use of accessory muscles. Peds grunting)  Outcome: Progressing     Problem: Respiratory  Goal: Patent airway maintained this shift  Outcome: Progressing     Problem: Respiratory  Goal: Verbalize decreased shortness of breath this shift  Outcome: Progressing     Problem: Chronic Conditions and Co-morbidities  Goal: Patient's chronic conditions and co-morbidity symptoms are monitored and maintained or improved  Outcome: Progressing   The patient's goals for the shift include  remain oriented throughout shift.     The clinical goals for the shift include Pt will deny any dizziness or SOB when ambulating

## 2025-03-13 NOTE — PROGRESS NOTES
03/13/25 1141   Rapid Rounds   Attendance Provider;Nurse;Care Transitions   Expected Discharge Disposition SNF   Today we still await: Clinical stability

## 2025-03-14 ENCOUNTER — APPOINTMENT (OUTPATIENT)
Dept: RADIOLOGY | Facility: HOSPITAL | Age: 80
End: 2025-03-14
Payer: COMMERCIAL
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
## 2025-03-14 LAB
ALBUMIN SERPL BCP-MCNC: 3.4 G/DL (ref 3.4–5)
ALBUMIN SERPL BCP-MCNC: 3.6 G/DL (ref 3.4–5)
ANION GAP SERPL CALC-SCNC: 12 MMOL/L (ref 10–20)
ANION GAP SERPL CALC-SCNC: 13 MMOL/L (ref 10–20)
BUN SERPL-MCNC: 41 MG/DL (ref 6–23)
BUN SERPL-MCNC: 44 MG/DL (ref 6–23)
CALCIUM SERPL-MCNC: 9.3 MG/DL (ref 8.6–10.6)
CALCIUM SERPL-MCNC: 9.8 MG/DL (ref 8.6–10.6)
CHLORIDE SERPL-SCNC: 111 MMOL/L (ref 98–107)
CHLORIDE SERPL-SCNC: 112 MMOL/L (ref 98–107)
CO2 SERPL-SCNC: 19 MMOL/L (ref 21–32)
CO2 SERPL-SCNC: 21 MMOL/L (ref 21–32)
CREAT SERPL-MCNC: 3.86 MG/DL (ref 0.5–1.05)
CREAT SERPL-MCNC: 3.94 MG/DL (ref 0.5–1.05)
EGFRCR SERPLBLD CKD-EPI 2021: 11 ML/MIN/1.73M*2
EGFRCR SERPLBLD CKD-EPI 2021: 11 ML/MIN/1.73M*2
ERYTHROCYTE [DISTWIDTH] IN BLOOD BY AUTOMATED COUNT: 15.5 % (ref 11.5–14.5)
GLUCOSE SERPL-MCNC: 105 MG/DL (ref 74–99)
GLUCOSE SERPL-MCNC: 115 MG/DL (ref 74–99)
HCT VFR BLD AUTO: 33.4 % (ref 36–46)
HGB BLD-MCNC: 9.8 G/DL (ref 12–16)
MAGNESIUM SERPL-MCNC: 2.19 MG/DL (ref 1.6–2.4)
MCH RBC QN AUTO: 28.8 PG (ref 26–34)
MCHC RBC AUTO-ENTMCNC: 29.3 G/DL (ref 32–36)
MCV RBC AUTO: 98 FL (ref 80–100)
NRBC BLD-RTO: 0 /100 WBCS (ref 0–0)
PHOSPHATE SERPL-MCNC: 3.4 MG/DL (ref 2.5–4.9)
PHOSPHATE SERPL-MCNC: 3.8 MG/DL (ref 2.5–4.9)
PLATELET # BLD AUTO: 159 X10*3/UL (ref 150–450)
POTASSIUM SERPL-SCNC: 4.5 MMOL/L (ref 3.5–5.3)
POTASSIUM SERPL-SCNC: 4.6 MMOL/L (ref 3.5–5.3)
RBC # BLD AUTO: 3.4 X10*6/UL (ref 4–5.2)
SODIUM SERPL-SCNC: 139 MMOL/L (ref 136–145)
SODIUM SERPL-SCNC: 139 MMOL/L (ref 136–145)
WBC # BLD AUTO: 4.6 X10*3/UL (ref 4.4–11.3)

## 2025-03-14 PROCEDURE — 76770 US EXAM ABDO BACK WALL COMP: CPT

## 2025-03-14 PROCEDURE — 36415 COLL VENOUS BLD VENIPUNCTURE: CPT | Performed by: NURSE PRACTITIONER

## 2025-03-14 PROCEDURE — 2500000001 HC RX 250 WO HCPCS SELF ADMINISTERED DRUGS (ALT 637 FOR MEDICARE OP): Performed by: NURSE PRACTITIONER

## 2025-03-14 PROCEDURE — 36415 COLL VENOUS BLD VENIPUNCTURE: CPT

## 2025-03-14 PROCEDURE — 99223 1ST HOSP IP/OBS HIGH 75: CPT | Performed by: PSYCHIATRY & NEUROLOGY

## 2025-03-14 PROCEDURE — 83735 ASSAY OF MAGNESIUM: CPT | Performed by: NURSE PRACTITIONER

## 2025-03-14 PROCEDURE — 80069 RENAL FUNCTION PANEL: CPT

## 2025-03-14 PROCEDURE — 2500000002 HC RX 250 W HCPCS SELF ADMINISTERED DRUGS (ALT 637 FOR MEDICARE OP, ALT 636 FOR OP/ED)

## 2025-03-14 PROCEDURE — 2500000002 HC RX 250 W HCPCS SELF ADMINISTERED DRUGS (ALT 637 FOR MEDICARE OP, ALT 636 FOR OP/ED): Performed by: NURSE PRACTITIONER

## 2025-03-14 PROCEDURE — 80069 RENAL FUNCTION PANEL: CPT | Performed by: NURSE PRACTITIONER

## 2025-03-14 PROCEDURE — 99232 SBSQ HOSP IP/OBS MODERATE 35: CPT | Performed by: STUDENT IN AN ORGANIZED HEALTH CARE EDUCATION/TRAINING PROGRAM

## 2025-03-14 PROCEDURE — 1200000002 HC GENERAL ROOM WITH TELEMETRY DAILY

## 2025-03-14 PROCEDURE — 85027 COMPLETE CBC AUTOMATED: CPT | Performed by: NURSE PRACTITIONER

## 2025-03-14 PROCEDURE — S4991 NICOTINE PATCH NONLEGEND: HCPCS | Performed by: NURSE PRACTITIONER

## 2025-03-14 RX ORDER — QUETIAPINE FUMARATE 25 MG/1
150 TABLET, FILM COATED ORAL NIGHTLY
Status: DISCONTINUED | OUTPATIENT
Start: 2025-03-14 | End: 2025-03-19 | Stop reason: HOSPADM

## 2025-03-14 RX ADMIN — BUPROPION HYDROCHLORIDE 150 MG: 150 TABLET, EXTENDED RELEASE ORAL at 09:44

## 2025-03-14 RX ADMIN — QUETIAPINE FUMARATE 150 MG: 25 TABLET ORAL at 22:46

## 2025-03-14 RX ADMIN — CARBIDOPA AND LEVODOPA 1 TABLET: 25; 100 TABLET ORAL at 09:03

## 2025-03-14 RX ADMIN — AMLODIPINE BESYLATE 10 MG: 10 TABLET ORAL at 09:03

## 2025-03-14 RX ADMIN — ISOSORBIDE MONONITRATE 60 MG: 60 TABLET, EXTENDED RELEASE ORAL at 09:03

## 2025-03-14 RX ADMIN — ASPIRIN 81 MG CHEWABLE TABLET 81 MG: 81 TABLET CHEWABLE at 09:03

## 2025-03-14 RX ADMIN — FOLIC ACID 1 MG: 1 TABLET ORAL at 09:03

## 2025-03-14 RX ADMIN — ATORVASTATIN CALCIUM 40 MG: 40 TABLET, FILM COATED ORAL at 21:02

## 2025-03-14 RX ADMIN — CARBIDOPA AND LEVODOPA 1 TABLET: 25; 100 TABLET ORAL at 21:01

## 2025-03-14 RX ADMIN — NICOTINE 1 PATCH: 14 PATCH, EXTENDED RELEASE TRANSDERMAL at 09:03

## 2025-03-14 RX ADMIN — TERAZOSIN HYDROCHLORIDE 2 MG: 2 CAPSULE ORAL at 09:03

## 2025-03-14 RX ADMIN — DULOXETINE HYDROCHLORIDE 30 MG: 30 CAPSULE, DELAYED RELEASE ORAL at 09:03

## 2025-03-14 RX ADMIN — CARBIDOPA AND LEVODOPA 1 TABLET: 25; 100 TABLET ORAL at 16:13

## 2025-03-14 ASSESSMENT — COGNITIVE AND FUNCTIONAL STATUS - GENERAL
DRESSING REGULAR UPPER BODY CLOTHING: A LITTLE
DAILY ACTIVITIY SCORE: 14
MOVING TO AND FROM BED TO CHAIR: A LITTLE
TURNING FROM BACK TO SIDE WHILE IN FLAT BAD: A LITTLE
EATING MEALS: A LITTLE
DRESSING REGULAR LOWER BODY CLOTHING: A LOT
DAILY ACTIVITIY SCORE: 14
TOILETING: A LOT
PERSONAL GROOMING: A LOT
CLIMB 3 TO 5 STEPS WITH RAILING: A LOT
PERSONAL GROOMING: A LOT
STANDING UP FROM CHAIR USING ARMS: A LITTLE
MOBILITY SCORE: 17
WALKING IN HOSPITAL ROOM: A LOT
MOVING TO AND FROM BED TO CHAIR: A LITTLE
TURNING FROM BACK TO SIDE WHILE IN FLAT BAD: A LITTLE
HELP NEEDED FOR BATHING: A LOT
HELP NEEDED FOR BATHING: A LOT
DRESSING REGULAR LOWER BODY CLOTHING: A LOT
STANDING UP FROM CHAIR USING ARMS: A LITTLE
MOBILITY SCORE: 17
EATING MEALS: A LITTLE
CLIMB 3 TO 5 STEPS WITH RAILING: A LOT
DRESSING REGULAR UPPER BODY CLOTHING: A LITTLE
WALKING IN HOSPITAL ROOM: A LOT
TOILETING: A LOT

## 2025-03-14 ASSESSMENT — PAIN SCALES - GENERAL: PAINLEVEL_OUTOF10: 0 - NO PAIN

## 2025-03-14 ASSESSMENT — PAIN - FUNCTIONAL ASSESSMENT: PAIN_FUNCTIONAL_ASSESSMENT: 0-10

## 2025-03-14 NOTE — CARE PLAN
The patient's goals for the shift include To get BP's under controla nd be able to go home    The clinical goals for the shift include Patient will remain safe free from falls and injury this shift    Over the shift, the patient reports 0/10 pain and no SOB at rest on room air.  Patient's BP hypotensive, team called to bedside, parameters ordered, med adjustments, bolus of NS given.  Patient had liquid stool while in chair.  Patient sat up in chair most of the day.  Patient remained safe free from falls and injury this shift.      Problem: Fall/Injury  Goal: Not fall by end of shift  Outcome: Progressing  Goal: Be free from injury by end of the shift  Outcome: Progressing  Goal: Verbalize understanding of personal risk factors for fall in the hospital  Outcome: Progressing  Goal: Verbalize understanding of risk factor reduction measures to prevent injury from fall in the home  Outcome: Progressing  Goal: Use assistive devices by end of the shift  Outcome: Progressing  Goal: Pace activities to prevent fatigue by end of the shift  Outcome: Progressing     Problem: Pain  Goal: Takes deep breaths with improved pain control throughout the shift  Outcome: Progressing  Goal: Turns in bed with improved pain control throughout the shift  Outcome: Progressing  Goal: Walks with improved pain control throughout the shift  Outcome: Progressing  Goal: Performs ADL's with improved pain control throughout shift  Outcome: Progressing  Goal: Participates in PT with improved pain control throughout the shift  Outcome: Progressing  Goal: Free from opioid side effects throughout the shift  Outcome: Progressing  Goal: Free from acute confusion related to pain meds throughout the shift  Outcome: Progressing     Problem: Respiratory  Goal: Clear secretions with interventions this shift  Outcome: Progressing  Goal: Minimize anxiety/maximize coping throughout shift  Outcome: Progressing  Goal: Minimal/no exertional discomfort or dyspnea this  shift  Outcome: Progressing  Goal: No signs of respiratory distress (eg. Use of accessory muscles. Peds grunting)  Outcome: Progressing  Goal: Patent airway maintained this shift  Outcome: Progressing  Goal: Tolerate mechanical ventilation evidenced by VS/agitation level this shift  Outcome: Progressing  Goal: Tolerate pulmonary toileting this shift  Outcome: Progressing  Goal: Verbalize decreased shortness of breath this shift  Outcome: Progressing  Goal: Wean oxygen to maintain O2 saturation per order/standard this shift  Outcome: Progressing  Goal: Increase self care and/or family involvement in next 24 hours  Outcome: Progressing     Problem: Pain - Adult  Goal: Verbalizes/displays adequate comfort level or baseline comfort level  Outcome: Progressing     Problem: Safety - Adult  Goal: Free from fall injury  Outcome: Progressing     Problem: Discharge Planning  Goal: Discharge to home or other facility with appropriate resources  Outcome: Progressing     Problem: Chronic Conditions and Co-morbidities  Goal: Patient's chronic conditions and co-morbidity symptoms are monitored and maintained or improved  Outcome: Progressing     Problem: Nutrition  Goal: Nutrient intake appropriate for maintaining nutritional needs  Outcome: Progressing

## 2025-03-14 NOTE — CARE PLAN
The patient's goals for the shift include To get BP's under control and be able to go home    The clinical goals for the shift include Patient will remain safe free from falls and injury this shift    Over the shift, the patient had improved BP's from yesterday evening with med adjustments.  BUN and creatinine elevated, Renal US completed.  Patient up in chair for meals today. Patient remained safe free from falls and injury this shift.      Problem: Fall/Injury  Goal: Not fall by end of shift  3/14/2025 1858 by Onelia Thompson RN  Outcome: Progressing  3/14/2025 1846 by Onelia Thompson RN  Outcome: Progressing  Goal: Be free from injury by end of the shift  3/14/2025 1858 by Onelia Thompson RN  Outcome: Progressing  3/14/2025 1846 by Onelia Thompson RN  Outcome: Progressing  Goal: Verbalize understanding of personal risk factors for fall in the hospital  3/14/2025 1858 by Onelia Thompson RN  Outcome: Progressing  3/14/2025 1846 by Onelia Thompson RN  Outcome: Progressing  Goal: Verbalize understanding of risk factor reduction measures to prevent injury from fall in the home  3/14/2025 1858 by Onelia Thompson RN  Outcome: Progressing  3/14/2025 1846 by Onelia Thompson RN  Outcome: Progressing  Goal: Use assistive devices by end of the shift  3/14/2025 1858 by Onelia Thompson RN  Outcome: Progressing  3/14/2025 1846 by Onelia Thompson RN  Outcome: Progressing  Goal: Pace activities to prevent fatigue by end of the shift  3/14/2025 1858 by Onelia Thompson RN  Outcome: Progressing  3/14/2025 1846 by Onelia Thompson RN  Outcome: Progressing     Problem: Pain  Goal: Takes deep breaths with improved pain control throughout the shift  3/14/2025 1858 by Onelia Thompson RN  Outcome: Progressing  3/14/2025 1846 by Onelia Thompson RN  Outcome: Progressing  Goal: Turns in bed with improved pain control throughout the shift  3/14/2025 1858 by Onelia Thompson RN  Outcome: Progressing  3/14/2025 1846 by Onelia  SUE Thompson  Outcome: Progressing  Goal: Walks with improved pain control throughout the shift  3/14/2025 1858 by Onelia Thompson RN  Outcome: Progressing  3/14/2025 1846 by Onelia Thompson RN  Outcome: Progressing  Goal: Performs ADL's with improved pain control throughout shift  3/14/2025 1858 by Onelia Thompson RN  Outcome: Progressing  3/14/2025 1846 by Onelia Thompson RN  Outcome: Progressing  Goal: Participates in PT with improved pain control throughout the shift  3/14/2025 1858 by Onelia Thompson RN  Outcome: Progressing  3/14/2025 1846 by Onelia Thompson RN  Outcome: Progressing  Goal: Free from opioid side effects throughout the shift  3/14/2025 1858 by Onelia Thompson RN  Outcome: Progressing  3/14/2025 1846 by Onelia Thompson RN  Outcome: Progressing  Goal: Free from acute confusion related to pain meds throughout the shift  3/14/2025 1858 by Onelia Thompson RN  Outcome: Progressing  3/14/2025 1846 by Onelia Thompson RN  Outcome: Progressing     Problem: Respiratory  Goal: Clear secretions with interventions this shift  3/14/2025 1858 by Onelia Thompson RN  Outcome: Progressing  3/14/2025 1846 by Onelia Thompson RN  Outcome: Progressing  Goal: Minimize anxiety/maximize coping throughout shift  3/14/2025 1858 by Onelia Thompson RN  Outcome: Progressing  3/14/2025 1846 by Onelia Thompson RN  Outcome: Progressing  Goal: Minimal/no exertional discomfort or dyspnea this shift  3/14/2025 1858 by Onelia Thompson RN  Outcome: Progressing  3/14/2025 1846 by Onelia Thompson RN  Outcome: Progressing  Goal: No signs of respiratory distress (eg. Use of accessory muscles. Peds grunting)  3/14/2025 1858 by Onelia Thompson RN  Outcome: Progressing  3/14/2025 1846 by Onelia Thompson RN  Outcome: Progressing  Goal: Patent airway maintained this shift  3/14/2025 1858 by Onelia Thompson RN  Outcome: Progressing  3/14/2025 1846 by Onelia Thompson RN  Outcome: Progressing  Goal: Tolerate mechanical  ventilation evidenced by VS/agitation level this shift  3/14/2025 1858 by Onelia Thompson RN  Outcome: Progressing  3/14/2025 1846 by Onelia Thompson RN  Outcome: Progressing  Goal: Tolerate pulmonary toileting this shift  3/14/2025 1858 by Onelia Thompson RN  Outcome: Progressing  3/14/2025 1846 by Onelia Thompson RN  Outcome: Progressing  Goal: Verbalize decreased shortness of breath this shift  3/14/2025 1858 by Onelia Thompson RN  Outcome: Progressing  3/14/2025 1846 by Onelia Thompson RN  Outcome: Progressing  Goal: Wean oxygen to maintain O2 saturation per order/standard this shift  3/14/2025 1858 by Onelia Thompson RN  Outcome: Progressing  3/14/2025 1846 by Onelia Thompson RN  Outcome: Progressing  Goal: Increase self care and/or family involvement in next 24 hours  3/14/2025 1858 by Onelia Thompson RN  Outcome: Progressing  3/14/2025 1846 by Onelia Thompson RN  Outcome: Progressing     Problem: Pain - Adult  Goal: Verbalizes/displays adequate comfort level or baseline comfort level  3/14/2025 1858 by Onelia Thompson RN  Outcome: Progressing  3/14/2025 1846 by Onelia Thompson RN  Outcome: Progressing     Problem: Safety - Adult  Goal: Free from fall injury  3/14/2025 1858 by Onelia Thompson RN  Outcome: Progressing  3/14/2025 1846 by Onelia Thompson RN  Outcome: Progressing     Problem: Discharge Planning  Goal: Discharge to home or other facility with appropriate resources  3/14/2025 1858 by Onelia Thompson RN  Outcome: Progressing  3/14/2025 1846 by Onelia Thompson RN  Outcome: Progressing     Problem: Chronic Conditions and Co-morbidities  Goal: Patient's chronic conditions and co-morbidity symptoms are monitored and maintained or improved  3/14/2025 1858 by Onelia Thompson RN  Outcome: Progressing  3/14/2025 1846 by Onelia Thompson RN  Outcome: Progressing     Problem: Nutrition  Goal: Nutrient intake appropriate for maintaining nutritional needs  3/14/2025 1858 by Onelia Thompson,  RN  Outcome: Progressing  3/14/2025 1846 by Onelia Thompson RN  Outcome: Progressing     Problem: Skin  Goal: Decreased wound size/increased tissue granulation at next dressing change  3/14/2025 1858 by Onelia Thompson RN  Outcome: Progressing  Flowsheets (Taken 3/14/2025 1858)  Decreased wound size/increased tissue granulation at next dressing change:   Promote sleep for wound healing   Protective dressings over bony prominences  3/14/2025 1846 by Onelia Thompson RN  Outcome: Progressing  Flowsheets (Taken 3/14/2025 1846)  Decreased wound size/increased tissue granulation at next dressing change:   Promote sleep for wound healing   Protective dressings over bony prominences  Goal: Participates in plan/prevention/treatment measures  3/14/2025 1858 by Onelia Thompson RN  Outcome: Progressing  Flowsheets (Taken 3/14/2025 1858)  Participates in plan/prevention/treatment measures:   Elevate heels   Increase activity/out of bed for meals  3/14/2025 1846 by Onelia Thompson RN  Outcome: Progressing  Flowsheets (Taken 3/14/2025 1846)  Participates in plan/prevention/treatment measures:   Elevate heels   Increase activity/out of bed for meals  Goal: Prevent/manage excess moisture  3/14/2025 1858 by Onelia Thompson RN  Outcome: Progressing  Flowsheets (Taken 3/14/2025 1858)  Prevent/manage excess moisture:   Follow provider orders for dressing changes   Moisturize dry skin   Monitor for/manage infection if present  3/14/2025 1846 by Onelia Thompson RN  Outcome: Progressing  Flowsheets (Taken 3/14/2025 1846)  Prevent/manage excess moisture:   Cleanse incontinence/protect with barrier cream   Follow provider orders for dressing changes   Moisturize dry skin   Monitor for/manage infection if present  Goal: Prevent/minimize sheer/friction injuries  3/14/2025 1858 by Onelia Thompson RN  Outcome: Progressing  Flowsheets (Taken 3/14/2025 1858)  Prevent/minimize sheer/friction injuries:   HOB 30 degrees or less   Increase  activity/out of bed for meals   Turn/reposition every 2 hours/use positioning/transfer devices  3/14/2025 1846 by Onelia Thompson RN  Outcome: Progressing  Flowsheets (Taken 3/14/2025 1846)  Prevent/minimize sheer/friction injuries:   HOB 30 degrees or less   Increase activity/out of bed for meals   Turn/reposition every 2 hours/use positioning/transfer devices  Goal: Promote/optimize nutrition  3/14/2025 1858 by Onelia Thompson RN  Outcome: Progressing  Flowsheets (Taken 3/14/2025 1858)  Promote/optimize nutrition:   Consume > 50% meals/supplements   Monitor/record intake including meals   Offer water/supplements/favorite foods  3/14/2025 1846 by Onelia Thompson RN  Outcome: Progressing  Flowsheets (Taken 3/14/2025 1846)  Promote/optimize nutrition:   Discuss with provider if NPO > 2 days   Monitor/record intake including meals   Offer water/supplements/favorite foods   Consume > 50% meals/supplements  Goal: Promote skin healing  3/14/2025 1858 by Onelia Thompson RN  Outcome: Progressing  Flowsheets (Taken 3/13/2025 2037)  Promote skin healing:   Ensure correct size (line/device) and apply per  instructions   Protective dressings over bony prominences   Rotate device position/do not position patient on device   Turn/reposition every 2 hours/use positioning/transfer devices   Assess skin/pad under line(s)/device(s)  3/14/2025 1846 by Onelia Thompson RN  Outcome: Progressing  Flowsheets (Taken 3/13/2025 2037)  Promote skin healing:   Ensure correct size (line/device) and apply per  instructions   Protective dressings over bony prominences   Rotate device position/do not position patient on device   Turn/reposition every 2 hours/use positioning/transfer devices   Assess skin/pad under line(s)/device(s)

## 2025-03-14 NOTE — PROGRESS NOTES
Interval Events:  No acute events overnight.     Subjective Data:  Patient seen and assessed this morning, lying in bed, NAD. Denies any CP or SOB, dizziness or lightheadedness. Remains somnolent in morning, awake/alert as day goes on, up in chair during rounds.     Today's Plan/Updates:   - per Psychiatry, reduce Seroquel to 150 mg nightly  - BP improved to 120/68 this morning, 94/58 at noon, remains asymptomatic  - Cr 4.17 last night, improved to 3.86 this morning, urine labs and Renal US pending  - cont to hold carvedilol and lisinopril    Objective Data:  Last Recorded Vitals:  Vitals:    03/14/25 0347 03/14/25 0811 03/14/25 1214 03/14/25 1653   BP: 112/69 120/68 94/58 118/71   BP Location: Right arm Right arm Right arm Right arm   Patient Position: Lying Lying Lying Lying   Pulse: 71 72     Resp: 17 12     Temp: 36.4 °C (97.5 °F) 36.5 °C (97.7 °F) 36.6 °C (97.9 °F) 36.1 °C (97 °F)   TempSrc: Temporal Temporal Temporal Temporal   SpO2: 92% 93% 93% 92%   Weight: 98.8 kg (217 lb 13 oz)      Height:         Last Labs:  Results for orders placed or performed during the hospital encounter of 03/10/25 (from the past 24 hours)   Renal function panel   Result Value Ref Range    Glucose 109 (H) 74 - 99 mg/dL    Sodium 142 136 - 145 mmol/L    Potassium 4.6 3.5 - 5.3 mmol/L    Chloride 109 (H) 98 - 107 mmol/L    Bicarbonate 23 21 - 32 mmol/L    Anion Gap 15 10 - 20 mmol/L    Urea Nitrogen 41 (H) 6 - 23 mg/dL    Creatinine 4.17 (H) 0.50 - 1.05 mg/dL    eGFR 10 (L) >60 mL/min/1.73m*2    Calcium 9.6 8.6 - 10.6 mg/dL    Phosphorus 3.8 2.5 - 4.9 mg/dL    Albumin 3.8 3.4 - 5.0 g/dL   Magnesium   Result Value Ref Range    Magnesium 2.22 1.60 - 2.40 mg/dL   CBC   Result Value Ref Range    WBC 5.0 4.4 - 11.3 x10*3/uL    nRBC 0.0 0.0 - 0.0 /100 WBCs    RBC 3.41 (L) 4.00 - 5.20 x10*6/uL    Hemoglobin 10.1 (L) 12.0 - 16.0 g/dL    Hematocrit 32.4 (L) 36.0 - 46.0 %    MCV 95 80 - 100 fL    MCH 29.6 26.0 - 34.0 pg    MCHC 31.2 (L) 32.0 -  36.0 g/dL    RDW 15.4 (H) 11.5 - 14.5 %    Platelets 181 150 - 450 x10*3/uL   Renal Function Panel   Result Value Ref Range    Glucose 115 (H) 74 - 99 mg/dL    Sodium 139 136 - 145 mmol/L    Potassium 4.6 3.5 - 5.3 mmol/L    Chloride 112 (H) 98 - 107 mmol/L    Bicarbonate 19 (L) 21 - 32 mmol/L    Anion Gap 13 10 - 20 mmol/L    Urea Nitrogen 41 (H) 6 - 23 mg/dL    Creatinine 3.86 (H) 0.50 - 1.05 mg/dL    eGFR 11 (L) >60 mL/min/1.73m*2    Calcium 9.3 8.6 - 10.6 mg/dL    Phosphorus 3.4 2.5 - 4.9 mg/dL    Albumin 3.4 3.4 - 5.0 g/dL        TROPHS   Date/Time Value Ref Range Status   03/10/2025 12:27 PM 28 0 - 34 ng/L Final   03/10/2025 11:03 AM 38 0 - 34 ng/L Final   09/21/2024 04:29 AM 35 0 - 34 ng/L Final   03/30/2024 03:19 PM 19 0 - 34 ng/L Final   03/30/2024 02:09 PM 18 0 - 34 ng/L Final   12/28/2023 02:33 AM 19 0 - 34 ng/L Final     BNP   Date/Time Value Ref Range Status   03/10/2025 11:03 AM 36 0 - 99 pg/mL Final   09/21/2024 04:29  0 - 99 pg/mL Final     HGBA1C   Date/Time Value Ref Range Status   03/10/2025 10:49 PM 5.7 See comment % Final   09/21/2024 04:29 AM 5.9 See comment % Final     LDLCALC   Date/Time Value Ref Range Status   03/10/2025 11:03 AM 62 <=99 mg/dL Final     Comment:                                 Near   Borderline      AGE      Desirable  Optimal    High     High     Very High     0-19 Y     0 - 109     ---    110-129   >/= 130     ----    20-24 Y     0 - 119     ---    120-159   >/= 160     ----      >24 Y     0 -  99   100-129  130-159   160-189     >/=190       VLDL   Date/Time Value Ref Range Status   03/10/2025 11:03 AM 20 0 - 40 mg/dL Final   12/07/2022 02:20 PM 16 0 - 40 mg/dL Final      Last I/O:  I/O last 3 completed shifts:  In: 1300 (13.2 mL/kg) [P.O.:800; IV Piggyback:500]  Out: 700 (7.1 mL/kg) [Urine:700 (0.2 mL/kg/hr)]  Weight: 98.8 kg     Past Cardiology Tests (Last 3 Years):  EKG:  ECG 12 lead 03/10/2025  Sinus rhythm with 1st degree AV block  Right bundle branch  block  Abnormal ECG  When compared with ECG of 04-OCT-2024 05:28,  Sinus rhythm has replaced Ectopic atrial rhythm  QRS axis Shifted right  T wave inversion no longer evident in Lateral leads  See ED provider note for full interpretation and clinical correlation  Confirmed by Renita Chen ([id number]) on 3/11/2025 4:30:43 AM    Echo:  Transthoracic Echo (TTE) Complete 09/21/2024:  1. Left ventricular ejection fraction is normal, calculated by Regan's biplane at 74%.  2. Spectral Doppler shows an impaired relaxation pattern of left ventricular diastolic filling.  3. There is mild concentric left ventricular hypertrophy.  4. There is normal right ventricular global systolic function.  5. The left atrium is mildly dilated.  6. Mildly elevated right ventricular systolic pressure.  7. There is no evidence of a patent foramen ovale.  8. Compared with study dated 12/29/2023, no significant change.     Transthoracic Echo (TTE) Limited 12/29/2023:  1. Left ventricular systolic function is normal with a 55-60% estimated ejection fraction.  2. Poorly visualized anatomical structures due to suboptimal image quality.  3. Spectral Doppler shows an impaired relaxation pattern of left ventricular diastolic filling.     Ejection Fractions:  EF   Date/Time Value Ref Range Status   03/11/2025 11:00 AM 68 %    09/21/2024 11:50 AM 74 %    12/29/2023 10:48 AM 54       Cath:  No results found for this or any previous visit from the past 1095 days.  Stress Test:  No results found for this or any previous visit from the past 1095 days.  Cardiac Imaging:  No results found for this or any previous visit from the past 1095 days.    Inpatient Medications:  Scheduled medications   Medication Dose Route Frequency    amLODIPine  10 mg oral Daily    aspirin  81 mg oral Daily    atorvastatin  40 mg oral Nightly    buPROPion XL  150 mg oral Daily    carbidopa-levodopa  1 tablet oral TID    [Held by provider] carvedilol  25 mg oral BID    DULoxetine  30  mg oral Daily    folic acid  1 mg oral Daily    isosorbide mononitrate ER  60 mg oral Daily    [Held by provider] lisinopril  40 mg oral Daily    nicotine  1 patch transdermal Daily    perflutren lipid microspheres  0.5-10 mL of dilution intravenous Once in imaging    [Held by provider] potassium chloride CR  20 mEq oral BID    QUEtiapine  150 mg oral Nightly    sennosides  2 tablet oral BID    terazosin  2 mg oral Daily     PRN medications   Medication    acetaminophen     Continuous Medications   Medication Dose Last Rate     Physical Exam:  General: NAD, lying in bed  Skin: warm and dry throughout, L forehead hematoma w/ecchymosis, +tenderness  Head/ neck: no JVD   Cardiac: RRR, S1, S2, NSR HR 70's on telemetry  Pulm: CTA, on RA  GI: soft, nontender, non-distended, obese  Extremities: trace edema in BLE, L>R w/L-calf tenderness  Neuro: no focal neuro deficits, a+ox4, hard of hearing, somnolent at times, trouble keeping eyes open. Bilateral eye orbits swollen  Psych: appropriate mood and behavior, very pleasant        Assessment/Plan   Kelly Martinez is a 80 y.o. female with PMH significant for R cerebellar/verminan AVM/hemorrhage (9/2024 reversed with kcentra), uncontrolled HTN, HLD, HFpEF (TTE LVEF 74% 09/2024), JOY, COPD, recurrent DVT/PE's prev. 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 Typically receives all her care at [facility]. Under [facility]VI Service for further management of HTN urgency and ADHF.     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 On coumadin  - s/p IVC filter removal 4/2016 d/t erosion  - (9/2024) presented w/[facility], CTH +R cerebellar-vermis hemorrhage, reversed with kcentra,  no residual effects  - off coumadin since (9/2024) per primary neurologist @[facility] (Dr. Renee); OP office visit (3/5/2025):  -- hold OFF on AC for 1 year (9/2024-9/2025) given risk of cerebral bleeding, new start of Parkinson's meds (see below)  - no evidence for PE on CTA chest during last admit at  (9/2024)  - currently satting >90% on RA, reported feeling more SOB than usual  - V/Q scan 3/11 without evidence of perfusion defects/PE  - d-dimer 2675 (no prior level for comparison)  - BLE duplex US 3/11: negative for DVT, BLE Venous insufficiency noted, tanisha hose ordered  - s/p mechanical fall at home upon admit, +trauma to L-side of head (w/L forehead hematoma)  - imaging on admit ([facility], CT C-spine): without acute changes/fractures  - no new neuro deficits  - lives at home alone, PT/OT consulted, MOCA score 3/13: 11/30  - cont. Home baby ASA and statin  - PRN tylenol for pain     Hypertensive Urgency, resolved  HLD  - BP on on admit: 208/112  - trazodone discontinued 3/12 due to day somnolence  - 3/13, hypotensive to 74/47, asymptomatic, evening carvedilol held, 500 ml bolus   - SBP last 24 hrs: , remains asymptomatic  - coreg 25mg BID and lisinopril on hold  - cont. home amlodipine 10mg daily, imdur 60 mg daily,   - hgb A1c 5.7%, TSH 0.98  - lipid panel: total cholesterol 127 HDL 45.3 LDL 62 Tgs 101  - cont. Home statin    Acute on chronic diastolic heart failure  Flash Pulmonary edema   - followed by ALMA ROSA Diana, @[facility]  - TTE OSH (9/2024): LVEF 74%, no WMAs, DD, negative bubble study  - TTE 3/11: EF 65-70%, Grade I DD  - admit BNP: 36 (prev. 170 in 9/2024)  - admit CXR: pulm edema, vasc. congestion  - admit wt: 98.4kg, today's wt (bed): 99kg  - no home diuretics, s/p IVP Lasix 40mg x1 (3/10, 3/11, and 3/12)  - appears euvolemic, hold off on any further diuresis given NICOLÁS  - Daily standing weights, strict I&O's, 2g sodium diet, 2L fluid restriction     NICOLÁS on CKD stage 4  - Bl Cr worsening over past year  ~2-3  - Cr 2.87 upon last discharge  - Cr today: 3.86 (4.17, 3.51, 2.95, 2.93, 2.73)  - Urine labs and Renal US pending  - diuresis plan as above  - Renally dose meds, avoid nephrotoxins      Parkinson's Disease  Schizophrenia  Depression  - diagnosed ~3-4 years ago  - new start of carvidopa/levodopa 25/200mg TID (on 3/5/2025), cont. In-house  - admit ECG w/Qtc of 455ms  - stop home trazodone (3/12)   - Psychiatry consulted/following for further medication changes given ongoing somnolence and recent falls, appreciate recs  - per Psychiatry, reduce Seroquel to 150 mg nightly  - cont. Home seroquel, wellbutrin, duloxetine  - cont. OP f/up w/primary neurologist, behavioral health, & PCP     JOY  COPD  Chronic Tobacco Use  - encourage use of nocturnal CPAP, RT following, refuses  - nicotine patch   - cont. encouraging cessation    DVT ppx: SCDs  DISPO: PT/OT recommend moderate intensity, resides at home alone, has HHA 2-4 hours daily, pending further BP stabilization/medical optimization, improvement in NICOLÁS  - 3/12: after meeting with daughter/ patient they are in agreement with SNF for short term rehab  - [facility] aware, referrals sent 3/13, pending pre-cert    Code Status: Full Code   NOK: Paola Lowry, daughter: [phone]     All labs, vital signs, tests & imaging results, and medications were reviewed.     Patient seen and discussed with Dr. Moon Martin, APRN-CNP

## 2025-03-14 NOTE — CONSULTS
HISTORY OF PRESENT ILLNESS:  Kelly Martinez is a 80 y.o. 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 Psychiatry was consulted on 3/14 for medication optimization for somnolence and recurrent falls.    On chart review, patient presented after fall at home. Exam revealed L scalp hematoma. /112; CTH and c-spine negative for fractures, CXR revealed vascular congestion. Per primary team documentation, patient has been somnolent throughout admission despite adequate sleep overnight.    Patient was recently admitted in 9/2024 for cerebellar ICH i/s/o cerebellar AVM confirmed on DSA. Followed up with NSGY in 1/2025: high perioperative risk for AVM resection; she remains off AC given her untreated cerebellar AVM.     Last seen by Psychiatry at [facility] in 1/2024, at which time a history of MDD was documented. Patient reported doing well and taking medications consistently; denied medication side effects. On exam, no evidence of depressive, manic or anxious symptoms. At that time, patient had been living alone since  passed away in 2022, but had family nearby. Patient was continued on duloxetine 30mg daily, bupropion ER 150mg daily, quetiapine 300mg at bedtime, and trazodone 150mg at bedtime PRN insomnia. Family called [facility] urgent care in 2/2025 for medication refills. Reportedly, patient had been off duloxetine, quetiapine and Wellbutrin for about 1 month. Patient endorsed auditory hallucinations and decline in mood while off medications. Patient has an upcoming Psychiatry appointment at [facility] (Dr. Frank) on 4/3/2025. Patient has schizophrenia listed as a problem in her chart but there are no Psychiatry evaluations available for review which corroborate this diagnosis.    Per  Neurology evaluation, patient reported worsening gait and recurrent falls since 2015. She also reported occasional dizziness. Exam revealed mild R-sided rigidity and symmetric mild bradykinesia. MRI brain in 2019 showed old microvascular lesions on the capsular regions (L>R), c/w vascular PD. Last seen by Movement Disorders at  in 8/2023: on C/L 1 TAB TID, started gabapentin 200mg TID for neuropathic pain.    On interview, patient reported that she follows with Psychiatry for recurrent falls, then declined further interview. Spoke to daughter over phone: she reported that patient is usually sleepy in AM but awake for the rest of the day. Denied any concerns for dizziness or lightheadedness at home, but patient would occasionally endorse headaches. Daughter also reported that patient has become more confused over the past few years, and when she was off psychiatric medication briefly, patient appeared marginally more lucid. Denied any history of psychiatric hospitalization, legal issues or other concerns for self-harm/harm to others. Daughter was unable to provide further details as patient's  was primary caregiver and daughter had only assumed the role since his passing. Reportedly, patient walks with walker at home.    PSYCHIATRIC REVIEW OF SYSTEMS  Depression:  unable to assess  Anxiety:  unable to assess  Kate:  unable to assess  Psychosis:  unable to assess  Delirium: confusion, disorientation, fluctuating or reduced consciousness, and lethargy and fatigue   Trauma:  unable to assess    PSYCHIATRIC HISTORY  Prior diagnoses: MDD  Prior hospitalizations: denied  History of suicide attempts: denied  History of self-harm: denied  History of trauma/abuse/loss: denied  History of violence: denied    Current psychiatrist: Dr. Frank  Current mental health agency: [facility]  Current : unknown  Current outpatient treatment: unknown  Guardian or payee: unknown    Current psychiatric medications:  duloxetine, bupropion, quetiapine, trazodone  Past psychiatric medications: unknown  Past psychiatric treatments: unknown    Family psychiatric history: denied    SUBSTANCE USE HISTORY   She reports that she has been smoking cigarettes. She has been exposed to tobacco smoke. She has never used smokeless tobacco. She reports that she does not currently use alcohol. She reports that she does not use drugs.    SOCIAL HISTORY  Social History     Socioeconomic History    Marital status:    Tobacco Use    Smoking status: Every Day     Current packs/day: 0.50     Types: Cigarettes     Passive exposure: Current    Smokeless tobacco: Never   Substance and Sexual Activity    Alcohol use: Not Currently    Drug use: Never     Social Drivers of Health     Financial Resource Strain: Low Risk  (3/11/2025)    Overall Financial Resource Strain (CARDIA)     Difficulty of Paying Living Expenses: Not very hard   Food Insecurity: No Food Insecurity (3/10/2025)    Hunger Vital Sign     Worried About Running Out of Food in the Last Year: Never true     Ran Out of Food in the Last Year: Never true   Transportation Needs: No Transportation Needs (3/11/2025)    PRAPARE - Transportation     Lack of Transportation (Medical): No     Lack of Transportation (Non-Medical): No   Physical Activity: Inactive (9/23/2024)    Exercise Vital Sign     Days of Exercise per Week: 0 days     Minutes of Exercise per Session: 0 min   Stress: Stress Concern Present (1/16/2024)    Received from [facility]    [employer] [place] [employer] - Occupational Stress Questionnaire     Feeling of Stress : Very much   Social Connections: Unknown (9/23/2024)    Social Connection and Isolation Panel [NHANES]     Frequency of Communication with Friends and Family: Three times a week     Frequency of Social Gatherings with Friends and Family: Three times a week     Active Member of Clubs or Organizations: No     Attends Club or Organization  Meetings: Never     Marital Status:    Intimate Partner Violence: Not At Risk (3/10/2025)    Humiliation, Afraid, Rape, and Kick questionnaire     Fear of Current or Ex-Partner: No     Emotionally Abused: No     Physically Abused: No     Sexually Abused: No   Housing Stability: Low Risk  (3/11/2025)    Housing Stability Vital Sign     Unable to Pay for Housing in the Last Year: No     Number of Times Moved in the Last Year: 1     Homeless in the Last Year: No      Current living situation: lives at home alone  Marital status: spouse passed away in 2022   Social support: daughter  Legal history: denied    PAST MEDICAL HISTORY  Past Medical History:   Diagnosis Date    Other pulmonary embolism without acute cor pulmonale 07/21/2013    Pulmonary embolism    Personal history of other mental and behavioral disorders     History of depression    Personal history of other venous thrombosis and embolism     History of deep venous thrombosis     PAST SURGICAL HISTORY  Past Surgical History:   Procedure Laterality Date    KNEE SURGERY  04/06/2016    Knee Surgery    NECK SURGERY  04/06/2016    Neck Surgery     FAMILY HISTORY  No family history on file.     ALLERGIES  Meclizine, Naproxen, and Piperacillin-tazobactam-dextrs    Some components of the patient's history were obtained through personal review of the patient's available medical records.    OARRS REVIEW  OARRS checked: 3/14/2025  OARRS comments: no recent prescriptions    OBJECTIVE    VITALS      3/13/2025     5:00 PM 3/13/2025     5:17 PM 3/13/2025     6:00 PM 3/13/2025     7:55 PM 3/13/2025    11:25 PM 3/14/2025     3:47 AM 3/14/2025     8:11 AM   Vitals   Systolic 74 94  116 112 112 120   Diastolic 47 57  66 65 69 68   BP Location Right arm Right arm  Right arm Left arm Right arm Right arm   Heart Rate  71 75 73 76 71 72   Temp    36.4 °C (97.5 °F) 36.6 °C (97.9 °F) 36.4 °C (97.5 °F) 36.5 °C (97.7 °F)   Resp  18 18 18 18 17 12   Weight (lb)      217.81    BMI  "     35.16 kg/m2    BSA (m2)      2.14 m2       MENTAL STATUS EXAM  Appearance: drowsy, obese, resting comfortably  Attitude: mild irritation at being woken up, limited cooperation and engagement  Behavior: Appropriate eye contact. No aggressive or agitated behavior.  Motor Activity: No psychomotor agitation or retardation. No abnormal movements, tremors or tics. No evidence of extrapyramidal symptoms or tardive dyskinesia on limited exam.  Speech: Regular rate, volume, tone, and quantity. Spontaneous, clear, coherent. No pressured speech.  Mood: \"ok\"  Affect: Mood congruent. Does not appear elated, irritable or tearful. Full-range. Non-labile.  Thought Process: Linear, logical, and goal-directed. No loose associations or gross thought disorganization.  Thought Content: Does not endorse suicidal or homicidal ideation. No delusions elicited.  Thought Perception: Does not endorse auditory or visual hallucinations and does not appear to be responding to any hallucinatory stimuli.  Cognition: drowsy and not oriented to place (\"[facility]\") or time (\"2017\"). Able to answer questions mostly appropriately throughout interview.   Insight: poor  Judgement: poor    PHYSICAL EXAM (limited by cooperation)  Neurological Exam:  MENTAL STATUS:  General appearance: drowsy, NAD  Orientation: person only  Follows simple commands across midline    CRANIAL NERVES:  - VII: L FD  - VIII: Intact to voice  - XII: Tongue midline without atrophy or fasciculation    MOTOR: Tone and bulk normal in all extremities; moves all extremities spontaneously AG. No appreciable tremor or rigidity on limited exam.    COORDINATION: Intact on finger to nose bl    HOME MEDICATIONS  Medication Documentation Review Audit       Reviewed by Abhay Moses, PharmD (Pharmacist) on 03/11/25 at 1244      Medication Order Taking? Sig Documenting Provider Last Dose Status   acetaminophen (Tylenol) 325 mg tablet [id number]  Take 2 tablets (650 mg) by mouth every 6 hours " if needed for mild pain (1 - 3). Stephania Gardner PA-C  Active   Patient not taking:  Discontinued 03/11/25 1240   aspirin 81 mg chewable tablet [id number]  Chew 1 tablet (81 mg) once daily. DO NOT RESUME UNTIL DISCUSSED AT NEUROSURGERY FOLLOW UP Stephania Gardner PA-C  Active   atorvastatin (Lipitor) 40 mg tablet [id number]  Take 1 tablet (40 mg) by mouth once daily. Historical Provider, MD  Active     Discontinued 03/11/25 1244   buPROPion XL (Wellbutrin XL) 150 mg 24 hr tablet [id number]  Take 1 tablet (150 mg) by mouth once daily. Do not crush, chew, or split. Historical Provider, MD  Active   carbidopa-levodopa (Sinemet)  mg tablet [id number] Yes Take 1 tablet by mouth 3 times a day. Historical Provider, MD  Active   carvedilol (Coreg) 12.5 mg tablet [id number]  Take 2 tablets (25 mg) by mouth 2 times daily (morning and late afternoon). Historical Provider, MD  Active   DULoxetine (Cymbalta) 30 mg DR capsule [id number]  Take 1 capsule (30 mg) by mouth once daily. Do not crush or chew.  Continue to hold until improvement in sCre Stephania Gardner PA-C  Active   folic acid (Folvite) 1 mg tablet [id number]  Take 1 tablet (1 mg) by mouth once daily. Historical Provider, MD  Active   Patient not taking:  Discontinued 03/11/25 1242   isosorbide mononitrate ER (Imdur) 30 mg 24 hr tablet [id number]  Take 1 tablet (30 mg) by mouth once daily. Historical Provider, MD  Active   lisinopril 40 mg tablet [id number]  Take 1 tablet (40 mg) by mouth once daily. Historical Provider, MD  Active   oxygen (O2) gas therapy [id number]  Inhale 1 each every 12 hours. Wean as tolerated Stephania Gardner PA-C  Active   Patient not taking:  Discontinued 03/11/25 1243   QUEtiapine (SEROquel) 300 mg tablet [id number]  Take 1 tablet (300 mg) by mouth once daily at bedtime. Historical Provider, MD  Active   Patient not taking:  Discontinued 03/11/25 1243   Patient not taking:  Discontinued 03/11/25 1243   terazosin (Hytrin) 2 mg capsule  [id number]  Take 1 capsule (2 mg) by mouth once daily. Historical Provider, MD  Active   traZODone (Desyrel) 150 mg tablet [id number]  Take 1 tablet (150 mg) by mouth as needed at bedtime for sleep. Xavier Schultz MD  Active                   CURRENT MEDICATIONS  Scheduled medications  amLODIPine, 10 mg, oral, Daily  aspirin, 81 mg, oral, Daily  atorvastatin, 40 mg, oral, Nightly  buPROPion XL, 150 mg, oral, Daily  carbidopa-levodopa, 1 tablet, oral, TID  [Held by provider] carvedilol, 25 mg, oral, BID  DULoxetine, 30 mg, oral, Daily  folic acid, 1 mg, oral, Daily  isosorbide mononitrate ER, 60 mg, oral, Daily  lidocaine, 5 mL, infiltration, Once  [Held by provider] lisinopril, 40 mg, oral, Daily  nicotine, 1 patch, transdermal, Daily  perflutren lipid microspheres, 0.5-10 mL of dilution, intravenous, Once in imaging  [Held by provider] potassium chloride CR, 20 mEq, oral, BID  QUEtiapine, 300 mg, oral, Nightly  sennosides, 2 tablet, oral, BID  terazosin, 2 mg, oral, Daily    Continuous medications     PRN medications  PRN medications: acetaminophen     LABS  Results for orders placed or performed during the hospital encounter of 03/10/25 (from the past 24 hours)   Renal function panel   Result Value Ref Range    Glucose 109 (H) 74 - 99 mg/dL    Sodium 142 136 - 145 mmol/L    Potassium 4.6 3.5 - 5.3 mmol/L    Chloride 109 (H) 98 - 107 mmol/L    Bicarbonate 23 21 - 32 mmol/L    Anion Gap 15 10 - 20 mmol/L    Urea Nitrogen 41 (H) 6 - 23 mg/dL    Creatinine 4.17 (H) 0.50 - 1.05 mg/dL    eGFR 10 (L) >60 mL/min/1.73m*2    Calcium 9.6 8.6 - 10.6 mg/dL    Phosphorus 3.8 2.5 - 4.9 mg/dL    Albumin 3.8 3.4 - 5.0 g/dL   Magnesium   Result Value Ref Range    Magnesium 2.22 1.60 - 2.40 mg/dL   CBC   Result Value Ref Range    WBC 5.0 4.4 - 11.3 x10*3/uL    nRBC 0.0 0.0 - 0.0 /100 WBCs    RBC 3.41 (L) 4.00 - 5.20 x10*6/uL    Hemoglobin 10.1 (L) 12.0 - 16.0 g/dL    Hematocrit 32.4 (L) 36.0 - 46.0 %    MCV 95 80 - 100 fL    MCH  "29.6 26.0 - 34.0 pg    MCHC 31.2 (L) 32.0 - 36.0 g/dL    RDW 15.4 (H) 11.5 - 14.5 %    Platelets 181 150 - 450 x10*3/uL      IMAGING  No results found.     PSYCHIATRIC RISK ASSESSMENT  Violence Risk Factors:  none  Acute Risk of Harm to Others is Considered: Low  Suicide Risk Factors: none  Protective Factors: positive family relationships  Acute Risk of Harm to Self is Considered: Low    ASSESSMENT AND PLAN  Kelly Martinez is a 80 y.o. 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 on 3/10 for HTN urgency and suspected ADHF. Psychiatry was consulted on 3/14 for medication optimization for somnolence and recurrent falls. On initial assessment, patient was drowsy and disoriented. History also suggestive of potential baseline cognitive impairment.    IMPRESSION  Hypoactive delirium i/s/o possible baseline cognitive impairment  MDD    RECOMMENDATIONS  Safety:  - Patient does not currently meet criteria for inpatient psychiatric admission.  - To evaluate decision-making capacity, recommend use of the Capacity Evaluation Tool. Search “[facility] Capacity Evaluation\" under SmartText unless the patient has a legal guardian, in which case all decisions per the legal guardian.  - Defer to primary team decision for 1:1 sitter.  - As with all hospitalized patients, would recommend delirium precautions, as below.    Medications:  - DISCONTINUE trazodone PO 150mg at bedtime PRN insomnia  - DECREASE Seroquel PO from 300mg to 150mg at bedtime   - CONTINUE duloxetine PO 30mg daily (consider adjusting for renal function)  - CONTINUE bupropion ER 150mg daily     Work-up:  - EKG (3/10): QTc of 455    Ancillary Services:  - defer , pet/music/art therapy consult to primary team discretion    Follow-up:  - Psychiatry appointment at [facility] (Dr. Frank) " on 4/3/2025    ==========  - Discussed recommendations with primary team.  - Psychiatry will continue to follow.    Thank you for allowing us to participate in the care of this patient. Please page [phone] with any questions or concerns.    Patient seen and staffed with Dr. Calix, who agrees with above plan.    Ann Marie Tiwari MD    Medication Consent  Medication Consent: n/a; consult service

## 2025-03-14 NOTE — CARE PLAN
The patient's goals for the shift include To get BP's under controla nd be able to go home    The clinical goals for the shift include Pt will remai safe and free from falls this shift.      Problem: Fall/Injury  Goal: Not fall by end of shift  Outcome: Progressing     Problem: Fall/Injury  Goal: Be free from injury by end of the shift  Outcome: Progressing     Patient's blood pressure remains WNL after fluid bolus this shift. Pt safe and free from falls/injury.

## 2025-03-15 LAB
ALBUMIN SERPL BCP-MCNC: 3.4 G/DL (ref 3.4–5)
ANION GAP SERPL CALC-SCNC: 13 MMOL/L (ref 10–20)
APPEARANCE UR: CLEAR
BILIRUB UR STRIP.AUTO-MCNC: NEGATIVE MG/DL
BUN SERPL-MCNC: 44 MG/DL (ref 6–23)
CALCIUM SERPL-MCNC: 9.3 MG/DL (ref 8.6–10.6)
CHLORIDE SERPL-SCNC: 111 MMOL/L (ref 98–107)
CHLORIDE UR-SCNC: 28 MMOL/L
CHLORIDE/CREATININE (MMOL/G) IN URINE: 27 MMOL/G CREAT (ref 38–318)
CO2 SERPL-SCNC: 19 MMOL/L (ref 21–32)
COLOR UR: ABNORMAL
CREAT SERPL-MCNC: 3.53 MG/DL (ref 0.5–1.05)
CREAT UR-MCNC: 104.5 MG/DL (ref 20–320)
CREAT UR-MCNC: 105.4 MG/DL (ref 20–320)
EGFRCR SERPLBLD CKD-EPI 2021: 13 ML/MIN/1.73M*2
ERYTHROCYTE [DISTWIDTH] IN BLOOD BY AUTOMATED COUNT: 14.8 % (ref 11.5–14.5)
GLUCOSE SERPL-MCNC: 104 MG/DL (ref 74–99)
GLUCOSE UR STRIP.AUTO-MCNC: NORMAL MG/DL
HCT VFR BLD AUTO: 28.6 % (ref 36–46)
HGB BLD-MCNC: 9.8 G/DL (ref 12–16)
HOLD SPECIMEN: NORMAL
KETONES UR STRIP.AUTO-MCNC: NEGATIVE MG/DL
LEUKOCYTE ESTERASE UR QL STRIP.AUTO: NEGATIVE
MAGNESIUM SERPL-MCNC: 2.18 MG/DL (ref 1.6–2.4)
MCH RBC QN AUTO: 29.1 PG (ref 26–34)
MCHC RBC AUTO-ENTMCNC: 34.3 G/DL (ref 32–36)
MCV RBC AUTO: 85 FL (ref 80–100)
NITRITE UR QL STRIP.AUTO: NEGATIVE
NRBC BLD-RTO: 0 /100 WBCS (ref 0–0)
OSMOLALITY UR: 318 MOSM/KG (ref 200–1200)
PH UR STRIP.AUTO: 6 [PH]
PHOSPHATE SERPL-MCNC: 3.6 MG/DL (ref 2.5–4.9)
PLATELET # BLD AUTO: 159 X10*3/UL (ref 150–450)
POTASSIUM SERPL-SCNC: 5.3 MMOL/L (ref 3.5–5.3)
POTASSIUM UR-SCNC: 30 MMOL/L
POTASSIUM/CREAT UR-RTO: 28 MMOL/G CREAT
PROT UR STRIP.AUTO-MCNC: ABNORMAL MG/DL
RBC # BLD AUTO: 3.37 X10*6/UL (ref 4–5.2)
RBC # UR STRIP.AUTO: NEGATIVE MG/DL
RBC #/AREA URNS AUTO: NORMAL /HPF
SODIUM SERPL-SCNC: 138 MMOL/L (ref 136–145)
SODIUM UR-SCNC: 37 MMOL/L
SODIUM/CREAT UR-RTO: 35 MMOL/G CREAT
SP GR UR STRIP.AUTO: 1.01
SQUAMOUS #/AREA URNS AUTO: NORMAL /HPF
UREA/CREAT UR-SRTO: 4.3 G/G CREAT
UROBILINOGEN UR STRIP.AUTO-MCNC: NORMAL MG/DL
UUN UR-MCNC: 452 MG/DL
WBC # BLD AUTO: 4.8 X10*3/UL (ref 4.4–11.3)
WBC #/AREA URNS AUTO: NORMAL /HPF

## 2025-03-15 PROCEDURE — 1200000002 HC GENERAL ROOM WITH TELEMETRY DAILY

## 2025-03-15 PROCEDURE — 99232 SBSQ HOSP IP/OBS MODERATE 35: CPT | Performed by: STUDENT IN AN ORGANIZED HEALTH CARE EDUCATION/TRAINING PROGRAM

## 2025-03-15 PROCEDURE — 2500000002 HC RX 250 W HCPCS SELF ADMINISTERED DRUGS (ALT 637 FOR MEDICARE OP, ALT 636 FOR OP/ED)

## 2025-03-15 PROCEDURE — 83935 ASSAY OF URINE OSMOLALITY: CPT

## 2025-03-15 PROCEDURE — S4991 NICOTINE PATCH NONLEGEND: HCPCS | Performed by: NURSE PRACTITIONER

## 2025-03-15 PROCEDURE — 81001 URINALYSIS AUTO W/SCOPE: CPT

## 2025-03-15 PROCEDURE — 82570 ASSAY OF URINE CREATININE: CPT | Performed by: NURSE PRACTITIONER

## 2025-03-15 PROCEDURE — 2500000001 HC RX 250 WO HCPCS SELF ADMINISTERED DRUGS (ALT 637 FOR MEDICARE OP): Performed by: NURSE PRACTITIONER

## 2025-03-15 PROCEDURE — 85027 COMPLETE CBC AUTOMATED: CPT | Performed by: NURSE PRACTITIONER

## 2025-03-15 PROCEDURE — 2500000002 HC RX 250 W HCPCS SELF ADMINISTERED DRUGS (ALT 637 FOR MEDICARE OP, ALT 636 FOR OP/ED): Performed by: NURSE PRACTITIONER

## 2025-03-15 PROCEDURE — 80069 RENAL FUNCTION PANEL: CPT | Performed by: NURSE PRACTITIONER

## 2025-03-15 PROCEDURE — 83735 ASSAY OF MAGNESIUM: CPT | Performed by: NURSE PRACTITIONER

## 2025-03-15 PROCEDURE — 36415 COLL VENOUS BLD VENIPUNCTURE: CPT | Performed by: NURSE PRACTITIONER

## 2025-03-15 PROCEDURE — 84133 ASSAY OF URINE POTASSIUM: CPT

## 2025-03-15 RX ORDER — CARVEDILOL 6.25 MG/1
6.25 TABLET ORAL 2 TIMES DAILY
Status: DISCONTINUED | OUTPATIENT
Start: 2025-03-15 | End: 2025-03-16

## 2025-03-15 RX ORDER — AMLODIPINE BESYLATE 10 MG/1
10 TABLET ORAL DAILY
Status: DISCONTINUED | OUTPATIENT
Start: 2025-03-16 | End: 2025-03-19 | Stop reason: HOSPADM

## 2025-03-15 RX ORDER — AMLODIPINE BESYLATE 5 MG/1
5 TABLET ORAL DAILY
Status: DISCONTINUED | OUTPATIENT
Start: 2025-03-15 | End: 2025-03-15

## 2025-03-15 RX ORDER — AMLODIPINE BESYLATE 5 MG/1
5 TABLET ORAL ONCE
Status: COMPLETED | OUTPATIENT
Start: 2025-03-15 | End: 2025-03-15

## 2025-03-15 RX ORDER — DULOXETIN HYDROCHLORIDE 30 MG/1
30 CAPSULE, DELAYED RELEASE ORAL DAILY
Status: DISCONTINUED | OUTPATIENT
Start: 2025-03-15 | End: 2025-03-19 | Stop reason: HOSPADM

## 2025-03-15 RX ADMIN — CARVEDILOL 6.25 MG: 6.25 TABLET, FILM COATED ORAL at 20:49

## 2025-03-15 RX ADMIN — QUETIAPINE FUMARATE 150 MG: 25 TABLET ORAL at 20:49

## 2025-03-15 RX ADMIN — ISOSORBIDE MONONITRATE 60 MG: 60 TABLET, EXTENDED RELEASE ORAL at 08:27

## 2025-03-15 RX ADMIN — AMLODIPINE BESYLATE 5 MG: 5 TABLET ORAL at 10:25

## 2025-03-15 RX ADMIN — FOLIC ACID 1 MG: 1 TABLET ORAL at 08:27

## 2025-03-15 RX ADMIN — BUPROPION HYDROCHLORIDE 150 MG: 150 TABLET, EXTENDED RELEASE ORAL at 08:36

## 2025-03-15 RX ADMIN — DULOXETINE HYDROCHLORIDE 30 MG: 30 CAPSULE, DELAYED RELEASE ORAL at 13:23

## 2025-03-15 RX ADMIN — ASPIRIN 81 MG CHEWABLE TABLET 81 MG: 81 TABLET CHEWABLE at 08:27

## 2025-03-15 RX ADMIN — TERAZOSIN HYDROCHLORIDE 2 MG: 2 CAPSULE ORAL at 08:27

## 2025-03-15 RX ADMIN — CARBIDOPA AND LEVODOPA 1 TABLET: 25; 100 TABLET ORAL at 15:09

## 2025-03-15 RX ADMIN — CARBIDOPA AND LEVODOPA 1 TABLET: 25; 100 TABLET ORAL at 08:27

## 2025-03-15 RX ADMIN — NICOTINE 1 PATCH: 14 PATCH, EXTENDED RELEASE TRANSDERMAL at 08:36

## 2025-03-15 RX ADMIN — CARBIDOPA AND LEVODOPA 1 TABLET: 25; 100 TABLET ORAL at 20:49

## 2025-03-15 RX ADMIN — AMLODIPINE BESYLATE 5 MG: 5 TABLET ORAL at 08:27

## 2025-03-15 RX ADMIN — ATORVASTATIN CALCIUM 40 MG: 40 TABLET, FILM COATED ORAL at 20:49

## 2025-03-15 ASSESSMENT — PAIN SCALES - GENERAL
PAINLEVEL_OUTOF10: 0 - NO PAIN

## 2025-03-15 ASSESSMENT — COGNITIVE AND FUNCTIONAL STATUS - GENERAL
DRESSING REGULAR LOWER BODY CLOTHING: A LOT
CLIMB 3 TO 5 STEPS WITH RAILING: A LOT
MOVING TO AND FROM BED TO CHAIR: A LITTLE
DRESSING REGULAR UPPER BODY CLOTHING: A LITTLE
STANDING UP FROM CHAIR USING ARMS: A LITTLE
EATING MEALS: A LITTLE
DAILY ACTIVITIY SCORE: 14
MOBILITY SCORE: 17
TURNING FROM BACK TO SIDE WHILE IN FLAT BAD: A LITTLE
DRESSING REGULAR UPPER BODY CLOTHING: A LITTLE
HELP NEEDED FOR BATHING: A LOT
TOILETING: A LOT
EATING MEALS: A LITTLE
TOILETING: A LOT
TURNING FROM BACK TO SIDE WHILE IN FLAT BAD: A LITTLE
WALKING IN HOSPITAL ROOM: A LOT
DAILY ACTIVITIY SCORE: 14
PERSONAL GROOMING: A LOT
CLIMB 3 TO 5 STEPS WITH RAILING: A LOT
TURNING FROM BACK TO SIDE WHILE IN FLAT BAD: A LITTLE
DRESSING REGULAR LOWER BODY CLOTHING: A LOT
DAILY ACTIVITIY SCORE: 14
HELP NEEDED FOR BATHING: A LOT
TOILETING: A LOT
PERSONAL GROOMING: A LOT
MOBILITY SCORE: 17
WALKING IN HOSPITAL ROOM: A LOT
CLIMB 3 TO 5 STEPS WITH RAILING: A LOT
HELP NEEDED FOR BATHING: A LOT
MOVING TO AND FROM BED TO CHAIR: A LITTLE
WALKING IN HOSPITAL ROOM: A LOT
MOBILITY SCORE: 17
STANDING UP FROM CHAIR USING ARMS: A LITTLE
MOVING TO AND FROM BED TO CHAIR: A LITTLE
STANDING UP FROM CHAIR USING ARMS: A LITTLE
DRESSING REGULAR LOWER BODY CLOTHING: A LOT
PERSONAL GROOMING: A LOT
EATING MEALS: A LITTLE
DRESSING REGULAR UPPER BODY CLOTHING: A LITTLE

## 2025-03-15 ASSESSMENT — PAIN - FUNCTIONAL ASSESSMENT
PAIN_FUNCTIONAL_ASSESSMENT: 0-10

## 2025-03-15 NOTE — CARE PLAN
The patient's goals for the shift include To get BP's under control and be able to go home    The clinical goals for the shift include patient will remain free from falls this shift        Problem: Skin  Goal: Decreased wound size/increased tissue granulation at next dressing change  3/15/2025 0641 by Bambi Wagoner RN  Outcome: Progressing  Flowsheets (Taken 3/14/2025 1858 by Onelia Thompson RN)  Decreased wound size/increased tissue granulation at next dressing change:   Promote sleep for wound healing   Protective dressings over bony prominences  3/15/2025 0638 by Bambi Wagoner RN  Outcome: Progressing  Goal: Participates in plan/prevention/treatment measures  3/15/2025 0641 by Bambi Wagoner RN  Outcome: Progressing  Flowsheets (Taken 3/14/2025 1858 by Onelia Thompson RN)  Participates in plan/prevention/treatment measures:   Elevate heels   Increase activity/out of bed for meals  3/15/2025 0638 by Bambi Wagoner RN  Outcome: Progressing  Goal: Prevent/manage excess moisture  3/15/2025 0641 by Bambi Wagoner RN  Outcome: Progressing  Flowsheets (Taken 3/14/2025 1858 by Onelia Thompson RN)  Prevent/manage excess moisture:   Follow provider orders for dressing changes   Moisturize dry skin   Monitor for/manage infection if present  3/15/2025 0638 by Bambi Wagoner RN  Outcome: Progressing  Goal: Prevent/minimize sheer/friction injuries  3/15/2025 0641 by Bambi Wagoner RN  Outcome: Progressing  Flowsheets (Taken 3/14/2025 1858 by Onelia Thmopson RN)  Prevent/minimize sheer/friction injuries:   HOB 30 degrees or less   Increase activity/out of bed for meals   Turn/reposition every 2 hours/use positioning/transfer devices  3/15/2025 0638 by Bambi Wagoner RN  Outcome: Progressing  Goal: Promote/optimize nutrition  3/15/2025 0641 by Bambi Wagoner RN  Outcome: Progressing  Flowsheets (Taken 3/14/2025 1858 by Onelia Thompson RN)  Promote/optimize nutrition:   Consume > 50% meals/supplements    Monitor/record intake including meals   Offer water/supplements/favorite foods  3/15/2025 0638 by Bambi Wagoner RN  Outcome: Progressing  Goal: Promote skin healing  3/15/2025 0641 by Bambi Wagoner RN  Outcome: Progressing  Flowsheets (Taken 3/13/2025 2037 by Onelia Thompson RN)  Promote skin healing:   Ensure correct size (line/device) and apply per  instructions   Protective dressings over bony prominences   Rotate device position/do not position patient on device   Turn/reposition every 2 hours/use positioning/transfer devices   Assess skin/pad under line(s)/device(s)  3/15/2025 0638 by Bambi Wagoner RN  Outcome: Progressing

## 2025-03-15 NOTE — PROGRESS NOTES
Subjective Data:  SBPs slightly increasing today to 150s. OK to cont. Duloxetine w/borderline CrCl. Standing weight 96kg (down from admit wt of ~98kg). Denies CP/SOB at rest. More awake today. UOP 1L thus far today.    - cont. Amlodipine 10mg, resume coreg at 6.25mg BID tonight  - FeUREA suggesting intrinsic damage, PVR pending  - likely PRN diuretics at discharge, holiday today  - PT/OT to re-eval for pre-cert to SNF, discharge pending dispo planning, better BP and kidney function (likely Monday)    Overnight Events:   No acute events overnight.     Objective Data:  Last Recorded Vitals:  Vitals:    03/14/25 1938 03/15/25 0042 03/15/25 0412 03/15/25 0744   BP: 134/80 128/77 150/90 156/83   BP Location: Left arm  Left arm Left arm   Patient Position: Lying  Lying Lying   Pulse:  80 78 75   Resp:  14 13 14   Temp: 36.2 °C (97.2 °F) 36 °C (96.8 °F) 36.1 °C (97 °F) 35.8 °C (96.4 °F)   TempSrc: Temporal Temporal Temporal Temporal   SpO2: 93% 91% 92%    Weight:       Height:         Last Labs:  Results for orders placed or performed during the hospital encounter of 03/10/25 (from the past 24 hours)   Renal Function Panel   Result Value Ref Range    Glucose 115 (H) 74 - 99 mg/dL    Sodium 139 136 - 145 mmol/L    Potassium 4.6 3.5 - 5.3 mmol/L    Chloride 112 (H) 98 - 107 mmol/L    Bicarbonate 19 (L) 21 - 32 mmol/L    Anion Gap 13 10 - 20 mmol/L    Urea Nitrogen 41 (H) 6 - 23 mg/dL    Creatinine 3.86 (H) 0.50 - 1.05 mg/dL    eGFR 11 (L) >60 mL/min/1.73m*2    Calcium 9.3 8.6 - 10.6 mg/dL    Phosphorus 3.4 2.5 - 4.9 mg/dL    Albumin 3.4 3.4 - 5.0 g/dL   CBC   Result Value Ref Range    WBC 4.6 4.4 - 11.3 x10*3/uL    nRBC 0.0 0.0 - 0.0 /100 WBCs    RBC 3.40 (L) 4.00 - 5.20 x10*6/uL    Hemoglobin 9.8 (L) 12.0 - 16.0 g/dL    Hematocrit 33.4 (L) 36.0 - 46.0 %    MCV 98 80 - 100 fL    MCH 28.8 26.0 - 34.0 pg    MCHC 29.3 (L) 32.0 - 36.0 g/dL    RDW 15.5 (H) 11.5 - 14.5 %    Platelets 159 150 - 450 x10*3/uL   Renal function panel    Result Value Ref Range    Glucose 105 (H) 74 - 99 mg/dL    Sodium 139 136 - 145 mmol/L    Potassium 4.5 3.5 - 5.3 mmol/L    Chloride 111 (H) 98 - 107 mmol/L    Bicarbonate 21 21 - 32 mmol/L    Anion Gap 12 10 - 20 mmol/L    Urea Nitrogen 44 (H) 6 - 23 mg/dL    Creatinine 3.94 (H) 0.50 - 1.05 mg/dL    eGFR 11 (L) >60 mL/min/1.73m*2    Calcium 9.8 8.6 - 10.6 mg/dL    Phosphorus 3.8 2.5 - 4.9 mg/dL    Albumin 3.6 3.4 - 5.0 g/dL   Magnesium   Result Value Ref Range    Magnesium 2.19 1.60 - 2.40 mg/dL   Urine electrolytes   Result Value Ref Range    Sodium, Urine Random 37 mmol/L    Sodium/Creatinine Ratio 35 Not established. mmol/g Creat    Potassium, Urine Random 30 mmol/L    Potassium/Creatinine Ratio 28 Not established mmol/g Creat    Chloride, Urine Random 28 mmol/L    Chloride/Creatinine Ratio 27 (L) 38 - 318 mmol/g creat    Creatinine, Urine Random 105.4 20.0 - 320.0 mg/dL   Osmolality, urine   Result Value Ref Range    Osmolality, Urine Random 318 200 - 1,200 mOsm/kg   Urinalysis with Reflex Culture and Microscopic   Result Value Ref Range    Color, Urine Light-Yellow Light-Yellow, Yellow, Dark-Yellow    Appearance, Urine Clear Clear    Specific Gravity, Urine 1.012 1.005 - 1.035    pH, Urine 6.0 5.0, 5.5, 6.0, 6.5, 7.0, 7.5, 8.0    Protein, Urine 30 (1+) (A) NEGATIVE, 10 (TRACE), 20 (TRACE) mg/dL    Glucose, Urine Normal Normal mg/dL    Blood, Urine NEGATIVE NEGATIVE mg/dL    Ketones, Urine NEGATIVE NEGATIVE mg/dL    Bilirubin, Urine NEGATIVE NEGATIVE mg/dL    Urobilinogen, Urine Normal Normal mg/dL    Nitrite, Urine NEGATIVE NEGATIVE    Leukocyte Esterase, Urine NEGATIVE NEGATIVE   Urinalysis Microscopic   Result Value Ref Range    WBC, Urine NONE 1-5, NONE /HPF    RBC, Urine NONE NONE, 1-2, 3-5 /HPF    Squamous Epithelial Cells, Urine 1-9 (SPARSE) Reference range not established. /HPF        TROPHS   Date/Time Value Ref Range Status   03/10/2025 12:27 PM 28 0 - 34 ng/L Final   03/10/2025 11:03 AM 38 0 - 34  ng/L Final   09/21/2024 04:29 AM 35 0 - 34 ng/L Final   03/30/2024 03:19 PM 19 0 - 34 ng/L Final   03/30/2024 02:09 PM 18 0 - 34 ng/L Final   12/28/2023 02:33 AM 19 0 - 34 ng/L Final     BNP   Date/Time Value Ref Range Status   03/10/2025 11:03 AM 36 0 - 99 pg/mL Final   09/21/2024 04:29  0 - 99 pg/mL Final     HGBA1C   Date/Time Value Ref Range Status   03/10/2025 10:49 PM 5.7 See comment % Final   09/21/2024 04:29 AM 5.9 See comment % Final     LDLCALC   Date/Time Value Ref Range Status   03/10/2025 11:03 AM 62 <=99 mg/dL Final     Comment:                                 Near   Borderline      AGE      Desirable  Optimal    High     High     Very High     0-19 Y     0 - 109     ---    110-129   >/= 130     ----    20-24 Y     0 - 119     ---    120-159   >/= 160     ----      >24 Y     0 -  99   100-129  130-159   160-189     >/=190       VLDL   Date/Time Value Ref Range Status   03/10/2025 11:03 AM 20 0 - 40 mg/dL Final   12/07/2022 02:20 PM 16 0 - 40 mg/dL Final      Last I/O:  I/O last 3 completed shifts:  In: 620 (6.3 mL/kg) [P.O.:120; IV Piggyback:500]  Out: 700 (7.1 mL/kg) [Urine:700 (0.2 mL/kg/hr)]  Weight: 98.8 kg     Past Cardiology Tests (Last 3 Years):  EKG:  ECG 12 lead 03/10/2025  Sinus rhythm with 1st degree AV block  Right bundle branch block  Abnormal ECG  When compared with ECG of 04-OCT-2024 05:28,  Sinus rhythm has replaced Ectopic atrial rhythm  QRS axis Shifted right  T wave inversion no longer evident in Lateral leads  See ED provider note for full interpretation and clinical correlation  Confirmed by Renita Chen ([id number]) on 3/11/2025 4:30:43 AM    Echo:  Transthoracic Echo (TTE) Complete 09/21/2024:  1. Left ventricular ejection fraction is normal, calculated by Regan's biplane at 74%.  2. Spectral Doppler shows an impaired relaxation pattern of left ventricular diastolic filling.  3. There is mild concentric left ventricular hypertrophy.  4. There is normal right ventricular global  systolic function.  5. The left atrium is mildly dilated.  6. Mildly elevated right ventricular systolic pressure.  7. There is no evidence of a patent foramen ovale.  8. Compared with study dated 12/29/2023, no significant change.     Transthoracic Echo (TTE) Limited 12/29/2023:  1. Left ventricular systolic function is normal with a 55-60% estimated ejection fraction.  2. Poorly visualized anatomical structures due to suboptimal image quality.  3. Spectral Doppler shows an impaired relaxation pattern of left ventricular diastolic filling.     Ejection Fractions:  EF   Date/Time Value Ref Range Status   03/11/2025 11:00 AM 68 %    09/21/2024 11:50 AM 74 %    12/29/2023 10:48 AM 54       Cath:  No results found for this or any previous visit from the past 1095 days.  Stress Test:  No results found for this or any previous visit from the past 1095 days.  Cardiac Imaging:  No results found for this or any previous visit from the past 1095 days.    Inpatient Medications:  Scheduled medications   Medication Dose Route Frequency    [START ON 3/16/2025] amLODIPine  10 mg oral Daily    amLODIPine  5 mg oral Once    aspirin  81 mg oral Daily    atorvastatin  40 mg oral Nightly    buPROPion XL  150 mg oral Daily    carbidopa-levodopa  1 tablet oral TID    [Held by provider] carvedilol  6.25 mg oral BID    [Held by provider] DULoxetine  30 mg oral Daily    folic acid  1 mg oral Daily    isosorbide mononitrate ER  60 mg oral Daily    [Held by provider] lisinopril  40 mg oral Daily    nicotine  1 patch transdermal Daily    perflutren lipid microspheres  0.5-10 mL of dilution intravenous Once in imaging    [Held by provider] potassium chloride CR  20 mEq oral BID    QUEtiapine  150 mg oral Nightly    sennosides  2 tablet oral BID    terazosin  2 mg oral Daily     PRN medications   Medication    acetaminophen     Continuous Medications   Medication Dose Last Rate     Physical Exam:  General: NAD, lying in bed  Skin: warm and dry  throughout, L forehead hematoma w/ecchymosis, +tenderness  Head/ neck: +JVD to earlobe  Cardiac: RRR, S1, S2, NSR HR 70's on telemetry  Pulm: diminished anteriorly, on RA  GI: soft, nontender, non-distended, obese  : light yellow draining in external catheter canister   Extremities: trace edema in BLE, L>R w/L-calf tenderness (improving)  Neuro: no focal neuro deficits, a+ox4, hard of hearing, alert today, Bilateral eye orbits swollen  Psych: appropriate mood and behavior, very pleasant        Assessment/Plan   Kelly Martinez is a 80 y.o. female with PMH significant for R cerebellar/verminan AVM/hemorrhage (9/2024 reversed with kcentra), uncontrolled HTN, HLD, HFpEF (TTE LVEF 74% 09/2024), JOY, COPD, recurrent DVT/PE's prev. 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 Typically receives all her care at [facility]. Under [facility]VI Service for further management of HTN urgency and ADHF.     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 On coumadin  - s/p IVC filter removal 4/2016 d/t erosion  - (9/2024) presented w/AMS, CTH +R cerebellar-vermis hemorrhage, reversed with kcentra, no residual effects  - off coumadin since (9/2024) per [facility] (Dr. Renee); OP office visit (3/5/2025):  -- hold OFF on AC for 1 year (9/2024-9/2025) given risk of cerebral bleeding, new start of Parkinson's meds (see below)  - no evidence for PE on CTA chest during last admit at  (9/2024)  - currently satting >90% on RA, reported feeling more SOB than usual  - V/Q scan 3/11 without evidence of perfusion defects/PE  - d-dimer 2675 (no prior level for  comparison)  - BLE duplex US 3/11: negative for DVT, BLE Venous insufficiency noted, tanisha hose/tubigrip ordered  - s/p mechanical fall at home upon admit, +trauma to L-side of head (w/L forehead hematoma)  - imaging on admit (CTH, CT C-spine): without acute changes/fractures  - no new neuro deficits  - lives at home alone, PT/OT consulted, MOCA score (3/13): 11/30, indicating mod-sev. Cognitive impairment--->encourage SNF at discharge, as rec'd by PT/OT  - cont. Home baby ASA and statin  - PRN tylenol for pain     Hypertensive Urgency, resolved  Current hypotension, improving  HLD  - BP on on admit: 208/112  - SBP past 24hrs: low 100-150s  - trazodone discontinued 3/12 due to day somnolence  - (3/13), hypotensive to 74/47, asymptomatic, evening carvedilol held, 500 ml bolus   - cont. Holding lisinopril & adjust coreg as below  - home imdur 60 mg daily discontinued   - cont. home amlodipine 10mg daily  - hgb A1c 5.7%, TSH 0.98  - lipid panel: total cholesterol 127 HDL 45.3 LDL 62 Tgs 101  - cont. Home statin    Acute on chronic diastolic heart failure  Flash Pulmonary edema, resolved  - followed by ALMA ROAS Diana, @[facility]  - TTE OSH (9/2024): LVEF 74%, no WMAs, DD, negative bubble study  - TTE (3/11): EF 65-70%, Grade I DD  - admit BNP: 36 (prev. 170 in 9/2024)  - admit CXR: pulm edema, vasc. congestion  - admit wt: 98.4kg, today's wt: 96kg (standing)  - no home diuretics, s/p IVP Lasix 40mg x1 (3/10, 3/11, and 3/12)  - likely PRN diuretics at discharge, holiday today  - decrease coreg to 6.25mg BID, resume tonight  - HOLD home lisinopril 40mg daily given NICOLÁS on CKD  - Daily standing weights, strict I&O's, 2g sodium diet, 2L fluid restriction     NICOLÁS on CKD stage 4  - Bl Cr worsening over past year ~2-3  - Cr 2.87 upon last discharge  - Cr today: 3.94 (3.86, 4.17, 3.51, 2.95, 2.93, 2.73)  - urine osmolality WNL  - complete renal US unremarkable  -  FeUREA 38.4% suggesting intrinsic damage, diuretic plan as above   --  UOP 1L recorded thus far today, bladder scan PVR today pending to evaluate for urinary retention  - Renally dose meds, avoid nephrotoxins, OK to cont. Duloxetine for now    Parkinson's Disease  Schizophrenia  Depression  - diagnosed ~3-4 years ago  - new start of carvidopa/levodopa 25/200mg TID (on 3/5/2025), cont. In-house  - admit ECG w/Qtc of 455ms  - stopped home trazodone (3/12)   - Psychiatry following, appreciate recs (3/15):  -- consulted for further medication changes given ongoing somnolence and recent falls, appreciate recs  -- cont. Seroquel at reduced dose (150mg nightly, down from home 300mg), cont. Wellbutrin at 150mg daily, duloxetine 30mg daily   - cont. OP f/up w/primary neurologist, behavioral health, & PCP     JOY  COPD  Chronic Tobacco Use  - encourage use of nocturnal CPAP, RT following, refuses  - nicotine patch   - cont. encouraging cessation    DVT ppx: SCDs  NOK: Paola Lowry, daughter: [phone]  DISPO: PT/OT SNF, patient/daughter agree mod-severe cognitive impairment per MOCA score (11/30 on 3/13), resides at home alone, has HHA 2-4 hours daily  - discharge likely to SNF Monday, pending pre-cert, further BP stabilization/medical optimization, improvement in NICOLÁS    All labs, vital signs, tests & imaging results, and medications were reviewed.    Patient seen and discussed with Dr. Mustafa    Code Status: Full Code     Danica Kay, APRN-CNP

## 2025-03-15 NOTE — PROGRESS NOTES
"Kelly Martinez is a 80 y.o. female on day 4 of admission presenting with Acute on chronic congestive heart failure, unspecified heart failure type.    Subjective   Pt reports mood is \"ok\" today. States she is in [facility] because of a \"fall and then they found something.\" Reports that she is doing well overall and that staff are treating her well. Reports eating and sleeping ok. Denies SI/HI/AVH. Denies any Hx of hallucinations in the past, but states \"I sometimes talk with myself.\" States it is March 2025. Reports takes Seroquel \"for sleep\" and states she feels she was overmedicated and prefers to be on as few meds as possible, but would like to keep medications the same for today as \"they are always changing them and I'm sensitive when they do that.\"    Per RN, has been doing ok overall. Arousable for care.      Objective     Last Recorded Vitals  Blood pressure 156/83, pulse 75, temperature 35.8 °C (96.4 °F), temperature source Temporal, resp. rate 14, height 1.676 m (5' 6\"), weight 96 kg (211 lb 10.3 oz), SpO2 92%.    Mental Status Exam  General/Appearance: Awake, alert, NAD, obese, resting comfortably watching TV  Attitude: Calm, engaged in interview  Behavior: Appropriate eye contact. No aggressive or agitated behavior.  Motor Activity: No psychomotor agitation or retardation. No abnormal movements, tremors or tics. No evidence of extrapyramidal symptoms or tardive dyskinesia on limited exam.  Speech: Regular rate, volume, tone, and quantity. Spontaneous, clear, coherent. No pressured speech.  Mood: \"ok\"  Affect: Euthymic, slightly restricted, mood congruent, appropriate to content.  Thought Process: Linear, logical, and goal-directed. Future-oriented  Thought Content: Does not endorse suicidal or homicidal ideation. No delusions elicited.  Thought Perception: Does not endorse auditory or visual hallucinations and does not appear to be responding to any hallucinatory stimuli.  Cognition: Oriented to person, place, " somewhat to time (March 2025), and somewhat to situation (I fell. They found something)  Insight: Fair  Judgement: Fair    Psychiatric Risk Assessment  Acute Risk of Harm to Others is Considered: low   Acute Risk of Harm to Self is Considered: low    Scheduled medications  [START ON 3/16/2025] amLODIPine, 10 mg, oral, Daily  aspirin, 81 mg, oral, Daily  atorvastatin, 40 mg, oral, Nightly  buPROPion XL, 150 mg, oral, Daily  carbidopa-levodopa, 1 tablet, oral, TID  [Held by provider] carvedilol, 6.25 mg, oral, BID  DULoxetine, 30 mg, oral, Daily  folic acid, 1 mg, oral, Daily  isosorbide mononitrate ER, 60 mg, oral, Daily  [Held by provider] lisinopril, 40 mg, oral, Daily  nicotine, 1 patch, transdermal, Daily  perflutren lipid microspheres, 0.5-10 mL of dilution, intravenous, Once in imaging  [Held by provider] potassium chloride CR, 20 mEq, oral, BID  QUEtiapine, 150 mg, oral, Nightly  sennosides, 2 tablet, oral, BID  terazosin, 2 mg, oral, Daily    Continuous medications     PRN medications  PRN medications: acetaminophen    Relevant Results  Results for orders placed or performed during the hospital encounter of 03/10/25 (from the past 24 hours)   CBC   Result Value Ref Range    WBC 4.6 4.4 - 11.3 x10*3/uL    nRBC 0.0 0.0 - 0.0 /100 WBCs    RBC 3.40 (L) 4.00 - 5.20 x10*6/uL    Hemoglobin 9.8 (L) 12.0 - 16.0 g/dL    Hematocrit 33.4 (L) 36.0 - 46.0 %    MCV 98 80 - 100 fL    MCH 28.8 26.0 - 34.0 pg    MCHC 29.3 (L) 32.0 - 36.0 g/dL    RDW 15.5 (H) 11.5 - 14.5 %    Platelets 159 150 - 450 x10*3/uL   Renal function panel   Result Value Ref Range    Glucose 105 (H) 74 - 99 mg/dL    Sodium 139 136 - 145 mmol/L    Potassium 4.5 3.5 - 5.3 mmol/L    Chloride 111 (H) 98 - 107 mmol/L    Bicarbonate 21 21 - 32 mmol/L    Anion Gap 12 10 - 20 mmol/L    Urea Nitrogen 44 (H) 6 - 23 mg/dL    Creatinine 3.94 (H) 0.50 - 1.05 mg/dL    eGFR 11 (L) >60 mL/min/1.73m*2    Calcium 9.8 8.6 - 10.6 mg/dL    Phosphorus 3.8 2.5 - 4.9 mg/dL     Albumin 3.6 3.4 - 5.0 g/dL   Magnesium   Result Value Ref Range    Magnesium 2.19 1.60 - 2.40 mg/dL   Urine electrolytes   Result Value Ref Range    Sodium, Urine Random 37 mmol/L    Sodium/Creatinine Ratio 35 Not established. mmol/g Creat    Potassium, Urine Random 30 mmol/L    Potassium/Creatinine Ratio 28 Not established mmol/g Creat    Chloride, Urine Random 28 mmol/L    Chloride/Creatinine Ratio 27 (L) 38 - 318 mmol/g creat    Creatinine, Urine Random 105.4 20.0 - 320.0 mg/dL   Osmolality, urine   Result Value Ref Range    Osmolality, Urine Random 318 200 - 1,200 mOsm/kg   Urea Nitrogen, Urine Random   Result Value Ref Range    Urea Nitrogen, Urine Random 452 mg/dL    Creatinine, Urine Random 104.5 20.0 - 320.0 mg/dL    Urea Nitrogen/Creatinine Ratio 4.3 Not established. g/g creat   Urinalysis with Reflex Culture and Microscopic   Result Value Ref Range    Color, Urine Light-Yellow Light-Yellow, Yellow, Dark-Yellow    Appearance, Urine Clear Clear    Specific Gravity, Urine 1.012 1.005 - 1.035    pH, Urine 6.0 5.0, 5.5, 6.0, 6.5, 7.0, 7.5, 8.0    Protein, Urine 30 (1+) (A) NEGATIVE, 10 (TRACE), 20 (TRACE) mg/dL    Glucose, Urine Normal Normal mg/dL    Blood, Urine NEGATIVE NEGATIVE mg/dL    Ketones, Urine NEGATIVE NEGATIVE mg/dL    Bilirubin, Urine NEGATIVE NEGATIVE mg/dL    Urobilinogen, Urine Normal Normal mg/dL    Nitrite, Urine NEGATIVE NEGATIVE    Leukocyte Esterase, Urine NEGATIVE NEGATIVE   Urinalysis Microscopic   Result Value Ref Range    WBC, Urine NONE 1-5, NONE /HPF    RBC, Urine NONE NONE, 1-2, 3-5 /HPF    Squamous Epithelial Cells, Urine 1-9 (SPARSE) Reference range not established. /HPF     Encounter Date: 03/10/25   ECG 12 lead   Result Value    Ventricular Rate 68    Atrial Rate 68    WV Interval 214    QRS Duration 144    QT Interval 428    QTC Calculation(Bazett) 455    P Axis 58    R Axis -11    T Axis -16    QRS Count 11    Q Onset 226    P Onset 119    P Offset 178    T Offset 440    QTC  "[place] 446    Narrative    Sinus rhythm with 1st degree AV block  Right bundle branch block  Abnormal ECG  When compared with ECG of 04-OCT-2024 05:28,  Sinus rhythm has replaced Ectopic atrial rhythm  QRS axis Shifted right  T wave inversion no longer evident in Lateral leads  See ED provider note for full interpretation and clinical correlation  Confirmed by Renita Chen ([id number]) on 3/11/2025 4:30:43 AM     ASSESSMENT AND RECOMMENDATIONS  Kelly Martinez is a 80 y.o. female with a past psychiatric history of MDD and PMHx cerebellar ICH i/s/o AVM (9/2024), vascular PD, PE and recurrent DVT (no AC 2/2 recent ICH) s/p IVCF (removed in 2016), HFpEF, HTN, HLD, JOY, COPD, CKD4 who was admitted to [facility] on 3/10 for HTN urgency and suspected ADHF. Psychiatry was consulted on 3/14 for medication optimization for somnolence and recurrent falls. On initial assessment, patient was drowsy and disoriented. History also suggestive of potential baseline cognitive impairment.    3/15: Pt doing much better on lower dose of medications. No overt signs of a psychotic D/O. Will hold off medication changes for today and reassess tomorrow.     IMPRESSION  Hypoactive delirium i/s/o possible baseline cognitive impairment  MDD     RECOMMENDATIONS  Safety:  - Patient does not currently meet criteria for inpatient psychiatric admission.  - To evaluate decision-making capacity, recommend use of the Capacity Evaluation Tool. Search “[facility] Capacity Evaluation\" under SmartText unless the patient has a legal guardian, in which case all decisions per the legal guardian.  - Defer to primary team decision for 1:1 sitter.  - As with all hospitalized patients, would recommend delirium precautions, as below.     Medications:  - CONTINUE Seroquel PO 150mg at bedtime   - CONTINUE duloxetine PO 30mg daily (consider adjusting for renal function)  - CONTINUE bupropion ER 150mg daily      Work-up:  - EKG (3/10): QTc of 455     Ancillary Services:  - defer " , pet/music/art therapy consult to primary team discretion     Follow-up:  - Psychiatry appointment at [facility] (Dr. Frank) on 4/3/2025     ==========  - Discussed recommendations with primary team.  - Psychiatry will continue to follow. Page [id number] with any questions or concerns.    Edith Villanueva, DO

## 2025-03-15 NOTE — CARE PLAN
The patient's goals for the shift include To get BP's under control and be able to go home    The clinical goals for the shift include patient will remain free from falls this shift    Over the shift, the patient remained free from falls. Frequent rounding was done to make sure patient's needs were met. Patient's call light left within reach.       Problem: Fall/Injury  Goal: Not fall by end of shift  Outcome: Progressing  Goal: Be free from injury by end of the shift  Outcome: Progressing  Goal: Verbalize understanding of personal risk factors for fall in the hospital  Outcome: Progressing  Goal: Verbalize understanding of risk factor reduction measures to prevent injury from fall in the home  Outcome: Progressing  Goal: Use assistive devices by end of the shift  Outcome: Progressing  Goal: Pace activities to prevent fatigue by end of the shift  Outcome: Progressing     Problem: Pain  Goal: Takes deep breaths with improved pain control throughout the shift  Outcome: Progressing  Goal: Turns in bed with improved pain control throughout the shift  Outcome: Progressing  Goal: Walks with improved pain control throughout the shift  Outcome: Progressing  Goal: Performs ADL's with improved pain control throughout shift  Outcome: Progressing  Goal: Participates in PT with improved pain control throughout the shift  Outcome: Progressing  Goal: Free from opioid side effects throughout the shift  Outcome: Progressing  Goal: Free from acute confusion related to pain meds throughout the shift  Outcome: Progressing     Problem: Pain  Goal: Turns in bed with improved pain control throughout the shift  Outcome: Progressing     Problem: Pain  Goal: Walks with improved pain control throughout the shift  Outcome: Progressing     Problem: Pain  Goal: Performs ADL's with improved pain control throughout shift  Outcome: Progressing     Problem: Pain  Goal: Participates in PT with improved pain control throughout the shift  Outcome:  Progressing

## 2025-03-15 NOTE — CARE PLAN
The patient's goals for the shift include To sit up in chair for meals and try walking a little bit today    The clinical goals for the shift include patient's SBP will maintain above 100.    Over the shift, the patient waiting for placement for SNF, possible discharge Monday.

## 2025-03-15 NOTE — CARE PLAN
The patient's goals for the shift include To sit up in chair for meals and try walking a little bit today    The clinical goals for the shift include Patietn will ahve SBP below 150 this shift    Over the shift, the patient had meds adjusted, bladder scan 63 - 69 ml PVR.  Standing weight obtained.  Patient BP's maintained and remained safe free from falls and injury this shift.      Problem: Fall/Injury  Goal: Not fall by end of shift  Outcome: Progressing  Goal: Be free from injury by end of the shift  Outcome: Progressing  Goal: Verbalize understanding of personal risk factors for fall in the hospital  Outcome: Progressing  Goal: Verbalize understanding of risk factor reduction measures to prevent injury from fall in the home  Outcome: Progressing  Goal: Use assistive devices by end of the shift  Outcome: Progressing  Goal: Pace activities to prevent fatigue by end of the shift  Outcome: Progressing     Problem: Pain  Goal: Takes deep breaths with improved pain control throughout the shift  Outcome: Progressing  Goal: Turns in bed with improved pain control throughout the shift  Outcome: Progressing  Goal: Walks with improved pain control throughout the shift  Outcome: Progressing  Goal: Performs ADL's with improved pain control throughout shift  Outcome: Progressing  Goal: Participates in PT with improved pain control throughout the shift  Outcome: Progressing  Goal: Free from opioid side effects throughout the shift  Outcome: Progressing  Goal: Free from acute confusion related to pain meds throughout the shift  Outcome: Progressing     Problem: Respiratory  Goal: Clear secretions with interventions this shift  Outcome: Progressing  Goal: Minimize anxiety/maximize coping throughout shift  Outcome: Progressing  Goal: Minimal/no exertional discomfort or dyspnea this shift  Outcome: Progressing  Goal: No signs of respiratory distress (eg. Use of accessory muscles. Peds grunting)  Outcome: Progressing  Goal: Patent  airway maintained this shift  Outcome: Progressing  Goal: Tolerate mechanical ventilation evidenced by VS/agitation level this shift  Outcome: Progressing  Goal: Tolerate pulmonary toileting this shift  Outcome: Progressing  Goal: Verbalize decreased shortness of breath this shift  Outcome: Progressing  Goal: Wean oxygen to maintain O2 saturation per order/standard this shift  Outcome: Progressing  Goal: Increase self care and/or family involvement in next 24 hours  Outcome: Progressing     Problem: Pain - Adult  Goal: Verbalizes/displays adequate comfort level or baseline comfort level  Outcome: Progressing     Problem: Safety - Adult  Goal: Free from fall injury  Outcome: Progressing     Problem: Discharge Planning  Goal: Discharge to home or other facility with appropriate resources  Outcome: Progressing     Problem: Chronic Conditions and Co-morbidities  Goal: Patient's chronic conditions and co-morbidity symptoms are monitored and maintained or improved  Outcome: Progressing     Problem: Nutrition  Goal: Nutrient intake appropriate for maintaining nutritional needs  Outcome: Progressing     Problem: Skin  Goal: Decreased wound size/increased tissue granulation at next dressing change  Outcome: Progressing  Flowsheets (Taken 3/14/2025 1858)  Decreased wound size/increased tissue granulation at next dressing change:   Promote sleep for wound healing   Protective dressings over bony prominences  Goal: Participates in plan/prevention/treatment measures  Outcome: Progressing  Flowsheets (Taken 3/14/2025 1858)  Participates in plan/prevention/treatment measures:   Elevate heels   Increase activity/out of bed for meals  Goal: Prevent/manage excess moisture  Outcome: Progressing  Flowsheets (Taken 3/14/2025 1858)  Prevent/manage excess moisture:   Follow provider orders for dressing changes   Moisturize dry skin   Monitor for/manage infection if present  Goal: Prevent/minimize sheer/friction injuries  Outcome:  Progressing  Flowsheets (Taken 3/14/2025 1858)  Prevent/minimize sheer/friction injuries:   HOB 30 degrees or less   Increase activity/out of bed for meals   Turn/reposition every 2 hours/use positioning/transfer devices  Goal: Promote/optimize nutrition  Outcome: Progressing  Flowsheets (Taken 3/14/2025 1858)  Promote/optimize nutrition:   Consume > 50% meals/supplements   Monitor/record intake including meals   Offer water/supplements/favorite foods  Goal: Promote skin healing  Outcome: Progressing  Flowsheets (Taken 3/13/2025 2037)  Promote skin healing:   Ensure correct size (line/device) and apply per  instructions   Protective dressings over bony prominences   Rotate device position/do not position patient on device   Turn/reposition every 2 hours/use positioning/transfer devices   Assess skin/pad under line(s)/device(s)

## 2025-03-16 VITALS
OXYGEN SATURATION: 97 % | RESPIRATION RATE: 18 BRPM | HEIGHT: 66 IN | DIASTOLIC BLOOD PRESSURE: 84 MMHG | BODY MASS INDEX: 34.27 KG/M2 | HEART RATE: 72 BPM | WEIGHT: 213.2 LBS | SYSTOLIC BLOOD PRESSURE: 140 MMHG | TEMPERATURE: 97.3 F

## 2025-03-16 LAB
ALBUMIN SERPL BCP-MCNC: 3.6 G/DL (ref 3.4–5)
ANION GAP SERPL CALC-SCNC: 13 MMOL/L (ref 10–20)
BUN SERPL-MCNC: 43 MG/DL (ref 6–23)
CALCIUM SERPL-MCNC: 9.3 MG/DL (ref 8.6–10.6)
CHLORIDE SERPL-SCNC: 110 MMOL/L (ref 98–107)
CO2 SERPL-SCNC: 18 MMOL/L (ref 21–32)
CREAT SERPL-MCNC: 3.28 MG/DL (ref 0.5–1.05)
EGFRCR SERPLBLD CKD-EPI 2021: 14 ML/MIN/1.73M*2
ERYTHROCYTE [DISTWIDTH] IN BLOOD BY AUTOMATED COUNT: 15.2 % (ref 11.5–14.5)
GLUCOSE SERPL-MCNC: 89 MG/DL (ref 74–99)
HCT VFR BLD AUTO: 33 % (ref 36–46)
HGB BLD-MCNC: 10.2 G/DL (ref 12–16)
MAGNESIUM SERPL-MCNC: 2.09 MG/DL (ref 1.6–2.4)
MCH RBC QN AUTO: 29.5 PG (ref 26–34)
MCHC RBC AUTO-ENTMCNC: 30.9 G/DL (ref 32–36)
MCV RBC AUTO: 95 FL (ref 80–100)
NRBC BLD-RTO: 0 /100 WBCS (ref 0–0)
PHOSPHATE SERPL-MCNC: 3.7 MG/DL (ref 2.5–4.9)
PLATELET # BLD AUTO: 175 X10*3/UL (ref 150–450)
POTASSIUM SERPL-SCNC: 4.9 MMOL/L (ref 3.5–5.3)
RBC # BLD AUTO: 3.46 X10*6/UL (ref 4–5.2)
SODIUM SERPL-SCNC: 136 MMOL/L (ref 136–145)
WBC # BLD AUTO: 5 X10*3/UL (ref 4.4–11.3)

## 2025-03-16 PROCEDURE — S4991 NICOTINE PATCH NONLEGEND: HCPCS | Performed by: NURSE PRACTITIONER

## 2025-03-16 PROCEDURE — 2500000002 HC RX 250 W HCPCS SELF ADMINISTERED DRUGS (ALT 637 FOR MEDICARE OP, ALT 636 FOR OP/ED)

## 2025-03-16 PROCEDURE — 85027 COMPLETE CBC AUTOMATED: CPT | Performed by: NURSE PRACTITIONER

## 2025-03-16 PROCEDURE — 2500000002 HC RX 250 W HCPCS SELF ADMINISTERED DRUGS (ALT 637 FOR MEDICARE OP, ALT 636 FOR OP/ED): Performed by: NURSE PRACTITIONER

## 2025-03-16 PROCEDURE — 1200000002 HC GENERAL ROOM WITH TELEMETRY DAILY

## 2025-03-16 PROCEDURE — 97116 GAIT TRAINING THERAPY: CPT | Mod: GP,CQ

## 2025-03-16 PROCEDURE — 99232 SBSQ HOSP IP/OBS MODERATE 35: CPT | Performed by: STUDENT IN AN ORGANIZED HEALTH CARE EDUCATION/TRAINING PROGRAM

## 2025-03-16 PROCEDURE — 83735 ASSAY OF MAGNESIUM: CPT | Performed by: NURSE PRACTITIONER

## 2025-03-16 PROCEDURE — 2500000001 HC RX 250 WO HCPCS SELF ADMINISTERED DRUGS (ALT 637 FOR MEDICARE OP): Performed by: NURSE PRACTITIONER

## 2025-03-16 PROCEDURE — 36415 COLL VENOUS BLD VENIPUNCTURE: CPT | Performed by: NURSE PRACTITIONER

## 2025-03-16 PROCEDURE — 80069 RENAL FUNCTION PANEL: CPT | Performed by: NURSE PRACTITIONER

## 2025-03-16 RX ORDER — CARVEDILOL 12.5 MG/1
12.5 TABLET ORAL 2 TIMES DAILY
Status: DISCONTINUED | OUTPATIENT
Start: 2025-03-16 | End: 2025-03-19 | Stop reason: HOSPADM

## 2025-03-16 RX ADMIN — CARVEDILOL 12.5 MG: 12.5 TABLET, FILM COATED ORAL at 20:21

## 2025-03-16 RX ADMIN — ASPIRIN 81 MG CHEWABLE TABLET 81 MG: 81 TABLET CHEWABLE at 09:26

## 2025-03-16 RX ADMIN — CARVEDILOL 12.5 MG: 12.5 TABLET, FILM COATED ORAL at 09:26

## 2025-03-16 RX ADMIN — DULOXETINE HYDROCHLORIDE 30 MG: 30 CAPSULE, DELAYED RELEASE ORAL at 09:26

## 2025-03-16 RX ADMIN — CARBIDOPA AND LEVODOPA 1 TABLET: 25; 100 TABLET ORAL at 14:32

## 2025-03-16 RX ADMIN — TERAZOSIN HYDROCHLORIDE 2 MG: 2 CAPSULE ORAL at 09:26

## 2025-03-16 RX ADMIN — AMLODIPINE BESYLATE 10 MG: 10 TABLET ORAL at 09:26

## 2025-03-16 RX ADMIN — ATORVASTATIN CALCIUM 40 MG: 40 TABLET, FILM COATED ORAL at 20:21

## 2025-03-16 RX ADMIN — QUETIAPINE FUMARATE 150 MG: 25 TABLET ORAL at 20:20

## 2025-03-16 RX ADMIN — CARBIDOPA AND LEVODOPA 1 TABLET: 25; 100 TABLET ORAL at 09:26

## 2025-03-16 RX ADMIN — BUPROPION HYDROCHLORIDE 150 MG: 150 TABLET, EXTENDED RELEASE ORAL at 09:26

## 2025-03-16 RX ADMIN — FOLIC ACID 1 MG: 1 TABLET ORAL at 09:26

## 2025-03-16 RX ADMIN — NICOTINE 1 PATCH: 14 PATCH, EXTENDED RELEASE TRANSDERMAL at 09:26

## 2025-03-16 RX ADMIN — CARBIDOPA AND LEVODOPA 1 TABLET: 25; 100 TABLET ORAL at 20:20

## 2025-03-16 RX ADMIN — ISOSORBIDE MONONITRATE 60 MG: 60 TABLET, EXTENDED RELEASE ORAL at 09:26

## 2025-03-16 ASSESSMENT — COGNITIVE AND FUNCTIONAL STATUS - GENERAL
PERSONAL GROOMING: A LOT
MOBILITY SCORE: 17
TOILETING: A LOT
PERSONAL GROOMING: A LOT
CLIMB 3 TO 5 STEPS WITH RAILING: TOTAL
EATING MEALS: A LITTLE
STANDING UP FROM CHAIR USING ARMS: A LITTLE
MOBILITY SCORE: 16
DRESSING REGULAR LOWER BODY CLOTHING: A LOT
CLIMB 3 TO 5 STEPS WITH RAILING: A LOT
CLIMB 3 TO 5 STEPS WITH RAILING: A LOT
MOVING TO AND FROM BED TO CHAIR: A LITTLE
HELP NEEDED FOR BATHING: A LOT
DAILY ACTIVITIY SCORE: 14
DRESSING REGULAR LOWER BODY CLOTHING: A LOT
EATING MEALS: A LITTLE
STANDING UP FROM CHAIR USING ARMS: A LITTLE
MOVING TO AND FROM BED TO CHAIR: A LITTLE
TOILETING: A LOT
MOBILITY SCORE: 17
HELP NEEDED FOR BATHING: A LOT
MOVING FROM LYING ON BACK TO SITTING ON SIDE OF FLAT BED WITH BEDRAILS: A LITTLE
TURNING FROM BACK TO SIDE WHILE IN FLAT BAD: A LITTLE
DAILY ACTIVITIY SCORE: 14
TOILETING: A LOT
CLIMB 3 TO 5 STEPS WITH RAILING: A LOT
HELP NEEDED FOR BATHING: A LOT
DRESSING REGULAR LOWER BODY CLOTHING: A LOT
MOVING TO AND FROM BED TO CHAIR: A LITTLE
TURNING FROM BACK TO SIDE WHILE IN FLAT BAD: A LITTLE
PERSONAL GROOMING: A LOT
MOVING TO AND FROM BED TO CHAIR: A LITTLE
DRESSING REGULAR UPPER BODY CLOTHING: A LITTLE
DRESSING REGULAR UPPER BODY CLOTHING: A LITTLE
WALKING IN HOSPITAL ROOM: A LOT
DRESSING REGULAR UPPER BODY CLOTHING: A LITTLE
WALKING IN HOSPITAL ROOM: A LITTLE
EATING MEALS: A LITTLE
DAILY ACTIVITIY SCORE: 14
TURNING FROM BACK TO SIDE WHILE IN FLAT BAD: A LITTLE
TURNING FROM BACK TO SIDE WHILE IN FLAT BAD: A LITTLE
MOBILITY SCORE: 17
WALKING IN HOSPITAL ROOM: A LOT
STANDING UP FROM CHAIR USING ARMS: A LITTLE
STANDING UP FROM CHAIR USING ARMS: A LITTLE
WALKING IN HOSPITAL ROOM: A LOT

## 2025-03-16 ASSESSMENT — PAIN - FUNCTIONAL ASSESSMENT
PAIN_FUNCTIONAL_ASSESSMENT: 0-10
PAIN_FUNCTIONAL_ASSESSMENT: 0-10

## 2025-03-16 ASSESSMENT — PAIN SCALES - GENERAL
PAINLEVEL_OUTOF10: 0 - NO PAIN

## 2025-03-16 NOTE — CARE PLAN
The patient's goals for the shift include To sit up in chair for meals and try walking a little bit today    The clinical goals for the shift include Patient will remain HDS throughout shift  Problem: Fall/Injury  Goal: Not fall by end of shift  Outcome: Progressing  Goal: Be free from injury by end of the shift  Outcome: Progressing  Goal: Verbalize understanding of personal risk factors for fall in the hospital  Outcome: Progressing  Goal: Verbalize understanding of risk factor reduction measures to prevent injury from fall in the home  Outcome: Progressing  Goal: Use assistive devices by end of the shift  Outcome: Progressing  Goal: Pace activities to prevent fatigue by end of the shift  Outcome: Progressing     Problem: Pain  Goal: Takes deep breaths with improved pain control throughout the shift  Outcome: Progressing  Goal: Turns in bed with improved pain control throughout the shift  Outcome: Progressing  Goal: Walks with improved pain control throughout the shift  Outcome: Progressing  Goal: Performs ADL's with improved pain control throughout shift  Outcome: Progressing  Goal: Participates in PT with improved pain control throughout the shift  Outcome: Progressing  Goal: Free from opioid side effects throughout the shift  Outcome: Progressing  Goal: Free from acute confusion related to pain meds throughout the shift  Outcome: Progressing

## 2025-03-16 NOTE — PROGRESS NOTES
"Kelly Martinez is a 80 y.o. female on day 5 of admission presenting with Acute on chronic congestive heart failure, unspecified heart failure type.    Subjective   Pt seen in chair in the AM. States mood is \"ok\" and reports eating and sleeping well. Again denies SI/HI/AVH, but states \"I talk with myself\" States she is in the hospital with a fall. Cannot state the date \"without looking at the board.\" Denies any other mental health concerns today.    Per RN, pt overall stable.      Objective     Last Recorded Vitals  Blood pressure 156/87, pulse 70, temperature 36.4 °C (97.5 °F), temperature source Temporal, resp. rate 18, height 1.676 m (5' 6\"), weight 96.7 kg (213 lb 3.2 oz), SpO2 93%.    Mental Status Exam  General/Appearance: Awake, alert, NAD, obese, sitting in chair watching TV  Attitude: Calm, engaged in interview  Behavior: Appropriate eye contact. No aggressive or agitated behavior.  Motor Activity: No psychomotor agitation or retardation. No abnormal movements, tremors or tics. No evidence of extrapyramidal symptoms or tardive dyskinesia on limited exam.  Speech: Regular rate, volume, tone, and quantity. Spontaneous, clear, coherent. No pressured speech.  Mood: \"ok\"  Affect: Euthymic, slightly restricted, mood congruent, appropriate to content.  Thought Process: Linear, logical, and goal-directed. Future-oriented  Thought Content: Does not endorse suicidal or homicidal ideation. No delusions elicited.  Thought Perception: Does not endorse auditory or visual hallucinations and does not appear to be responding to any hallucinatory stimuli.  Cognition: Oriented to person, place, does not attempt time but states \"I can look at the sign\" [on her wall]  Insight: Fair  Judgement: Fair     Psychiatric Risk Assessment  Acute Risk of Harm to Others is Considered: low   Acute Risk of Harm to Self is Considered: low    Scheduled medications  amLODIPine, 10 mg, oral, Daily  aspirin, 81 mg, oral, Daily  atorvastatin, 40 " mg, oral, Nightly  buPROPion XL, 150 mg, oral, Daily  carbidopa-levodopa, 1 tablet, oral, TID  carvedilol, 12.5 mg, oral, BID  DULoxetine, 30 mg, oral, Daily  folic acid, 1 mg, oral, Daily  isosorbide mononitrate ER, 60 mg, oral, Daily  nicotine, 1 patch, transdermal, Daily  perflutren lipid microspheres, 0.5-10 mL of dilution, intravenous, Once in imaging  QUEtiapine, 150 mg, oral, Nightly  sennosides, 2 tablet, oral, BID  terazosin, 2 mg, oral, Daily    Continuous medications     PRN medications  PRN medications: acetaminophen    Relevant Results  Results for orders placed or performed during the hospital encounter of 03/10/25 (from the past 24 hours)   CBC   Result Value Ref Range    WBC 4.8 4.4 - 11.3 x10*3/uL    nRBC 0.0 0.0 - 0.0 /100 WBCs    RBC 3.37 (L) 4.00 - 5.20 x10*6/uL    Hemoglobin 9.8 (L) 12.0 - 16.0 g/dL    Hematocrit 28.6 (L) 36.0 - 46.0 %    MCV 85 80 - 100 fL    MCH 29.1 26.0 - 34.0 pg    MCHC 34.3 32.0 - 36.0 g/dL    RDW 14.8 (H) 11.5 - 14.5 %    Platelets 159 150 - 450 x10*3/uL   Renal function panel   Result Value Ref Range    Glucose 104 (H) 74 - 99 mg/dL    Sodium 138 136 - 145 mmol/L    Potassium 5.3 3.5 - 5.3 mmol/L    Chloride 111 (H) 98 - 107 mmol/L    Bicarbonate 19 (L) 21 - 32 mmol/L    Anion Gap 13 10 - 20 mmol/L    Urea Nitrogen 44 (H) 6 - 23 mg/dL    Creatinine 3.53 (H) 0.50 - 1.05 mg/dL    eGFR 13 (L) >60 mL/min/1.73m*2    Calcium 9.3 8.6 - 10.6 mg/dL    Phosphorus 3.6 2.5 - 4.9 mg/dL    Albumin 3.4 3.4 - 5.0 g/dL   Magnesium   Result Value Ref Range    Magnesium 2.18 1.60 - 2.40 mg/dL     ASSESSMENT AND RECOMMENDATIONS  Kelly Martinez is a 80 y.o. 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 Psychiatry was consulted on 3/14 for medication optimization  for somnolence and recurrent falls. On initial assessment, patient was drowsy and disoriented. History also suggestive of potential baseline cognitive impairment.     3/15: Pt doing much better on lower dose of medications. No overt signs of a psychotic D/O. Will hold off medication changes for today and reassess tomorrow.    3/16: Pt awake and more interactive today. No med changes indicated, however would consider if able to lower dose of Seroquel any further as outpt.      IMPRESSION  Hypoactive delirium i/s/o possible baseline cognitive impairment  MDD     RECOMMENDATIONS  Safety:  - Patient does not currently meet criteria for inpatient psychiatric admission.  - Defer to primary team decision for 1:1 sitter.  - As with all hospitalized patients, would recommend delirium precautions, as below.     Medications:  - CONTINUE Seroquel PO 150mg at bedtime   - CONTINUE duloxetine PO 30mg daily (consider adjusting for renal function)  - CONTINUE bupropion ER 150mg daily      Work-up:  - EKG (3/10): QTc of 455     Ancillary Services:  - defer , pet/music/art therapy consult to primary team discretion     Follow-up:  - Psychiatry appointment at [facility] (Dr. Frank) on 4/3/2025     ==========  - Discussed recommendations with primary team.  - Psychiatry will continue to follow. Page [id number] with any questions or concerns.     Edith Villanueva, DO

## 2025-03-16 NOTE — CARE PLAN
The patient's goals for the shift include To sit up in chair for meals and try walking a little bit today    The clinical goals for the shift include Patient will maintain SBP above 100 and less than 160 this shift    Over the shift, the patient sat up in chair and is ambulating with staff and walker to bathroom.  Patient worked with PT and did well.  She was able to ambulate with PT from room to [place] and turn around and go back to her room with staff.  Patient reports 0/10 pain and improved SOB when ambulating and at rest.  Patient had med adjustments today and was seen by psych.  Patient remained safe free from falls and injury this shift.      Problem: Fall/Injury  Goal: Not fall by end of shift  Outcome: Progressing  Goal: Be free from injury by end of the shift  Outcome: Progressing  Goal: Verbalize understanding of personal risk factors for fall in the hospital  Outcome: Progressing  Goal: Verbalize understanding of risk factor reduction measures to prevent injury from fall in the home  Outcome: Progressing  Goal: Use assistive devices by end of the shift  Outcome: Progressing  Goal: Pace activities to prevent fatigue by end of the shift  Outcome: Progressing     Problem: Pain  Goal: Takes deep breaths with improved pain control throughout the shift  Outcome: Progressing  Goal: Turns in bed with improved pain control throughout the shift  Outcome: Progressing  Goal: Walks with improved pain control throughout the shift  Outcome: Progressing  Goal: Performs ADL's with improved pain control throughout shift  Outcome: Progressing  Goal: Participates in PT with improved pain control throughout the shift  Outcome: Progressing  Goal: Free from opioid side effects throughout the shift  Outcome: Progressing  Goal: Free from acute confusion related to pain meds throughout the shift  Outcome: Progressing     Problem: Respiratory  Goal: Clear secretions with interventions this shift  Outcome: Progressing  Goal:  Minimize anxiety/maximize coping throughout shift  Outcome: Progressing  Goal: Minimal/no exertional discomfort or dyspnea this shift  Outcome: Progressing  Goal: No signs of respiratory distress (eg. Use of accessory muscles. Peds grunting)  Outcome: Progressing  Goal: Patent airway maintained this shift  Outcome: Progressing  Goal: Tolerate mechanical ventilation evidenced by VS/agitation level this shift  Outcome: Progressing  Goal: Tolerate pulmonary toileting this shift  Outcome: Progressing  Goal: Verbalize decreased shortness of breath this shift  Outcome: Progressing  Goal: Wean oxygen to maintain O2 saturation per order/standard this shift  Outcome: Progressing  Goal: Increase self care and/or family involvement in next 24 hours  Outcome: Progressing     Problem: Pain - Adult  Goal: Verbalizes/displays adequate comfort level or baseline comfort level  Outcome: Progressing     Problem: Safety - Adult  Goal: Free from fall injury  Outcome: Progressing     Problem: Discharge Planning  Goal: Discharge to home or other facility with appropriate resources  Outcome: Progressing     Problem: Chronic Conditions and Co-morbidities  Goal: Patient's chronic conditions and co-morbidity symptoms are monitored and maintained or improved  Outcome: Progressing     Problem: Nutrition  Goal: Nutrient intake appropriate for maintaining nutritional needs  Outcome: Progressing     Problem: Skin  Goal: Decreased wound size/increased tissue granulation at next dressing change  Outcome: Progressing  Flowsheets (Taken 3/15/2025 1716)  Decreased wound size/increased tissue granulation at next dressing change:   Promote sleep for wound healing   Protective dressings over bony prominences  Goal: Participates in plan/prevention/treatment measures  Outcome: Progressing  Flowsheets (Taken 3/15/2025 1716)  Participates in plan/prevention/treatment measures:   Elevate heels   Increase activity/out of bed for meals  Goal: Prevent/manage  excess moisture  Outcome: Progressing  Flowsheets (Taken 3/15/2025 1716)  Prevent/manage excess moisture:   Follow provider orders for dressing changes   Moisturize dry skin   Monitor for/manage infection if present   Cleanse incontinence/protect with barrier cream  Goal: Prevent/minimize sheer/friction injuries  Outcome: Progressing  Flowsheets (Taken 3/15/2025 1716)  Prevent/minimize sheer/friction injuries:   HOB 30 degrees or less   Increase activity/out of bed for meals   Turn/reposition every 2 hours/use positioning/transfer devices  Goal: Promote/optimize nutrition  Outcome: Progressing  Flowsheets (Taken 3/15/2025 1716)  Promote/optimize nutrition:   Consume > 50% meals/supplements   Monitor/record intake including meals   Offer water/supplements/favorite foods  Goal: Promote skin healing  Outcome: Progressing  Flowsheets (Taken 3/15/2025 1716)  Promote skin healing:   Assess skin/pad under line(s)/device(s)   Ensure correct size (line/device) and apply per  instructions   Protective dressings over bony prominences   Turn/reposition every 2 hours/use positioning/transfer devices   Rotate device position/do not position patient on device

## 2025-03-16 NOTE — PROGRESS NOTES
Physical Therapy    Physical Therapy Treatment    Patient Name: Kelly Martinez  MRN: [id number]  Department: [place] [id number]  Room: [address] [place] [address]  Today's Date: 3/16/2025  Time Calculation  Start Time: 1229  Stop Time: 1245  Time Calculation (min): 16 min         Assessment/Plan   PT Assessment  PT Assessment Results: Decreased strength, Decreased endurance, Impaired balance, Decreased mobility, Decreased safety awareness  End of Session Communication: Bedside nurse  Assessment Comment: Pt ambulated 40' using FWW with CGA. Pt with poor eccentric control during funcitonal transfers requires cues for proper hand placement. Pt unsteady with ambulation and decreased insight to deficits and remains appropriate for mod intensity therapy upon discharge.  End of Session Patient Position: Up in chair, Alarm off, caregiver present (Pt's daughter and granddaughter in room with pt.)     PT Plan  Treatment/Interventions: Bed mobility, Transfer training, Gait training, Balance training, Strengthening, Endurance training, Therapeutic exercise, Therapeutic activity  PT Plan: Ongoing PT  PT Frequency: 3 times per week  PT Discharge Recommendations: Moderate intensity level of continued care  Equipment Recommended upon Discharge:  (tbd)  PT Recommended Transfer Status: Assist x1  PT - OK to Discharge: Yes (PT eval completed & recs made)      General Visit Information:   PT  Visit  PT Received On: 03/16/25  General  Family/Caregiver Present: Yes  Caregiver Feedback: Pt's daughter and granddaughter entered room at the beginning of treatment and encouraged pt to participate.  Prior to Session Communication: Bedside nurse  General Comment: Pt seated in chair upon arrival. Pt initially declined therapy despite encouragement but agreed to therapy after max encouragement from family.    Subjective   Precautions:  Precautions  Hearing/Visual Limitations: L eye edema - ? visual changes - pt with difficulty answering  Medical Precautions: Fall  precautions     Date/Time Vitals Session Patient Position Pulse Resp SpO2 BP MAP (mmHg)    03/16/25 1156 --  --  64  18  96 %  99/63  75                 Objective   Pain:       Activity Tolerance:  Activity Tolerance  Endurance: Tolerates 10 - 20 min exercise with multiple rests  Treatments:  Ambulation/Gait Training  Ambulation/Gait Training Performed: Yes  Ambulation/Gait Training 1  Surface 1: Carpet  Device 1: Rolling walker  Assistance 1: Contact guard  Quality of Gait 1: Decreased step length, Forward flexed posture (unsteady)  Comments/Distance (ft) 1: 40'  Transfers  Transfer: Yes  Transfer 1  Transfer From 1: Sit to, Stand to  Transfer to 1: Stand, Sit  Technique 1: Sit to stand, Stand to sit  Transfer Device 1: Walker  Transfer Level of Assistance 1: Contact guard  Trials/Comments 1: Pt with poor eccentric control, requires cues for proper hand placement.    Outcome Measures:  [facility] Basic Mobility  Turning from your back to your side while in a flat bed without using bedrails: A little  Moving from lying on your back to sitting on the side of a flat bed without using bedrails: A little  Moving to and from bed to chair (including a wheelchair): A little  Standing up from a chair using your arms (e.g. wheelchair or bedside chair): A little  To walk in hospital room: A little  Climbing 3-5 steps with railing: Total  Basic Mobility - Total Score: 16    Education Documentation  Mobility Training, taught by Melina Vega PTA at 3/16/2025 12:57 PM.  Learner: Patient  Readiness: Acceptance  Method: Explanation  Response: Verbalizes Understanding, Needs Reinforcement  Comment: Educated pt in proper hand placement during functional transfers.    Education Comments  No comments found.        OP EDUCATION:       Encounter Problems       Encounter Problems (Active)       Balance       Pt will score >23 on Tinetti assessment to indicate low falls risk (Progressing)       Start:  03/11/25    Expected End:  03/25/25                Mobility       STG - Patient will ambulate >100' with WW and SBA without LOB or path deviation (Progressing)       Start:  03/11/25    Expected End:  03/25/25               PT Transfers       STG - Patient to transfer to and from sit to supine indep from flat bed, no rails  (Progressing)       Start:  03/11/25    Expected End:  03/25/25            STG - Patient will transfer sit to and from stand with SBA and WW (Progressing)       Start:  03/11/25    Expected End:  03/25/25               Pain - Adult          Safety       LTG - Patient will adhere to hip precautions during ADL's and transfers       Start:  03/10/25            LTG - Patient will demonstrate safety requirements appropriate to situation/environment       Start:  03/10/25            LTG - Patient will utilize safety techniques       Start:  03/10/25            STG - Patient locks brakes on wheelchair       Start:  03/10/25            STG - Patient uses call light consistently to request assistance with transfers       Start:  03/10/25            STG - Patient uses gait belt during all transfers       Start:  03/10/25            Goal 1       Start:  03/10/25            Goal 2       Start:  03/10/25            Goal 3       Start:  03/10/25

## 2025-03-16 NOTE — PROGRESS NOTES
Subjective Data:  Patient woke to voice this AM while sleeping, no lethargy. Denies CP/SOB at rest.    - increase coreg 12.5mg BID  - discontinue home lisinopril  - diuretic holiday, likely PRN at discharge  - PT/OT re-eval pending    Overnight Events:   No acute events overnight.     Objective Data:  Last Recorded Vitals:  Vitals:    03/15/25 2044 03/16/25 0024 03/16/25 0436 03/16/25 0725   BP: 134/79 144/82 132/84 156/87   BP Location: Left arm Left arm Left arm Left arm   Patient Position: Lying Lying Lying Lying   Pulse:    70   Resp: 18 18 18 18   Temp: 36.5 °C (97.7 °F) 36.9 °C (98.4 °F) 36.5 °C (97.7 °F) 36.4 °C (97.5 °F)   TempSrc: Temporal Temporal Temporal Temporal   SpO2: 98% 96% 94% 93%   Weight:  96.7 kg (213 lb 3.2 oz)     Height:         Last Labs:  Results for orders placed or performed during the hospital encounter of 03/10/25 (from the past 24 hours)   CBC   Result Value Ref Range    WBC 4.8 4.4 - 11.3 x10*3/uL    nRBC 0.0 0.0 - 0.0 /100 WBCs    RBC 3.37 (L) 4.00 - 5.20 x10*6/uL    Hemoglobin 9.8 (L) 12.0 - 16.0 g/dL    Hematocrit 28.6 (L) 36.0 - 46.0 %    MCV 85 80 - 100 fL    MCH 29.1 26.0 - 34.0 pg    MCHC 34.3 32.0 - 36.0 g/dL    RDW 14.8 (H) 11.5 - 14.5 %    Platelets 159 150 - 450 x10*3/uL   Renal function panel   Result Value Ref Range    Glucose 104 (H) 74 - 99 mg/dL    Sodium 138 136 - 145 mmol/L    Potassium 5.3 3.5 - 5.3 mmol/L    Chloride 111 (H) 98 - 107 mmol/L    Bicarbonate 19 (L) 21 - 32 mmol/L    Anion Gap 13 10 - 20 mmol/L    Urea Nitrogen 44 (H) 6 - 23 mg/dL    Creatinine 3.53 (H) 0.50 - 1.05 mg/dL    eGFR 13 (L) >60 mL/min/1.73m*2    Calcium 9.3 8.6 - 10.6 mg/dL    Phosphorus 3.6 2.5 - 4.9 mg/dL    Albumin 3.4 3.4 - 5.0 g/dL   Magnesium   Result Value Ref Range    Magnesium 2.18 1.60 - 2.40 mg/dL        TROPHS   Date/Time Value Ref Range Status   03/10/2025 12:27 PM 28 0 - 34 ng/L Final   03/10/2025 11:03 AM 38 0 - 34 ng/L Final   09/21/2024 04:29 AM 35 0 - 34 ng/L Final    03/30/2024 03:19 PM 19 0 - 34 ng/L Final   03/30/2024 02:09 PM 18 0 - 34 ng/L Final   12/28/2023 02:33 AM 19 0 - 34 ng/L Final     BNP   Date/Time Value Ref Range Status   03/10/2025 11:03 AM 36 0 - 99 pg/mL Final   09/21/2024 04:29  0 - 99 pg/mL Final     HGBA1C   Date/Time Value Ref Range Status   03/10/2025 10:49 PM 5.7 See comment % Final   09/21/2024 04:29 AM 5.9 See comment % Final     LDLCALC   Date/Time Value Ref Range Status   03/10/2025 11:03 AM 62 <=99 mg/dL Final     Comment:                                 Near   Borderline      AGE      Desirable  Optimal    High     High     Very High     0-19 Y     0 - 109     ---    110-129   >/= 130     ----    20-24 Y     0 - 119     ---    120-159   >/= 160     ----      >24 Y     0 -  99   100-129  130-159   160-189     >/=190       VLDL   Date/Time Value Ref Range Status   03/10/2025 11:03 AM 20 0 - 40 mg/dL Final   12/07/2022 02:20 PM 16 0 - 40 mg/dL Final      Last I/O:  I/O last 3 completed shifts:  In: 720 (7.4 mL/kg) [P.O.:720]  Out: 1500 (15.5 mL/kg) [Urine:1500 (0.4 mL/kg/hr)]  Weight: 96.7 kg     Past Cardiology Tests (Last 3 Years):  EKG:  ECG 12 lead 03/10/2025  Sinus rhythm with 1st degree AV block  Right bundle branch block  Abnormal ECG  When compared with ECG of 04-OCT-2024 05:28,  Sinus rhythm has replaced Ectopic atrial rhythm  QRS axis Shifted right  T wave inversion no longer evident in Lateral leads  See ED provider note for full interpretation and clinical correlation  Confirmed by Renita Chen ([id number]) on 3/11/2025 4:30:43 AM    Echo:  Transthoracic Echo (TTE) Complete 09/21/2024:  1. Left ventricular ejection fraction is normal, calculated by Regan's biplane at 74%.  2. Spectral Doppler shows an impaired relaxation pattern of left ventricular diastolic filling.  3. There is mild concentric left ventricular hypertrophy.  4. There is normal right ventricular global systolic function.  5. The left atrium is mildly dilated.  6. Mildly  elevated right ventricular systolic pressure.  7. There is no evidence of a patent foramen ovale.  8. Compared with study dated 12/29/2023, no significant change.     Transthoracic Echo (TTE) Limited 12/29/2023:  1. Left ventricular systolic function is normal with a 55-60% estimated ejection fraction.  2. Poorly visualized anatomical structures due to suboptimal image quality.  3. Spectral Doppler shows an impaired relaxation pattern of left ventricular diastolic filling.     Ejection Fractions:  EF   Date/Time Value Ref Range Status   03/11/2025 11:00 AM 68 %    09/21/2024 11:50 AM 74 %    12/29/2023 10:48 AM 54       Cath:  No results found for this or any previous visit from the past 1095 days.  Stress Test:  No results found for this or any previous visit from the past 1095 days.  Cardiac Imaging:  No results found for this or any previous visit from the past 1095 days.    Inpatient Medications:  Scheduled medications   Medication Dose Route Frequency    amLODIPine  10 mg oral Daily    aspirin  81 mg oral Daily    atorvastatin  40 mg oral Nightly    buPROPion XL  150 mg oral Daily    carbidopa-levodopa  1 tablet oral TID    carvedilol  12.5 mg oral BID    DULoxetine  30 mg oral Daily    folic acid  1 mg oral Daily    isosorbide mononitrate ER  60 mg oral Daily    nicotine  1 patch transdermal Daily    perflutren lipid microspheres  0.5-10 mL of dilution intravenous Once in imaging    QUEtiapine  150 mg oral Nightly    sennosides  2 tablet oral BID    terazosin  2 mg oral Daily     PRN medications   Medication    acetaminophen     Continuous Medications   Medication Dose Last Rate     Physical Exam:  General: NAD, sitting up in chair  Skin: warm and dry throughout, L forehead hematoma w/ecchymosis, +tenderness  Head/ neck: +JVD to earlobe  Cardiac: RRR, S1, S2, NSR HR 70's on telemetry  Pulm: diminished anteriorly, on RA  GI: soft, nontender, non-distended, obese  : yellow draining in external catheter  canister (more concentrated today)  Extremities: trace edema in BLE, L>R w/L-calf tenderness (improving), TANISHA hose in place  Neuro: no focal neuro deficits, a+ox4, hard of hearing, alert today, Bilateral eye orbits swollen  Psych: appropriate mood and behavior, very pleasant        Assessment/Plan   Kelly Martinez is a 80 y.o. female with PMH significant for R cerebellar/verminan AVM/hemorrhage (9/2024 reversed with kcentra), uncontrolled HTN, HLD, HFpEF (TTE LVEF 74% 09/2024), JOY, COPD, recurrent DVT/PE's prev. 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 Typically receives all [facility]. Under [facility] Service for further management of HTN urgency and ADHF.     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 On coumadin  - s/p IVC filter removal 4/2016 d/t erosion  - (9/2024) presented w/AMS, CTH +R cerebellar-vermis hemorrhage, reversed with kcentra, no residual effects  - off coumadin since (9/2024) per primary neurologist @[facility] (Dr. Renee); OP office visit (3/5/2025):  -- hold OFF on AC for 1 year (9/2024-9/2025) given risk of cerebral bleeding, new start of Parkinson's meds (see below)  - no evidence for PE on CTA chest during last admit at  (9/2024)  - currently satting >90% on RA, reported feeling more SOB than usual  - V/Q scan 3/11 without evidence of perfusion defects/PE  - d-dimer 2675 (no prior level for comparison)  - BLE duplex US 3/11: negative for DVT, BLE Venous insufficiency noted, tanisha hose/tubigrip ordered  - s/p mechanical fall at home upon admit, +trauma to L-side of head (w/L forehead hematoma)  - imaging on admit  (CT[facility], CT C-spine): without acute changes/fractures  - no new neuro deficits  - lives at home alone, PT/OT consulted, MOCA score (3/13): 11/30, indicating mod-sev. Cognitive impairment--->patient/family agree to SNF at discharge  - cont. Home baby ASA and statin  - PRN tylenol for pain     Hypertensive Urgency, improving  Current hypotension, resolved  HLD  - BP on on admit: 208/112  - SBP past 24hrs: low 130s  - trazodone discontinued 3/12 due to day somnolence  - (3/13), hypotensive to 74/47, asymptomatic, evening carvedilol held, 500 ml bolus   - increase coreg to 12.5mg BID  - discontinue home lisinopril (no indication, if able to control BP w/other meds)  - cont. home imdur 60 mg daily, amlodipine 10mg daily  - hgb A1c 5.7%, TSH 0.98  - lipid panel: total cholesterol 127 HDL 45.3 LDL 62 Tgs 101  - cont. Home statin    Acute on chronic diastolic heart failure  Flash Pulmonary edema, resolved  - followed by ALMA ROSA Diana, @[facility]  - TTE OSH (9/2024): LVEF 74%, no WMAs, DD, negative bubble study  - TTE (3/11): EF 65-70%, Grade I DD  - admit BNP: 36 (prev. 170 in 9/2024)  - admit CXR: pulm edema, vasc. congestion  - admit wt: 98.4kg, today's wt: 96.7g (96kg standing)  - no home diuretics, s/p IVP Lasix 40mg x1 (3/10, 3/11, and 3/12)  - likely PRN diuretics at discharge, holiday today  - coreg 12.5mg BID, imdur 20mg daily, discontinue lisinopril as above  - Daily standing weights, strict I&O's, 2g sodium diet, 2L fluid restriction     NICOLÁS on CKD stage 4, improving  - Bl Cr worsening over past year ~2-3  - Cr 2.87 upon last discharge  - Cr today: 3.53 (3.94, 3.86, 4.17, 3.51, 2.95, 2.93, 2.73)  - improving w/diuretic holiday and discontinuation of ACEi  - urine osmolality WNL  - complete renal US unremarkable  -  FeUREA 38.4% suggesting intrinsic damage, diuretic plan as above (no c/f retention, UOP 1L past 24hrs w/69cc PVR on bladder scan)  - Renally dose meds, avoid nephrotoxins    Parkinson's  Disease  Schizophrenia  Depression  - diagnosed ~3-4 years ago  - new start of carvidopa/levodopa 25/200mg TID (on 3/5/2025), cont. In-house  - admit ECG w/Qtc of 455ms  - stopped home trazodone (3/12)   - Psychiatry following, appreciate recs (3/15):  -- consulted for further medication changes given ongoing somnolence and recent falls, appreciate recs (med changes resolved somnolence)  -- cont. Seroquel at reduced dose (150mg nightly, down from home 300mg), cont. Wellbutrin at 150mg daily, duloxetine 30mg daily   - cont. OP f/up w/primary neurologist, behavioral health, & PCP     JOY  COPD  Chronic Tobacco Use  - refuses CPAP  - nicotine patch   - cont. encouraging cessation    DVT ppx: SCDs  NOK: Paola Lowry, daughter: [phone]  DISPO: PT/OT SNF, patient/daughter agree mod-severe cognitive impairment per MOCA score (11/30 on 3/13), resides at home alone, has HHA 2-4 hours daily  - discharge SNF ?Tuesday, pending PT/OT re-eval/pre-cert    All labs, vital signs, tests & imaging results, and medications were reviewed.    Patient seen and discussed with Dr. Mustafa    Code Status: Full Code     Danica Kay, APRN-CNP

## 2025-03-17 LAB
ALBUMIN SERPL BCP-MCNC: 3.5 G/DL (ref 3.4–5)
ANION GAP SERPL CALC-SCNC: 12 MMOL/L (ref 10–20)
BUN SERPL-MCNC: 41 MG/DL (ref 6–23)
CALCIUM SERPL-MCNC: 9.3 MG/DL (ref 8.6–10.6)
CHLORIDE SERPL-SCNC: 110 MMOL/L (ref 98–107)
CO2 SERPL-SCNC: 21 MMOL/L (ref 21–32)
CREAT SERPL-MCNC: 3.08 MG/DL (ref 0.5–1.05)
EGFRCR SERPLBLD CKD-EPI 2021: 15 ML/MIN/1.73M*2
ERYTHROCYTE [DISTWIDTH] IN BLOOD BY AUTOMATED COUNT: 15.2 % (ref 11.5–14.5)
GLUCOSE SERPL-MCNC: 109 MG/DL (ref 74–99)
HCT VFR BLD AUTO: 31.1 % (ref 36–46)
HGB BLD-MCNC: 9.8 G/DL (ref 12–16)
MAGNESIUM SERPL-MCNC: 1.95 MG/DL (ref 1.6–2.4)
MCH RBC QN AUTO: 29.7 PG (ref 26–34)
MCHC RBC AUTO-ENTMCNC: 31.5 G/DL (ref 32–36)
MCV RBC AUTO: 94 FL (ref 80–100)
NRBC BLD-RTO: 0 /100 WBCS (ref 0–0)
PHOSPHATE SERPL-MCNC: 3.9 MG/DL (ref 2.5–4.9)
PLATELET # BLD AUTO: 171 X10*3/UL (ref 150–450)
POTASSIUM SERPL-SCNC: 4.8 MMOL/L (ref 3.5–5.3)
RBC # BLD AUTO: 3.3 X10*6/UL (ref 4–5.2)
SODIUM SERPL-SCNC: 138 MMOL/L (ref 136–145)
WBC # BLD AUTO: 4.7 X10*3/UL (ref 4.4–11.3)

## 2025-03-17 PROCEDURE — 2500000001 HC RX 250 WO HCPCS SELF ADMINISTERED DRUGS (ALT 637 FOR MEDICARE OP): Performed by: NURSE PRACTITIONER

## 2025-03-17 PROCEDURE — S4991 NICOTINE PATCH NONLEGEND: HCPCS | Performed by: NURSE PRACTITIONER

## 2025-03-17 PROCEDURE — 99233 SBSQ HOSP IP/OBS HIGH 50: CPT | Performed by: INTERNAL MEDICINE

## 2025-03-17 PROCEDURE — 83735 ASSAY OF MAGNESIUM: CPT | Performed by: NURSE PRACTITIONER

## 2025-03-17 PROCEDURE — 97535 SELF CARE MNGMENT TRAINING: CPT | Mod: GO | Performed by: OCCUPATIONAL THERAPIST

## 2025-03-17 PROCEDURE — 80069 RENAL FUNCTION PANEL: CPT | Performed by: NURSE PRACTITIONER

## 2025-03-17 PROCEDURE — 2500000002 HC RX 250 W HCPCS SELF ADMINISTERED DRUGS (ALT 637 FOR MEDICARE OP, ALT 636 FOR OP/ED)

## 2025-03-17 PROCEDURE — 36415 COLL VENOUS BLD VENIPUNCTURE: CPT | Performed by: NURSE PRACTITIONER

## 2025-03-17 PROCEDURE — 1200000002 HC GENERAL ROOM WITH TELEMETRY DAILY

## 2025-03-17 PROCEDURE — 2500000002 HC RX 250 W HCPCS SELF ADMINISTERED DRUGS (ALT 637 FOR MEDICARE OP, ALT 636 FOR OP/ED): Performed by: NURSE PRACTITIONER

## 2025-03-17 PROCEDURE — 97530 THERAPEUTIC ACTIVITIES: CPT | Mod: GO | Performed by: OCCUPATIONAL THERAPIST

## 2025-03-17 PROCEDURE — 85027 COMPLETE CBC AUTOMATED: CPT | Performed by: NURSE PRACTITIONER

## 2025-03-17 RX ADMIN — QUETIAPINE FUMARATE 150 MG: 25 TABLET ORAL at 21:08

## 2025-03-17 RX ADMIN — FOLIC ACID 1 MG: 1 TABLET ORAL at 08:45

## 2025-03-17 RX ADMIN — ISOSORBIDE MONONITRATE 60 MG: 60 TABLET, EXTENDED RELEASE ORAL at 08:45

## 2025-03-17 RX ADMIN — AMLODIPINE BESYLATE 10 MG: 10 TABLET ORAL at 08:45

## 2025-03-17 RX ADMIN — TERAZOSIN HYDROCHLORIDE 2 MG: 2 CAPSULE ORAL at 08:45

## 2025-03-17 RX ADMIN — ACETAMINOPHEN 650 MG: 325 TABLET, FILM COATED ORAL at 21:07

## 2025-03-17 RX ADMIN — CARBIDOPA AND LEVODOPA 1 TABLET: 25; 100 TABLET ORAL at 21:10

## 2025-03-17 RX ADMIN — NICOTINE 1 PATCH: 14 PATCH, EXTENDED RELEASE TRANSDERMAL at 08:47

## 2025-03-17 RX ADMIN — ATORVASTATIN CALCIUM 40 MG: 40 TABLET, FILM COATED ORAL at 21:08

## 2025-03-17 RX ADMIN — CARVEDILOL 12.5 MG: 12.5 TABLET, FILM COATED ORAL at 21:09

## 2025-03-17 RX ADMIN — CARVEDILOL 12.5 MG: 12.5 TABLET, FILM COATED ORAL at 08:45

## 2025-03-17 RX ADMIN — CARBIDOPA AND LEVODOPA 1 TABLET: 25; 100 TABLET ORAL at 14:57

## 2025-03-17 RX ADMIN — ASPIRIN 81 MG CHEWABLE TABLET 81 MG: 81 TABLET CHEWABLE at 08:45

## 2025-03-17 RX ADMIN — DULOXETINE HYDROCHLORIDE 30 MG: 30 CAPSULE, DELAYED RELEASE ORAL at 08:44

## 2025-03-17 RX ADMIN — CARBIDOPA AND LEVODOPA 1 TABLET: 25; 100 TABLET ORAL at 08:45

## 2025-03-17 RX ADMIN — BUPROPION HYDROCHLORIDE 150 MG: 150 TABLET, EXTENDED RELEASE ORAL at 08:44

## 2025-03-17 ASSESSMENT — COGNITIVE AND FUNCTIONAL STATUS - GENERAL
MOVING TO AND FROM BED TO CHAIR: A LITTLE
CLIMB 3 TO 5 STEPS WITH RAILING: A LOT
TURNING FROM BACK TO SIDE WHILE IN FLAT BAD: A LITTLE
EATING MEALS: A LITTLE
HELP NEEDED FOR BATHING: A LOT
DRESSING REGULAR UPPER BODY CLOTHING: A LITTLE
DRESSING REGULAR LOWER BODY CLOTHING: A LITTLE
TURNING FROM BACK TO SIDE WHILE IN FLAT BAD: A LITTLE
HELP NEEDED FOR BATHING: A LITTLE
MOBILITY SCORE: 17
HELP NEEDED FOR BATHING: A LOT
DRESSING REGULAR LOWER BODY CLOTHING: A LOT
DRESSING REGULAR LOWER BODY CLOTHING: A LITTLE
HELP NEEDED FOR BATHING: A LOT
STANDING UP FROM CHAIR USING ARMS: A LITTLE
MOBILITY SCORE: 17
PERSONAL GROOMING: A LITTLE
PERSONAL GROOMING: A LOT
PERSONAL GROOMING: A LOT
MOVING TO AND FROM BED TO CHAIR: A LITTLE
CLIMB 3 TO 5 STEPS WITH RAILING: A LOT
MOVING TO AND FROM BED TO CHAIR: A LITTLE
TOILETING: A LOT
TOILETING: A LOT
MOBILITY SCORE: 18
WALKING IN HOSPITAL ROOM: A LOT
EATING MEALS: A LITTLE
STANDING UP FROM CHAIR USING ARMS: A LITTLE
TOILETING: A LITTLE
TOILETING: A LOT
DAILY ACTIVITIY SCORE: 14
DRESSING REGULAR UPPER BODY CLOTHING: A LITTLE
DAILY ACTIVITIY SCORE: 18
WALKING IN HOSPITAL ROOM: A LITTLE
CLIMB 3 TO 5 STEPS WITH RAILING: A LOT
DAILY ACTIVITIY SCORE: 16
DRESSING REGULAR UPPER BODY CLOTHING: A LITTLE
DRESSING REGULAR UPPER BODY CLOTHING: A LOT
DAILY ACTIVITIY SCORE: 14
DRESSING REGULAR LOWER BODY CLOTHING: A LOT
WALKING IN HOSPITAL ROOM: A LOT
STANDING UP FROM CHAIR USING ARMS: A LITTLE
PERSONAL GROOMING: A LITTLE
TURNING FROM BACK TO SIDE WHILE IN FLAT BAD: A LITTLE
EATING MEALS: A LITTLE

## 2025-03-17 ASSESSMENT — PAIN SCALES - GENERAL
PAINLEVEL_OUTOF10: 0 - NO PAIN

## 2025-03-17 ASSESSMENT — PAIN - FUNCTIONAL ASSESSMENT
PAIN_FUNCTIONAL_ASSESSMENT: 0-10

## 2025-03-17 ASSESSMENT — ACTIVITIES OF DAILY LIVING (ADL): HOME_MANAGEMENT_TIME_ENTRY: 12

## 2025-03-17 NOTE — CARE PLAN
The patient's goals for the shift include To sit up in chair for meals and try walking a little bit today    The clinical goals for the shift include Patient will ambulate three times a  day    Over the shift, the patient did work and walk with OT and ambulatory aide with walker.  She reports 0/10 and less REYNOLDS when ambulating.  Patient sat up  in chair during the day but did not eat breakfast or lunch.  Patient remained safe free from falls and injury this shift.    Note:  Patient to be discharged to SNF tomorrow.      Problem: Fall/Injury  Goal: Not fall by end of shift  Outcome: Progressing  Goal: Be free from injury by end of the shift  Outcome: Progressing  Goal: Verbalize understanding of personal risk factors for fall in the hospital  Outcome: Progressing  Goal: Verbalize understanding of risk factor reduction measures to prevent injury from fall in the home  Outcome: Progressing  Goal: Use assistive devices by end of the shift  Outcome: Progressing  Goal: Pace activities to prevent fatigue by end of the shift  Outcome: Progressing     Problem: Pain  Goal: Takes deep breaths with improved pain control throughout the shift  Outcome: Progressing  Goal: Turns in bed with improved pain control throughout the shift  Outcome: Progressing  Goal: Walks with improved pain control throughout the shift  Outcome: Progressing  Goal: Performs ADL's with improved pain control throughout shift  Outcome: Progressing  Goal: Participates in PT with improved pain control throughout the shift  Outcome: Progressing  Goal: Free from opioid side effects throughout the shift  Outcome: Progressing  Goal: Free from acute confusion related to pain meds throughout the shift  Outcome: Progressing     Problem: Respiratory  Goal: Clear secretions with interventions this shift  Outcome: Progressing  Goal: Minimize anxiety/maximize coping throughout shift  Outcome: Progressing  Goal: Minimal/no exertional discomfort or dyspnea this  shift  Outcome: Progressing  Goal: No signs of respiratory distress (eg. Use of accessory muscles. Peds grunting)  Outcome: Progressing  Goal: Patent airway maintained this shift  Outcome: Progressing  Goal: Tolerate mechanical ventilation evidenced by VS/agitation level this shift  Outcome: Progressing  Goal: Tolerate pulmonary toileting this shift  Outcome: Progressing  Goal: Verbalize decreased shortness of breath this shift  Outcome: Progressing  Goal: Wean oxygen to maintain O2 saturation per order/standard this shift  Outcome: Progressing  Goal: Increase self care and/or family involvement in next 24 hours  Outcome: Progressing     Problem: Pain - Adult  Goal: Verbalizes/displays adequate comfort level or baseline comfort level  Outcome: Progressing     Problem: Safety - Adult  Goal: Free from fall injury  Outcome: Progressing     Problem: Discharge Planning  Goal: Discharge to home or other facility with appropriate resources  Outcome: Progressing     Problem: Chronic Conditions and Co-morbidities  Goal: Patient's chronic conditions and co-morbidity symptoms are monitored and maintained or improved  Outcome: Progressing     Problem: Nutrition  Goal: Nutrient intake appropriate for maintaining nutritional needs  Outcome: Progressing     Problem: Skin  Goal: Decreased wound size/increased tissue granulation at next dressing change  Outcome: Progressing  Flowsheets (Taken 3/17/2025 1743)  Decreased wound size/increased tissue granulation at next dressing change:   Promote sleep for wound healing   Protective dressings over bony prominences  Goal: Participates in plan/prevention/treatment measures  Outcome: Progressing  Flowsheets (Taken 3/17/2025 1743)  Participates in plan/prevention/treatment measures:   Elevate heels   Increase activity/out of bed for meals  Goal: Prevent/manage excess moisture  Outcome: Progressing  Flowsheets (Taken 3/17/2025 1743)  Prevent/manage excess moisture:   Cleanse  incontinence/protect with barrier cream   Follow provider orders for dressing changes   Monitor for/manage infection if present   Moisturize dry skin  Goal: Prevent/minimize sheer/friction injuries  Outcome: Progressing  Flowsheets (Taken 3/17/2025 1743)  Prevent/minimize sheer/friction injuries:   HOB 30 degrees or less   Increase activity/out of bed for meals   Turn/reposition every 2 hours/use positioning/transfer devices  Goal: Promote/optimize nutrition  Outcome: Progressing  Flowsheets (Taken 3/17/2025 1743)  Promote/optimize nutrition:   Consume > 50% meals/supplements   Monitor/record intake including meals   Offer water/supplements/favorite foods  Goal: Promote skin healing  Outcome: Progressing  Flowsheets (Taken 3/17/2025 1743)  Promote skin healing:   Assess skin/pad under line(s)/device(s)   Ensure correct size (line/device) and apply per  instructions   Protective dressings over bony prominences   Rotate device position/do not position patient on device   Turn/reposition every 2 hours/use positioning/transfer devices

## 2025-03-17 NOTE — CARE PLAN
The patient's goals for the shift include To sit up in chair for meals and try walking a little bit today    The clinical goals for the shift include Patient will remain HDS throughout shift    Over the shift, the patient remained HDS. Frequent rounding was done to make sure patient's needs were met.       Problem: Fall/Injury  Goal: Not fall by end of shift  Outcome: Progressing  Goal: Be free from injury by end of the shift  Outcome: Progressing  Goal: Verbalize understanding of personal risk factors for fall in the hospital  Outcome: Progressing  Goal: Verbalize understanding of risk factor reduction measures to prevent injury from fall in the home  Outcome: Progressing  Goal: Use assistive devices by end of the shift  Outcome: Progressing  Goal: Pace activities to prevent fatigue by end of the shift  Outcome: Progressing     Problem: Pain  Goal: Takes deep breaths with improved pain control throughout the shift  Outcome: Progressing  Goal: Turns in bed with improved pain control throughout the shift  Outcome: Progressing  Goal: Walks with improved pain control throughout the shift  Outcome: Progressing  Goal: Performs ADL's with improved pain control throughout shift  Outcome: Progressing  Goal: Participates in PT with improved pain control throughout the shift  Outcome: Progressing  Goal: Free from opioid side effects throughout the shift  Outcome: Progressing  Goal: Free from acute confusion related to pain meds throughout the shift  Outcome: Progressing

## 2025-03-17 NOTE — PROGRESS NOTES
Subjective Data:  Denies CP/SOB at rest. SBPs improved w/med changes yesterday.    - SNF pending pre-cert; OT to re-evaluate today    Overnight Events:   No acute events overnight.     Objective Data:  Last Recorded Vitals:  Vitals:    03/16/25 2038 03/16/25 2300 03/17/25 0416 03/17/25 0748   BP: 138/82 140/84 118/77 127/85   BP Location: Left arm Left arm Left arm Left arm   Patient Position: Lying Lying Lying Lying   Pulse: 72   72   Resp: 18   15   Temp: 36.2 °C (97.2 °F) 36.3 °C (97.3 °F) 36.2 °C (97.2 °F) 36.6 °C (97.9 °F)   TempSrc: Temporal Temporal Temporal Temporal   SpO2: 97% 97% 93% 96%   Weight:   96.2 kg (212 lb)    Height:         Last Labs:  Results for orders placed or performed during the hospital encounter of 03/10/25 (from the past 24 hours)   CBC   Result Value Ref Range    WBC 5.0 4.4 - 11.3 x10*3/uL    nRBC 0.0 0.0 - 0.0 /100 WBCs    RBC 3.46 (L) 4.00 - 5.20 x10*6/uL    Hemoglobin 10.2 (L) 12.0 - 16.0 g/dL    Hematocrit 33.0 (L) 36.0 - 46.0 %    MCV 95 80 - 100 fL    MCH 29.5 26.0 - 34.0 pg    MCHC 30.9 (L) 32.0 - 36.0 g/dL    RDW 15.2 (H) 11.5 - 14.5 %    Platelets 175 150 - 450 x10*3/uL   Renal function panel   Result Value Ref Range    Glucose 89 74 - 99 mg/dL    Sodium 136 136 - 145 mmol/L    Potassium 4.9 3.5 - 5.3 mmol/L    Chloride 110 (H) 98 - 107 mmol/L    Bicarbonate 18 (L) 21 - 32 mmol/L    Anion Gap 13 10 - 20 mmol/L    Urea Nitrogen 43 (H) 6 - 23 mg/dL    Creatinine 3.28 (H) 0.50 - 1.05 mg/dL    eGFR 14 (L) >60 mL/min/1.73m*2    Calcium 9.3 8.6 - 10.6 mg/dL    Phosphorus 3.7 2.5 - 4.9 mg/dL    Albumin 3.6 3.4 - 5.0 g/dL   Magnesium   Result Value Ref Range    Magnesium 2.09 1.60 - 2.40 mg/dL        TROPHS   Date/Time Value Ref Range Status   03/10/2025 12:27 PM 28 0 - 34 ng/L Final   03/10/2025 11:03 AM 38 0 - 34 ng/L Final   09/21/2024 04:29 AM 35 0 - 34 ng/L Final   03/30/2024 03:19 PM 19 0 - 34 ng/L Final   03/30/2024 02:09 PM 18 0 - 34 ng/L Final   12/28/2023 02:33 AM 19 0 - 34  ng/L Final     BNP   Date/Time Value Ref Range Status   03/10/2025 11:03 AM 36 0 - 99 pg/mL Final   09/21/2024 04:29  0 - 99 pg/mL Final     HGBA1C   Date/Time Value Ref Range Status   03/10/2025 10:49 PM 5.7 See comment % Final   09/21/2024 04:29 AM 5.9 See comment % Final     LDLCALC   Date/Time Value Ref Range Status   03/10/2025 11:03 AM 62 <=99 mg/dL Final     Comment:                                 Near   Borderline      AGE      Desirable  Optimal    High     High     Very High     0-19 Y     0 - 109     ---    110-129   >/= 130     ----    20-24 Y     0 - 119     ---    120-159   >/= 160     ----      >24 Y     0 -  99   100-129  130-159   160-189     >/=190       VLDL   Date/Time Value Ref Range Status   03/10/2025 11:03 AM 20 0 - 40 mg/dL Final   12/07/2022 02:20 PM 16 0 - 40 mg/dL Final      Last I/O:  I/O last 3 completed shifts:  In: 480 (5 mL/kg) [P.O.:480]  Out: - (0 mL/kg)   Weight: 96.2 kg     Past Cardiology Tests (Last 3 Years):  EKG:  ECG 12 lead 03/10/2025  Sinus rhythm with 1st degree AV block  Right bundle branch block  Abnormal ECG  When compared with ECG of 04-OCT-2024 05:28,  Sinus rhythm has replaced Ectopic atrial rhythm  QRS axis Shifted right  T wave inversion no longer evident in Lateral leads  See ED provider note for full interpretation and clinical correlation  Confirmed by Renita Chen ([id number]) on 3/11/2025 4:30:43 AM    Echo:  Transthoracic Echo (TTE) Complete 09/21/2024:  1. Left ventricular ejection fraction is normal, calculated by Regan's biplane at 74%.  2. Spectral Doppler shows an impaired relaxation pattern of left ventricular diastolic filling.  3. There is mild concentric left ventricular hypertrophy.  4. There is normal right ventricular global systolic function.  5. The left atrium is mildly dilated.  6. Mildly elevated right ventricular systolic pressure.  7. There is no evidence of a patent foramen ovale.  8. Compared with study dated 12/29/2023, no  significant change.     Transthoracic Echo (TTE) Limited 12/29/2023:  1. Left ventricular systolic function is normal with a 55-60% estimated ejection fraction.  2. Poorly visualized anatomical structures due to suboptimal image quality.  3. Spectral Doppler shows an impaired relaxation pattern of left ventricular diastolic filling.     Ejection Fractions:  EF   Date/Time Value Ref Range Status   03/11/2025 11:00 AM 68 %    09/21/2024 11:50 AM 74 %    12/29/2023 10:48 AM 54       Cath:  No results found for this or any previous visit from the past 1095 days.  Stress Test:  No results found for this or any previous visit from the past 1095 days.  Cardiac Imaging:  No results found for this or any previous visit from the past 1095 days.    Inpatient Medications:  Scheduled medications   Medication Dose Route Frequency    amLODIPine  10 mg oral Daily    aspirin  81 mg oral Daily    atorvastatin  40 mg oral Nightly    buPROPion XL  150 mg oral Daily    carbidopa-levodopa  1 tablet oral TID    carvedilol  12.5 mg oral BID    DULoxetine  30 mg oral Daily    folic acid  1 mg oral Daily    isosorbide mononitrate ER  60 mg oral Daily    nicotine  1 patch transdermal Daily    perflutren lipid microspheres  0.5-10 mL of dilution intravenous Once in imaging    QUEtiapine  150 mg oral Nightly    sennosides  2 tablet oral BID    terazosin  2 mg oral Daily     PRN medications   Medication    acetaminophen     Continuous Medications   Medication Dose Last Rate     Physical Exam:  General: NAD, lying in bed  Skin: warm and dry throughout, L forehead hematoma w/ecchymosis, +tenderness  Head/ neck: +JVD above clavicle (prev. At earlobe)  Cardiac: RRR, S1, S2, NSR HR 70's on telemetry  Pulm: diminished anteriorly, on RA  GI: soft, nontender, non-distended, obese  : yellow draining in external catheter canister (more concentrated today)  Extremities: trace edema in BLE, L>R w/L-calf tenderness (improving), CLOVIS hose in place  Neuro: no  focal neuro deficits, a+ox4, hard of hearing, alert today, Bilateral eye orbits swollen (improving)  Psych: appropriate mood and behavior, very pleasant        Assessment/Plan   Kelly Martinez is a 80 y.o. female with PMH significant for R cerebellar/verminan AVM/hemorrhage (9/2024 reversed with kcentra), uncontrolled HTN, HLD, HFpEF (TTE LVEF 74% 09/2024), JOY, COPD, recurrent DVT/PE's prev. 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 Typically receives all her care at [facility]. Under [facility]VI Service for further management of HTN urgency and ADHF.     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 On coumadin  - s/p IVC filter removal 4/2016 d/t erosion  - (9/2024) presented w/AMS, CTH +R cerebellar-vermis hemorrhage, reversed with kcentra, no residual effects  - off coumadin since (9/2024) per primary neurologist @[facility] (Dr. Renee); OP office visit (3/5/2025):  -- hold OFF on AC for 1 year (9/2024-9/2025) given risk of cerebral bleeding, new start of Parkinson's meds (see below)  - no evidence for PE on CTA chest during last admit at  (9/2024)  - currently satting >90% on RA, reported feeling more SOB than usual  - V/Q scan 3/11 without evidence of perfusion defects/PE  - d-dimer 2675 (no prior level for comparison)  - BLE duplex US 3/11: negative for DVT, BLE Venous insufficiency noted, tanisha hose/tubigrip ordered  - s/p mechanical fall at home upon admit, +trauma to L-side of head (w/L forehead hematoma)  - imaging on admit (CTH, CT C-spine): without acute changes/fractures  - no new neuro deficits  - lives at home alone, PT/OT consulted, MOCA score  (3/13): 11/30, indicating mod-sev. Cognitive impairment--->patient/family agree to SNF at discharge  - cont. Home baby ASA and statin  - PRN tylenol for pain     Hypertensive Urgency, improving  Current hypotension, resolved  HLD  - BP on on admit: 208/112  - SBP past 24hrs: low 130s  - trazodone discontinued 3/12 due to day somnolence  - (3/13), hypotensive to 74/47, asymptomatic, evening carvedilol held, 500 ml bolus   - discontinued home lisinopril (no indication, if able to control BP w/other meds)  - cont. home imdur 60 mg daily, amlodipine 10mg daily  - cont. coreg to 12.5mg BID  - hgb A1c 5.7%, TSH 0.98  - lipid panel: total cholesterol 127 HDL 45.3 LDL 62 Tgs 101  - cont. Home statin    Acute on chronic diastolic heart failure  Flash Pulmonary edema, resolved  - followed by ALMA ROSA [place], @[facility]  - TTE OSH (9/2024): LVEF 74%, no WMAs, DD, negative bubble study  - TTE (3/11): EF 65-70%, Grade I DD  - admit BNP: 36 (prev. 170 in 9/2024)  - admit CXR: pulm edema, vasc. congestion  - admit wt: 98.4kg, today's wt: 96.2g (96.7, 96kg standing)  - no home diuretics, s/p IVP Lasix 40mg x1 (3/10, 3/11, and 3/12)  - no maintenance diuretic needed, PRN at discharge  - coreg 12.5mg BID, imdur 20mg daily, discontinue lisinopril as above  - Daily standing weights, strict I&O's, 2g sodium diet, 2L fluid restriction     NICOLÁS on CKD stage 4, improving  - Bl Cr worsening over past year ~2-3  - Cr 2.87 upon last discharge  - Cr today: 3.28 (3.53,3.94, 3.86, 4.17, 3.51, 2.95, 2.93, 2.73)  - improving w/diuretic holiday and discontinuation of ACEi  - urine osmolality WNL  - complete renal US unremarkable  -  FeUREA 38.4% suggesting intrinsic damage, diuretic plan as above (no c/f retention, on 3/15 UOP 1L w/69cc PVR on bladder scan)--->no UOP recorded yesterday, however, confirmed w/RN patient voiding in toilet without hat  - Renally dose meds, avoid nephrotoxins    Parkinson's Disease  Schizophrenia  Depression  - diagnosed ~3-4  years ago  - new start of carvidopa/levodopa 25/200mg TID (on 3/5/2025), cont. In-house  - admit ECG w/Qtc of 455ms  - stopped home trazodone (3/12)   - Psychiatry following, appreciate recs (3/16):  -- consulted for further medication changes given ongoing somnolence and recent falls, appreciate recs (med changes resolved somnolence)  -- cont. Seroquel at reduced dose (150mg nightly, down from home 300mg), cont. Wellbutrin at 150mg daily, duloxetine 30mg daily   - cont. OP f/up w/primary neurologist, behavioral health, & PCP     JOY  COPD  Chronic Tobacco Use  - refuses CPAP  - nicotine patch   - cont. encouraging cessation    DVT ppx: SCDs  NOK: Paola Lowry, daughter: [phone]  DISPO: PT/OT SNF, patient/daughter agree mod-severe cognitive impairment per MOCA score (11/30 on 3/13), resides at home alone, has HHA 2-4 hours daily  - discharge SNF ?today vs. Tomorrow, pending OT re-eval & pre-cert    All labs, vital signs, tests & imaging results, and medications were reviewed.    Patient seen and discussed with Dr. Herzog    Code Status: Full Code     YUNIOR Morgan-CNP

## 2025-03-17 NOTE — PROGRESS NOTES
Occupational Therapy    Occupational Therapy Treatment    Name: Kelly Martinez  MRN: [id number]  : 1945  Date: 25  Room: [address] [place] [address]A      Time Calculation  Start Time: 1458  Stop Time: 1522  Time Calculation (min): 24 min    Assessment:  OT Assessment: Pt will benefit from continued skilled OT to increase independence in ADLs, functional mobility, activity tolerance, safety, strength, and cognition.  Prognosis: Good  Barriers to Discharge Home: Physical needs, Caregiver assistance  Caregiver Assistance: Caregiver assistance needed per identified barriers - however, level of patient's required assistance exceeds assistance available at home  Physical Needs: Intermittent mobility assistance needed, Intermittent ADL assistance needed, High falls risk due to function or environment  Evaluation/Treatment Tolerance: Patient tolerated treatment well  Medical Staff Made Aware: Yes  End of Session Communication: Bedside nurse  End of Session Patient Position: Up in chair, Alarm off, not on at start of session  Plan:  Treatment Interventions: ADL retraining, Functional transfer training, UE strengthening/ROM, Endurance training, Patient/family training, Equipment evaluation/education  OT Frequency: 3 times per week  OT Discharge Recommendations: Moderate intensity level of continued care  Equipment Recommended upon Discharge:  (TBD)  OT Recommended Transfer Status: Assist of 1  OT - OK to Discharge: Yes    Subjective   General:  OT Last Visit  OT Received On: 25  Reason for Referral: presenting to ED from home s/p mechanical fall  Past Medical History Relevant to Rehab: R cerebellar/verminan AVM/hemorrhage (2024 reversed with kcentra), uncontrolled HTN, HLD, HFpEF (LINDA LVEF 74% 2024), JOY, COPD, recurrent DVT/PE's prev. 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 and recurrent falls, s/p IVC filter removed 2016), CKD stage IV, chronic tobacco use, schizophrenia, Parkinson's disease, & depression  Prior  to Session Communication: Bedside nurse  Patient Position Received: Up in chair  Family/Caregiver Present: No   Precautions:  Medical Precautions: Fall precautions  Vitals:    Lines/Tubes/Drains:       Cognition:  Overall Cognitive Status: Within Functional Limits  Insight: Within function limits  Impulsive: Within functional limits  Processing Speed: Within funtional limits    Pain Assessment:  Pain Assessment  Pain Assessment: 0-10  0-10 (Numeric) Pain Score: 0 - No pain     Objective   Activities of Daily Living:      Grooming  Grooming Level of Assistance: Setup, Close supervision  Grooming Where Assessed: Chair           LE Dressing  LE Dressing: Yes  Sock Level of Assistance: Close supervision  LE Dressing Where Assessed: Chair       Functional Standing Tolerance:  Functional Mobility  Functional Mobility Performed: Yes  Functional Mobility 1  Surface 1: Level tile  Device 1: Rolling walker  Bed Mobility/Transfers:   Bed Mobility  Bed Mobility: Yes  Bed Mobility 1  Bed Mobility 1: Sitting to supine  Level of Assistance 1: Close supervision  Bed Mobility 2  Bed Mobility  2: Supine to sitting  Level of Assistance 2: Contact guard (HOB slightly raised)  Transfers  Transfer: Yes  Transfer 1  Transfer From 1: Sit to, Stand to  Transfer to 1: Stand, Sit  Technique 1: Sit to stand, Stand to sit  Transfer Device 1: Walker  Transfer Level of Assistance 1: Contact guard                Balance:  Dynamic Sitting Balance  Dynamic Sitting-Balance Support: Feet supported  Dynamic Sitting-Level of Assistance: Independent  Dynamic Standing Balance  Dynamic Standing-Balance Support: Bilateral upper extremity supported  Dynamic Standing-Level of Assistance: Minimum assistance  Static Sitting Balance  Static Sitting-Balance Support: Feet supported  Static Sitting-Level of Assistance: Independent  Static Standing Balance  Static Standing-Balance Support: Bilateral upper extremity supported  Static Standing-Level of Assistance:  Contact guard  Communication:     Splinting:     Therapy/Activity:   Therapeutic Exercise  Therapeutic Exercise Performed: Yes  Therapeutic Exercise Activity 1: transfers; functional mobility        Sensory:     Cognitive Skill Development:     Vision:     Strength:  Strength  Strength Comments: generalized deconditioning  Other Activity:         Outcome Measures:  [facility] Daily Activity  Putting on and taking off regular lower body clothing: A little  Bathing (including washing, rinsing, drying): A lot  Putting on and taking off regular upper body clothing: A lot  Toileting, which includes using toilet, bedpan or urinal: A lot  Taking care of personal grooming such as brushing teeth: A little  Eating Meals: None  Daily Activity - Total Score: 16     Education Documentation  Body Mechanics, taught by Rosangela Mccall OT at 3/17/2025  3:46 PM.  Learner: Patient  Readiness: Acceptance  Method: Explanation  Response: Verbalizes Understanding    Education Comments  No comments found.        Goals:  Encounter Problems       Encounter Problems (Active)       ADLs       Patient with complete upper body dressing with stand by assist level of assistance donning and doffing all UE clothes with no adaptive equipment while edge of bed  (Progressing)       Start:  03/11/25    Expected End:  03/25/25            Patient will complete toileting including hygiene clothing management/hygiene with stand by assist level of assistance and grab bars. (Progressing)       Start:  03/11/25    Expected End:  03/25/25               COGNITION/SAFETY       Patient will score WFL on standardized cognitive assessment with min cues and within reasonable time frame (Progressing)       Start:  03/11/25    Expected End:  03/25/25               MOBILITY       Patient will perform Functional mobility min Household distances with stand by assist level of assistance and least restrictive device in order to improve safety and functional mobility.  (Progressing)       Start:  03/11/25    Expected End:  03/25/25               TRANSFERS       Patient will perform bed mobility independent level of assistance and bed rails in order to improve safety and independence with mobility (Progressing)       Start:  03/11/25    Expected End:  03/25/25            Patient will complete sit to stand transfer with stand by assist level of assistance and least restrictive device in order to improve safety and prepare for out of bed mobility. (Progressing)       Start:  03/11/25    Expected End:  03/25/25 03/17/25 at 3:47 PM   Rosangela Mccall, OT   [phone]

## 2025-03-18 PROBLEM — I50.33 ACUTE ON CHRONIC DIASTOLIC HEART FAILURE: Status: ACTIVE | Noted: 2025-03-18

## 2025-03-18 PROBLEM — F20.9 SCHIZOPHRENIA: Status: ACTIVE | Noted: 2025-03-18

## 2025-03-18 PROBLEM — R41.89 COGNITIVE IMPAIRMENT: Status: ACTIVE | Noted: 2025-03-18

## 2025-03-18 PROBLEM — Z86.718 HX OF DEEP VENOUS THROMBOSIS: Status: ACTIVE | Noted: 2025-03-18

## 2025-03-18 PROBLEM — I16.0 HYPERTENSIVE URGENCY: Status: ACTIVE | Noted: 2025-03-18

## 2025-03-18 PROBLEM — N17.9 ACUTE KIDNEY INJURY SUPERIMPOSED ON CKD (CMS-HCC): Status: ACTIVE | Noted: 2025-03-18

## 2025-03-18 PROBLEM — N18.9 ACUTE KIDNEY INJURY SUPERIMPOSED ON CKD: Status: ACTIVE | Noted: 2025-03-18

## 2025-03-18 LAB
ALBUMIN SERPL BCP-MCNC: 3.6 G/DL (ref 3.4–5)
ANION GAP SERPL CALC-SCNC: 11 MMOL/L (ref 10–20)
BUN SERPL-MCNC: 49 MG/DL (ref 6–23)
CALCIUM SERPL-MCNC: 9.2 MG/DL (ref 8.6–10.6)
CHLORIDE SERPL-SCNC: 110 MMOL/L (ref 98–107)
CO2 SERPL-SCNC: 23 MMOL/L (ref 21–32)
CREAT SERPL-MCNC: 3.31 MG/DL (ref 0.5–1.05)
EGFRCR SERPLBLD CKD-EPI 2021: 14 ML/MIN/1.73M*2
ERYTHROCYTE [DISTWIDTH] IN BLOOD BY AUTOMATED COUNT: 15.1 % (ref 11.5–14.5)
GLUCOSE SERPL-MCNC: 99 MG/DL (ref 74–99)
HCT VFR BLD AUTO: 31.7 % (ref 36–46)
HGB BLD-MCNC: 9.7 G/DL (ref 12–16)
MAGNESIUM SERPL-MCNC: 2.03 MG/DL (ref 1.6–2.4)
MCH RBC QN AUTO: 29.7 PG (ref 26–34)
MCHC RBC AUTO-ENTMCNC: 30.6 G/DL (ref 32–36)
MCV RBC AUTO: 97 FL (ref 80–100)
NRBC BLD-RTO: 0 /100 WBCS (ref 0–0)
PHOSPHATE SERPL-MCNC: 4.3 MG/DL (ref 2.5–4.9)
PLATELET # BLD AUTO: 160 X10*3/UL (ref 150–450)
POTASSIUM SERPL-SCNC: 4.5 MMOL/L (ref 3.5–5.3)
RBC # BLD AUTO: 3.27 X10*6/UL (ref 4–5.2)
SODIUM SERPL-SCNC: 139 MMOL/L (ref 136–145)
WBC # BLD AUTO: 4.5 X10*3/UL (ref 4.4–11.3)

## 2025-03-18 PROCEDURE — 2500000002 HC RX 250 W HCPCS SELF ADMINISTERED DRUGS (ALT 637 FOR MEDICARE OP, ALT 636 FOR OP/ED): Performed by: NURSE PRACTITIONER

## 2025-03-18 PROCEDURE — 99232 SBSQ HOSP IP/OBS MODERATE 35: CPT | Performed by: INTERNAL MEDICINE

## 2025-03-18 PROCEDURE — 97530 THERAPEUTIC ACTIVITIES: CPT | Mod: GP

## 2025-03-18 PROCEDURE — 2500000002 HC RX 250 W HCPCS SELF ADMINISTERED DRUGS (ALT 637 FOR MEDICARE OP, ALT 636 FOR OP/ED)

## 2025-03-18 PROCEDURE — 80069 RENAL FUNCTION PANEL: CPT | Performed by: NURSE PRACTITIONER

## 2025-03-18 PROCEDURE — 36415 COLL VENOUS BLD VENIPUNCTURE: CPT | Performed by: NURSE PRACTITIONER

## 2025-03-18 PROCEDURE — 2500000001 HC RX 250 WO HCPCS SELF ADMINISTERED DRUGS (ALT 637 FOR MEDICARE OP): Performed by: NURSE PRACTITIONER

## 2025-03-18 PROCEDURE — S4991 NICOTINE PATCH NONLEGEND: HCPCS | Performed by: NURSE PRACTITIONER

## 2025-03-18 PROCEDURE — 99232 SBSQ HOSP IP/OBS MODERATE 35: CPT | Performed by: STUDENT IN AN ORGANIZED HEALTH CARE EDUCATION/TRAINING PROGRAM

## 2025-03-18 PROCEDURE — 83735 ASSAY OF MAGNESIUM: CPT | Performed by: NURSE PRACTITIONER

## 2025-03-18 PROCEDURE — 97116 GAIT TRAINING THERAPY: CPT | Mod: GP

## 2025-03-18 PROCEDURE — 85027 COMPLETE CBC AUTOMATED: CPT | Performed by: NURSE PRACTITIONER

## 2025-03-18 PROCEDURE — 1200000002 HC GENERAL ROOM WITH TELEMETRY DAILY

## 2025-03-18 RX ADMIN — ACETAMINOPHEN 650 MG: 325 TABLET, FILM COATED ORAL at 21:24

## 2025-03-18 RX ADMIN — QUETIAPINE FUMARATE 150 MG: 25 TABLET ORAL at 21:25

## 2025-03-18 RX ADMIN — NICOTINE 1 PATCH: 14 PATCH, EXTENDED RELEASE TRANSDERMAL at 09:09

## 2025-03-18 RX ADMIN — AMLODIPINE BESYLATE 10 MG: 10 TABLET ORAL at 09:08

## 2025-03-18 RX ADMIN — CARBIDOPA AND LEVODOPA 1 TABLET: 25; 100 TABLET ORAL at 09:09

## 2025-03-18 RX ADMIN — TERAZOSIN HYDROCHLORIDE 2 MG: 2 CAPSULE ORAL at 09:09

## 2025-03-18 RX ADMIN — CARBIDOPA AND LEVODOPA 1 TABLET: 25; 100 TABLET ORAL at 21:25

## 2025-03-18 RX ADMIN — DULOXETINE HYDROCHLORIDE 30 MG: 30 CAPSULE, DELAYED RELEASE ORAL at 09:09

## 2025-03-18 RX ADMIN — ISOSORBIDE MONONITRATE 60 MG: 60 TABLET, EXTENDED RELEASE ORAL at 09:09

## 2025-03-18 RX ADMIN — CARBIDOPA AND LEVODOPA 1 TABLET: 25; 100 TABLET ORAL at 15:32

## 2025-03-18 RX ADMIN — CARVEDILOL 12.5 MG: 12.5 TABLET, FILM COATED ORAL at 21:25

## 2025-03-18 RX ADMIN — BUPROPION HYDROCHLORIDE 150 MG: 150 TABLET, EXTENDED RELEASE ORAL at 09:09

## 2025-03-18 RX ADMIN — CARVEDILOL 12.5 MG: 12.5 TABLET, FILM COATED ORAL at 09:09

## 2025-03-18 RX ADMIN — SENNOSIDES 17.2 MG: 8.6 TABLET ORAL at 09:09

## 2025-03-18 RX ADMIN — ATORVASTATIN CALCIUM 40 MG: 40 TABLET, FILM COATED ORAL at 21:25

## 2025-03-18 RX ADMIN — ASPIRIN 81 MG CHEWABLE TABLET 81 MG: 81 TABLET CHEWABLE at 09:09

## 2025-03-18 RX ADMIN — FOLIC ACID 1 MG: 1 TABLET ORAL at 09:09

## 2025-03-18 ASSESSMENT — COGNITIVE AND FUNCTIONAL STATUS - GENERAL
DAILY ACTIVITIY SCORE: 15
PERSONAL GROOMING: A LITTLE
PERSONAL GROOMING: A LOT
STANDING UP FROM CHAIR USING ARMS: A LITTLE
TOILETING: A LITTLE
MOBILITY SCORE: 16
TOILETING: A LOT
DAILY ACTIVITIY SCORE: 18
WALKING IN HOSPITAL ROOM: A LITTLE
DRESSING REGULAR UPPER BODY CLOTHING: A LITTLE
CLIMB 3 TO 5 STEPS WITH RAILING: A LOT
MOBILITY SCORE: 18
TURNING FROM BACK TO SIDE WHILE IN FLAT BAD: A LITTLE
MOVING TO AND FROM BED TO CHAIR: A LITTLE
STANDING UP FROM CHAIR USING ARMS: A LITTLE
MOVING TO AND FROM BED TO CHAIR: A LITTLE
EATING MEALS: A LITTLE
HELP NEEDED FOR BATHING: A LITTLE
CLIMB 3 TO 5 STEPS WITH RAILING: A LOT
EATING MEALS: A LITTLE
DRESSING REGULAR LOWER BODY CLOTHING: A LITTLE
MOBILITY SCORE: 18
CLIMB 3 TO 5 STEPS WITH RAILING: TOTAL
DRESSING REGULAR LOWER BODY CLOTHING: A LITTLE
MOVING FROM LYING ON BACK TO SITTING ON SIDE OF FLAT BED WITH BEDRAILS: A LITTLE
WALKING IN HOSPITAL ROOM: A LITTLE
TURNING FROM BACK TO SIDE WHILE IN FLAT BAD: A LITTLE
DRESSING REGULAR UPPER BODY CLOTHING: A LITTLE
TURNING FROM BACK TO SIDE WHILE IN FLAT BAD: A LITTLE
MOVING TO AND FROM BED TO CHAIR: A LITTLE
WALKING IN HOSPITAL ROOM: A LITTLE
HELP NEEDED FOR BATHING: A LOT
STANDING UP FROM CHAIR USING ARMS: A LITTLE

## 2025-03-18 ASSESSMENT — COLUMBIA-SUICIDE SEVERITY RATING SCALE - C-SSRS
1. SINCE LAST CONTACT, HAVE YOU WISHED YOU WERE DEAD OR WISHED YOU COULD GO TO SLEEP AND NOT WAKE UP?: NO
2. HAVE YOU ACTUALLY HAD ANY THOUGHTS OF KILLING YOURSELF?: NO
6. HAVE YOU EVER DONE ANYTHING, STARTED TO DO ANYTHING, OR PREPARED TO DO ANYTHING TO END YOUR LIFE?: NO

## 2025-03-18 ASSESSMENT — PAIN SCALES - GENERAL
PAINLEVEL_OUTOF10: 2
PAINLEVEL_OUTOF10: 0 - NO PAIN
PAINLEVEL_OUTOF10: 4

## 2025-03-18 ASSESSMENT — PAIN - FUNCTIONAL ASSESSMENT
PAIN_FUNCTIONAL_ASSESSMENT: 0-10
PAIN_FUNCTIONAL_ASSESSMENT: 0-10

## 2025-03-18 NOTE — DISCHARGE INSTRUCTIONS
Please follow up with your [facility] providers as scheduled:    Neurology 3/20  Psychiatry 4/3  Cardiology 4/28  Primary Care Provider 6/27

## 2025-03-18 NOTE — SIGNIFICANT EVENT
"Capacity Assessment Tool    \"Capacity\" is the \"ability\" to make a decision.  The decision in question must be specific (one decision), relevant to a patient's current condition (appropriate), and timely (neither prospective nor retrospective).    Capacity varies based on knowledge base (explanation/understanding of clinical information), cognitive processing, acute psychiatric illness, and other clinical conditions.    In order to be deemed \"capacitated\" to make a single decision at one point in time, a patient must demonstrate all 4 of the following elements:    *Ability to consistently communicate a choice (consistent over time with adequate information)  *Ability to understand the relevant information (accurate knowledge of condition)  *Ability to appreciate the situation and its consequences (risks/benefits, pros/cons)  *Ability to reason about treatment options (without undue influence of a person or condition, eg. suicidality or acute psychosis)      Current Decision    Clinical issue:   Homegoing versus SNF    Did the appropriate team address relevant information with the patient:  Yes    Date: 3/18    If \"NO\" is selected for appropriate team, then please discuss with the appropriate team.  The appropriate team should be encouraged to address relevant information with the patient AND reevaluate capacity when appropriate.    Capacity Evaluation    Patient demonstrates ability to consistently communicate choice:  Yes     Patient demonstrates ability to understand the relevant information:  Yes     Patient demonstrates ability to appreciate the situation and its consequences:  Yes     Patient demonstrates ability to reason about treatment options:  Yes     If ANY of the above items are answered \"NO,\" the patient LACKS CAPACITY for that specific decision at hand, at that specific time.  Further capacity evaluations can be done as needed.         "

## 2025-03-18 NOTE — CARE PLAN
The patient's goals for the shift include To sit up in chair for meals and try walking a little bit today    The clinical goals for the shift include Patient will remain HDS      Problem: Fall/Injury  Goal: Not fall by end of shift  Outcome: Progressing  Goal: Be free from injury by end of the shift  Outcome: Progressing  Goal: Verbalize understanding of personal risk factors for fall in the hospital  Outcome: Progressing  Goal: Verbalize understanding of risk factor reduction measures to prevent injury from fall in the home  Outcome: Progressing  Goal: Use assistive devices by end of the shift  Outcome: Progressing  Goal: Pace activities to prevent fatigue by end of the shift  Outcome: Progressing     Problem: Pain  Goal: Takes deep breaths with improved pain control throughout the shift  Outcome: Progressing  Goal: Turns in bed with improved pain control throughout the shift  Outcome: Progressing  Goal: Walks with improved pain control throughout the shift  Outcome: Progressing  Goal: Performs ADL's with improved pain control throughout shift  Outcome: Progressing  Goal: Participates in PT with improved pain control throughout the shift  Outcome: Progressing  Goal: Free from opioid side effects throughout the shift  Outcome: Progressing  Goal: Free from acute confusion related to pain meds throughout the shift  Outcome: Progressing     Problem: Respiratory  Goal: Clear secretions with interventions this shift  Outcome: Progressing  Goal: Minimize anxiety/maximize coping throughout shift  Outcome: Progressing  Goal: Minimal/no exertional discomfort or dyspnea this shift  Outcome: Progressing  Goal: No signs of respiratory distress (eg. Use of accessory muscles. Peds grunting)  Outcome: Progressing  Goal: Patent airway maintained this shift  Outcome: Progressing  Goal: Tolerate mechanical ventilation evidenced by VS/agitation level this shift  Outcome: Progressing  Goal: Tolerate pulmonary toileting this  shift  Outcome: Progressing  Goal: Verbalize decreased shortness of breath this shift  Outcome: Progressing  Goal: Wean oxygen to maintain O2 saturation per order/standard this shift  Outcome: Progressing  Goal: Increase self care and/or family involvement in next 24 hours  Outcome: Progressing     Problem: Pain - Adult  Goal: Verbalizes/displays adequate comfort level or baseline comfort level  Outcome: Progressing     Problem: Safety - Adult  Goal: Free from fall injury  Outcome: Progressing     Problem: Discharge Planning  Goal: Discharge to home or other facility with appropriate resources  Outcome: Progressing     Problem: Chronic Conditions and Co-morbidities  Goal: Patient's chronic conditions and co-morbidity symptoms are monitored and maintained or improved  Outcome: Progressing     Problem: Nutrition  Goal: Nutrient intake appropriate for maintaining nutritional needs  Outcome: Progressing     Problem: Skin  Goal: Decreased wound size/increased tissue granulation at next dressing change  Outcome: Progressing  Goal: Participates in plan/prevention/treatment measures  Outcome: Progressing  Goal: Prevent/manage excess moisture  Outcome: Progressing  Goal: Prevent/minimize sheer/friction injuries  Outcome: Progressing  Goal: Promote/optimize nutrition  Outcome: Progressing  Goal: Promote skin healing  Outcome: Progressing

## 2025-03-18 NOTE — PROGRESS NOTES
Patient to set to begin home care with [facility]  home care services 24-48 hours post discharge

## 2025-03-18 NOTE — PROGRESS NOTES
Subjective Data:  No events overnight. Pt feeling well. Reports she is going home although documentation notes lack of capacity and that daughter is agreeable to send to SNF. Will repeat capacity assessment today. Ok to discharge on current regimen per Psych.     Objective Data:  Last Recorded Vitals:  Vitals:    03/18/25 0659 03/18/25 0700 03/18/25 1120 03/18/25 1126   BP:  144/79  105/64   BP Location:  Left arm  Right arm   Patient Position:  Lying  Sitting   Pulse:  69 77 75   Resp:  19  16   Temp:  36.5 °C (97.7 °F)  36.8 °C (98.2 °F)   TempSrc:  Temporal  Temporal   SpO2:  100%  98%   Weight: 97.1 kg (214 lb 1.1 oz)      Height:         Last Labs:  Results for orders placed or performed during the hospital encounter of 03/10/25 (from the past 24 hours)   CBC   Result Value Ref Range    WBC 4.7 4.4 - 11.3 x10*3/uL    nRBC 0.0 0.0 - 0.0 /100 WBCs    RBC 3.30 (L) 4.00 - 5.20 x10*6/uL    Hemoglobin 9.8 (L) 12.0 - 16.0 g/dL    Hematocrit 31.1 (L) 36.0 - 46.0 %    MCV 94 80 - 100 fL    MCH 29.7 26.0 - 34.0 pg    MCHC 31.5 (L) 32.0 - 36.0 g/dL    RDW 15.2 (H) 11.5 - 14.5 %    Platelets 171 150 - 450 x10*3/uL   Renal function panel   Result Value Ref Range    Glucose 109 (H) 74 - 99 mg/dL    Sodium 138 136 - 145 mmol/L    Potassium 4.8 3.5 - 5.3 mmol/L    Chloride 110 (H) 98 - 107 mmol/L    Bicarbonate 21 21 - 32 mmol/L    Anion Gap 12 10 - 20 mmol/L    Urea Nitrogen 41 (H) 6 - 23 mg/dL    Creatinine 3.08 (H) 0.50 - 1.05 mg/dL    eGFR 15 (L) >60 mL/min/1.73m*2    Calcium 9.3 8.6 - 10.6 mg/dL    Phosphorus 3.9 2.5 - 4.9 mg/dL    Albumin 3.5 3.4 - 5.0 g/dL   Magnesium   Result Value Ref Range    Magnesium 1.95 1.60 - 2.40 mg/dL        TROPHS   Date/Time Value Ref Range Status   03/10/2025 12:27 PM 28 0 - 34 ng/L Final   03/10/2025 11:03 AM 38 0 - 34 ng/L Final   09/21/2024 04:29 AM 35 0 - 34 ng/L Final   03/30/2024 03:19 PM 19 0 - 34 ng/L Final   03/30/2024 02:09 PM 18 0 - 34 ng/L Final   12/28/2023 02:33 AM 19 0 - 34  ng/L Final     BNP   Date/Time Value Ref Range Status   03/10/2025 11:03 AM 36 0 - 99 pg/mL Final   09/21/2024 04:29  0 - 99 pg/mL Final     HGBA1C   Date/Time Value Ref Range Status   03/10/2025 10:49 PM 5.7 See comment % Final   09/21/2024 04:29 AM 5.9 See comment % Final     LDLCALC   Date/Time Value Ref Range Status   03/10/2025 11:03 AM 62 <=99 mg/dL Final     Comment:                                 Near   Borderline      AGE      Desirable  Optimal    High     High     Very High     0-19 Y     0 - 109     ---    110-129   >/= 130     ----    20-24 Y     0 - 119     ---    120-159   >/= 160     ----      >24 Y     0 -  99   100-129  130-159   160-189     >/=190       VLDL   Date/Time Value Ref Range Status   03/10/2025 11:03 AM 20 0 - 40 mg/dL Final   12/07/2022 02:20 PM 16 0 - 40 mg/dL Final      Last I/O:  No intake/output data recorded.    Past Cardiology Tests (Last 3 Years):  EKG:  ECG 12 lead 03/10/2025  Sinus rhythm with 1st degree AV block  Right bundle branch block  Abnormal ECG  When compared with ECG of 04-OCT-2024 05:28,  Sinus rhythm has replaced Ectopic atrial rhythm  QRS axis Shifted right  T wave inversion no longer evident in Lateral leads  See ED provider note for full interpretation and clinical correlation  Confirmed by Renita Chen ([id number]) on 3/11/2025 4:30:43 AM    Echo:  Transthoracic Echo (TTE) Complete 09/21/2024:  1. Left ventricular ejection fraction is normal, calculated by Regan's biplane at 74%.  2. Spectral Doppler shows an impaired relaxation pattern of left ventricular diastolic filling.  3. There is mild concentric left ventricular hypertrophy.  4. There is normal right ventricular global systolic function.  5. The left atrium is mildly dilated.  6. Mildly elevated right ventricular systolic pressure.  7. There is no evidence of a patent foramen ovale.  8. Compared with study dated 12/29/2023, no significant change.     Transthoracic Echo (TTE) Limited 12/29/2023:  1.  Left ventricular systolic function is normal with a 55-60% estimated ejection fraction.  2. Poorly visualized anatomical structures due to suboptimal image quality.  3. Spectral Doppler shows an impaired relaxation pattern of left ventricular diastolic filling.     Ejection Fractions:  EF   Date/Time Value Ref Range Status   03/11/2025 11:00 AM 68 %    09/21/2024 11:50 AM 74 %    12/29/2023 10:48 AM 54       Cath:  No results found for this or any previous visit from the past 1095 days.  Stress Test:  No results found for this or any previous visit from the past 1095 days.  Cardiac Imaging:  No results found for this or any previous visit from the past 1095 days.    Inpatient Medications:  Scheduled medications   Medication Dose Route Frequency    amLODIPine  10 mg oral Daily    aspirin  81 mg oral Daily    atorvastatin  40 mg oral Nightly    buPROPion XL  150 mg oral Daily    carbidopa-levodopa  1 tablet oral TID    carvedilol  12.5 mg oral BID    DULoxetine  30 mg oral Daily    folic acid  1 mg oral Daily    isosorbide mononitrate ER  60 mg oral Daily    nicotine  1 patch transdermal Daily    perflutren lipid microspheres  0.5-10 mL of dilution intravenous Once in imaging    QUEtiapine  150 mg oral Nightly    sennosides  2 tablet oral BID    terazosin  2 mg oral Daily    trimethobenzamide  200 mg intramuscular Once     PRN medications   Medication    acetaminophen     Continuous Medications   Medication Dose Last Rate     Physical Exam:  General: NAD, lying in bed  Skin: warm and dry throughout, L forehead hematoma w/ecchymosis, +tenderness  Head/ neck: +JVD above clavicle  Cardiac: RRR, S1S2  Pulm: diminished anteriorly, on RA  GI: soft, nontender, non-distended, obese  Extremities: trace edema in BLE, CLOVIS hose in place  Neuro: A/Ox3, hard of hearing, alert today, Bilateral eye orbits swollen (improving)  Psych: appropriate mood and behavior, very pleasant        Assessment/Plan   Kelly Martinez is a 80 y.o.  female with PMH significant for R cerebellar/verminan AVM/hemorrhage (9/2024 reversed with kcentra), uncontrolled HTN, HLD, HFpEF (TTE LVEF 74% 09/2024), JOY, COPD, recurrent DVT/PE's prev. 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 Typically receives all her [facility] at [facility]. Under [facility]VI Service for further management of HTN urgency and ADHF.     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 On coumadin  - s/p IVC filter removal 4/2016 d/t erosion  - 9/2024 presented w/AMS, CTH +R cerebellar-vermis hemorrhage, reversed with kcentra, no residual effects  - off coumadin since 9/2024 per primary neurologist [facility] (Dr. Renee); OP office visit 3/5/2025  - hold OFF on AC for 1 year (9/2024-9/2025) given risk of cerebral bleeding, new start of Parkinson's meds (see below)  - no evidence for PE on CTA chest during last admit at  (9/2024)  - currently satting >90% on RA, reported feeling more SOB than usual  - V/Q scan 3/11 without evidence of perfusion defects/PE  - d-dimer 2675 (no prior level for comparison)  - BLE duplex US 3/11: negative for DVT, BLE Venous insufficiency noted, tanisha hose/tubigrip ordered  - s/p mechanical fall at home upon admit, +trauma to L-side of head (w/L forehead hematoma)  - imaging on admit (CTH, CT C-spine): without acute changes/fractures  - no new neuro deficits  - lives at home alone, PT/OT consulted, MOCA score 11/30, indicating mod-sev cognitive impairment -> patient/family agree to SNF at discharge, today 3/18 patient reports she wants to go home   - cont ASA, statin  - PRN tylenol for pain     Hypertensive Urgency,  improving  Current hypotension, resolved  HLD  - BP on on admit: 208/112  - SBP past 24hrs 120s-140s  - trazodone discontinued 3/12 due to day somnolence  - 3/13 hypotensive to 74/47, asymptomatic, evening carvedilol held, 500 ml bolus   - discontinued home lisinopril (no indication, if able to control BP w/other meds)  - cont Imdur, amlodipine, coreg   - hgb A1c 5.7%, TSH 0.98  - lipid panel: total cholesterol 127 HDL 45.3 LDL 62 Tgs 101  - cont statin    Acute on chronic diastolic heart failure  Flash Pulmonary edema, resolved  - followed by ALMA ROSA Diana, @[facility]  - TTE OSH 9/2024 LVEF 74%, no WMAs, DD, negative bubble study  - TTE 3/11 EF 65-70%, Grade I DD  - BNP 36 (prev. 170 in 9/2024)  - CXR pulm edema, vasc. congestion  - admit wt: 98.4kg  - today's wt 97.1kg (96.2) (96.7) (96)  - no home diuretics, s/p IVP Lasix 40mg x1 (3/10, 3/11, and 3/12)  - no maintenance diuretic needed, PRN at discharge  - coreg 12.5mg BID, imdur 20mg daily, discontinue lisinopril as above  - Daily standing wts, strict I/Os, 2g sodium diet, 2L fluid restriction     NICOLÁS on CKD stage 4, improving  - Bl Cr worsening over past year ~2-3  - Cr 2.87 upon last discharge  - Cr today 3.08 (3.28) (3.53) (3.94) (3.86)  - improving w/diuretic holiday and discontinuation of ACEi  - urine osmolality WNL  - complete renal US unremarkable  -  FeUREA 38.4% suggesting intrinsic damage, diuretic plan as above (no c/f retention, on 3/15 UOP 1L w/69cc PVR on bladder scan)  - no UOP recorded however confirmed w/RN patient voiding in toilet without hat  - Renally dose meds, avoid nephrotoxins    Parkinson's Disease  Schizophrenia  Depression  - diagnosed ~3-4 years ago  - new start of carvidopa/levodopa 25/200mg TID (on 3/5/2025)  - admit ECG w/QTc of 455ms  - stopped home trazodone (3/12)   - Psychiatry following, appreciate recs   - consulted for further medication changes given ongoing somnolence and recent falls -> med changes resolved somnolence   -  cont Seroquel at reduced dose (150mg nightly, down from 300mg)  - cont Wellbutrin 150mg daily, duloxetine 30mg daily   - cont OP f/up w/primary neurologist, behavioral health, & PCP     JOY  COPD  Chronic Tobacco Use  - refuses CPAP  - nicotine patch   - encourage cessation    DVT ppx: SCDs    NOK: Paola Lowry, daughter: [phone]  DISPO: PT/OT SNF, patient/daughter agree mod-severe cognitive impairment per MOCA score (11/30 on 3/13), resides at home alone, has HHA 2-4 hours daily  - pending precert    All labs, vital signs, tests & imaging results, and medications were reviewed.    Patient seen and discussed with Dr. Mino Jerez, APRN-CNP

## 2025-03-18 NOTE — PROGRESS NOTES
Cerebrovascular Conference 01/08/2025    Case Review: PMH HTN, HLD, HfpEF, JOY, COPD, recurrent DVT/PE on warfarin s/p IVC filter and removal (2016), CKD stage IV, tobacco use, schizophrenia, parkinsons, depression, p/w AMS after fall.     CTA/angio with SM2 vermian AVM (stayed off eliquis given AVM).       Recommendations:  Conservative treatment. Due to high risk for hemorrhage in the first year recommend she remain off aspirin. Radiation not recommended at this time due to radiation necrosis that can occur post treatment.       Cerebrovascular conference is a multidisciplinary conferences attended by neurosurgery, neuro-radiology, APPs, and Rns.

## 2025-03-18 NOTE — PROGRESS NOTES
Physical Therapy    Physical Therapy Treatment    Patient Name: Kelly Martinez  MRN: [id number]  Department: [place] [address]  Room: [facility]5038-A  Today's Date: 3/18/2025  Time Calculation  Start Time: 1047  Stop Time: 1110  Time Calculation (min): 23 min         Assessment/Plan   PT Assessment  Barriers to Discharge Home: Caregiver assistance, Cognition needs, Physical needs  Caregiver Assistance: Patient lives alone and/or does not have reliable caregiver assistance  Cognition Needs: Insight of patient limited regarding functional ability/needs  Physical Needs: High falls risk due to function or environment, Intermittent mobility assistance needed  Evaluation/Treatment Tolerance: Patient limited by fatigue  End of Session Communication: Bedside nurse  Assessment Comment: Pt. is progressing appropriately, tolerating multiple transfers and ambulating increased distance. Pt continues to demo decreased functional strength, decreased activity tolerance/endurance, impaired balance, and increased difficulty with functional mobility compared to baseline. Pt required significant UE assist and >1 minute to complete 5xSTS indicating pt is at increased risk for falls. Pt. will continue to benefit from skilled PT intervention while inpatient to address deficits and maximize return to PLOF.  End of Session Patient Position: Up in chair, Alarm on     PT Plan  Treatment/Interventions: Bed mobility, Transfer training, Gait training, Balance training, Strengthening, Endurance training, Therapeutic exercise, Therapeutic activity  PT Plan: Ongoing PT  PT Frequency: 3 times per week  PT Discharge Recommendations: Moderate intensity level of continued care  Equipment Recommended upon Discharge:  (tbd)  PT Recommended Transfer Status: Assist x1, Assistive device  PT - OK to Discharge: Yes (PT eval completed & recs made)      General Visit Information:   PT  Visit  PT Received On: 03/18/25  Response to Previous Treatment: Patient with no complaints  from previous session.  General  Family/Caregiver Present: No  Prior to Session Communication: Bedside nurse  Patient Position Received: Bed, 2 rail up, Alarm off, not on at start of session  General Comment: Pt received seated EOB, agreeable to participate in session    Subjective   Precautions:  Precautions  Medical Precautions: Fall precautions     Date/Time Vitals Session Patient Position Pulse Resp SpO2 BP MAP (mmHg)    03/18/25 1047 Pre PT  --  77  --  --  --  --                 Objective   Pain:  Pain Assessment  Pain Assessment: 0-10  0-10 (Numeric) Pain Score: 2  Pain Type: Acute pain  Pain Location: Shoulder  Pain Orientation: Left    Cognition:  Cognition  Overall Cognitive Status: Within Functional Limits  Orientation Level: Oriented X4 (oriented to month/year,not date)    Postural Control:  Postural Control  Postural Control: Within Functional Limits  Static Sitting Balance  Static Sitting-Balance Support: Feet supported, Bilateral upper extremity supported  Static Sitting-Level of Assistance: Close supervision  Static Sitting-Comment/Number of Minutes: pt leaning onto elevated HOB for additional support  Static Standing Balance  Static Standing-Balance Support: Bilateral upper extremity supported  Static Standing-Level of Assistance: Contact guard  Dynamic Standing Balance  Dynamic Standing-Balance Support: Bilateral upper extremity supported  Dynamic Standing-Level of Assistance: Contact guard    Activity Tolerance:  Activity Tolerance  Endurance: Tolerates 10 - 20 min exercise with multiple rests  Early Mobility/Exercise Safety Screen: Proceed with mobilization - No exclusion criteria met    Treatments:  Therapeutic Exercise  Therapeutic Exercise Performed: Yes  Therapeutic Exercise Activity 1: EOB: AP x20 antoinette, LAQ x15 antoinette    Therapeutic Activity  Therapeutic Activity Performed: Yes  Therapeutic Activity 1: Extended seated rest following ambulation trial 2/2 fatigue. Cues for upright posture and  PLB.  Therapeutic Activity 2: Pt completed 5xSTS, refer to outcome measures    Bed Mobility  Bed Mobility: No    Ambulation/Gait Training  Ambulation/Gait Training Performed: Yes  Ambulation/Gait Training 1  Surface 1: Level tile  Device 1: Rolling walker  Assistance 1: Contact guard, Minimal verbal cues  Quality of Gait 1: Decreased step length, Forward flexed posture (slow shane, unsteady)  Comments/Distance (ft) 1: 75 ft x2 trials. Extended rest between trials 2/2 fatigue (Cues to maintain self within parameters of FWW to maximize stability and safety. Cues to mainain antoinette UE support on FWW for safety. Cues for upright posture and forward gaze in order to avoid obstacles)    Transfers  Transfer: Yes  Transfer 1  Transfer From 1: Sit to, Stand to  Transfer to 1: Stand, Sit  Technique 1: Sit to stand, Stand to sit  Transfer Device 1: Walker  Transfer Level of Assistance 1: Contact guard  Trials/Comments 1: cues for proper UE placement and technique. Poor eccentric control with descent and decreased safety awareness noted  Transfers 2  Transfer From 2: Chair with arms to, Stand to  Transfer to 2: Stand, Chair with arms  Technique 2: Sit to stand, Stand to sit  Transfer Device 2:  (none)  Transfer Level of Assistance 2: Minimum assistance, Minimal verbal cues  Trials/Comments 2: Poor eccentric control    Stairs  Stairs: No    Outcome Measures:  [facility] Basic Mobility  Turning from your back to your side while in a flat bed without using bedrails: A little  Moving from lying on your back to sitting on the side of a flat bed without using bedrails: A little  Moving to and from bed to chair (including a wheelchair): A little  Standing up from a chair using your arms (e.g. wheelchair or bedside chair): A little  To walk in hospital room: A little  Climbing 3-5 steps with railing: Total  Basic Mobility - Total Score: 16    Other Measures  5x Sit to Stand: >60 seconds  Patient completed 5xSTS in greater than 60 seconds with  heavy use of UEs. Pt required extended rest between reps 2/2 fatigue and demos poor eccentric control on stand>sit transfers. A time of >12 seconds indicates increased risk for falls.      Education Documentation  Precautions, taught by Olivia Barrera, PT at 3/18/2025 11:30 AM.  Learner: Patient  Readiness: Acceptance  Method: Explanation, Demonstration  Response: Needs Reinforcement  Comment: transfer techniques, AD management, importance of requesting assistance for functional mobility 2/2 increased falls risk    Body Mechanics, taught by Olivia Barrera, PT at 3/18/2025 11:30 AM.  Learner: Patient  Readiness: Acceptance  Method: Explanation, Demonstration  Response: Needs Reinforcement  Comment: transfer techniques, AD management, importance of requesting assistance for functional mobility 2/2 increased falls risk    Mobility Training, taught by Olivia Barrera, PT at 3/18/2025 11:30 AM.  Learner: Patient  Readiness: Acceptance  Method: Explanation, Demonstration  Response: Needs Reinforcement  Comment: transfer techniques, AD management, importance of requesting assistance for functional mobility 2/2 increased falls risk    Education Comments  No comments found.        OP EDUCATION:       Encounter Problems       Encounter Problems (Active)       Balance       Pt will score >23 on Tinetti assessment to indicate low falls risk (Progressing)       Start:  03/11/25    Expected End:  03/25/25               Mobility       STG - Patient will ambulate >100' with WW and SBA without LOB or path deviation (Progressing)       Start:  03/11/25    Expected End:  03/25/25               PT Transfers       STG - Patient to transfer to and from sit to supine indep from flat bed, no rails  (Progressing)       Start:  03/11/25    Expected End:  03/25/25            STG - Patient will transfer sit to and from stand with SBA and WW (Progressing)       Start:  03/11/25    Expected End:  03/25/25               Pain - Adult               03/18/25 at 11:34 AM - Olivia Barrera, PT

## 2025-03-18 NOTE — PROGRESS NOTES
"PSYCHIATRY CONSULT-LIAISON PROGRESS NOTE    SUBJECTIVE  Patient more alert this AM and looking forward to going home. Patient reported that she follows with Psychiatry for sleep and denied prior diagnosis of bipolar disorder or schizophrenia. Patient reported that daughter organizes her medications at home. Per primary team, patient is likely discharging to SNF today or tomorrow.    OBJECTIVE  VITALS      3/17/2025     3:18 PM 3/17/2025     8:30 PM 3/18/2025    12:31 AM 3/18/2025    12:43 AM 3/18/2025     4:25 AM 3/18/2025     6:59 AM 3/18/2025     7:00 AM   Vitals   Systolic 91 145  132 127  144   Diastolic 57 77  78 79  79   BP Location Left arm      Left arm   Heart Rate 73 86 73 73 67  69   Temp 36.6 °C (97.9 °F) 36.7 °C (98.1 °F)  36.5 °C (97.7 °F) 36.6 °C (97.9 °F)  36.5 °C (97.7 °F)   Resp 16 20 15 16 15  19   Weight (lb)      214.07    BMI      34.55 kg/m2    BSA (m2)      2.13 m2       MENTAL STATUS EXAM  Appearance: alert, obese, resting comfortably  Attitude: Calm, cooperative, and engaged in conversation  Behavior: Appropriate eye contact. No aggressive or agitated behavior.  Motor Activity: No psychomotor agitation or retardation. No abnormal movements, tremors or tics. No evidence of extrapyramidal symptoms or tardive dyskinesia on limited exam.  Speech: Regular rate, volume, tone, and quantity. Spontaneous, clear, coherent. No pressured speech.  Mood: \"ok\"  Affect: Mood congruent. Does not appear elated, irritable or tearful. Full-range. Non-labile.  Thought Process: Linear, logical, and goal-directed. No loose associations or gross thought disorganization.  Thought Content: Does not endorse suicidal or homicidal ideation. No delusions elicited.  Thought Perception: Does not endorse auditory or visual hallucinations and does not appear to be responding to any hallucinatory stimuli.  Cognition: alert and oriented to place. Able to answer questions mostly appropriately throughout interview.   Insight: " fair  Judgement: fair    CURRENT MEDICATIONS  Scheduled medications  amLODIPine, 10 mg, oral, Daily  aspirin, 81 mg, oral, Daily  atorvastatin, 40 mg, oral, Nightly  buPROPion XL, 150 mg, oral, Daily  carbidopa-levodopa, 1 tablet, oral, TID  carvedilol, 12.5 mg, oral, BID  DULoxetine, 30 mg, oral, Daily  folic acid, 1 mg, oral, Daily  isosorbide mononitrate ER, 60 mg, oral, Daily  nicotine, 1 patch, transdermal, Daily  perflutren lipid microspheres, 0.5-10 mL of dilution, intravenous, Once in imaging  QUEtiapine, 150 mg, oral, Nightly  sennosides, 2 tablet, oral, BID  terazosin, 2 mg, oral, Daily  trimethobenzamide, 200 mg, intramuscular, Once    Continuous medications     PRN medications  PRN medications: acetaminophen     LABS  Results for orders placed or performed during the hospital encounter of 03/10/25 (from the past 24 hours)   CBC   Result Value Ref Range    WBC 4.7 4.4 - 11.3 x10*3/uL    nRBC 0.0 0.0 - 0.0 /100 WBCs    RBC 3.30 (L) 4.00 - 5.20 x10*6/uL    Hemoglobin 9.8 (L) 12.0 - 16.0 g/dL    Hematocrit 31.1 (L) 36.0 - 46.0 %    MCV 94 80 - 100 fL    MCH 29.7 26.0 - 34.0 pg    MCHC 31.5 (L) 32.0 - 36.0 g/dL    RDW 15.2 (H) 11.5 - 14.5 %    Platelets 171 150 - 450 x10*3/uL   Renal function panel   Result Value Ref Range    Glucose 109 (H) 74 - 99 mg/dL    Sodium 138 136 - 145 mmol/L    Potassium 4.8 3.5 - 5.3 mmol/L    Chloride 110 (H) 98 - 107 mmol/L    Bicarbonate 21 21 - 32 mmol/L    Anion Gap 12 10 - 20 mmol/L    Urea Nitrogen 41 (H) 6 - 23 mg/dL    Creatinine 3.08 (H) 0.50 - 1.05 mg/dL    eGFR 15 (L) >60 mL/min/1.73m*2    Calcium 9.3 8.6 - 10.6 mg/dL    Phosphorus 3.9 2.5 - 4.9 mg/dL    Albumin 3.5 3.4 - 5.0 g/dL   Magnesium   Result Value Ref Range    Magnesium 1.95 1.60 - 2.40 mg/dL      IMAGING  No results found.     PSYCHIATRIC RISK ASSESSMENT  Acute Risk of Harm to Others is Considered: Low  Acute Risk of Harm to Self is Considered: Low    ASSESSMENT AND PLAN  Kelly Martinez is a 80 y.o. 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 Psychiatry was consulted on 3/14 for medication optimization for somnolence and recurrent falls. On initial assessment, patient was drowsy and disoriented. History also suggestive of potential baseline cognitive impairment. Alertness has improved since discontinuing trazodone and decreasing Seroquel.    IMPRESSION  Sedation 2/2 overmedication   Hypoactive delirium i/s/o possible baseline cognitive impairment, improved delirium  MDD     RECOMMENDATIONS  Safety:  - Patient does not currently meet criteria for inpatient psychiatric admission.  - To evaluate decision-making capacity, recommend use of the Capacity Evaluation Tool. Search “[facility] Capacity Evaluation\" under SmartText unless the patient has a legal guardian, in which case all decisions per the legal guardian.  - Defer to primary team decision for 1:1 sitter.  - As with all hospitalized patients, would recommend delirium precautions, as below.     Medications:  - CONTINUE Seroquel PO 150mg at bedtime   - CONTINUE duloxetine PO 30mg daily (consider adjusting for renal function)  - CONTINUE bupropion ER 150mg daily   - defer further adjustment of medications to outpatient Psychiatrist    Work-up:  - EKG (3/10): QTc of 455     Ancillary Services:  - defer , pet/music/art therapy consult to primary team discretion     Follow-up:  - Psychiatry appointment at [facility] (Dr. Frank) on 4/3/2025     ==========  - Discussed recommendations with primary team.  - Psychiatry will continue to follow though not daily.     Thank you for allowing us to participate in the care of this patient. Please page [phone] with any questions or concerns.     Patient seen and discussed with Dr. Carr, who " agrees with above plan.     Ann Marie Tiwari MD    Medication Consent  Medication Consent: n/a; consult service

## 2025-03-18 NOTE — CARE PLAN
Problem: Fall/Injury  Goal: Not fall by end of shift  Outcome: Progressing  Goal: Be free from injury by end of the shift  Outcome: Progressing  Goal: Verbalize understanding of personal risk factors for fall in the hospital  Outcome: Progressing  Goal: Verbalize understanding of risk factor reduction measures to prevent injury from fall in the home  Outcome: Progressing  Goal: Use assistive devices by end of the shift  Outcome: Progressing  Goal: Pace activities to prevent fatigue by end of the shift  Outcome: Progressing     Problem: Pain  Goal: Takes deep breaths with improved pain control throughout the shift  Outcome: Progressing  Goal: Turns in bed with improved pain control throughout the shift  Outcome: Progressing  Goal: Walks with improved pain control throughout the shift  Outcome: Progressing  Goal: Performs ADL's with improved pain control throughout shift  Outcome: Progressing  Goal: Participates in PT with improved pain control throughout the shift  Outcome: Progressing  Goal: Free from opioid side effects throughout the shift  Outcome: Progressing  Goal: Free from acute confusion related to pain meds throughout the shift  Outcome: Progressing     Problem: Respiratory  Goal: Clear secretions with interventions this shift  Outcome: Progressing  Goal: Minimize anxiety/maximize coping throughout shift  Outcome: Progressing  Goal: Minimal/no exertional discomfort or dyspnea this shift  Outcome: Progressing  Goal: No signs of respiratory distress (eg. Use of accessory muscles. Peds grunting)  Outcome: Progressing  Goal: Patent airway maintained this shift  Outcome: Progressing  Goal: Tolerate mechanical ventilation evidenced by VS/agitation level this shift  Outcome: Progressing  Goal: Tolerate pulmonary toileting this shift  Outcome: Progressing  Goal: Verbalize decreased shortness of breath this shift  Outcome: Progressing  Goal: Wean oxygen to maintain O2 saturation per order/standard this  shift  Outcome: Progressing  Goal: Increase self care and/or family involvement in next 24 hours  Outcome: Progressing     Problem: Pain - Adult  Goal: Verbalizes/displays adequate comfort level or baseline comfort level  Outcome: Progressing     Problem: Safety - Adult  Goal: Free from fall injury  Outcome: Progressing     Problem: Discharge Planning  Goal: Discharge to home or other facility with appropriate resources  Outcome: Progressing     Problem: Chronic Conditions and Co-morbidities  Goal: Patient's chronic conditions and co-morbidity symptoms are monitored and maintained or improved  Outcome: Progressing     Problem: Nutrition  Goal: Nutrient intake appropriate for maintaining nutritional needs  Outcome: Progressing     Problem: Skin  Goal: Decreased wound size/increased tissue granulation at next dressing change  Outcome: Progressing  Goal: Participates in plan/prevention/treatment measures  Outcome: Progressing  Goal: Prevent/manage excess moisture  Outcome: Progressing  Goal: Prevent/minimize sheer/friction injuries  Outcome: Progressing  Goal: Promote/optimize nutrition  Outcome: Progressing  Goal: Promote skin healing  Outcome: Progressing

## 2025-03-19 ENCOUNTER — PHARMACY VISIT (OUTPATIENT)
Dept: PHARMACY | Facility: CLINIC | Age: 80
End: 2025-03-19
Payer: COMMERCIAL

## 2025-03-19 VITALS
OXYGEN SATURATION: 93 % | DIASTOLIC BLOOD PRESSURE: 63 MMHG | WEIGHT: 215.61 LBS | RESPIRATION RATE: 16 BRPM | HEART RATE: 72 BPM | SYSTOLIC BLOOD PRESSURE: 100 MMHG | HEIGHT: 66 IN | TEMPERATURE: 98.1 F | BODY MASS INDEX: 34.65 KG/M2

## 2025-03-19 PROCEDURE — S4991 NICOTINE PATCH NONLEGEND: HCPCS | Performed by: NURSE PRACTITIONER

## 2025-03-19 PROCEDURE — RXMED WILLOW AMBULATORY MEDICATION CHARGE

## 2025-03-19 PROCEDURE — 99239 HOSP IP/OBS DSCHRG MGMT >30: CPT | Performed by: INTERNAL MEDICINE

## 2025-03-19 PROCEDURE — 2500000001 HC RX 250 WO HCPCS SELF ADMINISTERED DRUGS (ALT 637 FOR MEDICARE OP): Performed by: NURSE PRACTITIONER

## 2025-03-19 PROCEDURE — 2500000002 HC RX 250 W HCPCS SELF ADMINISTERED DRUGS (ALT 637 FOR MEDICARE OP, ALT 636 FOR OP/ED): Performed by: NURSE PRACTITIONER

## 2025-03-19 RX ORDER — AMLODIPINE BESYLATE 10 MG/1
10 TABLET ORAL DAILY
Qty: 30 TABLET | Refills: 3 | Status: SHIPPED | OUTPATIENT
Start: 2025-03-20 | End: 2025-04-19

## 2025-03-19 RX ORDER — ISOSORBIDE MONONITRATE 60 MG/1
60 TABLET, EXTENDED RELEASE ORAL DAILY
Qty: 30 TABLET | Refills: 3 | Status: SHIPPED | OUTPATIENT
Start: 2025-03-20 | End: 2025-04-19

## 2025-03-19 RX ORDER — QUETIAPINE FUMARATE 150 MG/1
150 TABLET, FILM COATED ORAL NIGHTLY
Qty: 30 TABLET | Refills: 11 | Status: SHIPPED | OUTPATIENT
Start: 2025-03-19 | End: 2025-04-18

## 2025-03-19 RX ORDER — FUROSEMIDE 40 MG/1
40 TABLET ORAL DAILY PRN
Qty: 30 TABLET | Refills: 3 | Status: SHIPPED | OUTPATIENT
Start: 2025-03-19 | End: 2025-04-18

## 2025-03-19 RX ORDER — QUETIAPINE FUMARATE 150 MG/1
150 TABLET, FILM COATED ORAL NIGHTLY
Qty: 30 TABLET | Refills: 3 | Status: SHIPPED | OUTPATIENT
Start: 2025-03-19 | End: 2025-03-19

## 2025-03-19 RX ADMIN — CARBIDOPA AND LEVODOPA 1 TABLET: 25; 100 TABLET ORAL at 08:16

## 2025-03-19 RX ADMIN — ISOSORBIDE MONONITRATE 60 MG: 60 TABLET, EXTENDED RELEASE ORAL at 08:16

## 2025-03-19 RX ADMIN — BUPROPION HYDROCHLORIDE 150 MG: 150 TABLET, EXTENDED RELEASE ORAL at 08:17

## 2025-03-19 RX ADMIN — SENNOSIDES 17.2 MG: 8.6 TABLET ORAL at 08:17

## 2025-03-19 RX ADMIN — DULOXETINE HYDROCHLORIDE 30 MG: 30 CAPSULE, DELAYED RELEASE ORAL at 08:16

## 2025-03-19 RX ADMIN — CARBIDOPA AND LEVODOPA 1 TABLET: 25; 100 TABLET ORAL at 15:28

## 2025-03-19 RX ADMIN — NICOTINE 1 PATCH: 14 PATCH, EXTENDED RELEASE TRANSDERMAL at 08:23

## 2025-03-19 RX ADMIN — CARVEDILOL 12.5 MG: 12.5 TABLET, FILM COATED ORAL at 08:16

## 2025-03-19 RX ADMIN — AMLODIPINE BESYLATE 10 MG: 10 TABLET ORAL at 08:17

## 2025-03-19 RX ADMIN — TERAZOSIN HYDROCHLORIDE 2 MG: 2 CAPSULE ORAL at 08:16

## 2025-03-19 RX ADMIN — FOLIC ACID 1 MG: 1 TABLET ORAL at 08:17

## 2025-03-19 RX ADMIN — ASPIRIN 81 MG CHEWABLE TABLET 81 MG: 81 TABLET CHEWABLE at 08:17

## 2025-03-19 ASSESSMENT — COLUMBIA-SUICIDE SEVERITY RATING SCALE - C-SSRS
1. SINCE LAST CONTACT, HAVE YOU WISHED YOU WERE DEAD OR WISHED YOU COULD GO TO SLEEP AND NOT WAKE UP?: NO
6. HAVE YOU EVER DONE ANYTHING, STARTED TO DO ANYTHING, OR PREPARED TO DO ANYTHING TO END YOUR LIFE?: NO
2. HAVE YOU ACTUALLY HAD ANY THOUGHTS OF KILLING YOURSELF?: NO

## 2025-03-19 ASSESSMENT — COGNITIVE AND FUNCTIONAL STATUS - GENERAL
MOVING TO AND FROM BED TO CHAIR: A LITTLE
TOILETING: A LITTLE
HELP NEEDED FOR BATHING: A LITTLE
EATING MEALS: A LITTLE
PERSONAL GROOMING: A LITTLE
STANDING UP FROM CHAIR USING ARMS: A LITTLE
CLIMB 3 TO 5 STEPS WITH RAILING: A LOT
DRESSING REGULAR UPPER BODY CLOTHING: A LITTLE
TURNING FROM BACK TO SIDE WHILE IN FLAT BAD: A LITTLE
DAILY ACTIVITIY SCORE: 18
MOBILITY SCORE: 18
DRESSING REGULAR LOWER BODY CLOTHING: A LITTLE
WALKING IN HOSPITAL ROOM: A LITTLE

## 2025-03-19 NOTE — NURSING NOTE
RN reviewed patient discharge instructions and after visit summary with both patient and family members at bedside. RN included information related to any and all new medications or medication regimen changes as well as covering patient upcoming follow-up appointments with [facility] providers. Both patient and family verbalized their understanding of discharge instructions and all questions answered at this time. PIV removed, patient belongings collected and patient discharged home with family via wheelchair transport.

## 2025-03-19 NOTE — DOCUMENTATION CLARIFICATION NOTE
"    PATIENT:               YESSY العلي  ACCT #:                  [id number]  MRN:                       [id number]  :                       1945  ADMIT DATE:       3/10/2025 10:04 AM  DISCH DATE:  RESPONDING PROVIDER #:        [id number]          PROVIDER RESPONSE TEXT:    Encephalopathy 2/2 Trazadone and Seroquel superimposed on Parkinson's with moderate dementia    CDI QUERY TEXT:    Clarification      Instruction:    Based on your assessment of the patient and the clinical information, please provide the requested documentation by clicking on the appropriate radio button and enter any additional information if prompted.    Question: Please further clarify the most likely etiology of the altered mental status as    When answering this query, please exercise your independent professional judgment. The fact that a question is being asked, does not imply that any particular answer is desired or expected.    The patient's clinical indicators include:  Clinical Information: 81y/o female admitted with ADHF and HTN Urgency    Clinical Indicators:    - 3/13 Cardiology PN: \"stopped home trazodone (3/12) d/t lethargy, may be contributing to confusion and falls at home\"    - 3/14 Psychiatry CS: \"Psychiatry was consulted on 3/14 for medication optimization for somnolence and recurrent falls. On initial assessment, patient was drowsy and disoriented. History also suggestive of potential baseline cognitive impairment\"    - 3/18 Psychiatry PN: \"Alertness has improved since discontinuing trazodone and decreasing Seroquel...Sedation 2/2 overmedication... Hypoactive delirium i/s/o possible baseline cognitive impairment, improved delirium\"    - 3/18 Cardiology PN: \"lives at home alone, PT/OT consulted, MOCA score 11/30, indicating mod-sev cognitive impairment\"    Treatment: Psych Consult, Medication adjustments, Trazodone discontinued    Risk Factors: Parkinson's Disease, Depression, Age, CKD  Options provided:  -- " Encephalopathy 2/2 Trazadone and Seroquel superimposed on Parkinson's with moderate dementia  -- Encephalopathy 2/2 Trazadone and Seroquel  -- Other - I will add my own diagnosis  -- Refer to Clinical Documentation Reviewer    Query created by: Barbara Bonilla on 3/19/2025 5:27 PM      Electronically signed by:  MARTIN MOJICA-CNP 3/19/2025 5:55 PM

## 2025-03-19 NOTE — DISCHARGE SUMMARY
Discharge Diagnosis  Acute on chronic HFPEF    Test Results Pending At Discharge  Pending Labs       No current pending labs.            Hospital Course  Kelly Martinez is a 80 y.o. female with PMH significant for 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 [facility] of HTN urgency and ADHF.     In ED:   L Forehead hematoma noted; L scalp hematoma noted on CT imaging without evidence of acute changes/fracture (head and C-spine). /112 on admit, went down to 187/100 s/p PO amlodipine 10mg x1, PO coreg 12.5mg x1, & IV hydral 10mg x2. CXR w/pulm. Vasc. Congestion, BNP WNL (36, prev. 170 in 9/2024), HS trop 38>28, Cr 2.73 (>3.00 in 10/2024, Bl Cr appears to be worsening ~2-3 this past year).    Floor Course:  Gently diuresed w/IVP Lasix boluses, transitioned to oral. TTE EF 65-70%. D-dimer elevated >2K (no prior baseline level); V/Q scan without perfusion defects, BLE US duplex negative for acute DVTs. Remained on RA. HTN Urgency resolved w/BP med adjustment. Transient hypotension resolved with discontinuation of lisinopril. Current smoker, encouraged cessation, nicotine patch used in-house.    Psychiatry adjusted medications for management of hypoactive delirium. Somnolence resolved with reduced Seroquel dosing.     NICOLÁS on CKD improved w/discontinuation of ACEi and diuretic holiday. PRN diuretics at discharge, no indication for maintenance.    PT/OT rec SNF. OT performed MOCA score, revealing mod-sev cognitive impairment (scored 11/30 on 3/13). Patient and daughter initially agreeable to SNF.  However patient then adamantly refusing facility. Patient capacity reassessed. Discussed risk of going home especially in the setting of recent falls. Patient voiced understanding. Daughter updated on plan. [facility] referral placed.     Continued OP f/up with regular [facility] providers.     Medications delivered via Meds to Beds.    Discharge weight: 97.8 kg    After all labs and VS were reviewed the decision was made that the patient was medically stable for discharge.  The patient was discharged in satisfactory condition.    More than 60 minutes were spent in coordinating patient discharge.     Pertinent Physical Exam At Time of Discharge  Physical Exam    Home Medications     Medication List      START taking these medications     amLODIPine 10 mg tablet; Commonly known as: Norvasc; Take 1 tablet (10   mg) by mouth once daily.; Start taking on: March 20, 2025   furosemide 40 mg tablet; Commonly known as: Lasix; Take 1 tablet (40 mg)   by mouth once daily as needed (For increased SOB, weight gain, leg   swelling). Please monitor you weight daily, if you are gaining weight   2-3lbs you should take the diuretic     CHANGE how you take these medications     isosorbide mononitrate ER 60 mg 24 hr tablet; Commonly known as: Imdur;   Take 1 tablet (60 mg) by mouth once daily. Do not crush or chew.; Start   taking on: March 20, 2025; What changed: medication strength, how much to   take, additional instructions   QUEtiapine 150 mg tablet; Take 150 mg by mouth once daily at bedtime.;   What changed: medication strength, how much to take     CONTINUE taking these medications     aspirin 81 mg chewable tablet; Chew 1 tablet (81 mg) once daily. DO NOT   RESUME UNTIL DISCUSSED AT NEUROSURGERY FOLLOW UP   atorvastatin 40 mg tablet; Commonly known as: Lipitor   buPROPion  mg 24 hr tablet; Commonly known as: Wellbutrin XL   carbidopa-levodopa  mg tablet; Commonly known as: Sinemet   carvedilol 12.5 mg tablet; Commonly  known as: Coreg   DULoxetine 30 mg DR capsule; Commonly known as: Cymbalta; Take 1 capsule   (30 mg) by mouth once daily. Do not crush or chew. Continue to hold until   improvement in sCre   folic acid 1 mg tablet; Commonly known as: Folvite   terazosin 2 mg capsule; Commonly known as: Hytrin     STOP taking these medications     acetaminophen 325 mg tablet; Commonly known as: Tylenol   lisinopril 40 mg tablet   oxygen gas therapy; Commonly known as: O2   traZODone 150 mg tablet; Commonly known as: Desyrel       Outpatient Follow-Up  No future appointments.    Chilango Emerson, APRN-CNP, DNP

## 2025-03-19 NOTE — CARE PLAN
Problem: Fall/Injury  Goal: Not fall by end of shift  Outcome: Met  Goal: Be free from injury by end of the shift  Outcome: Met  Goal: Verbalize understanding of personal risk factors for fall in the hospital  Outcome: Met  Goal: Verbalize understanding of risk factor reduction measures to prevent injury from fall in the home  Outcome: Met  Goal: Use assistive devices by end of the shift  Outcome: Met  Goal: Pace activities to prevent fatigue by end of the shift  Outcome: Met     Problem: Pain  Goal: Takes deep breaths with improved pain control throughout the shift  Outcome: Met  Goal: Turns in bed with improved pain control throughout the shift  Outcome: Met  Goal: Walks with improved pain control throughout the shift  Outcome: Met  Goal: Performs ADL's with improved pain control throughout shift  Outcome: Met  Goal: Participates in PT with improved pain control throughout the shift  Outcome: Met  Goal: Free from opioid side effects throughout the shift  Outcome: Met  Goal: Free from acute confusion related to pain meds throughout the shift  Outcome: Met     Problem: Respiratory  Goal: Clear secretions with interventions this shift  Outcome: Met  Goal: Minimize anxiety/maximize coping throughout shift  Outcome: Met  Goal: Minimal/no exertional discomfort or dyspnea this shift  Outcome: Met  Goal: No signs of respiratory distress (eg. Use of accessory muscles. Peds grunting)  Outcome: Met  Goal: Patent airway maintained this shift  Outcome: Met  Goal: Tolerate mechanical ventilation evidenced by VS/agitation level this shift  Outcome: Met  Goal: Tolerate pulmonary toileting this shift  Outcome: Met  Goal: Verbalize decreased shortness of breath this shift  Outcome: Met  Goal: Wean oxygen to maintain O2 saturation per order/standard this shift  Outcome: Met  Goal: Increase self care and/or family involvement in next 24 hours  Outcome: Met     Problem: Pain - Adult  Goal: Verbalizes/displays adequate comfort level  or baseline comfort level  Outcome: Met     Problem: Safety - Adult  Goal: Free from fall injury  Outcome: Met     Problem: Discharge Planning  Goal: Discharge to home or other facility with appropriate resources  Outcome: Met     Problem: Chronic Conditions and Co-morbidities  Goal: Patient's chronic conditions and co-morbidity symptoms are monitored and maintained or improved  Outcome: Met     Problem: Nutrition  Goal: Nutrient intake appropriate for maintaining nutritional needs  Outcome: Met     Problem: Skin  Goal: Decreased wound size/increased tissue granulation at next dressing change  Outcome: Met  Goal: Participates in plan/prevention/treatment measures  Outcome: Met  Goal: Prevent/manage excess moisture  Outcome: Met  Goal: Prevent/minimize sheer/friction injuries  Outcome: Met  Goal: Promote/optimize nutrition  Outcome: Met  Goal: Promote skin healing  Outcome: Met

## 2025-04-10 PROCEDURE — RXMED WILLOW AMBULATORY MEDICATION CHARGE

## 2025-04-14 ENCOUNTER — PHARMACY VISIT (OUTPATIENT)
Dept: PHARMACY | Facility: CLINIC | Age: 80
End: 2025-04-14
Payer: COMMERCIAL

## 2025-06-17 PROCEDURE — RXMED WILLOW AMBULATORY MEDICATION CHARGE

## 2025-06-18 ENCOUNTER — PHARMACY VISIT (OUTPATIENT)
Dept: PHARMACY | Facility: CLINIC | Age: 80
End: 2025-06-18
Payer: COMMERCIAL